# Patient Record
Sex: MALE | Race: BLACK OR AFRICAN AMERICAN | Employment: OTHER | URBAN - NONMETROPOLITAN AREA
[De-identification: names, ages, dates, MRNs, and addresses within clinical notes are randomized per-mention and may not be internally consistent; named-entity substitution may affect disease eponyms.]

---

## 2017-04-04 RX ORDER — TAMSULOSIN HYDROCHLORIDE 0.4 MG/1
CAPSULE ORAL
Qty: 90 CAPSULE | Refills: 0 | OUTPATIENT
Start: 2017-04-04

## 2017-06-22 RX ORDER — METFORMIN HYDROCHLORIDE 500 MG/1
TABLET, EXTENDED RELEASE ORAL
Qty: 180 TABLET | Refills: 0 | OUTPATIENT
Start: 2017-06-22

## 2017-11-28 ENCOUNTER — OFFICE VISIT (OUTPATIENT)
Dept: FAMILY MEDICINE CLINIC | Age: 72
End: 2017-11-28
Payer: MEDICARE

## 2017-11-28 VITALS
HEART RATE: 84 BPM | DIASTOLIC BLOOD PRESSURE: 76 MMHG | BODY MASS INDEX: 42.01 KG/M2 | WEIGHT: 277.2 LBS | SYSTOLIC BLOOD PRESSURE: 138 MMHG | HEIGHT: 68 IN

## 2017-11-28 DIAGNOSIS — K21.9 GASTROESOPHAGEAL REFLUX DISEASE WITHOUT ESOPHAGITIS: ICD-10-CM

## 2017-11-28 DIAGNOSIS — I10 ESSENTIAL HYPERTENSION: Primary | ICD-10-CM

## 2017-11-28 DIAGNOSIS — E11.69 TYPE 2 DIABETES MELLITUS WITH OTHER SPECIFIED COMPLICATION, UNSPECIFIED LONG TERM INSULIN USE STATUS: ICD-10-CM

## 2017-11-28 DIAGNOSIS — K21.9 GASTROESOPHAGEAL REFLUX DISEASE, ESOPHAGITIS PRESENCE NOT SPECIFIED: ICD-10-CM

## 2017-11-28 DIAGNOSIS — N40.0 ENLARGED PROSTATE: ICD-10-CM

## 2017-11-28 DIAGNOSIS — Z23 FLU VACCINE NEED: ICD-10-CM

## 2017-11-28 DIAGNOSIS — E78.2 MIXED HYPERLIPIDEMIA: ICD-10-CM

## 2017-11-28 PROCEDURE — 3017F COLORECTAL CA SCREEN DOC REV: CPT | Performed by: NURSE PRACTITIONER

## 2017-11-28 PROCEDURE — 4040F PNEUMOC VAC/ADMIN/RCVD: CPT | Performed by: NURSE PRACTITIONER

## 2017-11-28 PROCEDURE — 99213 OFFICE O/P EST LOW 20 MIN: CPT | Performed by: NURSE PRACTITIONER

## 2017-11-28 PROCEDURE — 90662 IIV NO PRSV INCREASED AG IM: CPT | Performed by: NURSE PRACTITIONER

## 2017-11-28 PROCEDURE — 1123F ACP DISCUSS/DSCN MKR DOCD: CPT | Performed by: NURSE PRACTITIONER

## 2017-11-28 PROCEDURE — G8417 CALC BMI ABV UP PARAM F/U: HCPCS | Performed by: NURSE PRACTITIONER

## 2017-11-28 PROCEDURE — G8484 FLU IMMUNIZE NO ADMIN: HCPCS | Performed by: NURSE PRACTITIONER

## 2017-11-28 PROCEDURE — G8427 DOCREV CUR MEDS BY ELIG CLIN: HCPCS | Performed by: NURSE PRACTITIONER

## 2017-11-28 PROCEDURE — 1036F TOBACCO NON-USER: CPT | Performed by: NURSE PRACTITIONER

## 2017-11-28 PROCEDURE — 3046F HEMOGLOBIN A1C LEVEL >9.0%: CPT | Performed by: NURSE PRACTITIONER

## 2017-11-28 PROCEDURE — G0008 ADMIN INFLUENZA VIRUS VAC: HCPCS | Performed by: NURSE PRACTITIONER

## 2017-11-28 RX ORDER — ATORVASTATIN CALCIUM 20 MG/1
20 TABLET, FILM COATED ORAL DAILY
COMMUNITY
End: 2017-11-28 | Stop reason: SDUPTHER

## 2017-11-28 RX ORDER — METFORMIN HYDROCHLORIDE 500 MG/1
TABLET, EXTENDED RELEASE ORAL
Qty: 180 TABLET | Refills: 1 | Status: SHIPPED | OUTPATIENT
Start: 2017-11-28 | End: 2018-05-04 | Stop reason: SDUPTHER

## 2017-11-28 RX ORDER — TAMSULOSIN HYDROCHLORIDE 0.4 MG/1
CAPSULE ORAL
Qty: 90 CAPSULE | Refills: 1 | Status: SHIPPED | OUTPATIENT
Start: 2017-11-28 | End: 2018-10-23 | Stop reason: SDUPTHER

## 2017-11-28 RX ORDER — ATORVASTATIN CALCIUM 20 MG/1
20 TABLET, FILM COATED ORAL DAILY
Qty: 90 TABLET | Refills: 1 | Status: SHIPPED | OUTPATIENT
Start: 2017-11-28 | End: 2018-05-10 | Stop reason: SDUPTHER

## 2017-11-28 RX ORDER — RANITIDINE 150 MG/1
TABLET ORAL
Qty: 180 TABLET | Refills: 1 | Status: SHIPPED | OUTPATIENT
Start: 2017-11-28 | End: 2018-05-27 | Stop reason: SDUPTHER

## 2017-11-28 RX ORDER — OMEPRAZOLE 40 MG/1
40 CAPSULE, DELAYED RELEASE ORAL DAILY
COMMUNITY
End: 2018-04-30 | Stop reason: SDUPTHER

## 2017-11-28 RX ORDER — VALSARTAN AND HYDROCHLOROTHIAZIDE 160; 25 MG/1; MG/1
TABLET ORAL
Qty: 90 TABLET | Refills: 1 | Status: SHIPPED | OUTPATIENT
Start: 2017-11-28 | End: 2018-05-04 | Stop reason: SDUPTHER

## 2017-11-28 ASSESSMENT — PATIENT HEALTH QUESTIONNAIRE - PHQ9
1. LITTLE INTEREST OR PLEASURE IN DOING THINGS: 0
SUM OF ALL RESPONSES TO PHQ QUESTIONS 1-9: 1
SUM OF ALL RESPONSES TO PHQ9 QUESTIONS 1 & 2: 1
2. FEELING DOWN, DEPRESSED OR HOPELESS: 1

## 2017-11-28 NOTE — PROGRESS NOTES
abdominal pain, diarrhea, constipation and blood in stool. GERD well controlled    History of hepatic hemangioma which remained stable after several CT scans and therefore he does not require any further. Endocrine:        Type II diabetes stable-labs today     Genitourinary: Negative for hematuria. Benign prostatic hypertrophy and Flomax is working well   Musculoskeletal: Negative for myalgias, back pain, joint swelling and arthralgias. Skin: Negative for rash. Neurological: Negative for dizziness, seizures, syncope, facial asymmetry, weakness, light-headedness, numbness and headaches. History of diabetic peripheral neuropathy stable   Hematological:        Iron deficiency anemia    Psychiatric/Behavioral: Negative for behavioral problems, sleep disturbance and dysphoric mood. The patient is not nervous/anxious. All other systems reviewed and are negative. Objective:   Physical Exam   Constitutional: He is oriented to person, place, and time. He appears well-developed and well-nourished. No distress. Alert and oriented ×3 no acute distress very pleasant morbidly obese 76-year-old male who is age-appropriate   HENT:   Head: Normocephalic and atraumatic. Right Ear: Tympanic membrane, external ear and ear canal normal.   Left Ear: Tympanic membrane, external ear and ear canal normal.   Nose: Nose normal.   Mouth/Throat: Uvula is midline and oropharynx is clear and moist. No oropharyngeal exudate. Neck: Normal range of motion. Neck supple. No JVD present. Carotid bruit is not present. No thyromegaly present. Cardiovascular: Normal rate, regular rhythm and normal heart sounds. Exam reveals no gallop and no friction rub. No murmur heard. Pulmonary/Chest: Effort normal and breath sounds normal. No respiratory distress. He has no wheezes. He has no rales. Abdominal: Soft. Bowel sounds are normal. He exhibits no distension. There is no tenderness.    Obese   Musculoskeletal:

## 2017-12-13 DIAGNOSIS — E11.42 DM TYPE 2 WITH DIABETIC PERIPHERAL NEUROPATHY (HCC): ICD-10-CM

## 2018-01-02 ENCOUNTER — OFFICE VISIT (OUTPATIENT)
Dept: OPTOMETRY | Age: 73
End: 2018-01-02
Payer: MEDICARE

## 2018-01-02 DIAGNOSIS — E11.9 NON-INSULIN DEPENDENT TYPE 2 DIABETES MELLITUS (HCC): Primary | ICD-10-CM

## 2018-01-02 DIAGNOSIS — H52.03 HYPEROPIA OF BOTH EYES WITH ASTIGMATISM AND PRESBYOPIA: ICD-10-CM

## 2018-01-02 DIAGNOSIS — H52.4 HYPEROPIA OF BOTH EYES WITH ASTIGMATISM AND PRESBYOPIA: ICD-10-CM

## 2018-01-02 DIAGNOSIS — H52.203 HYPEROPIA OF BOTH EYES WITH ASTIGMATISM AND PRESBYOPIA: ICD-10-CM

## 2018-01-02 DIAGNOSIS — H25.13 NUCLEAR SCLEROSIS OF BOTH EYES: ICD-10-CM

## 2018-01-02 PROCEDURE — 99203 OFFICE O/P NEW LOW 30 MIN: CPT | Performed by: OPTOMETRIST

## 2018-01-02 RX ORDER — TROPICAMIDE 10 MG/ML
1 SOLUTION/ DROPS OPHTHALMIC ONCE
Status: COMPLETED | OUTPATIENT
Start: 2018-01-02 | End: 2018-01-02

## 2018-01-02 RX ORDER — PHENYLEPHRINE HCL 2.5 %
1 DROPS OPHTHALMIC (EYE) ONCE
Status: COMPLETED | OUTPATIENT
Start: 2018-01-02 | End: 2018-01-02

## 2018-01-02 RX ORDER — BENOXINATE HCL/FLUORESCEIN SOD 0.4%-0.25%
1 DROPS OPHTHALMIC (EYE) ONCE
Status: COMPLETED | OUTPATIENT
Start: 2018-01-02 | End: 2018-01-02

## 2018-01-02 RX ADMIN — Medication 1 DROP: at 14:21

## 2018-01-02 RX ADMIN — TROPICAMIDE 1 DROP: 10 SOLUTION/ DROPS OPHTHALMIC at 14:21

## 2018-01-02 ASSESSMENT — REFRACTION_MANIFEST
OD_AXIS: 095
OD_ADD: +2.25
OS_AXIS: 080
OD_CYLINDER: -0.75
OS_CYLINDER: -1.00
OS_ADD: +2.25
OS_SPHERE: +0.50
OD_SPHERE: +0.75

## 2018-01-02 ASSESSMENT — TONOMETRY
OD_IOP_MMHG: 17
IOP_METHOD: APPLANATION W FLURESS DROP
OS_IOP_MMHG: 17

## 2018-01-02 ASSESSMENT — VISUAL ACUITY
OS_SC+: -1
OS_SC: 20/40
OD_SC: 20/50 OU
METHOD: SNELLEN - LINEAR
OD_SC: 20/30

## 2018-01-02 ASSESSMENT — SLIT LAMP EXAM - LIDS
COMMENTS: NORMAL
COMMENTS: NORMAL

## 2018-01-02 NOTE — PROGRESS NOTES
-1.00 080 +2.25    Type:  fill only if deisred     Expiration Date:  1/3/2020            No diabetic retinopathy   1. Non-insulin dependent type 2 diabetes mellitus ([facility]    2. Hyperopia of both eyes with astigmatism and presbyopia    3.  Nuclear sclerosis of both eyes             Patient Instructions   Keep yearly appointments because of diabetes        Return in about 1 year (around 1/2/2019) for complete eye exam.

## 2018-01-04 ENCOUNTER — OFFICE VISIT (OUTPATIENT)
Dept: PODIATRY | Age: 73
End: 2018-01-04
Payer: MEDICARE

## 2018-01-04 VITALS
HEIGHT: 65 IN | DIASTOLIC BLOOD PRESSURE: 80 MMHG | SYSTOLIC BLOOD PRESSURE: 130 MMHG | BODY MASS INDEX: 42.25 KG/M2 | HEART RATE: 70 BPM | WEIGHT: 253.6 LBS

## 2018-01-04 DIAGNOSIS — E11.42 DM TYPE 2 WITH DIABETIC PERIPHERAL NEUROPATHY (HCC): ICD-10-CM

## 2018-01-04 DIAGNOSIS — M20.42 HAMMER TOES OF BOTH FEET: ICD-10-CM

## 2018-01-04 DIAGNOSIS — B35.1 DERMATOPHYTOSIS OF NAIL: Primary | ICD-10-CM

## 2018-01-04 DIAGNOSIS — M20.41 HAMMER TOES OF BOTH FEET: ICD-10-CM

## 2018-01-04 PROCEDURE — 1036F TOBACCO NON-USER: CPT | Performed by: PODIATRIST

## 2018-01-04 PROCEDURE — 3046F HEMOGLOBIN A1C LEVEL >9.0%: CPT | Performed by: PODIATRIST

## 2018-01-04 PROCEDURE — 1123F ACP DISCUSS/DSCN MKR DOCD: CPT | Performed by: PODIATRIST

## 2018-01-04 PROCEDURE — 4040F PNEUMOC VAC/ADMIN/RCVD: CPT | Performed by: PODIATRIST

## 2018-01-04 PROCEDURE — 11721 DEBRIDE NAIL 6 OR MORE: CPT | Performed by: PODIATRIST

## 2018-01-04 PROCEDURE — 99213 OFFICE O/P EST LOW 20 MIN: CPT | Performed by: PODIATRIST

## 2018-01-04 PROCEDURE — 3017F COLORECTAL CA SCREEN DOC REV: CPT | Performed by: PODIATRIST

## 2018-01-04 PROCEDURE — G8484 FLU IMMUNIZE NO ADMIN: HCPCS | Performed by: PODIATRIST

## 2018-01-04 PROCEDURE — G8417 CALC BMI ABV UP PARAM F/U: HCPCS | Performed by: PODIATRIST

## 2018-01-04 PROCEDURE — G8427 DOCREV CUR MEDS BY ELIG CLIN: HCPCS | Performed by: PODIATRIST

## 2018-01-04 NOTE — PROGRESS NOTES
Foot Care Worksheet  PCP: Tracy Mendoza NP  Last visit: 11/28/17    Nail description:  Thick , Yellow , Crumbly , Marked limitation of ambulation     Pain resulting from thickened and dystrophy of nail plate No    Nails involved  Right   1, 2, 3, 4, 5  (T5-T9)  Left     1, 2, 3, 4, 5  (TA-T4)    Q7 1 Class A Finding - Non traumatic amputation of foot No    Q8 2 Class B Findings - Absent DP pulse No, Absent PT pulse No, Advanced tropic changes (3 required) Yes    Decrease hair growth Yes, Nail changes/thickening Yes, Pigmented changes/discoloration Yes, Skin texture (thin, shiny) Yes, Skin color (rubor/redness) No    Q9 1 Class B and 2 Class C Findings  Claudication No, Temperature change No, Paresthesia No, Burning Yes, Edema Yes

## 2018-01-22 DIAGNOSIS — E11.42 TYPE 2 DIABETES MELLITUS WITH POLYNEUROPATHY (HCC): ICD-10-CM

## 2018-02-23 DIAGNOSIS — E11.69 TYPE 2 DIABETES MELLITUS WITH OTHER SPECIFIED COMPLICATION, UNSPECIFIED LONG TERM INSULIN USE STATUS: Primary | ICD-10-CM

## 2018-02-23 RX ORDER — LANCETS 30 GAUGE
EACH MISCELLANEOUS
Qty: 100 EACH | Refills: 3 | Status: SHIPPED | OUTPATIENT
Start: 2018-02-23 | End: 2018-05-04 | Stop reason: SDUPTHER

## 2018-03-13 ENCOUNTER — OFFICE VISIT (OUTPATIENT)
Dept: FAMILY MEDICINE CLINIC | Age: 73
End: 2018-03-13
Payer: MEDICARE

## 2018-03-13 VITALS
SYSTOLIC BLOOD PRESSURE: 130 MMHG | HEIGHT: 65 IN | WEIGHT: 292.6 LBS | DIASTOLIC BLOOD PRESSURE: 70 MMHG | BODY MASS INDEX: 48.75 KG/M2 | HEART RATE: 72 BPM

## 2018-03-13 DIAGNOSIS — E66.01 OBESITY, MORBID (HCC): Primary | ICD-10-CM

## 2018-03-13 DIAGNOSIS — E11.42 DM TYPE 2 WITH DIABETIC PERIPHERAL NEUROPATHY (HCC): ICD-10-CM

## 2018-03-13 PROCEDURE — G8427 DOCREV CUR MEDS BY ELIG CLIN: HCPCS | Performed by: NURSE PRACTITIONER

## 2018-03-13 PROCEDURE — 1123F ACP DISCUSS/DSCN MKR DOCD: CPT | Performed by: NURSE PRACTITIONER

## 2018-03-13 PROCEDURE — 3046F HEMOGLOBIN A1C LEVEL >9.0%: CPT | Performed by: NURSE PRACTITIONER

## 2018-03-13 PROCEDURE — 3017F COLORECTAL CA SCREEN DOC REV: CPT | Performed by: NURSE PRACTITIONER

## 2018-03-13 PROCEDURE — 4040F PNEUMOC VAC/ADMIN/RCVD: CPT | Performed by: NURSE PRACTITIONER

## 2018-03-13 PROCEDURE — G8482 FLU IMMUNIZE ORDER/ADMIN: HCPCS | Performed by: NURSE PRACTITIONER

## 2018-03-13 PROCEDURE — 99213 OFFICE O/P EST LOW 20 MIN: CPT | Performed by: NURSE PRACTITIONER

## 2018-03-13 PROCEDURE — 1036F TOBACCO NON-USER: CPT | Performed by: NURSE PRACTITIONER

## 2018-03-13 PROCEDURE — G8417 CALC BMI ABV UP PARAM F/U: HCPCS | Performed by: NURSE PRACTITIONER

## 2018-03-13 ASSESSMENT — ENCOUNTER SYMPTOMS
DIARRHEA: 0
SHORTNESS OF BREATH: 0
CONSTIPATION: 0
WHEEZING: 0

## 2018-03-13 NOTE — PROGRESS NOTES
Subjective:      Patient ID: Gonzalo Henao is a 75 y.o. male. Pt presents to the clinic to discuss weight gain. Pt states that he was 79 in January when he was in to see Dr. Katia Arnett and today he is 89. He states that he rides the bike daily at the [facility]. He met with the dietician several time while he lived in [place] and thinks that he would benefit from seeing another one. He has not been watching what he eats as closely as he was doing. He states that he notices when he gains the weight his back hurts more and he becomes a little more short of breath. He denies chest pain. He denies lower leg edema. He has no further concerns at this time. Past Medical History:   Diagnosis Date    Anemia     chronic, negative work up with EGD and Colonoscopy.  DJD (degenerative joint disease) of lumbar spine     Dry eye syndrome     GERD (gastroesophageal reflux disease)     Hepatic hemangioma     Hypertension     Keratitis     Secondary to dry eye syndrome.  Nuclear sclerotic cataract of both eyes     Obesity, morbid ([address]     Obstructive sleep apnea of adult     non compliant with cpap    Onychomycosis     1, 2, 3, 4, and 5, bilateral.    Type II or unspecified type diabetes mellitus without mention of complication, not stated as uncontrolled        Past Surgical History:   Procedure Laterality Date    COLONOSCOPY  09/15/2006    Diffuse diverticulosis slightly greater in the sigmoid colon and grade 1 internal hemorrhoids.  CYST REMOVAL      right side of  scalp    UMBILICAL HERNIA REPAIR  2013    UPPER GASTROINTESTINAL ENDOSCOPY  09/15/2006    Mild to moderate diffuse gastritis, mild to moderate diffuse duodenitis.        Social History     Social History    Marital status: Single     Spouse name: N/A    Number of children: N/A    Years of education: N/A     Social History Main Topics    Smoking status: Former Smoker     Years: 15.00     Types: Cigars     Quit date: 12/1/1970    Smokeless tobacco: Never Used      Comment: quit over 30 years ago    Alcohol use No      Comment: no alcohol for many years    Drug use: No    Sexual activity: Not Asked     Other Topics Concern    None     Social History Narrative    None       Family History   Problem Relation Age of Onset    Ovarian Cancer Mother     High Blood Pressure Sister     Diabetes Sister       at 76    High Blood Pressure Brother      all 4 brothers have it    Stroke Brother     Heart Attack Father      Acute myocardial infarction.  High Blood Pressure Brother     Stroke Brother     Dementia Sister       at 76    Cataracts Neg Hx     Glaucoma Neg Hx        No Known Allergies    Current Outpatient Prescriptions   Medication Sig Dispense Refill    valsartan-hydrochlorothiazide (DIOVAN-HCT) 160-25 MG per tablet TAKE 1 TABLET DAILY 90 tablet 1    metFORMIN (GLUCOPHAGE-XR) 500 MG extended release tablet TAKE 1 TABLET TWICE A  tablet 1    tamsulosin (FLOMAX) 0.4 MG capsule TAKE 1 CAPSULE DAILY 90 capsule 1    ranitidine (ZANTAC) 150 MG tablet TAKE 1 TABLET TWICE A  tablet 1    atorvastatin (LIPITOR) 20 MG tablet Take 1 tablet by mouth daily 90 tablet 1    Lancets MISC Check blood sugar daily 100 each 3    glucose blood VI test strips (GLUCOCARD VITAL TEST) strip 1 each by In Vitro route 2 times daily 100 each 1    Foot Care Products (DIABETIC INSOLES) MISC 1 Units by Does not apply route daily DM type 2 with diabetic peripheral neuropathy (HCC)  (primary encounter diagnosis)  Dermatophytosis of nail 3 each 0    Diabetic Shoe MISC by Does not apply route DM type 2 with diabetic peripheral neuropathy (HCC)  (primary encounter diagnosis)  Dermatophytosis of nail 1 each 0    Blood Glucose Monitoring Suppl KENA Check blood sugar daily.  1 Device 0    omeprazole (PRILOSEC) 40 MG delayed release capsule Take 40 mg by mouth daily      VIAGRA 100 MG tablet TAKE ONE-HALF TO ONE TABLET BY MOUTH AS

## 2018-03-23 DIAGNOSIS — E11.42 TYPE 2 DIABETES MELLITUS WITH POLYNEUROPATHY (HCC): ICD-10-CM

## 2018-04-09 ENCOUNTER — TELEPHONE (OUTPATIENT)
Dept: PODIATRY | Age: 73
End: 2018-04-09

## 2018-04-30 DIAGNOSIS — K21.9 GASTROESOPHAGEAL REFLUX DISEASE WITHOUT ESOPHAGITIS: Primary | ICD-10-CM

## 2018-04-30 RX ORDER — OMEPRAZOLE 40 MG/1
40 CAPSULE, DELAYED RELEASE ORAL DAILY
Qty: 90 CAPSULE | Refills: 1 | Status: SHIPPED | OUTPATIENT
Start: 2018-04-30 | End: 2018-10-27 | Stop reason: SDUPTHER

## 2018-05-04 DIAGNOSIS — E11.69 TYPE 2 DIABETES MELLITUS WITH OTHER SPECIFIED COMPLICATION, UNSPECIFIED LONG TERM INSULIN USE STATUS: ICD-10-CM

## 2018-05-04 DIAGNOSIS — I10 ESSENTIAL HYPERTENSION: ICD-10-CM

## 2018-05-04 RX ORDER — METFORMIN HYDROCHLORIDE 500 MG/1
TABLET, EXTENDED RELEASE ORAL
Qty: 180 TABLET | Refills: 1 | Status: SHIPPED | OUTPATIENT
Start: 2018-05-04 | End: 2018-10-23 | Stop reason: SDUPTHER

## 2018-05-04 RX ORDER — LANCETS 30 GAUGE
EACH MISCELLANEOUS
Qty: 100 EACH | Refills: 3 | Status: SHIPPED | OUTPATIENT
Start: 2018-05-04 | End: 2018-09-26 | Stop reason: SDUPTHER

## 2018-05-04 RX ORDER — VALSARTAN AND HYDROCHLOROTHIAZIDE 160; 25 MG/1; MG/1
TABLET ORAL
Qty: 90 TABLET | Refills: 1 | Status: SHIPPED | OUTPATIENT
Start: 2018-05-04 | End: 2018-10-23 | Stop reason: SDUPTHER

## 2018-05-08 DIAGNOSIS — E11.42 DM TYPE 2 WITH DIABETIC PERIPHERAL NEUROPATHY (HCC): ICD-10-CM

## 2018-05-10 DIAGNOSIS — E78.2 MIXED HYPERLIPIDEMIA: ICD-10-CM

## 2018-05-10 RX ORDER — ATORVASTATIN CALCIUM 20 MG/1
TABLET, FILM COATED ORAL
Qty: 90 TABLET | Refills: 1 | Status: SHIPPED | OUTPATIENT
Start: 2018-05-10 | End: 2018-10-23 | Stop reason: SDUPTHER

## 2018-05-22 ENCOUNTER — HOSPITAL ENCOUNTER (OUTPATIENT)
Dept: LAB | Age: 73
Setting detail: SPECIMEN
Discharge: HOME | End: 2018-05-22
Payer: MEDICARE

## 2018-05-22 ENCOUNTER — OFFICE VISIT (OUTPATIENT)
Dept: PODIATRY | Age: 73
End: 2018-05-22
Payer: MEDICARE

## 2018-05-22 VITALS
SYSTOLIC BLOOD PRESSURE: 132 MMHG | DIASTOLIC BLOOD PRESSURE: 74 MMHG | BODY MASS INDEX: 49.12 KG/M2 | HEIGHT: 65 IN | WEIGHT: 294.8 LBS | HEART RATE: 76 BPM

## 2018-05-22 DIAGNOSIS — I10 ESSENTIAL HYPERTENSION: ICD-10-CM

## 2018-05-22 DIAGNOSIS — E78.2 MIXED HYPERLIPIDEMIA: ICD-10-CM

## 2018-05-22 DIAGNOSIS — E11.69 TYPE 2 DIABETES MELLITUS WITH OTHER SPECIFIED COMPLICATION, UNSPECIFIED LONG TERM INSULIN USE STATUS: ICD-10-CM

## 2018-05-22 DIAGNOSIS — B35.1 DERMATOPHYTOSIS OF NAIL: ICD-10-CM

## 2018-05-22 DIAGNOSIS — E11.42 DM TYPE 2 WITH DIABETIC PERIPHERAL NEUROPATHY (HCC): Primary | ICD-10-CM

## 2018-05-22 LAB
ALBUMIN SERPL-MCNC: 3.9 G/DL (ref 3.5–5.2)
ALBUMIN/GLOBULIN RATIO: 0.9 (ref 1–2.5)
ALP BLD-CCNC: 112 U/L (ref 40–129)
ALT SERPL-CCNC: 14 U/L (ref 5–41)
ANION GAP SERPL CALCULATED.3IONS-SCNC: 11 MMOL/L (ref 9–17)
AST SERPL-CCNC: 14 U/L
BILIRUB SERPL-MCNC: 0.72 MG/DL (ref 0.3–1.2)
BUN BLDV-MCNC: 11 MG/DL (ref 8–23)
BUN/CREAT BLD: 11 (ref 9–20)
CALCIUM SERPL-MCNC: 9.4 MG/DL (ref 8.6–10.4)
CHLORIDE BLD-SCNC: 100 MMOL/L (ref 98–107)
CHOLESTEROL/HDL RATIO: 2.4
CHOLESTEROL: 102 MG/DL
CO2: 30 MMOL/L (ref 20–31)
CREAT SERPL-MCNC: 1.04 MG/DL (ref 0.7–1.2)
CREATININE URINE: 154.9 MG/DL (ref 39–259)
ESTIMATED AVERAGE GLUCOSE: 137 MG/DL
GFR AFRICAN AMERICAN: >60 ML/MIN
GFR NON-AFRICAN AMERICAN: >60 ML/MIN
GFR SERPL CREATININE-BSD FRML MDRD: ABNORMAL ML/MIN/{1.73_M2}
GFR SERPL CREATININE-BSD FRML MDRD: ABNORMAL ML/MIN/{1.73_M2}
GLUCOSE BLD-MCNC: 129 MG/DL (ref 70–99)
HBA1C MFR BLD: 6.4 % (ref 4.8–5.9)
HDLC SERPL-MCNC: 42 MG/DL
LDL CHOLESTEROL: 15 MG/DL (ref 0–130)
MICROALBUMIN/CREAT 24H UR: 79 MG/L
MICROALBUMIN/CREAT UR-RTO: 51 MCG/MG CREAT
POTASSIUM SERPL-SCNC: 3.9 MMOL/L (ref 3.7–5.3)
SODIUM BLD-SCNC: 141 MMOL/L (ref 135–144)
TOTAL PROTEIN: 8.1 G/DL (ref 6.4–8.3)
TRIGL SERPL-MCNC: 227 MG/DL
VLDLC SERPL CALC-MCNC: ABNORMAL MG/DL (ref 1–30)

## 2018-05-22 PROCEDURE — 36415 COLL VENOUS BLD VENIPUNCTURE: CPT

## 2018-05-22 PROCEDURE — 82570 ASSAY OF URINE CREATININE: CPT

## 2018-05-22 PROCEDURE — 82043 UR ALBUMIN QUANTITATIVE: CPT

## 2018-05-22 PROCEDURE — 83036 HEMOGLOBIN GLYCOSYLATED A1C: CPT

## 2018-05-22 PROCEDURE — 80061 LIPID PANEL: CPT

## 2018-05-22 PROCEDURE — 11721 DEBRIDE NAIL 6 OR MORE: CPT | Performed by: PODIATRIST

## 2018-05-22 PROCEDURE — 99999 PR OFFICE/OUTPT VISIT,PROCEDURE ONLY: CPT | Performed by: PODIATRIST

## 2018-05-22 PROCEDURE — 80053 COMPREHEN METABOLIC PANEL: CPT

## 2018-05-27 DIAGNOSIS — K21.9 GASTROESOPHAGEAL REFLUX DISEASE, ESOPHAGITIS PRESENCE NOT SPECIFIED: ICD-10-CM

## 2018-05-27 DIAGNOSIS — K21.9 GASTROESOPHAGEAL REFLUX DISEASE WITHOUT ESOPHAGITIS: ICD-10-CM

## 2018-05-29 ENCOUNTER — OFFICE VISIT (OUTPATIENT)
Dept: FAMILY MEDICINE CLINIC | Age: 73
End: 2018-05-29
Payer: MEDICARE

## 2018-05-29 VITALS
HEART RATE: 72 BPM | BODY MASS INDEX: 48.32 KG/M2 | DIASTOLIC BLOOD PRESSURE: 80 MMHG | HEIGHT: 65 IN | SYSTOLIC BLOOD PRESSURE: 130 MMHG | WEIGHT: 290 LBS

## 2018-05-29 DIAGNOSIS — Z12.11 ENCOUNTER FOR SCREENING COLONOSCOPY: ICD-10-CM

## 2018-05-29 DIAGNOSIS — G47.33 OBSTRUCTIVE SLEEP APNEA OF ADULT: ICD-10-CM

## 2018-05-29 DIAGNOSIS — I10 ESSENTIAL HYPERTENSION: ICD-10-CM

## 2018-05-29 DIAGNOSIS — Z00.00 ROUTINE GENERAL MEDICAL EXAMINATION AT A HEALTH CARE FACILITY: Primary | ICD-10-CM

## 2018-05-29 DIAGNOSIS — E11.42 DM TYPE 2 WITH DIABETIC PERIPHERAL NEUROPATHY (HCC): ICD-10-CM

## 2018-05-29 DIAGNOSIS — E66.01 OBESITY, MORBID (HCC): ICD-10-CM

## 2018-05-29 DIAGNOSIS — G31.84 MILD COGNITIVE IMPAIRMENT: ICD-10-CM

## 2018-05-29 PROCEDURE — G8417 CALC BMI ABV UP PARAM F/U: HCPCS | Performed by: NURSE PRACTITIONER

## 2018-05-29 PROCEDURE — G0438 PPPS, INITIAL VISIT: HCPCS | Performed by: NURSE PRACTITIONER

## 2018-05-29 PROCEDURE — 2022F DILAT RTA XM EVC RTNOPTHY: CPT | Performed by: NURSE PRACTITIONER

## 2018-05-29 PROCEDURE — 1036F TOBACCO NON-USER: CPT | Performed by: NURSE PRACTITIONER

## 2018-05-29 PROCEDURE — 3044F HG A1C LEVEL LT 7.0%: CPT | Performed by: NURSE PRACTITIONER

## 2018-05-29 PROCEDURE — 99214 OFFICE O/P EST MOD 30 MIN: CPT | Performed by: NURSE PRACTITIONER

## 2018-05-29 PROCEDURE — 1123F ACP DISCUSS/DSCN MKR DOCD: CPT | Performed by: NURSE PRACTITIONER

## 2018-05-29 PROCEDURE — 3017F COLORECTAL CA SCREEN DOC REV: CPT | Performed by: NURSE PRACTITIONER

## 2018-05-29 PROCEDURE — 4040F PNEUMOC VAC/ADMIN/RCVD: CPT | Performed by: NURSE PRACTITIONER

## 2018-05-29 PROCEDURE — G8427 DOCREV CUR MEDS BY ELIG CLIN: HCPCS | Performed by: NURSE PRACTITIONER

## 2018-05-29 RX ORDER — RANITIDINE 150 MG/1
TABLET ORAL
Qty: 180 TABLET | Refills: 1 | Status: SHIPPED | OUTPATIENT
Start: 2018-05-29 | End: 2018-11-25 | Stop reason: SDUPTHER

## 2018-05-29 ASSESSMENT — PATIENT HEALTH QUESTIONNAIRE - PHQ9: SUM OF ALL RESPONSES TO PHQ QUESTIONS 1-9: 1

## 2018-05-29 ASSESSMENT — LIFESTYLE VARIABLES: HOW OFTEN DO YOU HAVE A DRINK CONTAINING ALCOHOL: 0

## 2018-05-29 ASSESSMENT — ANXIETY QUESTIONNAIRES: GAD7 TOTAL SCORE: 1

## 2018-05-30 ASSESSMENT — ENCOUNTER SYMPTOMS
CONSTIPATION: 0
VOMITING: 0
NAUSEA: 0
BLURRED VISION: 0
WHEEZING: 0
VISUAL CHANGE: 0
COUGH: 0
SHORTNESS OF BREATH: 0

## 2018-07-20 DIAGNOSIS — E11.42 TYPE 2 DIABETES MELLITUS WITH POLYNEUROPATHY (HCC): ICD-10-CM

## 2018-09-06 ENCOUNTER — OFFICE VISIT (OUTPATIENT)
Dept: PODIATRY | Age: 73
End: 2018-09-06
Payer: MEDICARE

## 2018-09-06 VITALS
BODY MASS INDEX: 49.48 KG/M2 | WEIGHT: 297 LBS | DIASTOLIC BLOOD PRESSURE: 80 MMHG | SYSTOLIC BLOOD PRESSURE: 138 MMHG | HEART RATE: 88 BPM | HEIGHT: 65 IN

## 2018-09-06 DIAGNOSIS — B35.1 DERMATOPHYTOSIS OF NAIL: ICD-10-CM

## 2018-09-06 DIAGNOSIS — E11.42 DM TYPE 2 WITH DIABETIC PERIPHERAL NEUROPATHY (HCC): Primary | ICD-10-CM

## 2018-09-06 PROCEDURE — 99999 PR OFFICE/OUTPT VISIT,PROCEDURE ONLY: CPT | Performed by: PODIATRIST

## 2018-09-06 PROCEDURE — 11721 DEBRIDE NAIL 6 OR MORE: CPT | Performed by: PODIATRIST

## 2018-09-06 NOTE — PROGRESS NOTES
Subjective:  Patient presents to [facility] today for routine diabetic foot care. Patient denies any new problems with their feet. Patient's diabetic control has been not changed. No Known Allergies    Past Medical History:   Diagnosis Date    Anemia     chronic, negative work up with EGD and Colonoscopy.  DJD (degenerative joint disease) of lumbar spine     Dry eye syndrome     GERD (gastroesophageal reflux disease)     Hepatic hemangioma     Hypertension     Keratitis     Secondary to dry eye syndrome.  Nuclear sclerotic cataract of both eyes     Obesity, morbid ([address]     Obstructive sleep apnea of adult     non compliant with cpap    Onychomycosis     1, 2, 3, 4, and 5, bilateral.    Type II or unspecified type diabetes mellitus without mention of complication, not stated as uncontrolled        Prior to Admission medications    Medication Sig Start Date End Date Taking? Authorizing Provider   blood glucose test strips (GLUCOCARD VITAL TEST) strip 1 each by In Vitro route 2 times daily 7/20/18  Yes ROCÍO Medina CNP   ranitidine (ZANTAC) 150 MG tablet TAKE 1 TABLET TWICE A DAY 5/29/18  Yes ROCÍO Medina CNP   Foot Care Products (DIABETIC INSOLES) MISC 1 Units by Does not apply route daily DM type 2 with diabetic peripheral neuropathy ([address]  (primary encounter diagnosis)  Dermatophytosis of nail 5/22/18  Yes Johanny Miranda DPM   Diabetic Shoe MISC by Does not apply route DM type 2 with diabetic peripheral neuropathy ([address]  (primary encounter diagnosis)  Dermatophytosis of nail 5/22/18  Yes Shauna Chopra DPM   atorvastatin (LIPITOR) 20 MG tablet TAKE 1 TABLET DAILY 5/10/18  Yes ROCÍO Medina CNP   Blood Glucose Monitoring Suppl KENA Check blood sugar daily.  5/8/18  Yes ROCÍO Medina CNP   metFORMIN (GLUCOPHAGE-XR) 500 MG extended release tablet TAKE 1 TABLET TWICE A DAY 5/4/18  Yes Daryle Hoyle Muscle strength 5/5, bilateral.  Pain absent upon palpation bilateral. Normal medial longitudinal arch, bilateral.  Ankle ROM within normal limits,bilateral.  1st MPJ ROM within normal limits, bilateral.  Dorsally contracted digits present. No other foot deformities. Integument:  Open lesion absent, Bilateral.  Interdigital maceration absent to web spaces,absent Bilateral.  Nails left 1, 2, 3, 4, 5 and right 1, 2, 3, 4, 5 thickened, dystrophic and crumbly, discolored with subungual debris. Fissures absent, Bilateral. Hyperkeratotic tissue is absent. Assessment:    Diagnosis Orders   1. DM type 2 with diabetic peripheral neuropathy ([address]     2. Dermatophytosis of nail         Plan:  Diabetic foot education and exam.  Nails as mentioned above debrided in length and thickness. Patient advised about OTC treatments for nails and callous. Patient will follow up in 10 weeks for routine foot care or PRN if any further problems arise.

## 2018-09-26 DIAGNOSIS — E11.42 DM TYPE 2 WITH DIABETIC PERIPHERAL NEUROPATHY (HCC): Primary | ICD-10-CM

## 2018-09-26 DIAGNOSIS — E11.69 TYPE 2 DIABETES MELLITUS WITH OTHER SPECIFIED COMPLICATION, UNSPECIFIED WHETHER LONG TERM INSULIN USE (HCC): ICD-10-CM

## 2018-09-27 RX ORDER — LANCETS 30 GAUGE
EACH MISCELLANEOUS
Qty: 100 EACH | Refills: 3 | Status: SHIPPED | OUTPATIENT
Start: 2018-09-27 | End: 2018-12-10 | Stop reason: SDUPTHER

## 2018-10-05 ENCOUNTER — OFFICE VISIT (OUTPATIENT)
Dept: FAMILY MEDICINE CLINIC | Age: 73
End: 2018-10-05
Payer: MEDICARE

## 2018-10-05 VITALS
HEIGHT: 68 IN | DIASTOLIC BLOOD PRESSURE: 70 MMHG | BODY MASS INDEX: 44.71 KG/M2 | SYSTOLIC BLOOD PRESSURE: 132 MMHG | HEART RATE: 78 BPM | WEIGHT: 295 LBS | OXYGEN SATURATION: 94 %

## 2018-10-05 DIAGNOSIS — E66.01 OBESITY, MORBID (HCC): Primary | ICD-10-CM

## 2018-10-05 PROCEDURE — 1036F TOBACCO NON-USER: CPT | Performed by: NURSE PRACTITIONER

## 2018-10-05 PROCEDURE — 99213 OFFICE O/P EST LOW 20 MIN: CPT | Performed by: NURSE PRACTITIONER

## 2018-10-05 PROCEDURE — G8427 DOCREV CUR MEDS BY ELIG CLIN: HCPCS | Performed by: NURSE PRACTITIONER

## 2018-10-05 PROCEDURE — 1123F ACP DISCUSS/DSCN MKR DOCD: CPT | Performed by: NURSE PRACTITIONER

## 2018-10-05 PROCEDURE — G8417 CALC BMI ABV UP PARAM F/U: HCPCS | Performed by: NURSE PRACTITIONER

## 2018-10-05 PROCEDURE — 1101F PT FALLS ASSESS-DOCD LE1/YR: CPT | Performed by: NURSE PRACTITIONER

## 2018-10-05 PROCEDURE — 3017F COLORECTAL CA SCREEN DOC REV: CPT | Performed by: NURSE PRACTITIONER

## 2018-10-05 PROCEDURE — 4040F PNEUMOC VAC/ADMIN/RCVD: CPT | Performed by: NURSE PRACTITIONER

## 2018-10-05 PROCEDURE — G8484 FLU IMMUNIZE NO ADMIN: HCPCS | Performed by: NURSE PRACTITIONER

## 2018-10-07 ASSESSMENT — ENCOUNTER SYMPTOMS
WHEEZING: 0
SHORTNESS OF BREATH: 0
CONSTIPATION: 0
DIARRHEA: 0

## 2018-10-07 NOTE — PROGRESS NOTES
current facility-administered medications for this visit. Review of Systems   Constitutional: Negative for activity change and appetite change. Respiratory: Negative for shortness of breath and wheezing. Cardiovascular: Negative for palpitations. Gastrointestinal: Negative for constipation and diarrhea. Objective:   Physical Exam   Constitutional: He is oriented to person, place, and time. He appears well-developed and well-nourished. No distress. Morbidly obese   HENT:   Head: Normocephalic and atraumatic. Neck: Neck supple. Cardiovascular: Normal rate, regular rhythm and normal heart sounds. Pulmonary/Chest: Effort normal and breath sounds normal. No respiratory distress. He has no wheezes. He has no rales. Neurological: He is alert and oriented to person, place, and time. Assessment:            Plan:      Discussed with patient that he is to work on his diet.    He is to reduce the amount of sweets he is doing  I will see him back in November for chronic medical follow with labs prior  Will do additional testing if patient continues to gain weight  Pt to return ROCÍO Adams - CNP

## 2018-10-23 DIAGNOSIS — E78.2 MIXED HYPERLIPIDEMIA: ICD-10-CM

## 2018-10-23 DIAGNOSIS — E11.42 DM TYPE 2 WITH DIABETIC PERIPHERAL NEUROPATHY (HCC): Primary | ICD-10-CM

## 2018-10-23 DIAGNOSIS — N40.0 ENLARGED PROSTATE: ICD-10-CM

## 2018-10-23 DIAGNOSIS — I10 ESSENTIAL HYPERTENSION: ICD-10-CM

## 2018-10-23 RX ORDER — TAMSULOSIN HYDROCHLORIDE 0.4 MG/1
CAPSULE ORAL
Qty: 90 CAPSULE | Refills: 1 | Status: SHIPPED | OUTPATIENT
Start: 2018-10-23 | End: 2019-03-29 | Stop reason: SDUPTHER

## 2018-10-23 RX ORDER — METFORMIN HYDROCHLORIDE 500 MG/1
TABLET, EXTENDED RELEASE ORAL
Qty: 180 TABLET | Refills: 1 | Status: SHIPPED | OUTPATIENT
Start: 2018-10-23 | End: 2019-04-21 | Stop reason: SDUPTHER

## 2018-10-23 RX ORDER — VALSARTAN AND HYDROCHLOROTHIAZIDE 160; 25 MG/1; MG/1
TABLET ORAL
Qty: 90 TABLET | Refills: 1 | Status: SHIPPED | OUTPATIENT
Start: 2018-10-23 | End: 2019-04-21 | Stop reason: SDUPTHER

## 2018-10-23 RX ORDER — ATORVASTATIN CALCIUM 20 MG/1
TABLET, FILM COATED ORAL
Qty: 90 TABLET | Refills: 1 | Status: SHIPPED | OUTPATIENT
Start: 2018-10-23 | End: 2019-04-21 | Stop reason: SDUPTHER

## 2018-10-27 DIAGNOSIS — K21.9 GASTROESOPHAGEAL REFLUX DISEASE WITHOUT ESOPHAGITIS: ICD-10-CM

## 2018-10-29 RX ORDER — OMEPRAZOLE 40 MG/1
CAPSULE, DELAYED RELEASE ORAL
Qty: 90 CAPSULE | Refills: 1 | Status: SHIPPED | OUTPATIENT
Start: 2018-10-29 | End: 2019-04-27 | Stop reason: SDUPTHER

## 2018-11-25 DIAGNOSIS — K21.9 GASTROESOPHAGEAL REFLUX DISEASE, ESOPHAGITIS PRESENCE NOT SPECIFIED: ICD-10-CM

## 2018-11-25 DIAGNOSIS — K21.9 GASTROESOPHAGEAL REFLUX DISEASE WITHOUT ESOPHAGITIS: ICD-10-CM

## 2018-11-26 DIAGNOSIS — E11.42 TYPE 2 DIABETES MELLITUS WITH POLYNEUROPATHY (HCC): ICD-10-CM

## 2018-11-26 RX ORDER — RANITIDINE 150 MG/1
TABLET ORAL
Qty: 180 TABLET | Refills: 1 | Status: SHIPPED | OUTPATIENT
Start: 2018-11-26 | End: 2019-05-25 | Stop reason: SDUPTHER

## 2018-11-29 ENCOUNTER — HOSPITAL ENCOUNTER (OUTPATIENT)
Dept: LAB | Age: 73
Discharge: HOME | End: 2018-11-29
Payer: MEDICARE

## 2018-11-29 ENCOUNTER — OFFICE VISIT (OUTPATIENT)
Dept: PODIATRY | Age: 73
End: 2018-11-29
Payer: MEDICARE

## 2018-11-29 ENCOUNTER — TELEPHONE (OUTPATIENT)
Dept: FAMILY MEDICINE CLINIC | Age: 73
End: 2018-11-29

## 2018-11-29 VITALS
DIASTOLIC BLOOD PRESSURE: 76 MMHG | WEIGHT: 300.4 LBS | RESPIRATION RATE: 20 BRPM | HEART RATE: 72 BPM | HEIGHT: 66 IN | SYSTOLIC BLOOD PRESSURE: 136 MMHG | BODY MASS INDEX: 48.28 KG/M2

## 2018-11-29 DIAGNOSIS — E11.42 DM TYPE 2 WITH DIABETIC PERIPHERAL NEUROPATHY (HCC): Primary | ICD-10-CM

## 2018-11-29 DIAGNOSIS — E11.42 DM TYPE 2 WITH DIABETIC PERIPHERAL NEUROPATHY (HCC): ICD-10-CM

## 2018-11-29 DIAGNOSIS — I10 ESSENTIAL HYPERTENSION: ICD-10-CM

## 2018-11-29 DIAGNOSIS — B35.1 DERMATOPHYTOSIS OF NAIL: ICD-10-CM

## 2018-11-29 LAB
ANION GAP SERPL CALCULATED.3IONS-SCNC: 10 MMOL/L (ref 9–17)
BUN BLDV-MCNC: 14 MG/DL (ref 8–23)
BUN/CREAT BLD: 13 (ref 9–20)
CALCIUM SERPL-MCNC: 9.6 MG/DL (ref 8.6–10.4)
CHLORIDE BLD-SCNC: 99 MMOL/L (ref 98–107)
CHOLESTEROL/HDL RATIO: 2.5
CHOLESTEROL: 104 MG/DL
CO2: 30 MMOL/L (ref 20–31)
CREAT SERPL-MCNC: 1.08 MG/DL (ref 0.7–1.2)
ESTIMATED AVERAGE GLUCOSE: 154 MG/DL
GFR AFRICAN AMERICAN: >60 ML/MIN
GFR NON-AFRICAN AMERICAN: >60 ML/MIN
GFR SERPL CREATININE-BSD FRML MDRD: ABNORMAL ML/MIN/{1.73_M2}
GFR SERPL CREATININE-BSD FRML MDRD: ABNORMAL ML/MIN/{1.73_M2}
GLUCOSE BLD-MCNC: 149 MG/DL (ref 70–99)
HBA1C MFR BLD: 7 % (ref 4.8–5.9)
HDLC SERPL-MCNC: 42 MG/DL
LDL CHOLESTEROL: 42 MG/DL (ref 0–130)
POTASSIUM SERPL-SCNC: 3.9 MMOL/L (ref 3.7–5.3)
SODIUM BLD-SCNC: 139 MMOL/L (ref 135–144)
TRIGL SERPL-MCNC: 101 MG/DL
VLDLC SERPL CALC-MCNC: NORMAL MG/DL (ref 1–30)

## 2018-11-29 PROCEDURE — 36415 COLL VENOUS BLD VENIPUNCTURE: CPT

## 2018-11-29 PROCEDURE — 80061 LIPID PANEL: CPT

## 2018-11-29 PROCEDURE — 83036 HEMOGLOBIN GLYCOSYLATED A1C: CPT

## 2018-11-29 PROCEDURE — 80048 BASIC METABOLIC PNL TOTAL CA: CPT

## 2018-11-29 PROCEDURE — 99999 PR OFFICE/OUTPT VISIT,PROCEDURE ONLY: CPT | Performed by: PODIATRIST

## 2018-11-29 PROCEDURE — 11721 DEBRIDE NAIL 6 OR MORE: CPT | Performed by: PODIATRIST

## 2018-11-29 NOTE — PROGRESS NOTES
INSOLES) MISC 1 Units by Does not apply route daily DM type 2 with diabetic peripheral neuropathy (HCC)  (primary encounter diagnosis)  Dermatophytosis of nail 5/22/18  Yes Faby Pickard DPM   Diabetic Shoe MISC by Does not apply route DM type 2 with diabetic peripheral neuropathy ([address]  (primary encounter diagnosis)  Dermatophytosis of nail 5/22/18  Yes Faby Pickard DPM   Blood Glucose Monitoring Suppl KENA Check blood sugar daily. 5/8/18  Yes ROCÍO Betts CNP   VIAGRA 100 MG tablet TAKE ONE-HALF TO ONE TABLET BY MOUTH AS NEEDED 6/13/16  Yes ROCÍO Betts CNP   fluticasone (FLONASE) 50 MCG/ACT nasal spray USE TWO SPRAYS IN EACH NOSTRIL EVERY DAY 4/4/16  Yes Kalyn Meléndez DO   aspirin 81 MG tablet Take 81 mg by mouth daily. Yes Historical Provider, MD       Social History   Substance Use Topics    Smoking status: Former Smoker     Years: 15.00     Types: Cigars     Quit date: 12/1/1970    Smokeless tobacco: Never Used      Comment: quit over 30 years ago    Alcohol use No      Comment: no alcohol for many years     Review of Systems: All 12 systems reviewed and pertinent positives noted above. Objective:  Vascular: DP and PT pulses palpable 2/4, bilateral.  CFT <3 seconds, bilateral.  Hair growth present to the level of the digits, bilateral.  Edema absent, bilateral.  Varicosities absent, bilateral. Erythema absent, bilateral. Distal Rubor absent bilateral.  Temperature within normal limits bilateral. Hyperpigmentation absent bilateral. No atrophic skin. Neurological: Sensation intact to light touch to level of digits, bilateral.  Protective sensation intact 10/10 sites via 5.07/10g [place]arly Monofilament, bilateral.  negative Tinel's, bilateral.  negative Valleix sign, bilateral.  Vibratory intact bilateral.  Reflexes Decreased bilateral.  Paresthesias negative. Dysthesias negative.   Sharp/dull intact bilateral.    Musculoskeletal: Muscle

## 2018-11-29 NOTE — TELEPHONE ENCOUNTER
----- Message from ROCÍO Sousa CNP sent at 11/29/2018  3:01 PM EST -----  Hemoglobin a1C is higher this time at 7.0 encourage diet and exercise.

## 2018-12-01 ENCOUNTER — HOSPITAL ENCOUNTER (EMERGENCY)
Age: 73
Discharge: HOME | End: 2018-12-01
Attending: EMERGENCY MEDICINE
Payer: MEDICARE

## 2018-12-01 ENCOUNTER — APPOINTMENT (OUTPATIENT)
Dept: CT IMAGING | Age: 73
End: 2018-12-01
Payer: MEDICARE

## 2018-12-01 VITALS
HEIGHT: 65 IN | DIASTOLIC BLOOD PRESSURE: 66 MMHG | RESPIRATION RATE: 16 BRPM | OXYGEN SATURATION: 96 % | TEMPERATURE: 98.4 F | HEART RATE: 79 BPM | SYSTOLIC BLOOD PRESSURE: 113 MMHG | BODY MASS INDEX: 49.98 KG/M2 | WEIGHT: 300 LBS

## 2018-12-01 DIAGNOSIS — R11.2 NON-INTRACTABLE VOMITING WITH NAUSEA, UNSPECIFIED VOMITING TYPE: Primary | ICD-10-CM

## 2018-12-01 DIAGNOSIS — R10.9 FLANK PAIN: ICD-10-CM

## 2018-12-01 LAB
-: ABNORMAL
ABSOLUTE EOS #: 0 K/UL (ref 0–0.4)
ABSOLUTE IMMATURE GRANULOCYTE: ABNORMAL K/UL (ref 0–0.3)
ABSOLUTE LYMPH #: 0.9 K/UL (ref 1–4.8)
ABSOLUTE MONO #: 0.8 K/UL (ref 0.1–1.2)
ALBUMIN SERPL-MCNC: 3.8 G/DL (ref 3.5–5.2)
ALBUMIN/GLOBULIN RATIO: 0.9 (ref 1–2.5)
ALP BLD-CCNC: 106 U/L (ref 40–129)
ALT SERPL-CCNC: 13 U/L (ref 5–41)
AMORPHOUS: ABNORMAL
ANION GAP SERPL CALCULATED.3IONS-SCNC: 13 MMOL/L (ref 9–17)
AST SERPL-CCNC: 13 U/L
BACTERIA: ABNORMAL
BASOPHILS # BLD: 1 % (ref 0–2)
BASOPHILS ABSOLUTE: 0 K/UL (ref 0–0.2)
BILIRUB SERPL-MCNC: 0.4 MG/DL (ref 0.3–1.2)
BILIRUBIN URINE: NEGATIVE
BUN BLDV-MCNC: 17 MG/DL (ref 8–23)
BUN/CREAT BLD: 14 (ref 9–20)
CALCIUM SERPL-MCNC: 9.1 MG/DL (ref 8.6–10.4)
CASTS UA: ABNORMAL /LPF (ref 0–2)
CHLORIDE BLD-SCNC: 98 MMOL/L (ref 98–107)
CO2: 23 MMOL/L (ref 20–31)
COLOR: ABNORMAL
COMMENT UA: ABNORMAL
CREAT SERPL-MCNC: 1.18 MG/DL (ref 0.7–1.2)
CRYSTALS, UA: ABNORMAL /HPF
DIFFERENTIAL TYPE: ABNORMAL
EKG ATRIAL RATE: 72 BPM
EKG P AXIS: 58 DEGREES
EKG P-R INTERVAL: 200 MS
EKG Q-T INTERVAL: 394 MS
EKG QRS DURATION: 82 MS
EKG QTC CALCULATION (BAZETT): 431 MS
EKG R AXIS: 15 DEGREES
EKG T AXIS: 48 DEGREES
EKG VENTRICULAR RATE: 72 BPM
EOSINOPHILS RELATIVE PERCENT: 1 % (ref 1–8)
EPITHELIAL CELLS UA: ABNORMAL /HPF (ref 0–5)
GFR AFRICAN AMERICAN: >60 ML/MIN
GFR NON-AFRICAN AMERICAN: >60 ML/MIN
GFR SERPL CREATININE-BSD FRML MDRD: ABNORMAL ML/MIN/{1.73_M2}
GFR SERPL CREATININE-BSD FRML MDRD: ABNORMAL ML/MIN/{1.73_M2}
GLUCOSE BLD-MCNC: 204 MG/DL (ref 70–99)
GLUCOSE URINE: ABNORMAL
HCT VFR BLD CALC: 37.8 % (ref 41–53)
HEMOGLOBIN: 11.9 G/DL (ref 13.5–17.5)
IMMATURE GRANULOCYTES: ABNORMAL %
INR BLD: 1.1
KETONES, URINE: NEGATIVE
LEUKOCYTE ESTERASE, URINE: NEGATIVE
LIPASE: 20 U/L (ref 13–60)
LYMPHOCYTES # BLD: 12 % (ref 15–43)
MCH RBC QN AUTO: 27.8 PG (ref 26–34)
MCHC RBC AUTO-ENTMCNC: 31.6 G/DL (ref 31–37)
MCV RBC AUTO: 88 FL (ref 80–100)
MONOCYTES # BLD: 10 % (ref 6–14)
MUCUS: ABNORMAL
MYOGLOBIN: 65 NG/ML (ref 28–72)
NITRITE, URINE: NEGATIVE
NRBC AUTOMATED: ABNORMAL PER 100 WBC
OTHER OBSERVATIONS UA: ABNORMAL
PDW BLD-RTO: 14.2 % (ref 11–14.5)
PH UA: 5.5 (ref 5–6)
PLATELET # BLD: 242 K/UL (ref 140–450)
PLATELET ESTIMATE: ABNORMAL
PMV BLD AUTO: 9 FL (ref 6–12)
POTASSIUM SERPL-SCNC: 3.9 MMOL/L (ref 3.7–5.3)
PROTEIN UA: NEGATIVE
PROTHROMBIN TIME: 11.4 SEC (ref 9.4–11.3)
RBC # BLD: 4.29 M/UL (ref 4.5–5.9)
RBC # BLD: ABNORMAL 10*6/UL
RBC UA: ABNORMAL /HPF (ref 0–4)
RENAL EPITHELIAL, UA: ABNORMAL /HPF
SEG NEUTROPHILS: 76 % (ref 44–74)
SEGMENTED NEUTROPHILS ABSOLUTE COUNT: 5.6 K/UL (ref 1.8–7.7)
SODIUM BLD-SCNC: 134 MMOL/L (ref 135–144)
SPECIFIC GRAVITY UA: 1.03 (ref 1.01–1.02)
TOTAL PROTEIN: 7.9 G/DL (ref 6.4–8.3)
TRICHOMONAS: ABNORMAL
TROPONIN INTERP: NORMAL
TROPONIN T: <0.03 NG/ML
TURBIDITY: ABNORMAL
URINE HGB: ABNORMAL
UROBILINOGEN, URINE: NORMAL
WBC # BLD: 7.3 K/UL (ref 3.5–11)
WBC # BLD: ABNORMAL 10*3/UL
WBC UA: ABNORMAL /HPF (ref 0–4)
YEAST: ABNORMAL

## 2018-12-01 PROCEDURE — 93005 ELECTROCARDIOGRAM TRACING: CPT

## 2018-12-01 PROCEDURE — 85610 PROTHROMBIN TIME: CPT

## 2018-12-01 PROCEDURE — 2709999900 CT ABDOMEN PELVIS W IV CONTRAST

## 2018-12-01 PROCEDURE — 36415 COLL VENOUS BLD VENIPUNCTURE: CPT

## 2018-12-01 PROCEDURE — 87086 URINE CULTURE/COLONY COUNT: CPT

## 2018-12-01 PROCEDURE — 83874 ASSAY OF MYOGLOBIN: CPT

## 2018-12-01 PROCEDURE — 80053 COMPREHEN METABOLIC PANEL: CPT

## 2018-12-01 PROCEDURE — 99285 EMERGENCY DEPT VISIT HI MDM: CPT

## 2018-12-01 PROCEDURE — 84484 ASSAY OF TROPONIN QUANT: CPT

## 2018-12-01 PROCEDURE — 6360000004 HC RX CONTRAST MEDICATION: Performed by: EMERGENCY MEDICINE

## 2018-12-01 PROCEDURE — 6360000002 HC RX W HCPCS: Performed by: EMERGENCY MEDICINE

## 2018-12-01 PROCEDURE — 83690 ASSAY OF LIPASE: CPT

## 2018-12-01 PROCEDURE — 96374 THER/PROPH/DIAG INJ IV PUSH: CPT

## 2018-12-01 PROCEDURE — 85025 COMPLETE CBC W/AUTO DIFF WBC: CPT

## 2018-12-01 PROCEDURE — 81001 URINALYSIS AUTO W/SCOPE: CPT

## 2018-12-01 RX ORDER — ONDANSETRON 4 MG/1
4 TABLET, ORALLY DISINTEGRATING ORAL EVERY 8 HOURS PRN
Qty: 20 TABLET | Refills: 0 | Status: SHIPPED | OUTPATIENT
Start: 2018-12-01 | End: 2018-12-20 | Stop reason: ALTCHOICE

## 2018-12-01 RX ORDER — ONDANSETRON 2 MG/ML
4 INJECTION INTRAMUSCULAR; INTRAVENOUS ONCE
Status: COMPLETED | OUTPATIENT
Start: 2018-12-01 | End: 2018-12-01

## 2018-12-01 RX ADMIN — ONDANSETRON 4 MG: 2 INJECTION INTRAMUSCULAR; INTRAVENOUS at 13:22

## 2018-12-01 RX ADMIN — IOPAMIDOL 100 ML: 755 INJECTION, SOLUTION INTRAVENOUS at 14:14

## 2018-12-01 ASSESSMENT — ENCOUNTER SYMPTOMS
CONSTIPATION: 0
BLOOD IN STOOL: 0
WHEEZING: 0
BACK PAIN: 0
VOMITING: 0
ABDOMINAL PAIN: 1
DIARRHEA: 0
NAUSEA: 0
SORE THROAT: 0
TROUBLE SWALLOWING: 0
SHORTNESS OF BREATH: 0

## 2018-12-01 ASSESSMENT — PAIN DESCRIPTION - LOCATION: LOCATION: FLANK

## 2018-12-01 ASSESSMENT — PAIN DESCRIPTION - PAIN TYPE: TYPE: ACUTE PAIN

## 2018-12-01 ASSESSMENT — PAIN DESCRIPTION - ORIENTATION: ORIENTATION: LEFT

## 2018-12-01 ASSESSMENT — PAIN SCALES - WONG BAKER: WONGBAKER_NUMERICALRESPONSE: 2

## 2018-12-01 ASSESSMENT — PAIN DESCRIPTION - DESCRIPTORS: DESCRIPTORS: ACHING

## 2018-12-01 NOTE — ED PROVIDER NOTES
includes Dementia in his sister; Diabetes in his sister; Heart Attack in his father; High Blood Pressure in his brother, brother, and sister; Ovarian Cancer in his mother; Stroke in his brother and brother. SOCIAL HISTORY      reports that he quit smoking about 48 years ago. His smoking use included Cigars. He quit after 15.00 years of use. He has never used smokeless tobacco. He reports that he does not drink alcohol or use drugs. PHYSICAL EXAM     INITIAL VITALS:  height is 5' 5\" (1.651 m) and weight is 300 lb (136.1 kg). His tympanic temperature is 98.4 °F (36.9 °C). His blood pressure is 113/66 and his pulse is 79. His respiration is 16 and oxygen saturation is 96%. Physical Exam   Constitutional: He is oriented to person, place, and time. He appears well-developed and well-nourished. HENT:   Head: Normocephalic and atraumatic. Mouth/Throat: Oropharynx is clear and moist.   Eyes: Pupils are equal, round, and reactive to light. EOM are normal.   Neck: Normal range of motion. Neck supple. Cardiovascular: Normal rate and regular rhythm. Pulmonary/Chest: Effort normal and breath sounds normal.   Abdominal: Soft. Bowel sounds are normal.   Musculoskeletal: Normal range of motion. Neurological: He is alert and oriented to person, place, and time. Skin: Skin is warm and dry. Psychiatric: He has a normal mood and affect.  His behavior is normal.       DIFFERENTIAL DIAGNOSIS/ MDM:     Nausea with some flank pain image him with a history of diabetes and hypertension we'll do a workup including an EKG and 1 set of enzymes    DIAGNOSTIC RESULTS     EKG: All EKG's are interpreted by the Emergency Department Physician who either signs or Co-signs this chart in the absence of a cardiologist.  Sinus rhythm with marked sinus arrhythmia rate 72 bpm ME was 200 ms QR stations A2 milliseconds QT corrected 432 ms axis is 15 for the QRS is no acute ST or T-wave changes      RADIOLOGY:   I directly visualized [place]on MD, F.A.A.E.M.   Attending Emergency Physician                            Miriam Quiñonez MD  12/01/18 [id number]

## 2018-12-03 LAB
CULTURE: NORMAL
Lab: NORMAL
SPECIMEN DESCRIPTION: NORMAL
STATUS: NORMAL

## 2018-12-07 ENCOUNTER — OFFICE VISIT (OUTPATIENT)
Dept: FAMILY MEDICINE CLINIC | Age: 73
End: 2018-12-07
Payer: MEDICARE

## 2018-12-07 VITALS
WEIGHT: 301.2 LBS | BODY MASS INDEX: 45.65 KG/M2 | DIASTOLIC BLOOD PRESSURE: 80 MMHG | HEART RATE: 80 BPM | SYSTOLIC BLOOD PRESSURE: 130 MMHG | HEIGHT: 68 IN

## 2018-12-07 DIAGNOSIS — E66.01 OBESITY, MORBID (HCC): ICD-10-CM

## 2018-12-07 DIAGNOSIS — E11.69 TYPE 2 DIABETES MELLITUS WITH OTHER SPECIFIED COMPLICATION, UNSPECIFIED WHETHER LONG TERM INSULIN USE (HCC): Primary | ICD-10-CM

## 2018-12-07 PROCEDURE — 2022F DILAT RTA XM EVC RTNOPTHY: CPT | Performed by: NURSE PRACTITIONER

## 2018-12-07 PROCEDURE — 3017F COLORECTAL CA SCREEN DOC REV: CPT | Performed by: NURSE PRACTITIONER

## 2018-12-07 PROCEDURE — 3045F PR MOST RECENT HEMOGLOBIN A1C LEVEL 7.0-9.0%: CPT | Performed by: NURSE PRACTITIONER

## 2018-12-07 PROCEDURE — 1123F ACP DISCUSS/DSCN MKR DOCD: CPT | Performed by: NURSE PRACTITIONER

## 2018-12-07 PROCEDURE — G8427 DOCREV CUR MEDS BY ELIG CLIN: HCPCS | Performed by: NURSE PRACTITIONER

## 2018-12-07 PROCEDURE — 1036F TOBACCO NON-USER: CPT | Performed by: NURSE PRACTITIONER

## 2018-12-07 PROCEDURE — G8484 FLU IMMUNIZE NO ADMIN: HCPCS | Performed by: NURSE PRACTITIONER

## 2018-12-07 PROCEDURE — G8417 CALC BMI ABV UP PARAM F/U: HCPCS | Performed by: NURSE PRACTITIONER

## 2018-12-07 PROCEDURE — 99213 OFFICE O/P EST LOW 20 MIN: CPT | Performed by: NURSE PRACTITIONER

## 2018-12-07 PROCEDURE — 4040F PNEUMOC VAC/ADMIN/RCVD: CPT | Performed by: NURSE PRACTITIONER

## 2018-12-07 PROCEDURE — 1101F PT FALLS ASSESS-DOCD LE1/YR: CPT | Performed by: NURSE PRACTITIONER

## 2018-12-07 NOTE — PROGRESS NOTES
ONE-HALF TO ONE TABLET BY MOUTH AS NEEDED 10 tablet 0    fluticasone (FLONASE) 50 MCG/ACT nasal spray USE TWO SPRAYS IN EACH NOSTRIL EVERY DAY 1 Bottle 11    aspirin 81 MG tablet Take 81 mg by mouth daily. No current facility-administered medications for this visit. Review of Systems   Constitutional: Negative for activity change, appetite change and fever. Respiratory: Negative for cough, shortness of breath and wheezing. Cardiovascular: Negative for chest pain and palpitations. Gastrointestinal:        N/V went to ER. It has resolved   Neurological: Negative for dizziness, light-headedness and headaches. Objective:   Physical Exam   Constitutional: He is oriented to person, place, and time. He appears well-developed and well-nourished. No distress. Obese     HENT:   Head: Normocephalic. Eyes: Pupils are equal, round, and reactive to light. Neck: Neck supple. Cardiovascular: Normal rate, regular rhythm and normal heart sounds. Pulmonary/Chest: Effort normal and breath sounds normal.   Neurological: He is alert and oriented to person, place, and time. Nursing note and vitals reviewed. Assessment:       Diagnosis Orders   1. Type 2 diabetes mellitus with other specified complication, unspecified whether long term insulin use ([address]  Esthela Marin NP, [facility] [place]   2. Obesity, morbid ([address]  Esthela Marin NP, [facility] [place]   3. Essential hypertension             Plan: Will refer to diabetic education for evaluation   He is to continue to monitor his blood sugars. Pt to return as scheduled sooner if needed.         Charmaine Barba, ROCÍO - CNP

## 2018-12-10 DIAGNOSIS — E11.42 DM TYPE 2 WITH DIABETIC PERIPHERAL NEUROPATHY (HCC): ICD-10-CM

## 2018-12-10 DIAGNOSIS — E11.69 TYPE 2 DIABETES MELLITUS WITH OTHER SPECIFIED COMPLICATION, UNSPECIFIED WHETHER LONG TERM INSULIN USE (HCC): ICD-10-CM

## 2018-12-10 RX ORDER — LANCETS 30 GAUGE
EACH MISCELLANEOUS
Qty: 100 EACH | Refills: 3 | Status: SHIPPED | OUTPATIENT
Start: 2018-12-10 | End: 2020-01-20

## 2018-12-10 ASSESSMENT — ENCOUNTER SYMPTOMS
SHORTNESS OF BREATH: 0
WHEEZING: 0
COUGH: 0

## 2018-12-20 ENCOUNTER — OFFICE VISIT (OUTPATIENT)
Dept: INTERNAL MEDICINE | Age: 73
End: 2018-12-20
Payer: MEDICARE

## 2018-12-20 VITALS
BODY MASS INDEX: 45.89 KG/M2 | RESPIRATION RATE: 12 BRPM | WEIGHT: 297.4 LBS | SYSTOLIC BLOOD PRESSURE: 118 MMHG | OXYGEN SATURATION: 94 % | DIASTOLIC BLOOD PRESSURE: 60 MMHG | HEART RATE: 73 BPM

## 2018-12-20 DIAGNOSIS — E11.42 DM TYPE 2 WITH DIABETIC PERIPHERAL NEUROPATHY (HCC): Primary | ICD-10-CM

## 2018-12-20 DIAGNOSIS — I10 ESSENTIAL HYPERTENSION: ICD-10-CM

## 2018-12-20 DIAGNOSIS — E66.01 OBESITY, MORBID (HCC): ICD-10-CM

## 2018-12-20 DIAGNOSIS — E78.00 HIGH CHOLESTEROL: ICD-10-CM

## 2018-12-20 PROCEDURE — G8484 FLU IMMUNIZE NO ADMIN: HCPCS | Performed by: NURSE PRACTITIONER

## 2018-12-20 PROCEDURE — G8417 CALC BMI ABV UP PARAM F/U: HCPCS | Performed by: NURSE PRACTITIONER

## 2018-12-20 PROCEDURE — 3017F COLORECTAL CA SCREEN DOC REV: CPT | Performed by: NURSE PRACTITIONER

## 2018-12-20 PROCEDURE — 1101F PT FALLS ASSESS-DOCD LE1/YR: CPT | Performed by: NURSE PRACTITIONER

## 2018-12-20 PROCEDURE — 1036F TOBACCO NON-USER: CPT | Performed by: NURSE PRACTITIONER

## 2018-12-20 PROCEDURE — 2022F DILAT RTA XM EVC RTNOPTHY: CPT | Performed by: NURSE PRACTITIONER

## 2018-12-20 PROCEDURE — 3045F PR MOST RECENT HEMOGLOBIN A1C LEVEL 7.0-9.0%: CPT | Performed by: NURSE PRACTITIONER

## 2018-12-20 PROCEDURE — 4040F PNEUMOC VAC/ADMIN/RCVD: CPT | Performed by: NURSE PRACTITIONER

## 2018-12-20 PROCEDURE — G8427 DOCREV CUR MEDS BY ELIG CLIN: HCPCS | Performed by: NURSE PRACTITIONER

## 2018-12-20 PROCEDURE — 1123F ACP DISCUSS/DSCN MKR DOCD: CPT | Performed by: NURSE PRACTITIONER

## 2018-12-20 PROCEDURE — 99214 OFFICE O/P EST MOD 30 MIN: CPT | Performed by: NURSE PRACTITIONER

## 2018-12-20 ASSESSMENT — ENCOUNTER SYMPTOMS
SHORTNESS OF BREATH: 0
DIARRHEA: 0
VISUAL CHANGE: 0
ABDOMINAL PAIN: 0
BLURRED VISION: 0
RESPIRATORY NEGATIVE: 1

## 2019-03-28 NOTE — PROGRESS NOTES
Overdue reminder letter mailed to patient.
Vitro route 2 times daily 200 each 6    omeprazole (PRILOSEC) 40 MG delayed release capsule TAKE 1 CAPSULE DAILY 90 capsule 1    atorvastatin (LIPITOR) 20 MG tablet TAKE 1 TABLET DAILY 90 tablet 1    valsartan-hydrochlorothiazide (DIOVAN-HCT) 160-25 MG per tablet TAKE 1 TABLET DAILY 90 tablet 1    metFORMIN (GLUCOPHAGE-XR) 500 MG extended release tablet TAKE 1 TABLET TWICE A  tablet 1    tamsulosin (FLOMAX) 0.4 MG capsule TAKE 1 CAPSULE DAILY 90 capsule 1    Foot Care Products (DIABETIC INSOLES) MISC 1 Units by Does not apply route daily DM type 2 with diabetic peripheral neuropathy (HCC)  (primary encounter diagnosis)  Dermatophytosis of nail 3 each 0    Diabetic Shoe MISC by Does not apply route DM type 2 with diabetic peripheral neuropathy (HCC)  (primary encounter diagnosis)  Dermatophytosis of nail 1 each 0    Blood Glucose Monitoring Suppl KENA Check blood sugar daily. 1 Device 0    aspirin 81 MG tablet Take 81 mg by mouth daily. No current facility-administered medications for this visit. Review ofSymptoms:  Review of Systems   Constitutional: Positive for fatigue. Negative for unexpected weight change. Eyes: Negative for blurred vision and visual disturbance. Respiratory: Negative. Negative for shortness of breath. Cardiovascular: Negative for chest pain and leg swelling. Gastrointestinal: Negative for abdominal pain and diarrhea. Endocrine: Positive for polydipsia and polyuria. Negative for polyphagia. Genitourinary: Negative. Musculoskeletal: Negative. Skin: Negative for rash and wound. Neurological: Negative for dizziness, tremors, seizures, weakness and headaches. Psychiatric/Behavioral: Negative. Negative for confusion and decreased concentration. Theremainder of a complete 14-point review of systems is negative.        Vital Signs: /60 (Site: Left Upper Arm, Position: Sitting, Cuff Size: Large Adult)   Pulse 73   Resp 12   Wt 297 lb 6.4

## 2019-03-29 DIAGNOSIS — N40.0 ENLARGED PROSTATE: ICD-10-CM

## 2019-03-29 RX ORDER — TAMSULOSIN HYDROCHLORIDE 0.4 MG/1
CAPSULE ORAL
Qty: 90 CAPSULE | Refills: 1 | Status: SHIPPED | OUTPATIENT
Start: 2019-03-29 | End: 2019-09-25 | Stop reason: SDUPTHER

## 2019-04-02 ENCOUNTER — OFFICE VISIT (OUTPATIENT)
Dept: PODIATRY | Age: 74
End: 2019-04-02
Payer: MEDICARE

## 2019-04-02 VITALS
SYSTOLIC BLOOD PRESSURE: 136 MMHG | DIASTOLIC BLOOD PRESSURE: 80 MMHG | BODY MASS INDEX: 46.93 KG/M2 | HEIGHT: 67 IN | HEART RATE: 88 BPM | WEIGHT: 299 LBS

## 2019-04-02 DIAGNOSIS — B35.1 DERMATOPHYTOSIS OF NAIL: ICD-10-CM

## 2019-04-02 DIAGNOSIS — E11.42 DM TYPE 2 WITH DIABETIC PERIPHERAL NEUROPATHY (HCC): Primary | ICD-10-CM

## 2019-04-02 PROCEDURE — 99999 PR OFFICE/OUTPT VISIT,PROCEDURE ONLY: CPT | Performed by: PODIATRIST

## 2019-04-02 PROCEDURE — 11721 DEBRIDE NAIL 6 OR MORE: CPT | Performed by: PODIATRIST

## 2019-04-02 NOTE — PROGRESS NOTES
Subjective:  Patient presents to [facility] today for routine diabetic foot care. Patient denies any new problems with their feet. Patient's diabetic control has been not changed. No Known Allergies    Past Medical History:   Diagnosis Date    Anemia     chronic, negative work up with EGD and Colonoscopy.  DJD (degenerative joint disease) of lumbar spine     Dry eye syndrome     GERD (gastroesophageal reflux disease)     Hepatic hemangioma     Hypertension     Keratitis     Secondary to dry eye syndrome.  Nuclear sclerotic cataract of both eyes     Obesity, morbid ([address]     Obstructive sleep apnea of adult     non compliant with cpap    Onychomycosis     1, 2, 3, 4, and 5, bilateral.    Type II or unspecified type diabetes mellitus without mention of complication, not stated as uncontrolled        Prior to Admission medications    Medication Sig Start Date End Date Taking?  Authorizing Provider   tamsulosin (FLOMAX) 0.4 MG capsule TAKE 1 CAPSULE DAILY 3/29/19  Yes ynnet Life, APRN - CNP   Lancets MISC Check blood sugar daily 12/10/18  Yes Gwynneth Life, APRN - CNP   ranitidine (ZANTAC) 150 MG tablet TAKE 1 TABLET TWICE A DAY 11/26/18  Yes Gwynneth Life, APRN - CNP   blood glucose test strips (GLUCOCARD VITAL TEST) strip 1 each by In Vitro route 2 times daily 11/26/18  Yes Gwynneth Life, APRN - CNP   omeprazole (PRILOSEC) 40 MG delayed release capsule TAKE 1 CAPSULE DAILY 10/29/18  Yes ynnet Life, APRN - CNP   atorvastatin (LIPITOR) 20 MG tablet TAKE 1 TABLET DAILY 10/23/18  Yes Gwynneth Life, APRN - CNP   valsartan-hydrochlorothiazide (DIOVAN-HCT) 160-25 MG per tablet TAKE 1 TABLET DAILY 10/23/18  Yes Gwynnet Life, APRN - CNP   metFORMIN (GLUCOPHAGE-XR) 500 MG extended release tablet TAKE 1 TABLET TWICE A DAY 10/23/18  Yes Gwynneth Life, APRN - [address] (DIABETIC INSOLES) MISC 1 Units by Does not apply route daily DM type 2 with diabetic peripheral neuropathy (HCC)  (primary encounter diagnosis)  Dermatophytosis of nail 18  Yes Stefan Mayfield DPM   Diabetic Shoe MISC by Does not apply route DM type 2 with diabetic peripheral neuropathy ([facility]  (primary encounter diagnosis)  Dermatophytosis of nail 18  Yes Stefan Mayfield DPM   Blood Glucose Monitoring Suppl KENA Check blood sugar daily. 18  Yes ROCÍO Suazo CNP   aspirin 81 MG tablet Take 81 mg by mouth daily. Yes Historical Provider, MD       Social History     Tobacco Use    Smoking status: Former Smoker     Years: 15.00     Types: Cigars     Last attempt to quit: 1970     Years since quittin.3    Smokeless tobacco: Never Used    Tobacco comment: quit over 30 years ago   Substance Use Topics    Alcohol use: No     Alcohol/week: 0.0 oz     Comment: no alcohol for many years     Review of Systems: All 12 systems reviewed and pertinent positives noted above. Objective:  Vascular: DP and PT pulses palpable 2/4, bilateral.  CFT <3 seconds, bilateral.  Hair growth present to the level of the digits, bilateral.  Edema absent, bilateral.  Varicosities absent, bilateral. Erythema absent, bilateral. Distal Rubor absent bilateral.  Temperature within normal limits bilateral. Hyperpigmentation absent bilateral. No atrophic skin. Neurological: Sensation intact to light touch to level of digits, bilateral.  Protective sensation intact 10/10 sites via 5.07/10g [place] Monofilament, bilateral.  negative Tinel's, bilateral.  negative Valleix sign, bilateral.  Vibratory intact bilateral.  Reflexes Decreased bilateral.  Paresthesias negative. Dysthesias negative.   Sharp/dull intact bilateral.    Musculoskeletal: Muscle strength 5/5, bilateral.  Pain absent upon palpation bilateral. Normal medial longitudinal arch, bilateral.  Ankle ROM within normal limits,bilateral.  1st MPJ ROM within normal limits, bilateral.  Dorsally contracted digits present. No other foot deformities. Integument:  Open lesion absent, Bilateral.  Interdigital maceration absent to web spaces,absent Bilateral.  Nails left 1, 2, 3, 4, 5 and right 1, 2, 3, 4, 5 thickened, dystrophic and crumbly, discolored with subungual debris. Fissures absent, Bilateral. Hyperkeratotic tissue is absent. Assessment:    Diagnosis Orders   1. DM type 2 with diabetic peripheral neuropathy (HCC)  HM DIABETES FOOT EXAM    IL DEBRIDEMENT OF NAILS, 6 OR MORE   2. Dermatophytosis of nail  HM DIABETES FOOT EXAM    IL DEBRIDEMENT OF NAILS, 6 OR MORE       Plan:  Diabetic foot education and exam.  Nails as mentioned above debrided in length and thickness. Patient advised about OTC treatments for nails and callous. Patient will follow up in 10 weeks for routine foot care or PRN if any further problems arise.

## 2019-04-21 DIAGNOSIS — I10 ESSENTIAL HYPERTENSION: ICD-10-CM

## 2019-04-21 DIAGNOSIS — E78.2 MIXED HYPERLIPIDEMIA: ICD-10-CM

## 2019-04-21 DIAGNOSIS — E11.42 DM TYPE 2 WITH DIABETIC PERIPHERAL NEUROPATHY (HCC): ICD-10-CM

## 2019-04-22 RX ORDER — ATORVASTATIN CALCIUM 20 MG/1
TABLET, FILM COATED ORAL
Qty: 90 TABLET | Refills: 1 | Status: SHIPPED | OUTPATIENT
Start: 2019-04-22 | End: 2019-10-19 | Stop reason: SDUPTHER

## 2019-04-22 RX ORDER — VALSARTAN AND HYDROCHLOROTHIAZIDE 160; 25 MG/1; MG/1
TABLET ORAL
Qty: 90 TABLET | Refills: 1 | Status: SHIPPED | OUTPATIENT
Start: 2019-04-22 | End: 2019-10-19 | Stop reason: SDUPTHER

## 2019-04-22 RX ORDER — METFORMIN HYDROCHLORIDE 500 MG/1
TABLET, EXTENDED RELEASE ORAL
Qty: 180 TABLET | Refills: 1 | Status: SHIPPED | OUTPATIENT
Start: 2019-04-22 | End: 2019-10-19 | Stop reason: SDUPTHER

## 2019-04-27 DIAGNOSIS — K21.9 GASTROESOPHAGEAL REFLUX DISEASE WITHOUT ESOPHAGITIS: ICD-10-CM

## 2019-04-29 RX ORDER — OMEPRAZOLE 40 MG/1
CAPSULE, DELAYED RELEASE ORAL
Qty: 90 CAPSULE | Refills: 1 | Status: SHIPPED | OUTPATIENT
Start: 2019-04-29 | End: 2019-10-26 | Stop reason: SDUPTHER

## 2019-05-01 RX ORDER — FLUTICASONE PROPIONATE 50 MCG
SPRAY, SUSPENSION (ML) NASAL
Qty: 1 BOTTLE | Refills: 11 | Status: SHIPPED | OUTPATIENT
Start: 2019-05-01 | End: 2020-01-09 | Stop reason: ALTCHOICE

## 2019-05-25 DIAGNOSIS — K21.9 GASTROESOPHAGEAL REFLUX DISEASE, ESOPHAGITIS PRESENCE NOT SPECIFIED: ICD-10-CM

## 2019-05-25 DIAGNOSIS — K21.9 GASTROESOPHAGEAL REFLUX DISEASE WITHOUT ESOPHAGITIS: ICD-10-CM

## 2019-05-28 RX ORDER — RANITIDINE 150 MG/1
TABLET ORAL
Qty: 180 TABLET | Refills: 1 | Status: SHIPPED | OUTPATIENT
Start: 2019-05-28 | End: 2019-11-24 | Stop reason: SDUPTHER

## 2019-06-11 ENCOUNTER — OFFICE VISIT (OUTPATIENT)
Dept: PODIATRY | Age: 74
End: 2019-06-11
Payer: MEDICARE

## 2019-06-11 VITALS
HEART RATE: 76 BPM | SYSTOLIC BLOOD PRESSURE: 128 MMHG | BODY MASS INDEX: 46.3 KG/M2 | HEIGHT: 67 IN | WEIGHT: 295 LBS | DIASTOLIC BLOOD PRESSURE: 72 MMHG

## 2019-06-11 DIAGNOSIS — E11.42 DM TYPE 2 WITH DIABETIC PERIPHERAL NEUROPATHY (HCC): Primary | ICD-10-CM

## 2019-06-11 DIAGNOSIS — B35.1 DERMATOPHYTOSIS OF NAIL: ICD-10-CM

## 2019-06-11 PROCEDURE — 11721 DEBRIDE NAIL 6 OR MORE: CPT | Performed by: PODIATRIST

## 2019-06-11 PROCEDURE — 99999 PR OFFICE/OUTPT VISIT,PROCEDURE ONLY: CPT | Performed by: PODIATRIST

## 2019-06-11 NOTE — PROGRESS NOTES
Subjective:  Patient presents to [facility] today for routine diabetic foot care. Patient denies any new problems with their feet. Patient's diabetic control has been not changed. No Known Allergies    Past Medical History:   Diagnosis Date    Anemia     chronic, negative work up with EGD and Colonoscopy.  DJD (degenerative joint disease) of lumbar spine     Dry eye syndrome     GERD (gastroesophageal reflux disease)     Hepatic hemangioma     Hypertension     Keratitis     Secondary to dry eye syndrome.  Nuclear sclerotic cataract of both eyes     Obesity, morbid ([address]     Obstructive sleep apnea of adult     non compliant with cpap    Onychomycosis     1, 2, 3, 4, and 5, bilateral.    Type II or unspecified type diabetes mellitus without mention of complication, not stated as uncontrolled        Prior to Admission medications    Medication Sig Start Date End Date Taking?  Authorizing Provider   ranitidine (ZANTAC) 150 MG tablet TAKE 1 TABLET TWICE A DAY 5/28/19  Yes ROCÍO Nelson CNP   fluticasone (FLONASE) 50 MCG/ACT nasal spray USE TWO SPRAYS IN EACH NOSTRIL EVERY DAY 5/1/19  Yes ROCÍO Nelson CNP   omeprazole (PRILOSEC) 40 MG delayed release capsule TAKE 1 CAPSULE DAILY 4/29/19  Yes ROCÍO Nelson CNP   atorvastatin (LIPITOR) 20 MG tablet TAKE 1 TABLET DAILY 4/22/19  Yes ROCÍO Nelson CNP   valsartan-hydrochlorothiazide (DIOVAN-HCT) 160-25 MG per tablet TAKE 1 TABLET DAILY 4/22/19  Yes ROCÍO Nelson CNP   metFORMIN (GLUCOPHAGE-XR) 500 MG extended release tablet TAKE 1 TABLET TWICE A DAY 4/22/19  Yes ROCÍO Nelson CNP   tamsulosin (FLOMAX) 0.4 MG capsule TAKE 1 CAPSULE DAILY 3/29/19  Yes ROCÍO Nelson CNP   Lancets MISC Check blood sugar daily 12/10/18  Yes ROCÍO Nelson CNP   blood glucose test strips (GLUCOCARD VITAL TEST) strip 1 each by In Vitro route 2 times daily 18  Yes ROCÍO Ariza CNP   Foot Care Products (DIABETIC INSOLES) MISC 1 Units by Does not apply route daily DM type 2 with diabetic peripheral neuropathy ([facility] [address]  (primary encounter diagnosis)  Dermatophytosis of nail 18  Yes Thana Morgan, DPM   Diabetic Shoe MISC by Does not apply route DM type 2 with diabetic peripheral neuropathy ([facility] [address]  (primary encounter diagnosis)  Dermatophytosis of nail 18  Yes Thana Budd, DPM   Blood Glucose Monitoring Suppl KENA Check blood sugar daily. 18  Yes ROCÍO Ariza CNP   aspirin 81 MG tablet Take 81 mg by mouth daily. Yes Historical Provider, MD       Social History     Tobacco Use    Smoking status: Former Smoker     Years: 15.00     Types: Cigars     Last attempt to quit: 1970     Years since quittin.5    Smokeless tobacco: Never Used    Tobacco comment: quit over 30 years ago   Substance Use Topics    Alcohol use: No     Alcohol/week: 0.0 oz     Comment: no alcohol for many years     Review of Systems: All 12 systems reviewed and pertinent positives noted above. Objective:  Vascular: DP and PT pulses palpable 2/4, bilateral.  CFT <3 seconds, bilateral.  Hair growth present to the level of the digits, bilateral.  Edema absent, bilateral.  Varicosities absent, bilateral. Erythema absent, bilateral. Distal Rubor absent bilateral.  Temperature within normal limits bilateral. Hyperpigmentation absent bilateral. No atrophic skin. Neurological: Sensation intact to light touch to level of digits, bilateral.  Protective sensation intact 10/10 sites via 5.07/10g [place] Monofilament, bilateral.  negative Tinel's, bilateral.  negative Valleix sign, bilateral.  Vibratory intact bilateral.  Reflexes Decreased bilateral.  Paresthesias negative. Dysthesias negative.   Sharp/dull intact bilateral.    Musculoskeletal: Muscle strength 5/5, bilateral. Pain absent upon palpation bilateral. Normal medial longitudinal arch, bilateral.  Ankle ROM within normal limits,bilateral.  1st MPJ ROM within normal limits, bilateral.  Dorsally contracted digits present. No other foot deformities. Integument:  Open lesion absent, Bilateral.  Interdigital maceration absent to web spaces,absent Bilateral.  Nails left 1, 2, 3, 4, 5 and right 1, 2, 3, 4, 5 thickened, dystrophic and crumbly, discolored with subungual debris. Fissures absent, Bilateral. Hyperkeratotic tissue is absent. Assessment:    Diagnosis Orders   1. DM type 2 with diabetic peripheral neuropathy ([facility] [address]     2. Dermatophytosis of nail         Plan:  Diabetic foot education and exam.  Nails as mentioned above debrided in length and thickness. Patient advised about OTC treatments for nails and callous. Patient will follow up in 10 weeks for routine foot care or PRN if any further problems arise.

## 2019-07-09 ENCOUNTER — OFFICE VISIT (OUTPATIENT)
Dept: FAMILY MEDICINE CLINIC | Age: 74
End: 2019-07-09
Payer: MEDICARE

## 2019-07-09 ENCOUNTER — HOSPITAL ENCOUNTER (OUTPATIENT)
Dept: LAB | Age: 74
Discharge: HOME | End: 2019-07-09
Payer: MEDICARE

## 2019-07-09 VITALS
OXYGEN SATURATION: 96 % | HEART RATE: 72 BPM | BODY MASS INDEX: 47.96 KG/M2 | WEIGHT: 298.4 LBS | HEIGHT: 66 IN | SYSTOLIC BLOOD PRESSURE: 110 MMHG | DIASTOLIC BLOOD PRESSURE: 68 MMHG | TEMPERATURE: 97.7 F

## 2019-07-09 DIAGNOSIS — E11.42 DM TYPE 2 WITH DIABETIC PERIPHERAL NEUROPATHY (HCC): ICD-10-CM

## 2019-07-09 DIAGNOSIS — K21.9 GASTROESOPHAGEAL REFLUX DISEASE WITHOUT ESOPHAGITIS: ICD-10-CM

## 2019-07-09 DIAGNOSIS — G31.84 MCI (MILD COGNITIVE IMPAIRMENT): ICD-10-CM

## 2019-07-09 DIAGNOSIS — E78.5 HYPERLIPIDEMIA, UNSPECIFIED HYPERLIPIDEMIA TYPE: ICD-10-CM

## 2019-07-09 DIAGNOSIS — Z12.5 SCREENING PSA (PROSTATE SPECIFIC ANTIGEN): ICD-10-CM

## 2019-07-09 DIAGNOSIS — E66.01 MORBID OBESITY WITH BMI OF 45.0-49.9, ADULT (HCC): ICD-10-CM

## 2019-07-09 DIAGNOSIS — Z00.00 ROUTINE GENERAL MEDICAL EXAMINATION AT A HEALTH CARE FACILITY: Primary | ICD-10-CM

## 2019-07-09 DIAGNOSIS — I10 ESSENTIAL HYPERTENSION: ICD-10-CM

## 2019-07-09 DIAGNOSIS — E66.01 OBESITY, MORBID (HCC): ICD-10-CM

## 2019-07-09 LAB
CREATININE URINE: 158.3 MG/DL (ref 39–259)
ESTIMATED AVERAGE GLUCOSE: 163 MG/DL
HBA1C MFR BLD: 7.3 % (ref 4.8–5.9)
MICROALBUMIN/CREAT 24H UR: 184 MG/L
MICROALBUMIN/CREAT UR-RTO: 116 MCG/MG CREAT

## 2019-07-09 PROCEDURE — G8427 DOCREV CUR MEDS BY ELIG CLIN: HCPCS | Performed by: NURSE PRACTITIONER

## 2019-07-09 PROCEDURE — 83036 HEMOGLOBIN GLYCOSYLATED A1C: CPT

## 2019-07-09 PROCEDURE — 36415 COLL VENOUS BLD VENIPUNCTURE: CPT

## 2019-07-09 PROCEDURE — 1123F ACP DISCUSS/DSCN MKR DOCD: CPT | Performed by: NURSE PRACTITIONER

## 2019-07-09 PROCEDURE — G8417 CALC BMI ABV UP PARAM F/U: HCPCS | Performed by: NURSE PRACTITIONER

## 2019-07-09 PROCEDURE — 1036F TOBACCO NON-USER: CPT | Performed by: NURSE PRACTITIONER

## 2019-07-09 PROCEDURE — G0439 PPPS, SUBSEQ VISIT: HCPCS | Performed by: NURSE PRACTITIONER

## 2019-07-09 PROCEDURE — 82043 UR ALBUMIN QUANTITATIVE: CPT

## 2019-07-09 PROCEDURE — 99214 OFFICE O/P EST MOD 30 MIN: CPT | Performed by: NURSE PRACTITIONER

## 2019-07-09 PROCEDURE — 3017F COLORECTAL CA SCREEN DOC REV: CPT | Performed by: NURSE PRACTITIONER

## 2019-07-09 PROCEDURE — 82570 ASSAY OF URINE CREATININE: CPT

## 2019-07-09 PROCEDURE — 4040F PNEUMOC VAC/ADMIN/RCVD: CPT | Performed by: NURSE PRACTITIONER

## 2019-07-09 PROCEDURE — 2022F DILAT RTA XM EVC RTNOPTHY: CPT | Performed by: NURSE PRACTITIONER

## 2019-07-09 PROCEDURE — 3045F PR MOST RECENT HEMOGLOBIN A1C LEVEL 7.0-9.0%: CPT | Performed by: NURSE PRACTITIONER

## 2019-07-09 ASSESSMENT — PATIENT HEALTH QUESTIONNAIRE - PHQ9
SUM OF ALL RESPONSES TO PHQ QUESTIONS 1-9: 0
SUM OF ALL RESPONSES TO PHQ QUESTIONS 1-9: 0

## 2019-07-09 ASSESSMENT — ANXIETY QUESTIONNAIRES: GAD7 TOTAL SCORE: 0

## 2019-07-09 ASSESSMENT — LIFESTYLE VARIABLES: HOW OFTEN DO YOU HAVE A DRINK CONTAINING ALCOHOL: 0

## 2019-07-09 NOTE — PROGRESS NOTES
encounter diagnosis)  Dermatophytosis of nail  Choco Hall DPM   Blood Glucose Monitoring Suppl KENA Check blood sugar daily. ROCÍO Ramirez CNP   aspirin 81 MG tablet Take 81 mg by mouth daily. Historical Provider, MD     Past Medical History:   Diagnosis Date    Anemia     chronic, negative work up with EGD and Colonoscopy.  DJD (degenerative joint disease) of lumbar spine     Dry eye syndrome     GERD (gastroesophageal reflux disease)     Hepatic hemangioma     Hypertension     Keratitis     Secondary to dry eye syndrome.  Nuclear sclerotic cataract of both eyes     Obesity, morbid ([address]     Obstructive sleep apnea of adult     non compliant with cpap    Onychomycosis     1, 2, 3, 4, and 5, bilateral.    Type II or unspecified type diabetes mellitus without mention of complication, not stated as uncontrolled      Past Surgical History:   Procedure Laterality Date    COLONOSCOPY  09/15/2006    Diffuse diverticulosis slightly greater in the sigmoid colon and grade 1 internal hemorrhoids.  CYST REMOVAL      right side of  scalp    UMBILICAL HERNIA REPAIR      UPPER GASTROINTESTINAL ENDOSCOPY  09/15/2006    Mild to moderate diffuse gastritis, mild to moderate diffuse duodenitis. Family History   Problem Relation Age of Onset    Ovarian Cancer Mother     High Blood Pressure Sister     Diabetes Sister          at 76    High Blood Pressure Brother         all 4 brothers have it    Stroke Brother     Heart Attack Father         Acute myocardial infarction.     High Blood Pressure Brother     Stroke Brother     Dementia Sister          at 76    Cataracts Neg Hx     Glaucoma Neg Hx        CareTeam (Including outside providers/suppliers regularly involved in providing care):   Patient Care Team:  ROCÍO Ramirez CNP as PCP - General (Family Medicine)  ROCÍO Ramirez CNP as PCP - [facility] Empaneled Provider    Wt

## 2019-07-09 NOTE — PATIENT INSTRUCTIONS
Personalized Preventive Plan for Roel Aas - 7/9/2019  Medicare offers a range of preventive health benefits. Some of the tests and screenings are paid in full while other may be subject to a deductible, co-insurance, and/or copay. Some of these benefits include a comprehensive review of your medical history including lifestyle, illnesses that may run in your family, and various assessments and screenings as appropriate. After reviewing your medical record and screening and assessments performed today your provider may have ordered immunizations, labs, imaging, and/or referrals for you. A list of these orders (if applicable) as well as your Preventive Care list are included within your After Visit Summary for your review. Other Preventive Recommendations:    · A preventive eye exam performed by an eye specialist is recommended every 1-2 years to screen for glaucoma; cataracts, macular degeneration, and other eye disorders. · A preventive dental visit is recommended every 6 months. · Try to get at least 150 minutes of exercise per week or 10,000 steps per day on a pedometer . · Order or download the FREE \"Exercise & Physical Activity: Your Everyday Guide\" from [employer]. Call 9-381.483.8532 or search [employer] online. · You need 3662-5297 mg of calcium and 6546-5373 IU of vitamin D per day. It is possible to meet your calcium requirement with diet alone, but a vitamin D supplement is usually necessary to meet this goal.  · When exposed to the sun, use a sunscreen that protects against both UVA and UVB radiation with an SPF of 30 or greater. Reapply every 2 to 3 hours or after sweating, drying off with a towel, or swimming. · Always wear a seat belt when traveling in a car. Always wear a helmet when riding a bicycle or motorcycle.

## 2019-07-11 ASSESSMENT — ENCOUNTER SYMPTOMS
BLURRED VISION: 0
SORE THROAT: 0
NAUSEA: 0
HEARTBURN: 0
COUGH: 0
CHOKING: 0
SHORTNESS OF BREATH: 0

## 2019-07-31 ENCOUNTER — OFFICE VISIT (OUTPATIENT)
Dept: OPTOMETRY | Age: 74
End: 2019-07-31
Payer: COMMERCIAL

## 2019-07-31 DIAGNOSIS — H52.4 PRESBYOPIA OF BOTH EYES: Primary | ICD-10-CM

## 2019-07-31 DIAGNOSIS — H25.13 NUCLEAR SCLEROSIS OF BOTH EYES: ICD-10-CM

## 2019-07-31 DIAGNOSIS — E11.9 NON-INSULIN DEPENDENT TYPE 2 DIABETES MELLITUS (HCC): ICD-10-CM

## 2019-07-31 PROCEDURE — 92014 COMPRE OPH EXAM EST PT 1/>: CPT | Performed by: OPTOMETRIST

## 2019-07-31 RX ORDER — TROPICAMIDE 10 MG/ML
1 SOLUTION/ DROPS OPHTHALMIC ONCE
Status: COMPLETED | OUTPATIENT
Start: 2019-07-31 | End: 2019-07-31

## 2019-07-31 RX ORDER — PHENYLEPHRINE HCL 2.5 %
1 DROPS OPHTHALMIC (EYE) ONCE
Status: COMPLETED | OUTPATIENT
Start: 2019-07-31 | End: 2019-07-31

## 2019-07-31 RX ADMIN — Medication 1 DROP: at 09:43

## 2019-07-31 RX ADMIN — TROPICAMIDE 1 DROP: 10 SOLUTION/ DROPS OPHTHALMIC at 09:43

## 2019-07-31 ASSESSMENT — VISUAL ACUITY
OD_SC+: -1
OS_SC+: -2
OD_SC: 20/30
METHOD: SNELLEN - LINEAR
OD_SC: 20/40 OU
OS_SC: 20/20

## 2019-07-31 ASSESSMENT — TONOMETRY
OD_IOP_MMHG: 21
IOP_METHOD: NON-CONTACT AIR PUFF
OS_IOP_MMHG: 20

## 2019-07-31 ASSESSMENT — REFRACTION_WEARINGRX
OS_CYLINDER: -1.00
OD_ADD: +2.25
OS_ADD: +2.25
OD_AXIS: 095
OD_SPHERE: +0.75
OS_SPHERE: +0.50
OD_CYLINDER: -0.75
OS_AXIS: 080

## 2019-07-31 ASSESSMENT — KERATOMETRY
OD_K1POWER_DIOPTERS: 39.75
OD_AXISANGLE_DEGREES: 025
OD_AXISANGLE2_DEGREES: 115
OD_K2POWER_DIOPTERS: 40.75

## 2019-07-31 ASSESSMENT — SLIT LAMP EXAM - LIDS
COMMENTS: NORMAL
COMMENTS: NORMAL

## 2019-07-31 NOTE — PROGRESS NOTES
Hepatic hemangioma     Hypertension     Keratitis     Secondary to dry eye syndrome.     Nuclear sclerotic cataract of both eyes     Obesity, morbid ([address]     Obstructive sleep apnea of adult     non compliant with cpap    Onychomycosis     1, 2, 3, 4, and 5, bilateral.    Type II or unspecified type diabetes mellitus without mention of complication, not stated as uncontrolled          Main Ophthalmology Exam     External Exam       Right Left    External Normal Normal          Slit Lamp Exam       Right Left    Lids/Lashes Normal Normal    Conjunctiva/Sclera White and quiet White and quiet    Cornea Clear Clear    Anterior Chamber Deep and quiet Deep and quiet    Iris Round and reactive Round and reactive    Lens 2+ Nuclear sclerosis 2+ Nuclear sclerosis    Vitreous Normal Normal          Fundus Exam       Right Left    Disc Normal Normal    C/D Ratio 0.35 0.35    Macula Normal Normal    Vessels Normal Normal    Periphery Normal Normal    Photo documented                   Tonometry     Tonometry (Non-contact air puff, 9:27 AM)       Right Left    Pressure 21 20   IOP.8             17.1  CH:  8.6          8.2  WS: 8.5          6.3                   Visual Acuity (Snellen - Linear)       Right Left    Dist sc 20/30 -1 20/20 -2    Near sc 20/40 OU         Keratometry     Keratometry       K1 Axis K2 Axis    Right 39.75 115 40.75 025    Left                  Pupils     Pupils       Pupils    Right PERRL    Left PERRL               Not recorded         Not recorded          Ophthalmology Exam     Wearing Rx       Sphere Cylinder Axis Add    Right +0.75 -0.75 095 +2.25    Left +0.50 -1.00 080 +2.25    Age:  1yr    Type:  fill only if deisred                 Manifest Refraction     Manifest Refraction #2 (Auto)       Sphere Cylinder Axis    Right +2.00 -1.50 095    Left +1.50 -1.50 087               Final Rx       Sphere Cylinder Axis Add    Right +1.00 -0.75 095 +2.25    Left +0.75 -1.00 080 +2.25    Type:

## 2019-08-20 ENCOUNTER — OFFICE VISIT (OUTPATIENT)
Dept: PODIATRY | Age: 74
End: 2019-08-20
Payer: MEDICARE

## 2019-08-20 VITALS
HEART RATE: 78 BPM | DIASTOLIC BLOOD PRESSURE: 80 MMHG | SYSTOLIC BLOOD PRESSURE: 138 MMHG | WEIGHT: 296 LBS | BODY MASS INDEX: 47.57 KG/M2 | HEIGHT: 66 IN

## 2019-08-20 DIAGNOSIS — M79.671 FOOT PAIN, BILATERAL: ICD-10-CM

## 2019-08-20 DIAGNOSIS — B35.1 DERMATOPHYTOSIS OF NAIL: ICD-10-CM

## 2019-08-20 DIAGNOSIS — M79.672 FOOT PAIN, BILATERAL: ICD-10-CM

## 2019-08-20 DIAGNOSIS — E11.42 DM TYPE 2 WITH DIABETIC PERIPHERAL NEUROPATHY (HCC): ICD-10-CM

## 2019-08-20 DIAGNOSIS — M24.573 EQUINUS CONTRACTURE OF ANKLE: Primary | ICD-10-CM

## 2019-08-20 PROCEDURE — G8427 DOCREV CUR MEDS BY ELIG CLIN: HCPCS | Performed by: PODIATRIST

## 2019-08-20 PROCEDURE — 11721 DEBRIDE NAIL 6 OR MORE: CPT | Performed by: PODIATRIST

## 2019-08-20 PROCEDURE — 99213 OFFICE O/P EST LOW 20 MIN: CPT | Performed by: PODIATRIST

## 2019-08-20 PROCEDURE — 4040F PNEUMOC VAC/ADMIN/RCVD: CPT | Performed by: PODIATRIST

## 2019-08-20 PROCEDURE — 2022F DILAT RTA XM EVC RTNOPTHY: CPT | Performed by: PODIATRIST

## 2019-08-20 PROCEDURE — G8417 CALC BMI ABV UP PARAM F/U: HCPCS | Performed by: PODIATRIST

## 2019-08-20 PROCEDURE — 3045F PR MOST RECENT HEMOGLOBIN A1C LEVEL 7.0-9.0%: CPT | Performed by: PODIATRIST

## 2019-08-20 PROCEDURE — 3017F COLORECTAL CA SCREEN DOC REV: CPT | Performed by: PODIATRIST

## 2019-08-20 PROCEDURE — 1036F TOBACCO NON-USER: CPT | Performed by: PODIATRIST

## 2019-08-20 PROCEDURE — 1123F ACP DISCUSS/DSCN MKR DOCD: CPT | Performed by: PODIATRIST

## 2019-08-20 NOTE — PROGRESS NOTES
Subjective:  Patient presents to [facility] today for new issue of soreness in his feet. pain comes and goes. Worse with activity. No tx tried. Pt thinks his shoes have worn out. pt also presents for routine diabetic foot care. No n/v/f/c/d. Patient's diabetic control has been not changed. No Known Allergies    Past Medical History:   Diagnosis Date    Anemia     chronic, negative work up with EGD and Colonoscopy.  DJD (degenerative joint disease) of lumbar spine     Dry eye syndrome     GERD (gastroesophageal reflux disease)     Hepatic hemangioma     Hypertension     Keratitis     Secondary to dry eye syndrome.  Nuclear sclerotic cataract of both eyes     Obesity, morbid ([address]     Obstructive sleep apnea of adult     non compliant with cpap    Onychomycosis     1, 2, 3, 4, and 5, bilateral.    Type II or unspecified type diabetes mellitus without mention of complication, not stated as uncontrolled        Prior to Admission medications    Medication Sig Start Date End Date Taking?  Authorizing Provider   Diabetic Shoe MISC by Does not apply route Equinus contracture of ankle  (primary encounter diagnosis)    DM type 2 with diabetic peripheral neuropathy ([facility] [address]    Dermatophytosis of nail    Foot pain, bilateral 8/20/19  Yes Rosa Solis DPM   Foot Care Products (DIABETIC INSOLES) MISC 1 Units by Does not apply route daily Equinus contracture of ankle  (primary encounter diagnosis) DM type 2 with diabetic peripheral neuropathy (HCC) Dermatophytosis of nail Foot pain, bilateral 8/20/19  Yes Rebecca Chopra DPM   ranitidine (ZANTAC) 150 MG tablet TAKE 1 TABLET TWICE A DAY 5/28/19  Yes Susan Point, APRN - CNP   fluticasone (FLONASE) 50 MCG/ACT nasal spray USE TWO SPRAYS IN EACH NOSTRIL EVERY DAY 5/1/19  Yes Susan Point, APRN - CNP   omeprazole (PRILOSEC) 40 MG delayed release capsule TAKE 1 CAPSULE DAILY 4/29/19  Yes Susan Point, APRN - bilateral.  negative Valleix sign, bilateral.  Vibratory intact bilateral.  Reflexes Decreased bilateral.  Paresthesias negative. Dysthesias negative. Sharp/dull intact bilateral.    Musculoskeletal: Muscle strength 5/5, bilateral.  Pain present upon palpation bilateral generalized forefoot. Normal medial longitudinal arch, bilateral.  Ankle ROM decreased,bilateral.  1st MPJ ROM within normal limits, bilateral.  Dorsally contracted digits present. No other foot deformities. Integument:  Open lesion absent, Bilateral.  Interdigital maceration absent to web spaces,absent Bilateral.  Nails left 1, 2, 3, 4, 5 and right 1, 2, 3, 4, 5 thickened, dystrophic and crumbly, discolored with subungual debris. Fissures absent, Bilateral. Hyperkeratotic tissue is absent. Assessment:    Diagnosis Orders   1. Equinus contracture of ankle   DIABETES FOOT EXAM   2. DM type 2 with diabetic peripheral neuropathy (HCC)   DIABETES FOOT EXAM    KY DEBRIDEMENT OF NAILS, 6 OR MORE   3. Dermatophytosis of nail  HM DIABETES FOOT EXAM    KY DEBRIDEMENT OF NAILS, 6 OR MORE   4. Foot pain, bilateral  HM DIABETES FOOT EXAM       Plan:  Diabetic foot education and exam.  Orders Placed This Encounter   Medications    Diabetic Shoe MISC     Sig: by Does not apply route Equinus contracture of ankle  (primary encounter diagnosis)    DM type 2 with diabetic peripheral neuropathy (HCC)    Dermatophytosis of nail    Foot pain, bilateral     Dispense:  1 each     Refill:  0    Foot Care Products (DIABETIC INSOLES) MISC     Si Units by Does not apply route daily Equinus contracture of ankle  (primary encounter diagnosis) DM type 2 with diabetic peripheral neuropathy (HCC) Dermatophytosis of nail Foot pain, bilateral     Dispense:  3 each     Refill:  0     appropriate stretching for tightness discussed. Nails as mentioned above debrided in length and thickness. Patient advised about OTC treatments for nails and callous.   Patient will follow up in 10 weeks for routine foot care or PRN if any further problems arise.

## 2019-08-20 NOTE — PROGRESS NOTES
Foot Care Worksheet  PCP: ROCÍO Junior - CNP  Last visit: 7/9/19  Nail description:  Thick , Yellow , Crumbly , Marked limitation of ambulation     Pain resulting from thickened and dystrophy of nail plate No    Nails involved  Right   1, 2, 3, 4, 5  (T5-T9)  Left     1, 2, 3, 4, 5  (TA-T4)    Q7 1 Class A Finding - Non traumatic amputation of foot No    Q8 2 Class B Findings - Absent DP pulse No, Absent PT pulse No, Advanced tropic changes (3 required) Yes    Decrease hair growth Yes, Nail changes/thickening Yes, Pigmented changes/discoloration Yes, Skin texture (thin, shiny) Yes, Skin color (rubor/redness) No    Q9 1 Class B and 2 Class C Findings  Claudication No, Temperature change No, Paresthesia No, Burning Yes, Edema Yes

## 2019-09-25 DIAGNOSIS — N40.0 ENLARGED PROSTATE: ICD-10-CM

## 2019-09-25 RX ORDER — TAMSULOSIN HYDROCHLORIDE 0.4 MG/1
CAPSULE ORAL
Qty: 90 CAPSULE | Refills: 1 | Status: SHIPPED | OUTPATIENT
Start: 2019-09-25 | End: 2020-03-23

## 2019-09-27 DIAGNOSIS — E11.42 TYPE 2 DIABETES MELLITUS WITH POLYNEUROPATHY (HCC): ICD-10-CM

## 2019-10-19 DIAGNOSIS — E78.2 MIXED HYPERLIPIDEMIA: ICD-10-CM

## 2019-10-19 DIAGNOSIS — I10 ESSENTIAL HYPERTENSION: ICD-10-CM

## 2019-10-19 DIAGNOSIS — E11.42 DM TYPE 2 WITH DIABETIC PERIPHERAL NEUROPATHY (HCC): ICD-10-CM

## 2019-10-21 RX ORDER — METFORMIN HYDROCHLORIDE 500 MG/1
TABLET, EXTENDED RELEASE ORAL
Qty: 180 TABLET | Refills: 1 | Status: SHIPPED | OUTPATIENT
Start: 2019-10-21 | End: 2020-05-14 | Stop reason: SDUPTHER

## 2019-10-21 RX ORDER — VALSARTAN AND HYDROCHLOROTHIAZIDE 160; 25 MG/1; MG/1
TABLET ORAL
Qty: 90 TABLET | Refills: 1 | Status: SHIPPED | OUTPATIENT
Start: 2019-10-21 | End: 2020-04-20

## 2019-10-21 RX ORDER — ATORVASTATIN CALCIUM 20 MG/1
TABLET, FILM COATED ORAL
Qty: 90 TABLET | Refills: 1 | Status: SHIPPED | OUTPATIENT
Start: 2019-10-21 | End: 2020-04-20

## 2019-10-26 DIAGNOSIS — K21.9 GASTROESOPHAGEAL REFLUX DISEASE WITHOUT ESOPHAGITIS: ICD-10-CM

## 2019-10-28 RX ORDER — OMEPRAZOLE 40 MG/1
CAPSULE, DELAYED RELEASE ORAL
Qty: 90 CAPSULE | Refills: 4 | Status: SHIPPED | OUTPATIENT
Start: 2019-10-28

## 2019-10-29 ENCOUNTER — OFFICE VISIT (OUTPATIENT)
Dept: PODIATRY | Age: 74
End: 2019-10-29
Payer: MEDICARE

## 2019-10-29 ENCOUNTER — IMMUNIZATION (OUTPATIENT)
Dept: LAB | Age: 74
End: 2019-10-29
Payer: MEDICARE

## 2019-10-29 VITALS
SYSTOLIC BLOOD PRESSURE: 128 MMHG | DIASTOLIC BLOOD PRESSURE: 80 MMHG | WEIGHT: 294 LBS | HEART RATE: 96 BPM | RESPIRATION RATE: 20 BRPM | BODY MASS INDEX: 47.25 KG/M2 | HEIGHT: 66 IN

## 2019-10-29 DIAGNOSIS — B35.1 DERMATOPHYTOSIS OF NAIL: ICD-10-CM

## 2019-10-29 DIAGNOSIS — E11.42 DM TYPE 2 WITH DIABETIC PERIPHERAL NEUROPATHY (HCC): Primary | ICD-10-CM

## 2019-10-29 PROCEDURE — G0008 ADMIN INFLUENZA VIRUS VAC: HCPCS | Performed by: NURSE PRACTITIONER

## 2019-10-29 PROCEDURE — 11721 DEBRIDE NAIL 6 OR MORE: CPT | Performed by: PODIATRIST

## 2019-10-29 PROCEDURE — 99999 PR OFFICE/OUTPT VISIT,PROCEDURE ONLY: CPT | Performed by: PODIATRIST

## 2019-10-29 PROCEDURE — 90653 IIV ADJUVANT VACCINE IM: CPT | Performed by: NURSE PRACTITIONER

## 2019-11-24 DIAGNOSIS — K21.9 GASTROESOPHAGEAL REFLUX DISEASE WITHOUT ESOPHAGITIS: ICD-10-CM

## 2019-11-24 DIAGNOSIS — K21.9 GASTROESOPHAGEAL REFLUX DISEASE, ESOPHAGITIS PRESENCE NOT SPECIFIED: ICD-10-CM

## 2019-11-25 RX ORDER — RANITIDINE 150 MG/1
TABLET ORAL
Qty: 180 TABLET | Refills: 4 | Status: SHIPPED | OUTPATIENT
Start: 2019-11-25 | End: 2020-01-09 | Stop reason: ALTCHOICE

## 2020-01-09 ENCOUNTER — OFFICE VISIT (OUTPATIENT)
Dept: PODIATRY | Age: 75
End: 2020-01-09
Payer: MEDICARE

## 2020-01-09 ENCOUNTER — OFFICE VISIT (OUTPATIENT)
Dept: FAMILY MEDICINE CLINIC | Age: 75
End: 2020-01-09
Payer: MEDICARE

## 2020-01-09 ENCOUNTER — HOSPITAL ENCOUNTER (OUTPATIENT)
Dept: LAB | Age: 75
Discharge: HOME | End: 2020-01-09
Payer: MEDICARE

## 2020-01-09 VITALS
DIASTOLIC BLOOD PRESSURE: 72 MMHG | HEART RATE: 68 BPM | SYSTOLIC BLOOD PRESSURE: 130 MMHG | RESPIRATION RATE: 20 BRPM | HEIGHT: 65 IN | BODY MASS INDEX: 49.28 KG/M2 | WEIGHT: 295.8 LBS

## 2020-01-09 VITALS
BODY MASS INDEX: 47.57 KG/M2 | WEIGHT: 296 LBS | DIASTOLIC BLOOD PRESSURE: 78 MMHG | OXYGEN SATURATION: 96 % | HEIGHT: 66 IN | HEART RATE: 88 BPM | SYSTOLIC BLOOD PRESSURE: 130 MMHG

## 2020-01-09 LAB
ALBUMIN SERPL-MCNC: 3.9 G/DL (ref 3.5–5.2)
ALBUMIN/GLOBULIN RATIO: 0.9 (ref 1–2.5)
ALP BLD-CCNC: 149 U/L (ref 40–129)
ALT SERPL-CCNC: 12 U/L (ref 5–41)
ANION GAP SERPL CALCULATED.3IONS-SCNC: 14 MMOL/L (ref 9–17)
AST SERPL-CCNC: 13 U/L
BILIRUB SERPL-MCNC: 0.45 MG/DL (ref 0.3–1.2)
BUN BLDV-MCNC: 12 MG/DL (ref 8–23)
BUN/CREAT BLD: 13 (ref 9–20)
CALCIUM SERPL-MCNC: 9.6 MG/DL (ref 8.6–10.4)
CHLORIDE BLD-SCNC: 96 MMOL/L (ref 98–107)
CHOLESTEROL/HDL RATIO: 2.4
CHOLESTEROL: 111 MG/DL
CO2: 25 MMOL/L (ref 20–31)
CREAT SERPL-MCNC: 0.94 MG/DL (ref 0.7–1.2)
ESTIMATED AVERAGE GLUCOSE: 180 MG/DL
FOLATE: 12.9 NG/ML
GFR AFRICAN AMERICAN: >60 ML/MIN
GFR NON-AFRICAN AMERICAN: >60 ML/MIN
GFR SERPL CREATININE-BSD FRML MDRD: ABNORMAL ML/MIN/{1.73_M2}
GFR SERPL CREATININE-BSD FRML MDRD: ABNORMAL ML/MIN/{1.73_M2}
GLUCOSE BLD-MCNC: 214 MG/DL (ref 70–99)
HBA1C MFR BLD: 7.9 % (ref 4.8–5.9)
HDLC SERPL-MCNC: 46 MG/DL
LDL CHOLESTEROL: 44 MG/DL (ref 0–130)
POTASSIUM SERPL-SCNC: 3.9 MMOL/L (ref 3.7–5.3)
PROSTATE SPECIFIC ANTIGEN: 4.2 UG/L
SODIUM BLD-SCNC: 135 MMOL/L (ref 135–144)
TOTAL PROTEIN: 8.3 G/DL (ref 6.4–8.3)
TRIGL SERPL-MCNC: 104 MG/DL
VITAMIN B-12: 290 PG/ML (ref 232–1245)
VLDLC SERPL CALC-MCNC: NORMAL MG/DL (ref 1–30)

## 2020-01-09 PROCEDURE — 1036F TOBACCO NON-USER: CPT | Performed by: NURSE PRACTITIONER

## 2020-01-09 PROCEDURE — G8417 CALC BMI ABV UP PARAM F/U: HCPCS | Performed by: NURSE PRACTITIONER

## 2020-01-09 PROCEDURE — 36415 COLL VENOUS BLD VENIPUNCTURE: CPT

## 2020-01-09 PROCEDURE — 82746 ASSAY OF FOLIC ACID SERUM: CPT

## 2020-01-09 PROCEDURE — G8427 DOCREV CUR MEDS BY ELIG CLIN: HCPCS | Performed by: NURSE PRACTITIONER

## 2020-01-09 PROCEDURE — 1123F ACP DISCUSS/DSCN MKR DOCD: CPT | Performed by: NURSE PRACTITIONER

## 2020-01-09 PROCEDURE — 99213 OFFICE O/P EST LOW 20 MIN: CPT | Performed by: NURSE PRACTITIONER

## 2020-01-09 PROCEDURE — 80053 COMPREHEN METABOLIC PANEL: CPT

## 2020-01-09 PROCEDURE — 90732 PPSV23 VACC 2 YRS+ SUBQ/IM: CPT | Performed by: NURSE PRACTITIONER

## 2020-01-09 PROCEDURE — 99212 OFFICE O/P EST SF 10 MIN: CPT | Performed by: NURSE PRACTITIONER

## 2020-01-09 PROCEDURE — 80061 LIPID PANEL: CPT

## 2020-01-09 PROCEDURE — 83036 HEMOGLOBIN GLYCOSYLATED A1C: CPT

## 2020-01-09 PROCEDURE — 4040F PNEUMOC VAC/ADMIN/RCVD: CPT | Performed by: NURSE PRACTITIONER

## 2020-01-09 PROCEDURE — G0103 PSA SCREENING: HCPCS

## 2020-01-09 PROCEDURE — 3017F COLORECTAL CA SCREEN DOC REV: CPT | Performed by: NURSE PRACTITIONER

## 2020-01-09 PROCEDURE — 82607 VITAMIN B-12: CPT

## 2020-01-09 PROCEDURE — 99999 PR OFFICE/OUTPT VISIT,PROCEDURE ONLY: CPT | Performed by: PODIATRIST

## 2020-01-09 PROCEDURE — 2022F DILAT RTA XM EVC RTNOPTHY: CPT | Performed by: NURSE PRACTITIONER

## 2020-01-09 PROCEDURE — 11721 DEBRIDE NAIL 6 OR MORE: CPT | Performed by: PODIATRIST

## 2020-01-09 PROCEDURE — G8482 FLU IMMUNIZE ORDER/ADMIN: HCPCS | Performed by: NURSE PRACTITIONER

## 2020-01-09 ASSESSMENT — PATIENT HEALTH QUESTIONNAIRE - PHQ9
SUM OF ALL RESPONSES TO PHQ QUESTIONS 1-9: 0
SUM OF ALL RESPONSES TO PHQ QUESTIONS 1-9: 0
2. FEELING DOWN, DEPRESSED OR HOPELESS: 0
1. LITTLE INTEREST OR PLEASURE IN DOING THINGS: 0
SUM OF ALL RESPONSES TO PHQ9 QUESTIONS 1 & 2: 0

## 2020-01-09 NOTE — PROGRESS NOTES
Subjective:  Patient presents to [facility] today for routine diabetic foot care. Patient denies any new problems with their feet. Patient's diabetic control has been not changed. No Known Allergies    Past Medical History:   Diagnosis Date    Anemia     chronic, negative work up with EGD and Colonoscopy.  DJD (degenerative joint disease) of lumbar spine     Dry eye syndrome     GERD (gastroesophageal reflux disease)     Hepatic hemangioma     Hypertension     Keratitis     Secondary to dry eye syndrome.  Nuclear sclerotic cataract of both eyes     Obesity, morbid ([address]     Obstructive sleep apnea of adult     non compliant with cpap    Onychomycosis     1, 2, 3, 4, and 5, bilateral.    Type II or unspecified type diabetes mellitus without mention of complication, not stated as uncontrolled        Prior to Admission medications    Medication Sig Start Date End Date Taking? Authorizing Provider   blood glucose monitor kit and supplies Check blood sugar daily.   Diagnosis E11.42 1/9/20  Yes Ace Royer, APRN - CNP   blood glucose test strips (GLUCOCARD VITAL TEST) strip 1 each by In Vitro route daily Diagnosis E11.42 1/9/20  Yes Ace Royer, APRN - CNP   Foot Care Products (DIABETIC INSOLES) MISC 1 Units by Does not apply route daily Equinus contracture of ankle  (primary encounter diagnosis) DM type 2 with diabetic peripheral neuropathy ([address] Dermatophytosis of nail Foot pain, bilateral 10/29/19  Yes Crista Jaramillo DPM   Diabetic Shoe MISC by Does not apply route Equinus contracture of ankle  (primary encounter diagnosis)    DM type 2 with diabetic peripheral neuropathy ([address]    Dermatophytosis of nail    Foot pain, bilateral 10/29/19  Yes Ting Chopra DPM   omeprazole (PRILOSEC) 40 MG delayed release capsule TAKE 1 CAPSULE DAILY 10/28/19  Yes Ace Royer, APRN - CNP   atorvastatin (LIPITOR) 20 MG tablet TAKE 1 TABLET DAILY 10/21/19  Yes Abdias Forrest, APRN - CNP   valsartan-hydrochlorothiazide (DIOVAN-HCT) 160-25 MG per tablet TAKE 1 TABLET DAILY 10/21/19  Yes ROCÍO Pineda CNP   metFORMIN (GLUCOPHAGE-XR) 500 MG extended release tablet TAKE 1 TABLET TWICE A DAY 10/21/19  Yes ROCÍO Pineda CNP   tamsulosin (FLOMAX) 0.4 MG capsule TAKE 1 CAPSULE DAILY 19  Yes ROCÍO Pineda CNP   Lancets MISC Check blood sugar daily 12/10/18  Yes ROCÍO Pineda CNP   aspirin 81 MG tablet Take 81 mg by mouth daily. Yes Historical Provider, MD       Social History     Tobacco Use    Smoking status: Former Smoker     Years: 15.00     Types: Cigars     Last attempt to quit: 1970     Years since quittin.1    Smokeless tobacco: Never Used    Tobacco comment: quit over 30 years ago   Substance Use Topics    Alcohol use: No     Alcohol/week: 0.0 standard drinks     Comment: no alcohol for many years     Review of Systems: All 12 systems reviewed and pertinent positives noted above. Objective:  Vascular: DP and PT pulses palpable 2/4, bilateral.  CFT <3 seconds, bilateral.  Hair growth present to the level of the digits, bilateral.  Edema absent, bilateral.  Varicosities absent, bilateral. Erythema absent, bilateral. Distal Rubor absent bilateral.  Temperature within normal limits bilateral. Hyperpigmentation absent bilateral. No atrophic skin. Neurological: Sensation intact to light touch to level of digits, bilateral.  Protective sensation intact 10/10 sites via 5.07/10g [place] Monofilament, bilateral.  negative Tinel's, bilateral.  negative Valleix sign, bilateral.  Vibratory intact bilateral.  Reflexes Decreased bilateral.  Paresthesias negative. Dysthesias negative.   Sharp/dull intact bilateral.    Musculoskeletal: Muscle strength 5/5, bilateral.  Pain absent upon palpation bilateral. Normal medial longitudinal arch, bilateral.  Ankle ROM within

## 2020-01-10 ENCOUNTER — TELEPHONE (OUTPATIENT)
Dept: FAMILY MEDICINE CLINIC | Age: 75
End: 2020-01-10

## 2020-01-12 ASSESSMENT — ENCOUNTER SYMPTOMS
SHORTNESS OF BREATH: 0
WHEEZING: 0
COUGH: 0

## 2020-01-13 NOTE — PROGRESS NOTES
2020     Christ Byrne (:  1945) is a 75 y.o. male, here for evaluation of the following medical concerns:    Diabetes   He presents for his follow-up diabetic visit. He has type 2 diabetes mellitus. Disease course: labs were not available at time of visit. There are no hypoglycemic associated symptoms. Pertinent negatives for hypoglycemia include no dizziness or headaches. Pertinent negatives for diabetes include no chest pain, no fatigue, no foot paresthesias, no polydipsia and no polyuria. There are no hypoglycemic complications. Diabetic symptom progression: due for labs. Diabetic complications include peripheral neuropathy. Risk factors for coronary artery disease include male sex, diabetes mellitus and obesity. Current diabetic treatment includes oral agent (monotherapy). He is compliant with treatment most of the time. His weight is stable. He is following a generally unhealthy diet. Meal planning includes avoidance of concentrated sweets. He has not had a previous visit with a dietitian. He participates in exercise daily (goes to the [employer] center and rides the bike). Home blood sugar record trend: does not check his blood sugar does not think his meter works. An ACE inhibitor/angiotensin II receptor blocker is being taken. He sees a podiatrist.Eye exam is current. Past Medical History:   Diagnosis Date    Anemia     chronic, negative work up with EGD and Colonoscopy.  DJD (degenerative joint disease) of lumbar spine     Dry eye syndrome     GERD (gastroesophageal reflux disease)     Hepatic hemangioma     Hypertension     Keratitis     Secondary to dry eye syndrome.     Nuclear sclerotic cataract of both eyes     Obesity, morbid ([address]     Obstructive sleep apnea of adult     non compliant with cpap    Onychomycosis     1, 2, 3, 4, and 5, bilateral.    Type II or unspecified type diabetes mellitus without mention of complication, not stated as uncontrolled        Past Surgical History:   Procedure Laterality Date    COLONOSCOPY  09/15/2006    Diffuse diverticulosis slightly greater in the sigmoid colon and grade 1 internal hemorrhoids.  CYST REMOVAL      right side of  scalp    UMBILICAL HERNIA REPAIR  2013    UPPER GASTROINTESTINAL ENDOSCOPY  09/15/2006    Mild to moderate diffuse gastritis, mild to moderate diffuse duodenitis.        Social History     Socioeconomic History    Marital status: Single     Spouse name: None    Number of children: None    Years of education: None    Highest education level: None   Occupational History    None   Social Needs    Financial resource strain: None    Food insecurity:     Worry: None     Inability: None    Transportation needs:     Medical: None     Non-medical: None   Tobacco Use    Smoking status: Former Smoker     Years: 15.00     Types: Cigars     Last attempt to quit: 1970     Years since quittin.1    Smokeless tobacco: Never Used    Tobacco comment: quit over 30 years ago   Substance and Sexual Activity    Alcohol use: No     Alcohol/week: 0.0 standard drinks     Comment: no alcohol for many years    Drug use: No    Sexual activity: None   Lifestyle    Physical activity:     Days per week: None     Minutes per session: None    Stress: None   Relationships    Social connections:     Talks on phone: None     Gets together: None     Attends Alevism service: None     Active member of club or organization: None     Attends meetings of clubs or organizations: None     Relationship status: None    Intimate partner violence:     Fear of current or ex partner: None     Emotionally abused: None     Physically abused: None     Forced sexual activity: None   Other Topics Concern    None   Social History Narrative    None       Family History   Problem Relation Age of Onset    Ovarian Cancer Mother     High Blood Pressure Sister     Diabetes Sister          at 76    High Blood Pressure Brother Negative for dizziness, light-headedness and headaches. Prior to Visit Medications    Medication Sig Taking? Authorizing Provider   blood glucose monitor kit and supplies Check blood sugar daily. Diagnosis E11.42 Yes ROCÍO TONEY CNP   blood glucose test strips (GLUCOCARD VITAL TEST) strip 1 each by In Vitro route daily Diagnosis E11.42 Yes ROCÍO TONEY CNP   Foot Care Products (DIABETIC INSOLES) MISC 1 Units by Does not apply route daily Equinus contracture of ankle  (primary encounter diagnosis) DM type 2 with diabetic peripheral neuropathy (HCC) Dermatophytosis of nail Foot pain, bilateral  Red Cha, DPM   Diabetic Shoe MISC by Does not apply route Equinus contracture of ankle  (primary encounter diagnosis)    DM type 2 with diabetic peripheral neuropathy (HCC)    Dermatophytosis of nail    Foot pain, bilateral  Red Cha, DPM   omeprazole (PRILOSEC) 40 MG delayed release capsule TAKE 1 CAPSULE DAILY  ROCÍO TONEY CNP   atorvastatin (LIPITOR) 20 MG tablet TAKE 1 TABLET DAILY  ROCÍO TONEY CNP   valsartan-hydrochlorothiazide (DIOVAN-HCT) 160-25 MG per tablet TAKE 1 TABLET DAILY  ROCÍO TONEY CNP   metFORMIN (GLUCOPHAGE-XR) 500 MG extended release tablet TAKE 1 TABLET TWICE A DAY  ROCÍO Duarte CNP   tamsulosin (FLOMAX) 0.4 MG capsule TAKE 1 CAPSULE DAILY  ROCÍO TONEY CNP   Lancets MISC Check blood sugar daily  ROCÍO TONEY CNP   aspirin 81 MG tablet Take 81 mg by mouth daily.   Historical Provider, MD        Social History     Tobacco Use    Smoking status: Former Smoker     Years: 15.00     Types: Cigars     Last attempt to quit: 1970     Years since quittin.1    Smokeless tobacco: Never Used    Tobacco comment: quit over 30 years ago   Substance Use Topics    Alcohol use: No     Alcohol/week: 0.0 standard drinks

## 2020-01-20 RX ORDER — LANCETS 30 GAUGE
EACH MISCELLANEOUS
Qty: 100 EACH | Refills: 3 | Status: SHIPPED | OUTPATIENT
Start: 2020-01-20 | End: 2021-04-07

## 2020-02-03 ENCOUNTER — HOSPITAL ENCOUNTER (OUTPATIENT)
Dept: LAB | Age: 75
Discharge: HOME | End: 2020-02-03
Payer: MEDICARE

## 2020-02-03 ENCOUNTER — OFFICE VISIT (OUTPATIENT)
Dept: UROLOGY | Age: 75
End: 2020-02-03
Payer: MEDICARE

## 2020-02-03 VITALS
DIASTOLIC BLOOD PRESSURE: 92 MMHG | SYSTOLIC BLOOD PRESSURE: 130 MMHG | OXYGEN SATURATION: 94 % | HEART RATE: 97 BPM | WEIGHT: 295.86 LBS | BODY MASS INDEX: 49.29 KG/M2 | HEIGHT: 65 IN

## 2020-02-03 LAB — PROSTATE SPECIFIC ANTIGEN: 4.46 UG/L

## 2020-02-03 PROCEDURE — 36415 COLL VENOUS BLD VENIPUNCTURE: CPT

## 2020-02-03 PROCEDURE — 3017F COLORECTAL CA SCREEN DOC REV: CPT | Performed by: UROLOGY

## 2020-02-03 PROCEDURE — G8427 DOCREV CUR MEDS BY ELIG CLIN: HCPCS | Performed by: UROLOGY

## 2020-02-03 PROCEDURE — 1036F TOBACCO NON-USER: CPT | Performed by: UROLOGY

## 2020-02-03 PROCEDURE — G8417 CALC BMI ABV UP PARAM F/U: HCPCS | Performed by: UROLOGY

## 2020-02-03 PROCEDURE — G8482 FLU IMMUNIZE ORDER/ADMIN: HCPCS | Performed by: UROLOGY

## 2020-02-03 PROCEDURE — 99204 OFFICE O/P NEW MOD 45 MIN: CPT | Performed by: UROLOGY

## 2020-02-03 PROCEDURE — 1123F ACP DISCUSS/DSCN MKR DOCD: CPT | Performed by: UROLOGY

## 2020-02-03 PROCEDURE — 84153 ASSAY OF PSA TOTAL: CPT

## 2020-02-03 PROCEDURE — 4040F PNEUMOC VAC/ADMIN/RCVD: CPT | Performed by: UROLOGY

## 2020-02-03 PROCEDURE — 99214 OFFICE O/P EST MOD 30 MIN: CPT

## 2020-02-03 RX ORDER — OXYBUTYNIN CHLORIDE 10 MG/1
10 TABLET, EXTENDED RELEASE ORAL DAILY
Qty: 30 TABLET | Refills: 3 | Status: SHIPPED | OUTPATIENT
Start: 2020-02-03 | End: 2021-03-08 | Stop reason: SDUPTHER

## 2020-02-03 NOTE — PROGRESS NOTES
DORINA Evangelista MD        [address]  [address] [place] [address]  Dept: [phone]  Dept Fax: [phone]  Loc: [address] Urology Office Note - New Patient    Patient:  Gena Snow  YOB: 1945  Date: 2/3/2020    The patient is a 75 y.o. male who presents today for evaluation of the following problems:   Chief Complaint   Patient presents with    Elevated PSA     elevated PSA    referred/consultation requested by ROCÍO Otero CNP. HISTORY OF PRESENT ILLNESS:     Elevated PSA  Onset was  Weeks ago  Overall, the problem(s) are worse. Severity is described as moderate. Associated Symptoms: No dysuria, no gross hematuria. Current Pain Severity: 0    No family hx of prostate cancer  Urinary frequency- hx of diabetes      BPH- worsening voiding. Incontinence. On flomax. Has been on this. Requested/reviewed records from ROCÍO Otero CNP office and/or outside physician/EMR    (Patient's old records have been requested, reviewed and pertinent findings summarized in today's note.)    Procedures Today: N/A      Last several PSA's:  Lab Results   Component Value Date    PSA 4.20 (H) 01/09/2020    PSA 3.11 03/19/2015    PSA 2.83 09/19/2014       Last total testosterone:  No results found for: TESTOSTERONE    Urinalysis today:  No results found for this visit on 02/03/20. Last BUN and creatinine:  Lab Results   Component Value Date    BUN 12 01/09/2020     Lab Results   Component Value Date    CREATININE 0.94 01/09/2020       Additional Lab/Culture results: none    Imaging Reviewed during this Office Visit:   Zaid Juarez MD independently reviewed the images and verified the radiology reports from:    No results found.     PAST MEDICAL, FAMILY AND SOCIAL HISTORY:  Past Medical History:   Diagnosis Date    Anemia     chronic, negative History     Substance and Sexual Activity   Alcohol Use No    Alcohol/week: 0.0 standard drinks    Comment: no alcohol for many years       REVIEW OF SYSTEMS:  Constitutional: negative  Eyes: negative  Respiratory: negative  Cardiovascular: negative  Gastrointestinal: negative  Genitourinary: see HPI  Musculoskeletal: negative  Skin: negative   Neurological: negative  Hematological/Lymphatic: negative  Psychological: negative      Physical Exam:    This a 75 y.o. male  Vitals:    02/03/20 1057   BP: (!) 130/92   Pulse: 97   SpO2: 94%     Body mass index is 49.23 kg/m². Constitutional: Patient in no acute distress; Neuro: alert and oriented to person place and time. Psych: Mood and affect normal.  Skin: warm, dry  Lungs: Respiratory effort normal, Symmetric chest rise  Cardiovascular:  Normal peripheral pulses; Regular rate, radial pulses palpable  Abdomen: Soft, non-tender, non-distended with no CVA, flank pain, hepatosplenomegaly or hernia. Kidneys normal. Protuberant abdomen  Bladder non-tender and not distended. Lymphatics: no palpable lymphadenopathy  Minimal/no edema in bilateral lower extremities        Assessment and Plan        1. Elevated PSA    2. Benign localized prostatic hyperplasia with lower urinary tract symptoms (LUTS)               Plan:      Continue flomax  Will try ditropan for OAB symptoms/worsening incontinence  Recheck PSA   Follow up in 2-3 weeks with results.         Prescriptions Ordered:  Orders Placed This Encounter   Medications    oxybutynin (DITROPAN XL) 10 MG extended release tablet     Sig: Take 1 tablet by mouth daily     Dispense:  30 tablet     Refill:  3      Orders Placed:  Orders Placed This Encounter   Procedures    PSA, Diagnostic     Standing Status:   Future     Standing Expiration Date:   2/3/2021            Charleen Weber MD

## 2020-03-09 ENCOUNTER — TELEPHONE (OUTPATIENT)
Dept: UROLOGY | Age: 75
End: 2020-03-09

## 2020-03-09 NOTE — TELEPHONE ENCOUNTER
Patient called stating he had lab work done and has not heard results of that, Last seen 2/3 by Dr Merlyn Okeefe and had PSA done that day also.   Call him back at [phone]

## 2020-03-16 ENCOUNTER — OFFICE VISIT (OUTPATIENT)
Dept: UROLOGY | Age: 75
End: 2020-03-16
Payer: MEDICARE

## 2020-03-16 ENCOUNTER — TELEPHONE (OUTPATIENT)
Dept: UROLOGY | Age: 75
End: 2020-03-16

## 2020-03-16 VITALS
WEIGHT: 296 LBS | DIASTOLIC BLOOD PRESSURE: 80 MMHG | BODY MASS INDEX: 49.31 KG/M2 | HEART RATE: 92 BPM | SYSTOLIC BLOOD PRESSURE: 130 MMHG | HEIGHT: 65 IN

## 2020-03-16 PROCEDURE — G8417 CALC BMI ABV UP PARAM F/U: HCPCS | Performed by: UROLOGY

## 2020-03-16 PROCEDURE — G8427 DOCREV CUR MEDS BY ELIG CLIN: HCPCS | Performed by: UROLOGY

## 2020-03-16 PROCEDURE — 1123F ACP DISCUSS/DSCN MKR DOCD: CPT | Performed by: UROLOGY

## 2020-03-16 PROCEDURE — 99214 OFFICE O/P EST MOD 30 MIN: CPT | Performed by: UROLOGY

## 2020-03-16 PROCEDURE — 4040F PNEUMOC VAC/ADMIN/RCVD: CPT | Performed by: UROLOGY

## 2020-03-16 PROCEDURE — 1036F TOBACCO NON-USER: CPT | Performed by: UROLOGY

## 2020-03-16 PROCEDURE — G8482 FLU IMMUNIZE ORDER/ADMIN: HCPCS | Performed by: UROLOGY

## 2020-03-16 NOTE — PROGRESS NOTES
DORINA ALI BETHESDA [facility], MD        [address] [address]  Dept: [phone]  Dept Fax: [phone]  Loc: [phone]      [facility] Urology Office Note - Follow up Patient    Patient:  Thad Franklin  YOB: 1945  Date: 3/31/2020    The patient is a 67 y.o. male who presents today for evaluation of the following problems:   Chief Complaint   Patient presents with    Elevated PSA     6wk follow up    referred/consultation requested by ROCÍO Sheffield CNP. HISTORY OF PRESENT ILLNESS:     Elevated PSA  Onset was  Weeks ago  Overall, the problem(s) are worse. Severity is described as moderate. Associated Symptoms: No dysuria, no gross hematuria. Current Pain Severity: 0    No family hx of prostate cancer  Here with rising PSA      Urinary frequency- hx of diabetes. Was recently started on flomax and finasteride- helpful        Requested/reviewed records from ROCÍO Sheffield [facility] office and/or outside physician/EMR    (Patient's old records have been requested, reviewed and pertinent findings summarized in today's note.)    Procedures Today: N/A      Last several PSA's:  Lab Results   Component Value Date    PSA 4.46 (H) 02/03/2020    PSA 4.20 (H) 01/09/2020    PSA 3.11 03/19/2015       Last total testosterone:  No results found for: TESTOSTERONE    Urinalysis today:  No results found for this visit on 03/16/20. Last BUN and creatinine:  Lab Results   Component Value Date    BUN 12 01/09/2020     Lab Results   Component Value Date    CREATININE 0.94 01/09/2020       Additional Lab/Culture results: none    Imaging Reviewed during this Office Visit:   Phil Duran MD independently reviewed the images and verified the radiology reports from:    No results found.     PAST MEDICAL, FAMILY AND SOCIAL HISTORY:  Past Medical History:   Diagnosis Date    Anemia chronic, negative work up with EGD and Colonoscopy.  DJD (degenerative joint disease) of lumbar spine     Dry eye syndrome     GERD (gastroesophageal reflux disease)     Hepatic hemangioma     Hypertension     Keratitis     Secondary to dry eye syndrome.  Nuclear sclerotic cataract of both eyes     Obesity, morbid ([address]     Obstructive sleep apnea of adult     non compliant with cpap    Onychomycosis     1, 2, 3, 4, and 5, bilateral.    Type II or unspecified type diabetes mellitus without mention of complication, not stated as uncontrolled      Past Surgical History:   Procedure Laterality Date    COLONOSCOPY  09/15/2006    Diffuse diverticulosis slightly greater in the sigmoid colon and grade 1 internal hemorrhoids.  CYST REMOVAL      right side of  scalp    UMBILICAL HERNIA REPAIR      UPPER GASTROINTESTINAL ENDOSCOPY  09/15/2006    Mild to moderate diffuse gastritis, mild to moderate diffuse duodenitis. Family History   Problem Relation Age of Onset    Ovarian Cancer Mother     High Blood Pressure Sister     Diabetes Sister          at 76    High Blood Pressure Brother         all 4 brothers have it    Stroke Brother     Heart Attack Father         Acute myocardial infarction.  High Blood Pressure Brother     Stroke Brother     Dementia Sister          at 76    Cataracts Neg Hx     Glaucoma Neg Hx      Outpatient Medications Marked as Taking for the 3/16/20 encounter (Office Visit) with Eladio Lin MD   Medication Sig Dispense Refill    oxybutynin (DITROPAN XL) 10 MG extended release tablet Take 1 tablet by mouth daily 30 tablet 3    Lancets MISC USE TO TEST BLOOD SUGAR EVERY  each 3    blood glucose monitor kit and supplies Check blood sugar daily.   Diagnosis E11.42 1 kit 0    [DISCONTINUED] blood glucose test strips (GLUCOCARD VITAL TEST) strip 1 each by In Vitro route daily Diagnosis E11.42 200 each 6    Foot Care Products (DIABETIC INSOLES) MISC Psych: Mood and affect normal.  Skin: warm, dry  Lungs: Respiratory effort normal, Symmetric chest rise  Cardiovascular:  Normal peripheral pulses; Regular rate, radial pulses palpable  Abdomen: Soft, non-tender, non-distended with no CVA, flank pain, hepatosplenomegaly or hernia. Kidneys normal. Protuberant abdomen  Bladder non-tender and not distended. Lymphatics: no palpable lymphadenopathy  Minimal/no edema in bilateral lower extremities        Assessment and Plan        1. Elevated PSA    2. Benign localized prostatic hyperplasia with lower urinary tract symptoms (LUTS)               Plan:      Ditropan and flomax helpful  Less incontinence  psa elevated on recheck  Prostate biopsy local   Keflex 500 tid cipro 500 bid 3 days to start day before biopsy        Prescriptions Ordered:  No orders of the defined types were placed in this encounter. Orders Placed:  No orders of the defined types were placed in this encounter.            Emy Mendoza MD

## 2020-03-23 RX ORDER — TAMSULOSIN HYDROCHLORIDE 0.4 MG/1
CAPSULE ORAL
Qty: 90 CAPSULE | Refills: 3 | Status: SHIPPED | OUTPATIENT
Start: 2020-03-23 | End: 2021-03-08 | Stop reason: SDUPTHER

## 2020-03-30 RX ORDER — FLUTICASONE PROPIONATE 50 MCG
SPRAY, SUSPENSION (ML) NASAL
Qty: 1 BOTTLE | Refills: 11 | Status: SHIPPED | OUTPATIENT
Start: 2020-03-30 | End: 2021-04-05

## 2020-03-30 RX ORDER — BLOOD-GLUCOSE METER
1 KIT MISCELLANEOUS DAILY
Qty: 200 EACH | Refills: 6 | Status: SHIPPED | OUTPATIENT
Start: 2020-03-30 | End: 2021-06-14

## 2020-03-30 NOTE — TELEPHONE ENCOUNTER
Pt call, med refill request.  Requested Prescriptions     Pending Prescriptions Disp Refills    blood glucose test strips (GLUCOCARD VITAL TEST) strip 200 each 6     Si each by In Vitro route daily Diagnosis E11.42    fluticasone (FLONASE) 50 MCG/ACT nasal spray 1 Bottle 11     Sig: USE TWO SPRAYS IN EACH NOSTRIL EVERY DAY

## 2020-04-20 RX ORDER — VALSARTAN AND HYDROCHLOROTHIAZIDE 160; 25 MG/1; MG/1
TABLET ORAL
Qty: 90 TABLET | Refills: 3 | Status: SHIPPED | OUTPATIENT
Start: 2020-04-20 | End: 2021-01-11 | Stop reason: SDUPTHER

## 2020-04-20 RX ORDER — ATORVASTATIN CALCIUM 20 MG/1
TABLET, FILM COATED ORAL
Qty: 90 TABLET | Refills: 3 | Status: SHIPPED | OUTPATIENT
Start: 2020-04-20 | End: 2021-01-11 | Stop reason: SDUPTHER

## 2020-05-14 RX ORDER — METFORMIN HYDROCHLORIDE 500 MG/1
TABLET, EXTENDED RELEASE ORAL
Qty: 180 TABLET | Refills: 1 | Status: SHIPPED | OUTPATIENT
Start: 2020-05-14 | End: 2020-11-10

## 2020-05-14 RX ORDER — METFORMIN HYDROCHLORIDE 500 MG/1
TABLET, EXTENDED RELEASE ORAL
Qty: 28 TABLET | Refills: 0 | Status: ON HOLD | OUTPATIENT
Start: 2020-05-14 | End: 2020-08-04

## 2020-05-21 ENCOUNTER — OFFICE VISIT (OUTPATIENT)
Dept: PODIATRY | Age: 75
End: 2020-05-21
Payer: MEDICARE

## 2020-05-21 VITALS
HEART RATE: 88 BPM | BODY MASS INDEX: 48.75 KG/M2 | DIASTOLIC BLOOD PRESSURE: 78 MMHG | WEIGHT: 292.6 LBS | SYSTOLIC BLOOD PRESSURE: 132 MMHG | HEIGHT: 65 IN

## 2020-05-21 PROCEDURE — 11721 DEBRIDE NAIL 6 OR MORE: CPT | Performed by: PODIATRIST

## 2020-05-21 NOTE — PROGRESS NOTES
2/3/20  Yes Danielle Maza MD   Lancets MISC USE TO TEST BLOOD SUGAR EVERY DAY 20  Yes ROCÍO Marcus CNP   blood glucose monitor kit and supplies Check blood sugar daily. Diagnosis E11.42 20  Yes ROCÍO Marcus CNP   Foot Care Products (DIABETIC INSOLES) MISC 1 Units by Does not apply route daily Equinus contracture of ankle  (primary encounter diagnosis) DM type 2 with diabetic peripheral neuropathy ([facility] [address] Dermatophytosis of nail Foot pain, bilateral 10/29/19  Yes Jesse Whittington DPM   Diabetic Shoe MISC by Does not apply route Equinus contracture of ankle  (primary encounter diagnosis)    DM type 2 with diabetic peripheral neuropathy ([facility] [address] [facility]    Dermatophytosis of nail    Foot pain, bilateral 10/29/19  Yes Angeline Chopra DPM   omeprazole (PRILOSEC) 40 MG delayed release capsule TAKE 1 CAPSULE DAILY 10/28/19  Yes ROCÍO Marcus CNP   aspirin 81 MG tablet Take 81 mg by mouth daily. Yes Historical Provider, MD       Social History     Tobacco Use    Smoking status: Former Smoker     Years: 15.00     Types: Cigars     Last attempt to quit: 1970     Years since quittin.5    Smokeless tobacco: Never Used    Tobacco comment: quit over 30 years ago   Substance Use Topics    Alcohol use: No     Alcohol/week: 0.0 standard drinks     Comment: no alcohol for many years     ROS: All 14 ROS systems reviewed and pertinent positives noted above, all others negative. Objective:  Vascular: DP and PT pulses palpable 2/4, bilateral.  CFT <3 seconds, bilateral.  Hair growth present to the level of the digits, bilateral.  Edema absent, bilateral.  Varicosities absent, bilateral. Erythema absent, bilateral. Distal Rubor absent bilateral.  Temperature within normal limits bilateral. Hyperpigmentation absent bilateral. No atrophic skin.     Neurological: Sensation intact to light touch to level of digits, bilateral.  Protective sensation intact 10/10 sites

## 2020-05-27 ENCOUNTER — TELEPHONE (OUTPATIENT)
Dept: UROLOGY | Age: 75
End: 2020-05-27

## 2020-06-05 RX ORDER — RANITIDINE 150 MG/1
TABLET ORAL
Qty: 180 TABLET | Refills: 4 | OUTPATIENT
Start: 2020-06-05

## 2020-06-05 RX ORDER — RANITIDINE 150 MG/1
TABLET ORAL
Status: CANCELLED | OUTPATIENT
Start: 2020-06-05 | End: 2020-06-19

## 2020-06-05 RX ORDER — FAMOTIDINE 20 MG/1
20 TABLET, FILM COATED ORAL 2 TIMES DAILY
Qty: 180 TABLET | Refills: 1 | Status: SHIPPED | OUTPATIENT
Start: 2020-06-05 | End: 2020-12-09

## 2020-06-05 RX ORDER — FAMOTIDINE 20 MG/1
20 TABLET, FILM COATED ORAL 2 TIMES DAILY
Qty: 60 TABLET | Refills: 11 | Status: SHIPPED | OUTPATIENT
Start: 2020-06-05 | End: 2020-06-05 | Stop reason: SDUPTHER

## 2020-06-09 RX ORDER — CIPROFLOXACIN 500 MG/1
500 TABLET, FILM COATED ORAL 2 TIMES DAILY
Qty: 6 TABLET | Refills: 0 | Status: SHIPPED | OUTPATIENT
Start: 2020-06-17 | End: 2020-06-17 | Stop reason: SDUPTHER

## 2020-06-09 RX ORDER — CEPHALEXIN 500 MG/1
500 CAPSULE ORAL 3 TIMES DAILY
Qty: 9 CAPSULE | Refills: 0 | Status: SHIPPED | OUTPATIENT
Start: 2020-06-17 | End: 2020-06-17 | Stop reason: SDUPTHER

## 2020-06-17 RX ORDER — CIPROFLOXACIN 500 MG/1
500 TABLET, FILM COATED ORAL 2 TIMES DAILY
Qty: 6 TABLET | Refills: 0 | Status: SHIPPED | OUTPATIENT
Start: 2020-06-17 | End: 2020-06-20

## 2020-06-17 RX ORDER — CEPHALEXIN 500 MG/1
500 CAPSULE ORAL 3 TIMES DAILY
Qty: 9 CAPSULE | Refills: 0 | Status: SHIPPED | OUTPATIENT
Start: 2020-06-17 | End: 2020-06-20

## 2020-07-06 ENCOUNTER — HOSPITAL ENCOUNTER (OUTPATIENT)
Dept: LAB | Age: 75
Discharge: HOME | End: 2020-07-06
Payer: MEDICARE

## 2020-07-06 LAB
ALBUMIN SERPL-MCNC: 4 G/DL (ref 3.5–5.2)
ALBUMIN/GLOBULIN RATIO: 1 (ref 1–2.5)
ALP BLD-CCNC: 139 U/L (ref 40–129)
ALT SERPL-CCNC: 13 U/L (ref 5–41)
ANION GAP SERPL CALCULATED.3IONS-SCNC: 12 MMOL/L (ref 9–17)
AST SERPL-CCNC: 11 U/L
BILIRUB SERPL-MCNC: 0.61 MG/DL (ref 0.3–1.2)
BUN BLDV-MCNC: 13 MG/DL (ref 8–23)
BUN/CREAT BLD: 11 (ref 9–20)
CALCIUM SERPL-MCNC: 8.9 MG/DL (ref 8.6–10.4)
CHLORIDE BLD-SCNC: 99 MMOL/L (ref 98–107)
CO2: 27 MMOL/L (ref 20–31)
CREAT SERPL-MCNC: 1.15 MG/DL (ref 0.7–1.2)
CREATININE URINE: 126.7 MG/DL (ref 39–259)
ESTIMATED AVERAGE GLUCOSE: 146 MG/DL
GFR AFRICAN AMERICAN: >60 ML/MIN
GFR NON-AFRICAN AMERICAN: >60 ML/MIN
GFR SERPL CREATININE-BSD FRML MDRD: ABNORMAL ML/MIN/{1.73_M2}
GFR SERPL CREATININE-BSD FRML MDRD: ABNORMAL ML/MIN/{1.73_M2}
GLUCOSE BLD-MCNC: 197 MG/DL (ref 70–99)
HBA1C MFR BLD: 6.7 % (ref 4.8–5.9)
MICROALBUMIN/CREAT 24H UR: 197 MG/L
MICROALBUMIN/CREAT UR-RTO: 155 MCG/MG CREAT
POTASSIUM SERPL-SCNC: 3.8 MMOL/L (ref 3.7–5.3)
SODIUM BLD-SCNC: 138 MMOL/L (ref 135–144)
TOTAL PROTEIN: 8.1 G/DL (ref 6.4–8.3)

## 2020-07-06 PROCEDURE — 83036 HEMOGLOBIN GLYCOSYLATED A1C: CPT

## 2020-07-06 PROCEDURE — 80053 COMPREHEN METABOLIC PANEL: CPT

## 2020-07-06 PROCEDURE — 82043 UR ALBUMIN QUANTITATIVE: CPT

## 2020-07-06 PROCEDURE — 82570 ASSAY OF URINE CREATININE: CPT

## 2020-07-06 PROCEDURE — 36415 COLL VENOUS BLD VENIPUNCTURE: CPT

## 2020-07-10 ENCOUNTER — OFFICE VISIT (OUTPATIENT)
Dept: FAMILY MEDICINE CLINIC | Age: 75
End: 2020-07-10
Payer: MEDICARE

## 2020-07-10 ENCOUNTER — TELEPHONE (OUTPATIENT)
Dept: UROLOGY | Age: 75
End: 2020-07-10

## 2020-07-10 VITALS
BODY MASS INDEX: 45.92 KG/M2 | DIASTOLIC BLOOD PRESSURE: 82 MMHG | OXYGEN SATURATION: 96 % | SYSTOLIC BLOOD PRESSURE: 132 MMHG | HEART RATE: 84 BPM | WEIGHT: 292.6 LBS | HEIGHT: 67 IN | TEMPERATURE: 97.4 F

## 2020-07-10 PROCEDURE — 1036F TOBACCO NON-USER: CPT | Performed by: NURSE PRACTITIONER

## 2020-07-10 PROCEDURE — 4040F PNEUMOC VAC/ADMIN/RCVD: CPT | Performed by: NURSE PRACTITIONER

## 2020-07-10 PROCEDURE — G8427 DOCREV CUR MEDS BY ELIG CLIN: HCPCS | Performed by: NURSE PRACTITIONER

## 2020-07-10 PROCEDURE — G8417 CALC BMI ABV UP PARAM F/U: HCPCS | Performed by: NURSE PRACTITIONER

## 2020-07-10 PROCEDURE — G0439 PPPS, SUBSEQ VISIT: HCPCS | Performed by: NURSE PRACTITIONER

## 2020-07-10 PROCEDURE — 99213 OFFICE O/P EST LOW 20 MIN: CPT | Performed by: NURSE PRACTITIONER

## 2020-07-10 PROCEDURE — 1123F ACP DISCUSS/DSCN MKR DOCD: CPT | Performed by: NURSE PRACTITIONER

## 2020-07-10 ASSESSMENT — PATIENT HEALTH QUESTIONNAIRE - PHQ9
1. LITTLE INTEREST OR PLEASURE IN DOING THINGS: 0
SUM OF ALL RESPONSES TO PHQ QUESTIONS 1-9: 0
2. FEELING DOWN, DEPRESSED OR HOPELESS: 0
SUM OF ALL RESPONSES TO PHQ9 QUESTIONS 1 & 2: 0
SUM OF ALL RESPONSES TO PHQ QUESTIONS 1-9: 0

## 2020-07-10 ASSESSMENT — LIFESTYLE VARIABLES: HOW OFTEN DO YOU HAVE A DRINK CONTAINING ALCOHOL: 0

## 2020-07-10 NOTE — PROGRESS NOTES
Medicare Annual Wellness Visit  Name: Cleopatra Walters Date: 7/10/2020   MRN: [id number] Sex: Male   Age: 67 y.o. Ethnicity: Non-/Non    : 1945 Race: Black      Agnieszka Dang is here for Annual Exam (medicare wellnesss) and Back Pain (ongoing)    Screenings for behavioral, psychosocial and functional/safety risks, and cognitive dysfunction are all negative except as indicated below. These results, as well as other patient data from the [address] form, are documented in Flowsheets linked to this Encounter. No Known Allergies      Prior to Visit Medications    Medication Sig Taking? Authorizing Provider   famotidine (PEPCID) 20 MG tablet Take 1 tablet by mouth 2 times daily  ROCÍO Bryant CNP   metFORMIN (GLUCOPHAGE-XR) 500 MG extended release tablet TAKE 1 TABLET TWICE A DAY  ROCÍO Duarte CNP   metFORMIN (GLUCOPHAGE-XR) 500 MG extended release tablet TAKE 1 TABLET TWICE A DAY  ROCÍO Duarte CNP   atorvastatin (LIPITOR) 20 MG tablet TAKE 1 TABLET DAILY  ROCÍO Duarte CNP   valsartan-hydroCHLOROthiazide (DIOVAN-HCT) 160-25 MG per tablet TAKE 1 TABLET DAILY  ROCÍO Duarte CNP   blood glucose test strips (GLUCOCARD VITAL TEST) strip 1 each by In Vitro route daily Diagnosis E11.42  ROCÍO Bryant CNP   fluticasone (FLONASE) 50 MCG/ACT nasal spray USE TWO SPRAYS IN EACH NOSTRIL EVERY DAY  ROCÍO Bryant CNP   tamsulosin (FLOMAX) 0.4 MG capsule TAKE 1 CAPSULE DAILY  ROCÍO Bryant CNP   oxybutynin (DITROPAN XL) 10 MG extended release tablet Take 1 tablet by mouth daily  Joy Sol MD   Lancets MISC USE TO TEST BLOOD SUGAR EVERY DAY  ROCÍO Bryant CNP   blood glucose monitor kit and supplies Check blood sugar daily.   Diagnosis E11.42  ROCÍO Bryant CNP   Foot Care Products (DIABETIC INSOLES) MISC 1 Units by Does not apply route daily Equinus contracture of ankle  (primary encounter diagnosis) DM type 2 with diabetic peripheral neuropathy (HCC) Dermatophytosis of nail Foot pain, bilateral  Cristin Ports, DPM   Diabetic Shoe MISC by Does not apply route Equinus contracture of ankle  (primary encounter diagnosis)    DM type 2 with diabetic peripheral neuropathy (HCC)    Dermatophytosis of nail    Foot pain, bilateral  Cristin Ports, DPM   omeprazole (PRILOSEC) 40 MG delayed release capsule TAKE 1 CAPSULE DAILY  Corina Blush, APRN - CNP   aspirin 81 MG tablet Take 81 mg by mouth daily. Historical Provider, MD         Past Medical History:   Diagnosis Date    Anemia     chronic, negative work up with EGD and Colonoscopy.  DJD (degenerative joint disease) of lumbar spine     Dry eye syndrome     GERD (gastroesophageal reflux disease)     Hepatic hemangioma     Hypertension     Keratitis     Secondary to dry eye syndrome.  Nuclear sclerotic cataract of both eyes     Obesity, morbid ([address]     Obstructive sleep apnea of adult     non compliant with cpap    Onychomycosis     1, 2, 3, 4, and 5, bilateral.    Type II or unspecified type diabetes mellitus without mention of complication, not stated as uncontrolled        Past Surgical History:   Procedure Laterality Date    COLONOSCOPY  09/15/2006    Diffuse diverticulosis slightly greater in the sigmoid colon and grade 1 internal hemorrhoids.  CYST REMOVAL      right side of  scalp    UMBILICAL HERNIA REPAIR      UPPER GASTROINTESTINAL ENDOSCOPY  09/15/2006    Mild to moderate diffuse gastritis, mild to moderate diffuse duodenitis.          Family History   Problem Relation Age of Onset    Ovarian Cancer Mother     High Blood Pressure Sister     Diabetes Sister          at 76    High Blood Pressure Brother         all 4 brothers have it    Stroke Brother     Heart Attack Father         Acute myocardial infarction.  High Blood Pressure Brother     Stroke Brother     Dementia Sister          at 76    Cataracts Neg Hx     Glaucoma Neg Hx        CareTeam (Including outside providers/suppliers regularly involved in providing care):   Patient Care Team:  ROCÍO Hunt CNP as PCP - General (Family Medicine)  ROCÍO Hunt CNP as PCP - [facility] Jayden Guillermo Provider    Wt Readings from Last 3 Encounters:   07/10/20 292 lb 9.6 oz (132.7 kg)   20 292 lb 9.6 oz (132.7 kg)   20 296 lb (134.3 kg)     Vitals:    07/10/20 0853   BP: 132/82   Site: Right Upper Arm   Position: Sitting   Cuff Size: Large Adult   Pulse: 84   Temp: 97.4 °F (36.3 °C)   SpO2: 96%   Weight: 292 lb 9.6 oz (132.7 kg)   Height: 5' 7\" (1.702 m)     Body mass index is 45.83 kg/m². Based upon direct observation of the patient, evaluation of cognition reveals recent memory intact, remote memory impaired. Patient's complete Health Risk Assessment and screening values have been reviewed and are found in Flowsheets. The following problems were reviewed today and where indicated follow up appointments were made and/or referrals ordered. Positive Risk Factor Screenings with Interventions:     Fall Risk:  Timed Up and Go Test > 12 seconds? (Complete if either Fall Risk answers are Yes): (!) yes  2 or more falls in past year?: no  Fall with injury in past year?: no  Fall Risk Interventions:    · Home safety tips provided    Cognitive: Words recalled: 2 Words Recalled  Clock Drawing Test (CDT) Score: (!) Abnormal  Total Score Interpretation: Positive Mini-Cog  Cognitive Impairment Interventions:  · Neurology referral ordered    General Health:  General  In general, how would you say your health is?: Fair  In the past 7 days, have you experienced any of the following?  New or Increased Pain, New or Increased Fatigue, Loneliness, Social Isolation, Stress or Anger?: (!) New or Increased Pain  Do you get the social and emotional support that you need?: Yes  Do you have a Living Will?: (!) No  General Health Risk Interventions:  · No Living Will: referred to spiritual counselor    Health Habits/Nutrition:  Health Habits/Nutrition  Do you exercise for at least 20 minutes 2-3 times per week?: (!) No  Have you lost any weight without trying in the past 3 months?: No  Do you eat fewer than 2 meals per day?: No  Have you seen a dentist within the past year?: (!) No  Body mass index is 45.83 kg/m².   Health Habits/Nutrition Interventions:  · Inadequate physical activity:  patient is not ready to increase his/her physical activity level at this time, planning to go back to the [facility] when they open  · Dental exam overdue:  patient encouraged to make appointment with his/her dentist    Hearing/Vision:  No exam data present  Hearing/Vision  Do you or your family notice any trouble with your hearing?: No  Do you have difficulty driving, watching TV, or doing any of your daily activities because of your eyesight?: No  Have you had an eye exam within the past year?: (!) No  Hearing/Vision Interventions:  · Vision concerns:  patient encouraged to make appointment with his/her eye specialist    Personalized Preventive Plan   Current Health Maintenance Status  Immunization History   Administered Date(s) Administered    Influenza Virus Vaccine 12/13/2010, 12/15/2011, 12/05/2012, 09/19/2014    Influenza, High Dose (Fluzone 65 yrs and older) 01/23/2014, 09/25/2015, 10/12/2016, 11/28/2017    Influenza, Triv, inactivated, subunit, adjuvanted, IM (Fluad 65 yrs and older) 10/10/2018, 10/29/2019    Pneumococcal Conjugate 13-valent ([id number]) 03/19/2015    Pneumococcal Conjugate 7-valent (Prevnar7) 12/15/2011    Pneumococcal Polysaccharide ([id number]) 12/15/2011, 01/09/2020        Health Maintenance   Topic Date Due    DTaP/Tdap/Td vaccine (1 - Tdap) 02/15/1963    Shingles Vaccine (1 of 2) 02/15/1994    Flu vaccine (1)

## 2020-07-10 NOTE — PATIENT INSTRUCTIONS
Personalized Preventive Plan for Shruthi Cooper - 7/10/2020  Medicare offers a range of preventive health benefits. Some of the tests and screenings are paid in full while other may be subject to a deductible, co-insurance, and/or copay. Some of these benefits include a comprehensive review of your medical history including lifestyle, illnesses that may run in your family, and various assessments and screenings as appropriate. After reviewing your medical record and screening and assessments performed today your provider may have ordered immunizations, labs, imaging, and/or referrals for you. A list of these orders (if applicable) as well as your Preventive Care list are included within your After Visit Summary for your review. Other Preventive Recommendations:    · A preventive eye exam performed by an eye specialist is recommended every 1-2 years to screen for glaucoma; cataracts, macular degeneration, and other eye disorders. · A preventive dental visit is recommended every 6 months. · Try to get at least 150 minutes of exercise per week or 10,000 steps per day on a pedometer . · Order or download the FREE \"Exercise & Physical Activity: Your Everyday Guide\" from [employer]. Call 8-131.473.2877 or search [employer] online. · You need 5222-8191 mg of calcium and 1911-1849 IU of vitamin D per day. It is possible to meet your calcium requirement with diet alone, but a vitamin D supplement is usually necessary to meet this goal.  · When exposed to the sun, use a sunscreen that protects against both UVA and UVB radiation with an SPF of 30 or greater. Reapply every 2 to 3 hours or after sweating, drying off with a towel, or swimming. · Always wear a seat belt when traveling in a car. Always wear a helmet when riding a bicycle or motorcycle.   Patient Education        Preventing Falls: Care Instructions  Your Care Instructions     Getting around your home safely can be a challenge if you have injuries or health problems that make it easy for you to fall. Loose rugs and furniture in walkways are among the dangers for many older people who have problems walking or who have poor eyesight. People who have conditions such as arthritis, osteoporosis, or dementia also have to be careful not to fall. You can make your home safer with a few simple measures. Follow-up care is a key part of your treatment and safety. Be sure to make and go to all appointments, and call your doctor if you are having problems. It's also a good idea to know your test results and keep a list of the medicines you take. How can you care for yourself at home? Taking care of yourself  You may get dizzy if you do not drink enough water. To prevent dehydration, drink plenty of fluids, enough so that your urine is light yellow or clear like water. Choose water and other caffeine-free clear liquids. If you have kidney, heart, or liver disease and have to limit fluids, talk with your doctor before you increase the amount of fluids you drink. Exercise regularly to improve your strength, muscle tone, and balance. Walk if you can. Swimming may be a good choice if you cannot walk easily. Have your vision and hearing checked each year or any time you notice a change. If you have trouble seeing and hearing, you might not be able to avoid objects and could lose your balance. Know the side effects of the medicines you take. Ask your doctor or pharmacist whether the medicines you take can affect your balance. Sleeping pills or sedatives can affect your balance. Limit the amount of alcohol you drink. Alcohol can impair your balance and other senses. Ask your doctor whether calluses or corns on your feet need to be removed. If you wear loose-fitting shoes because of calluses or corns, you can lose your balance and fall. Talk to your doctor if you have numbness in your feet.   Preventing falls at home  Remove raised first.  Repair loose toilet seats and consider installing a raised toilet seat to make getting on and off the toilet easier. Keep your bathroom door unlocked while you are in the shower. Where can you learn more? Go to https://chpediana.myQaa. org and sign in to your [employer] account. Enter [phone] in the [facility] box to learn more about \"Preventing Falls: Care Instructions. \"     If you do not have an account, please click on the \"Sign Up Now\" link. Current as of: August 7, 2019               Content Version: 12.5  © 9188-7347 Healthwise, Incorporated. Care instructions adapted under license by [facility] ([facility]). If you have questions about a medical condition or this instruction, always ask your healthcare professional. [address] any warranty or liability for your use of this information.

## 2020-07-10 NOTE — TELEPHONE ENCOUNTER
Patient was scheduled for a prostate biopsy 6/22/2020 but this was canceled. Please call patient back to reschedule this.

## 2020-07-12 ENCOUNTER — APPOINTMENT (OUTPATIENT)
Dept: GENERAL RADIOLOGY | Age: 75
End: 2020-07-12
Payer: MEDICARE

## 2020-07-12 ENCOUNTER — HOSPITAL ENCOUNTER (EMERGENCY)
Age: 75
Discharge: HOME | End: 2020-07-12
Attending: EMERGENCY MEDICINE
Payer: MEDICARE

## 2020-07-12 VITALS
SYSTOLIC BLOOD PRESSURE: 162 MMHG | OXYGEN SATURATION: 96 % | BODY MASS INDEX: 44.25 KG/M2 | TEMPERATURE: 98.3 F | RESPIRATION RATE: 15 BRPM | HEIGHT: 68 IN | DIASTOLIC BLOOD PRESSURE: 81 MMHG | WEIGHT: 292 LBS | HEART RATE: 68 BPM

## 2020-07-12 LAB
-: NORMAL
AMORPHOUS: NORMAL
BACTERIA: NORMAL
BILIRUBIN URINE: NEGATIVE
CASTS UA: NORMAL /LPF (ref 0–2)
COLOR: ABNORMAL
COMMENT UA: ABNORMAL
CRYSTALS, UA: NORMAL /HPF
EPITHELIAL CELLS UA: NORMAL /HPF (ref 0–5)
GLUCOSE URINE: NEGATIVE
KETONES, URINE: NEGATIVE
LEUKOCYTE ESTERASE, URINE: NEGATIVE
MUCUS: NORMAL
NITRITE, URINE: NEGATIVE
OTHER OBSERVATIONS UA: NORMAL
PH UA: 6 (ref 5–6)
PROTEIN UA: ABNORMAL
RBC UA: NORMAL /HPF (ref 0–4)
RENAL EPITHELIAL, UA: NORMAL /HPF
SPECIFIC GRAVITY UA: 1.02 (ref 1.01–1.02)
TRICHOMONAS: NORMAL
TURBIDITY: ABNORMAL
URINE HGB: ABNORMAL
UROBILINOGEN, URINE: NORMAL
WBC UA: NORMAL /HPF (ref 0–4)
YEAST: NORMAL

## 2020-07-12 PROCEDURE — 81001 URINALYSIS AUTO W/SCOPE: CPT

## 2020-07-12 PROCEDURE — 96372 THER/PROPH/DIAG INJ SC/IM: CPT

## 2020-07-12 PROCEDURE — 6360000002 HC RX W HCPCS: Performed by: EMERGENCY MEDICINE

## 2020-07-12 PROCEDURE — 99284 EMERGENCY DEPT VISIT MOD MDM: CPT

## 2020-07-12 PROCEDURE — 72100 X-RAY EXAM L-S SPINE 2/3 VWS: CPT

## 2020-07-12 RX ORDER — KETOROLAC TROMETHAMINE 30 MG/ML
15 INJECTION, SOLUTION INTRAMUSCULAR; INTRAVENOUS ONCE
Status: COMPLETED | OUTPATIENT
Start: 2020-07-12 | End: 2020-07-12

## 2020-07-12 RX ORDER — IBUPROFEN 800 MG/1
800 TABLET ORAL EVERY 8 HOURS PRN
Qty: 30 TABLET | Refills: 0 | Status: ON HOLD | OUTPATIENT
Start: 2020-07-12 | End: 2020-08-05 | Stop reason: HOSPADM

## 2020-07-12 RX ADMIN — KETOROLAC TROMETHAMINE 15 MG: 30 INJECTION, SOLUTION INTRAMUSCULAR at 03:28

## 2020-07-12 ASSESSMENT — ENCOUNTER SYMPTOMS
WHEEZING: 0
BLURRED VISION: 0
BACK PAIN: 1
SHORTNESS OF BREATH: 0
BOWEL INCONTINENCE: 0
COUGH: 0
ABDOMINAL PAIN: 0

## 2020-07-12 ASSESSMENT — PAIN SCALES - GENERAL
PAINLEVEL_OUTOF10: 2

## 2020-07-12 ASSESSMENT — PAIN DESCRIPTION - LOCATION: LOCATION: FLANK

## 2020-07-12 ASSESSMENT — PAIN DESCRIPTION - PAIN TYPE
TYPE: ACUTE PAIN
TYPE: ACUTE PAIN

## 2020-07-12 ASSESSMENT — PAIN DESCRIPTION - ORIENTATION: ORIENTATION: LEFT

## 2020-07-12 ASSESSMENT — PAIN DESCRIPTION - DESCRIPTORS: DESCRIPTORS: ACHING

## 2020-07-12 NOTE — ED PROVIDER NOTES
eMERGENCY dEPARTMENT eNCOUnter      Pt Name: Susie De La Torre  MRN: [id number]  [place] 1945  Date of evaluation: 7/12/2020      CHIEF COMPLAINT       Chief Complaint   Patient presents with    Flank Pain     left sided, worse when getting up and sitting down. x1 wk. HISTORY OF PRESENT ILLNESS    Susie De La Torre is a 67 y.o. male who presents with back pain. Patient states for last week. He is been having pain to left side of his back. It is worse to sit down, worse to stand up, worse to actually move. Says lying down at rest.  He has no pain. No radiation pain. No bowel or bladder dysfunction, frequency, urgency, dysuria. No chest pain, shortness of breath, fever, chills. Has not taken anything for his pain         REVIEW OF SYSTEMS       Review systems are reviewed and negative except stated above in HPI     [address]    has a past medical history of Anemia, DJD (degenerative joint disease) of lumbar spine, Dry eye syndrome, GERD (gastroesophageal reflux disease), Hepatic hemangioma, Hypertension, Keratitis, Nuclear sclerotic cataract of both eyes, Obesity, morbid ([address] Obstructive sleep apnea of adult, Onychomycosis, and Type II or unspecified type diabetes mellitus without mention of complication, not stated as uncontrolled. SURGICAL HISTORY      has a past surgical history that includes Colonoscopy (09/15/2006); Upper gastrointestinal endoscopy (09/15/2006); cyst removal; and Umbilical hernia repair (2013).     CURRENT MEDICATIONS       Discharge Medication List as of 7/12/2020  5:02 AM      CONTINUE these medications which have NOT CHANGED    Details   famotidine (PEPCID) 20 MG tablet Take 1 tablet by mouth 2 times daily, Disp-180 tablet, R-1Normal      !! metFORMIN (GLUCOPHAGE-XR) 500 MG extended release tablet TAKE 1 TABLET TWICE A DAY, Disp-180 tablet, R-1Normal      atorvastatin (LIPITOR) 20 MG tablet TAKE 1 TABLET DAILY, Disp-90 tablet, R-3Normal valsartan-hydroCHLOROthiazide (DIOVAN-HCT) 160-25 MG per tablet TAKE 1 TABLET DAILY, Disp-90 tablet, R-3Normal      tamsulosin (FLOMAX) 0.4 MG capsule TAKE 1 CAPSULE DAILY, Disp-90 capsule, R-3Normal      omeprazole (PRILOSEC) 40 MG delayed release capsule TAKE 1 CAPSULE DAILY, Disp-90 capsule, R-4Normal      !! metFORMIN (GLUCOPHAGE-XR) 500 MG extended release tablet TAKE 1 TABLET TWICE A DAY, Disp-28 tablet, R-0Normal      blood glucose test strips (GLUCOCARD VITAL TEST) strip 1 each by In Vitro route daily Diagnosis E11.42, Disp-200 each, R-6Patient uses True Metrix MeterNormal      fluticasone (FLONASE) 50 MCG/ACT nasal spray USE TWO SPRAYS IN EACH NOSTRIL EVERY DAY, Disp-1 Bottle, R-11Normal      oxybutynin (DITROPAN XL) 10 MG extended release tablet Take 1 tablet by mouth daily, Disp-30 tablet, R-3Normal      Lancets MISC Disp-100 each, R-3, NormalUSE TO TEST BLOOD SUGAR EVERY DAY      blood glucose monitor kit and supplies Check blood sugar daily. Diagnosis E11.42, Disp-1 kit, R-0, Print      Foot Care Products (DIABETIC INSOLES) MISC DAILY Starting Tue 10/29/2019, Disp-3 each, R-0, PrintEquinus contracture of ankle  (primary encounter diagnosis) DM type 2 with diabetic peripheral neuropathy ([facility] [address] Dermatophytosis of nail Foot pain, bilateral      Diabetic Shoe MISC Starting Tue 10/29/2019, Disp-1 each, R-0, PrintEquinus contracture of ankle  (primary encounter diagnosis)  DM type 2 with diabetic peripheral neuropathy (HCC)  Dermatophytosis of nail  Foot pain, bilateral      aspirin 81 MG tablet Take 81 mg by mouth daily. !! - Potential duplicate medications found. Please discuss with provider. ALLERGIES     has No Known Allergies. FAMILY HISTORY     He indicated that his mother is . He indicated that his father is . He indicated that one of his two sisters is . He indicated that two of his four brothers are alive.  He indicated that the status of his neg hx is unknown.     family history includes Dementia in his sister; Diabetes in his sister; Heart Attack in his father; High Blood Pressure in his brother, brother, and sister; Ovarian Cancer in his mother; Stroke in his brother and brother. SOCIAL HISTORY      reports that he quit smoking about 49 years ago. His smoking use included cigars. He quit after 15.00 years of use. He has never used smokeless tobacco. He reports that he does not drink alcohol or use drugs. PHYSICAL EXAM     INITIAL VITALS:  height is 5' 8\" (1.727 m) and weight is 292 lb (132.5 kg). His tympanic temperature is 98.3 °F (36.8 °C). His blood pressure is 162/81 (abnormal) and his pulse is 68. His respiration is 15 and oxygen saturation is 96%. Gen.: Patient is alert, in no acute distress. HEENT: Head is atraumatic. Respiratory: Sounds are clear bilateral.  Cardiac: Heart is regular rate and rhythm. GI: Abdomen is morbidly obese, soft and nontender. Back: Patient has some mild reproducible pain in the paraspinal region in the left lumbar no step-offs or deformities. Neuro: Patient is no gross focal neurological deficits at bedside exam    DIFFERENTIAL DIAGNOSIS/ MDM:     , Herniated disks, muscular skeletal strain    DIAGNOSTIC RESULTS       RADIOLOGY:   I directly visualized the following  images and reviewed the radiologist interpretations:  XR LUMBAR SPINE (2-3 VIEWS)   Final Result   1. L4 on L5 anterolisthesis measuring 4 mm. 2. Multilevel mild lumbar spine spondylosis. 3. Multilevel mild-to-moderate facet degenerative arthropathy.                LABS:  Labs Reviewed   URINALYSIS - Abnormal; Notable for the following components:       Result Value    Urine Hgb TRACE (*)     Protein, UA TRACE (*)     All other components within normal limits   MICROSCOPIC URINALYSIS         EMERGENCY DEPARTMENT COURSE:   Vitals:    Vitals:    07/12/20 0310 07/12/20 0511   BP: (!) 154/67 (!) 162/81   Pulse: 72 68   Resp: 16 15   Temp: 98.3 °F (36.8 °C)    TempSrc: Tympanic    SpO2: 98% 96%   Weight: 292 lb (132.5 kg)    Height: 5' 8\" (1.727 m)      -------------------------  BP: (!) 162/81, Temp: 98.3 °F (36.8 °C), Pulse: 68, Resp: 15    Orders Placed This Encounter   Medications    ketorolac (TORADOL) injection 15 mg    ibuprofen (ADVIL;MOTRIN) 800 MG tablet     Sig: Take 1 tablet by mouth every 8 hours as needed for Pain     Dispense:  30 tablet     Refill:  0           Re-evaluation Notes    X-rays revealed degenerative changes, otherwise nothing acute. Eyes are most consistent with muscular skeletal origin. We will begin a prescription for Motrin and follow-up as directed. Return if worse        FINAL IMPRESSION      1. Left flank pain          DISPOSITION/PLAN   DISPOSITION Decision To Discharge 07/12/2020 05:02:15 AM      Condition on Disposition    Stable    PATIENT REFERRED TO:  ROCÍO Rodriguez - CNP  [facility] [id number]  [phone]    In 2 days        DISCHARGE MEDICATIONS:  Discharge Medication List as of 7/12/2020  5:02 AM      START taking these medications    Details   ibuprofen (ADVIL;MOTRIN) 800 MG tablet Take 1 tablet by mouth every 8 hours as needed for Pain, Disp-30 tablet,R-0Print             (Please note that portions of this note were completed with a voice recognition program.  Efforts were made to edit the dictations but occasionally words are mis-transcribed.)    Moraes MD, F.A.C.E.P.   Attending Emergency Physician        Kashif Katz MD  07/12/20 [id number]

## 2020-07-12 NOTE — ED TRIAGE NOTES
Pt presents to ED d/t left flank pain x1 wk. Pt states it hurts worse when he sits down or gets up. Denies any n/v/d. Pt reports its aching pain. Pt reports when he goes to urinate he sometimes urinates on himself. Pt  Denies ever having kidney stones but reports he takes a pill for his prostate.

## 2020-07-12 NOTE — PROGRESS NOTES
participates in exercise three times a week (when the senior center is open). There is no change in his home blood glucose trend. An ACE inhibitor/angiotensin II receptor blocker is being taken. He does not see a podiatrist.Eye exam is not current. Hyperlipidemia   This is a chronic problem. The current episode started more than 1 year ago. The problem is controlled. Recent lipid tests were reviewed and are normal. Exacerbating diseases include diabetes and obesity. Factors aggravating his hyperlipidemia include fatty foods and thiazides. Pertinent negatives include no chest pain, leg pain, myalgias or shortness of breath. Current antihyperlipidemic treatment includes statins. The current treatment provides significant improvement of lipids. Compliance problems include adherence to diet and adherence to exercise. Risk factors for coronary artery disease include diabetes mellitus, dyslipidemia, hypertension, male sex and obesity. Hypertension   This is a chronic problem. The current episode started more than 1 year ago. The problem is unchanged. The problem is controlled. Pertinent negatives include no anxiety, blurred vision, chest pain, headaches, palpitations, peripheral edema or shortness of breath. There are no associated agents to hypertension. Risk factors for coronary artery disease include diabetes mellitus, dyslipidemia, obesity, male gender and sedentary lifestyle. Past treatments include angiotensin blockers and diuretics. The current treatment provides moderate improvement. Compliance problems include diet and exercise. Past Medical History:   Diagnosis Date    Anemia     chronic, negative work up with EGD and Colonoscopy.  DJD (degenerative joint disease) of lumbar spine     Dry eye syndrome     GERD (gastroesophageal reflux disease)     Hepatic hemangioma     Hypertension     Keratitis     Secondary to dry eye syndrome.     Nuclear sclerotic cataract of both eyes     Obesity, morbid ([facility]     Obstructive sleep apnea of adult     non compliant with cpap    Onychomycosis     1, 2, 3, 4, and 5, bilateral.    Type II or unspecified type diabetes mellitus without mention of complication, not stated as uncontrolled        Past Surgical History:   Procedure Laterality Date    COLONOSCOPY  09/15/2006    Diffuse diverticulosis slightly greater in the sigmoid colon and grade 1 internal hemorrhoids.  CYST REMOVAL      right side of  scalp    UMBILICAL HERNIA REPAIR  2013    UPPER GASTROINTESTINAL ENDOSCOPY  09/15/2006    Mild to moderate diffuse gastritis, mild to moderate diffuse duodenitis.        Social History     Socioeconomic History    Marital status: Single     Spouse name: None    Number of children: None    Years of education: None    Highest education level: None   Occupational History    None   Social Needs    Financial resource strain: None    Food insecurity     Worry: None     Inability: None    Transportation needs     Medical: None     Non-medical: None   Tobacco Use    Smoking status: Former Smoker     Years: 15.00     Types: Cigars     Last attempt to quit: 1970     Years since quittin.6    Smokeless tobacco: Never Used    Tobacco comment: quit over 30 years ago   Substance and Sexual Activity    Alcohol use: No     Alcohol/week: 0.0 standard drinks     Comment: no alcohol for many years    Drug use: No    Sexual activity: None   Lifestyle    Physical activity     Days per week: None     Minutes per session: None    Stress: None   Relationships    Social connections     Talks on phone: None     Gets together: None     Attends Mandaeism service: None     Active member of club or organization: None     Attends meetings of clubs or organizations: None     Relationship status: None    Intimate partner violence     Fear of current or ex partner: None     Emotionally abused: None     Physically abused: None     Forced sexual activity: None   Other Topics Concern    None   Social History Narrative    None       Family History   Problem Relation Age of Onset    Ovarian Cancer Mother     High Blood Pressure Sister     Diabetes Sister          at 76    High Blood Pressure Brother         all 4 brothers have it    Stroke Brother     Heart Attack Father         Acute myocardial infarction.  High Blood Pressure Brother     Stroke Brother     Dementia Sister          at 76    Cataracts Neg Hx     Glaucoma Neg Hx        No Known Allergies    Current Outpatient Medications   Medication Sig Dispense Refill    ibuprofen (ADVIL;MOTRIN) 800 MG tablet Take 1 tablet by mouth every 8 hours as needed for Pain 30 tablet 0    famotidine (PEPCID) 20 MG tablet Take 1 tablet by mouth 2 times daily 180 tablet 1    metFORMIN (GLUCOPHAGE-XR) 500 MG extended release tablet TAKE 1 TABLET TWICE A  tablet 1    metFORMIN (GLUCOPHAGE-XR) 500 MG extended release tablet TAKE 1 TABLET TWICE A DAY 28 tablet 0    atorvastatin (LIPITOR) 20 MG tablet TAKE 1 TABLET DAILY 90 tablet 3    valsartan-hydroCHLOROthiazide (DIOVAN-HCT) 160-25 MG per tablet TAKE 1 TABLET DAILY 90 tablet 3    blood glucose test strips (GLUCOCARD VITAL TEST) strip 1 each by In Vitro route daily Diagnosis E11.42 200 each 6    fluticasone (FLONASE) 50 MCG/ACT nasal spray USE TWO SPRAYS IN EACH NOSTRIL EVERY DAY 1 Bottle 11    tamsulosin (FLOMAX) 0.4 MG capsule TAKE 1 CAPSULE DAILY 90 capsule 3    oxybutynin (DITROPAN XL) 10 MG extended release tablet Take 1 tablet by mouth daily 30 tablet 3    Lancets MISC USE TO TEST BLOOD SUGAR EVERY  each 3    blood glucose monitor kit and supplies Check blood sugar daily.   Diagnosis E11.42 1 kit 0    Foot Care Products (DIABETIC INSOLES) MISC 1 Units by Does not apply route daily Equinus contracture of ankle  (primary encounter diagnosis) DM type 2 with diabetic peripheral neuropathy (HCC) Dermatophytosis of nail Foot pain, bilateral 3 each 0    Diabetic Shoe MISC by Does not apply route Equinus contracture of ankle  (primary encounter diagnosis)    DM type 2 with diabetic peripheral neuropathy (HCC)    Dermatophytosis of nail    Foot pain, bilateral 1 each 0    omeprazole (PRILOSEC) 40 MG delayed release capsule TAKE 1 CAPSULE DAILY 90 capsule 4    aspirin 81 MG tablet Take 81 mg by mouth daily. No current facility-administered medications for this visit. Review of Systems   Constitutional: Negative for activity change, appetite change, fatigue and fever. Eyes: Negative for blurred vision. Respiratory: Negative for cough, shortness of breath and wheezing. Cardiovascular: Negative for chest pain, palpitations and leg swelling. Gastrointestinal: Negative for abdominal pain and bowel incontinence. Genitourinary: Positive for difficulty urinating. Negative for bladder incontinence, discharge, dysuria and penile pain. Musculoskeletal: Positive for back pain. Negative for myalgias. Neurological: Negative for dizziness, light-headedness and headaches. Psychiatric/Behavioral:        Difficulty with memory       Prior to Visit Medications    Medication Sig Taking?  Authorizing Provider   ibuprofen (ADVIL;MOTRIN) 800 MG tablet Take 1 tablet by mouth every 8 hours as needed for Pain  Jen Stewart MD   famotidine (PEPCID) 20 MG tablet Take 1 tablet by mouth 2 times daily  ROCÍO Castillo CNP   metFORMIN (GLUCOPHAGE-XR) 500 MG extended release tablet TAKE 1 TABLET TWICE A DAY  ROCÍO Duarte CNP   metFORMIN (GLUCOPHAGE-XR) 500 MG extended release tablet TAKE 1 TABLET TWICE A DAY  ROCÍO Duarte CNP   atorvastatin (LIPITOR) 20 MG tablet TAKE 1 TABLET DAILY  ROCÍO Duarte CNP   valsartan-hydroCHLOROthiazide (DIOVAN-HCT) 160-25 MG per tablet TAKE 1 TABLET DAILY  ROCÍO Castillo CNP   blood glucose test strips (GLUCOCARD VITAL TEST) strip 1 each by In Vitro route daily Diagnosis E11.42  ROCÍO Green CNP   fluticasone (FLONASE) 50 MCG/ACT nasal spray USE TWO SPRAYS IN EACH NOSTRIL EVERY DAY  ROCÍO Green CNP   tamsulosin (FLOMAX) 0.4 MG capsule TAKE 1 CAPSULE DAILY  ROCÍO Green CNP   oxybutynin (DITROPAN XL) 10 MG extended release tablet Take 1 tablet by mouth daily  Valentin Reza MD   Lancets MISC USE TO TEST BLOOD SUGAR EVERY DAY  ROCÍO Green CNP   blood glucose monitor kit and supplies Check blood sugar daily. Diagnosis E11.42  ROCOÍ Green CNP   Foot Care Products (DIABETIC INSOLES) MISC 1 Units by Does not apply route daily Equinus contracture of ankle  (primary encounter diagnosis) DM type 2 with diabetic peripheral neuropathy (HCC) Dermatophytosis of nail Foot pain, bilateral  [place] Broccoli, DPM   Diabetic Shoe MISC by Does not apply route Equinus contracture of ankle  (primary encounter diagnosis)    DM type 2 with diabetic peripheral neuropathy (HCC)    Dermatophytosis of nail    Foot pain, bilateral  [place] Broccoli, DPM   omeprazole (PRILOSEC) 40 MG delayed release capsule TAKE 1 CAPSULE DAILY  ROCÍO Green CNP   aspirin 81 MG tablet Take 81 mg by mouth daily.   Historical Provider, MD        Social History     Tobacco Use    Smoking status: Former Smoker     Years: 15.00     Types: Cigars     Last attempt to quit: 1970     Years since quittin.6    Smokeless tobacco: Never Used    Tobacco comment: quit over 30 years ago   Substance Use Topics    Alcohol use: No     Alcohol/week: 0.0 standard drinks     Comment: no alcohol for many years        Vitals:    07/10/20 0853   BP: 132/82   Site: Right Upper Arm   Position: Sitting   Cuff Size: Large Adult   Pulse: 84   Temp: 97.4 °F (36.3 °C)   SpO2: 96%   Weight: 292 lb 9.6 oz (132.7 kg)   Height: 5' 7\" (1.702 m)     Estimated body mass index is 45.83 Albumin/Globulin Ratio 07/06/2020 1.0  1.0 - 2.5 Final    GFR Non- 07/06/2020 >60  >60 mL/min Final    GFR  07/06/2020 >60  >60 mL/min Final    GFR Comment 07/06/2020        Final    Comment: Average GFR for 79or more years old:   76 mL/min/1.73sq m  Chronic Kidney Disease:   <60 mL/min/1.73sq m  Kidney failure:   <15 mL/min/1.73sq m              eGFR calculated using average adult body mass. Additional eGFR calculator available at:        [email or web address]            GFR Staging 07/06/2020 NOT REPORTED   Final    Hemoglobin A1C 07/06/2020 6.7* 4.8 - 5.9 % Final    Estimated Avg Glucose 07/06/2020 146  mg/dL Final    Microalb, Ur 07/06/2020 197* <21 mg/L Final    Creatinine, Ur 07/06/2020 126.7  39.0 - 259.0 mg/dL Final    Microalb/Crt. Ratio 07/06/2020 155* <17 mcg/mg creat Final         ASSESSMENT/PLAN:  1. Routine general medical examination at a health care facility  Referral to spiritual counselor for living will    2. DM type 2 with diabetic peripheral neuropathy ([facility] [address]  Improved continue current medication   Encouraged patient to resume light exercise  - Comprehensive Metabolic Panel; Future  - Hemoglobin A1C; Future    3. Enlarged prostate  Encouraged patient to reschedule with urology     4. Mixed hyperlipidemia  Stable continue current medication   - Comprehensive Metabolic Panel; Future  - Lipid Panel; Future    5. Abnormal mini-mental status exam    - [place] Cheadle, MD, Neurology, [place]    6. Counseling for living will    - [facility]    7. Essential hypertension  Stable continue current medication     8. Back pain try NSAIDs or tylenol, heat to affected area      Return in about 6 months (around 1/10/2021), or if symptoms worsen or fail to improve, for Medicare Annual Wellness Visit in 1 year. An electronic signature was used to authenticate this note.     --ROCÍO Iniguez - CNP on 7/12/2020 at 5:35 PM

## 2020-07-13 NOTE — TELEPHONE ENCOUNTER
Spoke with patient. He is set up for biopsy next Monday the 20th at 10:30. Reviewed medication instructions- patient has cipro and keflex ordered that he already picked up and was instructed to hold blood thinners after today. Patent voiced understanding, no further questions.

## 2020-07-16 ENCOUNTER — TELEPHONE (OUTPATIENT)
Dept: UROLOGY | Age: 75
End: 2020-07-16

## 2020-07-16 NOTE — TELEPHONE ENCOUNTER
Contacted patient answered his questions about his antibiotic that he needs to take prior the procedure

## 2020-07-20 ENCOUNTER — HOSPITAL ENCOUNTER (OUTPATIENT)
Age: 75
Setting detail: SPECIMEN
Discharge: HOME | End: 2020-07-20
Payer: MEDICARE

## 2020-07-20 ENCOUNTER — HOSPITAL ENCOUNTER (OUTPATIENT)
Dept: ULTRASOUND IMAGING | Age: 75
Discharge: HOME | End: 2020-07-22
Payer: MEDICARE

## 2020-07-20 ENCOUNTER — PROCEDURE VISIT (OUTPATIENT)
Dept: UROLOGY | Age: 75
End: 2020-07-20
Payer: MEDICARE

## 2020-07-20 PROCEDURE — 88344 IMHCHEM/IMCYTCHM EA MLT ANTB: CPT

## 2020-07-20 PROCEDURE — 55700 HC BIOPSY PROSTATE NEEDLE OR PUNC: CPT | Performed by: UROLOGY

## 2020-07-20 PROCEDURE — 76872 US TRANSRECTAL: CPT | Performed by: UROLOGY

## 2020-07-20 PROCEDURE — 76942 ECHO GUIDE FOR BIOPSY: CPT

## 2020-07-20 PROCEDURE — 2500000003 HC RX 250 WO HCPCS

## 2020-07-20 PROCEDURE — 55700 PR BIOPSY OF PROSTATE,NEEDLE/PUNCH: CPT | Performed by: UROLOGY

## 2020-07-20 PROCEDURE — 88305 TISSUE EXAM BY PATHOLOGIST: CPT

## 2020-07-20 NOTE — PROGRESS NOTES
Mr. Hilaria London was seen in follow up for his prostate biopsy. The biopsy was indicated and is being performed for elevated PSA. The biopsy was done without difficulty or complication. TRUS prostate biopsy note:  After obtaining informed consent, the rectal ultrasound probe was passed per rectum and the prostate visualized. A local block was performed by instilling 2% lidocaine at the base Measurements were taken and the prostate measuredfor a total volume of 160 cc. At this point, prostate biopsy was performed. Two cores were taken at the base, the mid-portion, and the apex of the gland on either side for a total of 12 cores. The rectal probe was removed and there was minimal bleeding. Mr. Hilaria London tolerated the procedure well and there were no complications. Prostate size: 160 cc  Prostatic calcifications:yes   Hypoechoic areas: yes  Cores taken: 12  Complications: none    Large median lobe      Assessment:      Prostate biopsy performed without difficulty. Plan:        I will see Patito Whittington back to discuss the pathology in 1-2 weeks. Further recommendations will be based on these results.

## 2020-07-21 ENCOUNTER — TELEPHONE (OUTPATIENT)
Dept: SPIRITUAL SERVICES | Age: 75
End: 2020-07-21

## 2020-07-21 NOTE — TELEPHONE ENCOUNTER
ACP specialist contacted patient per outpatient referral for assistance with advance directives. Left message. Specialist will follow up at a later time.     Electronically signed by Emogene Peabody on 7/21/2020 at 10:17 AM

## 2020-07-23 LAB — SURGICAL PATHOLOGY REPORT: NORMAL

## 2020-07-27 ENCOUNTER — APPOINTMENT (OUTPATIENT)
Dept: GENERAL RADIOLOGY | Age: 75
End: 2020-07-27
Payer: MEDICARE

## 2020-07-27 ENCOUNTER — OFFICE VISIT (OUTPATIENT)
Dept: UROLOGY | Age: 75
End: 2020-07-27
Payer: MEDICARE

## 2020-07-27 ENCOUNTER — HOSPITAL ENCOUNTER (EMERGENCY)
Age: 75
Discharge: HOME | End: 2020-07-27
Attending: EMERGENCY MEDICINE
Payer: MEDICARE

## 2020-07-27 VITALS
TEMPERATURE: 98.1 F | OXYGEN SATURATION: 96 % | RESPIRATION RATE: 16 BRPM | HEIGHT: 68 IN | SYSTOLIC BLOOD PRESSURE: 126 MMHG | WEIGHT: 295 LBS | HEART RATE: 83 BPM | BODY MASS INDEX: 44.71 KG/M2 | DIASTOLIC BLOOD PRESSURE: 67 MMHG

## 2020-07-27 VITALS
DIASTOLIC BLOOD PRESSURE: 82 MMHG | HEIGHT: 68 IN | SYSTOLIC BLOOD PRESSURE: 126 MMHG | OXYGEN SATURATION: 97 % | WEIGHT: 292 LBS | BODY MASS INDEX: 44.25 KG/M2 | HEART RATE: 159 BPM

## 2020-07-27 LAB
ABSOLUTE EOS #: 0.06 K/UL (ref 0–0.44)
ABSOLUTE IMMATURE GRANULOCYTE: 0.05 K/UL (ref 0–0.3)
ABSOLUTE LYMPH #: 1.55 K/UL (ref 1.1–3.7)
ABSOLUTE MONO #: 0.81 K/UL (ref 0.1–1.2)
ANION GAP SERPL CALCULATED.3IONS-SCNC: 15 MMOL/L (ref 9–17)
BASOPHILS # BLD: 0 % (ref 0–2)
BASOPHILS ABSOLUTE: 0.03 K/UL (ref 0–0.2)
BUN BLDV-MCNC: 22 MG/DL (ref 8–23)
BUN/CREAT BLD: 16 (ref 9–20)
CALCIUM SERPL-MCNC: 8.4 MG/DL (ref 8.6–10.4)
CHLORIDE BLD-SCNC: 98 MMOL/L (ref 98–107)
CO2: 23 MMOL/L (ref 20–31)
CREAT SERPL-MCNC: 1.41 MG/DL (ref 0.7–1.2)
D-DIMER QUANTITATIVE: 0.39 MG/L FEU (ref 0–0.59)
DIFFERENTIAL TYPE: ABNORMAL
EOSINOPHILS RELATIVE PERCENT: 1 % (ref 1–4)
GFR AFRICAN AMERICAN: 59 ML/MIN
GFR NON-AFRICAN AMERICAN: 49 ML/MIN
GFR SERPL CREATININE-BSD FRML MDRD: ABNORMAL ML/MIN/{1.73_M2}
GFR SERPL CREATININE-BSD FRML MDRD: ABNORMAL ML/MIN/{1.73_M2}
GLUCOSE BLD-MCNC: 225 MG/DL (ref 70–99)
HCT VFR BLD CALC: 35 % (ref 40.7–50.3)
HEMOGLOBIN: 11.1 G/DL (ref 13–17)
IMMATURE GRANULOCYTES: 1 %
LYMPHOCYTES # BLD: 21 % (ref 24–43)
MCH RBC QN AUTO: 27.3 PG (ref 25.2–33.5)
MCHC RBC AUTO-ENTMCNC: 31.7 G/DL (ref 25.2–33.5)
MCV RBC AUTO: 86 FL (ref 82.6–102.9)
MONOCYTES # BLD: 11 % (ref 3–12)
NRBC AUTOMATED: 0 PER 100 WBC
PDW BLD-RTO: 13.2 % (ref 11.8–14.4)
PLATELET # BLD: 319 K/UL (ref 138–453)
PLATELET ESTIMATE: ABNORMAL
PMV BLD AUTO: 10.2 FL (ref 8.1–13.5)
POTASSIUM SERPL-SCNC: 3.3 MMOL/L (ref 3.7–5.3)
RBC # BLD: 4.07 M/UL (ref 4.21–5.77)
RBC # BLD: ABNORMAL 10*6/UL
SEG NEUTROPHILS: 66 % (ref 36–65)
SEGMENTED NEUTROPHILS ABSOLUTE COUNT: 4.9 K/UL (ref 1.5–8.1)
SODIUM BLD-SCNC: 136 MMOL/L (ref 135–144)
TROPONIN INTERP: NORMAL
TROPONIN T: NORMAL NG/ML
TROPONIN, HIGH SENSITIVITY: 14 NG/L (ref 0–22)
WBC # BLD: 7.4 K/UL (ref 3.5–11.3)
WBC # BLD: ABNORMAL 10*3/UL

## 2020-07-27 PROCEDURE — 71045 X-RAY EXAM CHEST 1 VIEW: CPT

## 2020-07-27 PROCEDURE — 84484 ASSAY OF TROPONIN QUANT: CPT

## 2020-07-27 PROCEDURE — 99285 EMERGENCY DEPT VISIT HI MDM: CPT

## 2020-07-27 PROCEDURE — 80048 BASIC METABOLIC PNL TOTAL CA: CPT

## 2020-07-27 PROCEDURE — 99214 OFFICE O/P EST MOD 30 MIN: CPT | Performed by: UROLOGY

## 2020-07-27 PROCEDURE — 85025 COMPLETE CBC W/AUTO DIFF WBC: CPT

## 2020-07-27 PROCEDURE — 99214 OFFICE O/P EST MOD 30 MIN: CPT

## 2020-07-27 PROCEDURE — 96374 THER/PROPH/DIAG INJ IV PUSH: CPT

## 2020-07-27 PROCEDURE — 1123F ACP DISCUSS/DSCN MKR DOCD: CPT | Performed by: UROLOGY

## 2020-07-27 PROCEDURE — 2500000003 HC RX 250 WO HCPCS: Performed by: EMERGENCY MEDICINE

## 2020-07-27 PROCEDURE — 93005 ELECTROCARDIOGRAM TRACING: CPT | Performed by: EMERGENCY MEDICINE

## 2020-07-27 PROCEDURE — 85379 FIBRIN DEGRADATION QUANT: CPT

## 2020-07-27 PROCEDURE — G8417 CALC BMI ABV UP PARAM F/U: HCPCS | Performed by: UROLOGY

## 2020-07-27 PROCEDURE — 4040F PNEUMOC VAC/ADMIN/RCVD: CPT | Performed by: UROLOGY

## 2020-07-27 PROCEDURE — G8427 DOCREV CUR MEDS BY ELIG CLIN: HCPCS | Performed by: UROLOGY

## 2020-07-27 PROCEDURE — 1036F TOBACCO NON-USER: CPT | Performed by: UROLOGY

## 2020-07-27 RX ORDER — METOPROLOL SUCCINATE 25 MG/1
25 TABLET, EXTENDED RELEASE ORAL DAILY
Qty: 30 TABLET | Refills: 0 | Status: ON HOLD | OUTPATIENT
Start: 2020-07-27 | End: 2020-08-05 | Stop reason: HOSPADM

## 2020-07-27 RX ORDER — METOPROLOL TARTRATE 5 MG/5ML
5 INJECTION INTRAVENOUS ONCE
Status: COMPLETED | OUTPATIENT
Start: 2020-07-27 | End: 2020-07-27

## 2020-07-27 RX ADMIN — METOPROLOL TARTRATE 5 MG: 1 INJECTION, SOLUTION INTRAVENOUS at 14:11

## 2020-07-27 NOTE — PROGRESS NOTES
DORINA Navarro MD        [address] [address]  Dept: [phone]  Dept Fax: [phone]  Loc: [phone]      [facility] Urology Office Note - Follow up Patient    Patient:  Shruthi Cooper  YOB: 1945  Date: 7/27/2020    The patient is a 67 y.o. male who presents today for evaluation of the following problems:   Chief Complaint   Patient presents with    Elevated PSA     path post prostate bx    referred/consultation requested by ROCÍO Mcarthur CNP. HISTORY OF PRESENT ILLNESS:     Elevated PSA  Onset was  Weeks ago  Overall, the problem(s) are unchanged  Severity is described as moderate. Associated Symptoms: No dysuria, no gross hematuria. Current Pain Severity: 0    No family hx of prostate cancer  Here with biopsy results--- negative  HR in 160's in office today      Secondary Diagnosis:    Urinary frequency- hx of diabetes. Was recently started on flomax and finasteride- helpful        Requested/reviewed records from ROCÍO Mcarthur office and/or outside physician/EMR    (Patient's old records have been requested, reviewed and pertinent findings summarized in today's note.)    Procedures Today: N/A      Last several PSA's:  Lab Results   Component Value Date    PSA 4.46 (H) 02/03/2020    PSA 4.20 (H) 01/09/2020    PSA 3.11 03/19/2015       Last total testosterone:  No results found for: TESTOSTERONE    Urinalysis today:  No results found for this visit on 07/27/20. Last BUN and creatinine:  Lab Results   Component Value Date    BUN 13 07/06/2020     Lab Results   Component Value Date    CREATININE 1.15 07/06/2020       Additional Lab/Culture results: none    Imaging Reviewed during this Office Visit:   Jennifer Nelson MD independently reviewed the images and verified the radiology reports from:    No results found.     PAST MEDICAL, FAMILY AND SOCIAL HISTORY:  Past Medical History:   Diagnosis Date    Anemia     chronic, negative work up with EGD and Colonoscopy.  DJD (degenerative joint disease) of lumbar spine     Dry eye syndrome     GERD (gastroesophageal reflux disease)     Hepatic hemangioma     Hypertension     Keratitis     Secondary to dry eye syndrome.  Nuclear sclerotic cataract of both eyes     Obesity, morbid ([address]     Obstructive sleep apnea of adult     non compliant with cpap    Onychomycosis     1, 2, 3, 4, and 5, bilateral.    Type II or unspecified type diabetes mellitus without mention of complication, not stated as uncontrolled      Past Surgical History:   Procedure Laterality Date    COLONOSCOPY  09/15/2006    Diffuse diverticulosis slightly greater in the sigmoid colon and grade 1 internal hemorrhoids.  CYST REMOVAL      right side of  scalp    UMBILICAL HERNIA REPAIR      UPPER GASTROINTESTINAL ENDOSCOPY  09/15/2006    Mild to moderate diffuse gastritis, mild to moderate diffuse duodenitis. Family History   Problem Relation Age of Onset    Ovarian Cancer Mother     High Blood Pressure Sister     Diabetes Sister          at 76    High Blood Pressure Brother         all 4 brothers have it    Stroke Brother     Heart Attack Father         Acute myocardial infarction.     High Blood Pressure Brother     Stroke Brother     Dementia Sister          at 76    Cataracts Neg Hx     Glaucoma Neg Hx      Outpatient Medications Marked as Taking for the 20 encounter (Office Visit) with Porsche Monreal MD   Medication Sig Dispense Refill    ibuprofen (ADVIL;MOTRIN) 800 MG tablet Take 1 tablet by mouth every 8 hours as needed for Pain 30 tablet 0    famotidine (PEPCID) 20 MG tablet Take 1 tablet by mouth 2 times daily 180 tablet 1    metFORMIN (GLUCOPHAGE-XR) 500 MG extended release tablet TAKE 1 TABLET TWICE A  tablet 1    metFORMIN (GLUCOPHAGE-XR) 500 MG extended release tablet TAKE 1 TABLET TWICE A DAY 28 tablet 0    atorvastatin (LIPITOR) 20 MG tablet TAKE 1 TABLET DAILY 90 tablet 3    valsartan-hydroCHLOROthiazide (DIOVAN-HCT) 160-25 MG per tablet TAKE 1 TABLET DAILY 90 tablet 3    blood glucose test strips (GLUCOCARD VITAL TEST) strip 1 each by In Vitro route daily Diagnosis E11.42 200 each 6    fluticasone (FLONASE) 50 MCG/ACT nasal spray USE TWO SPRAYS IN EACH NOSTRIL EVERY DAY 1 Bottle 11    tamsulosin (FLOMAX) 0.4 MG capsule TAKE 1 CAPSULE DAILY 90 capsule 3    oxybutynin (DITROPAN XL) 10 MG extended release tablet Take 1 tablet by mouth daily 30 tablet 3    Lancets MISC USE TO TEST BLOOD SUGAR EVERY  each 3    blood glucose monitor kit and supplies Check blood sugar daily. Diagnosis E11.42 1 kit 0    Foot Care Products (DIABETIC INSOLES) MISC 1 Units by Does not apply route daily Equinus contracture of ankle  (primary encounter diagnosis) DM type 2 with diabetic peripheral neuropathy (HCC) Dermatophytosis of nail Foot pain, bilateral 3 each 0    Diabetic Shoe MISC by Does not apply route Equinus contracture of ankle  (primary encounter diagnosis)    DM type 2 with diabetic peripheral neuropathy (HCC)    Dermatophytosis of nail    Foot pain, bilateral 1 each 0    omeprazole (PRILOSEC) 40 MG delayed release capsule TAKE 1 CAPSULE DAILY 90 capsule 4    aspirin 81 MG tablet Take 81 mg by mouth daily. Patient has no known allergies.   Social History     Tobacco Use   Smoking Status Former Smoker    Years: 15.00    Types: Cigars    Last attempt to quit: 1970    Years since quittin.6   Smokeless Tobacco Never Used   Tobacco Comment    quit over 30 years ago      (If patient a smoker, smoking cessation counseling offered)   Social History     Substance and Sexual Activity   Alcohol Use No    Alcohol/week: 0.0 standard drinks    Comment: no alcohol for many years       REVIEW OF SYSTEMS:  Constitutional: negative  Eyes: negative  Respiratory: negative  Cardiovascular: chest pain  Gastrointestinal: negative  Genitourinary: see HPI  Musculoskeletal: negative  Skin: negative   Neurological: negative  Hematological/Lymphatic: negative  Psychological: negative      Physical Exam:    This a 67 y.o. male  Vitals:    07/27/20 1319   BP:    Pulse: 159   SpO2:      Body mass index is 44.4 kg/m². Constitutional: Patient in no acute distress;           Assessment and Plan        1. Elevated PSA    2. Benign localized prostatic hyperplasia with lower urinary tract symptoms (LUTS)               Plan:      Ditropan and flomax helpful. Continue. Less incontinence  Follow up in six months with PSA prior  Recommend ED urgently for tachycardia and intermittent chest pain over last few days. Prescriptions Ordered:  No orders of the defined types were placed in this encounter. Orders Placed:  No orders of the defined types were placed in this encounter.            Jeovany Coon MD

## 2020-07-27 NOTE — ED NOTES
Writer called [facility] [employer], spoke with Naman Clark. Called in prescription for metoprolol succinate 25mg extended release tablet with instructions to take one tablet by mouth daily, dispense 30 tablets with no refills.      Dillon Minor RN  07/27/20 0946

## 2020-07-27 NOTE — ED PROVIDER NOTES
sister; Diabetes in his sister; Heart Attack in his father; High Blood Pressure in his brother, brother, and sister; Ovarian Cancer in his mother; Stroke in his brother and brother. SOCIAL HISTORY      reports that he quit smoking about 49 years ago. His smoking use included cigars. He quit after 15.00 years of use. He has never used smokeless tobacco. He reports that he does not drink alcohol or use drugs. PHYSICAL EXAM    (up to 7 for level 4, 8 or more for level 5)   INITIAL VITALS:  height is 5' 8\" (1.727 m) and weight is 295 lb (133.8 kg). His tympanic temperature is 98.1 °F (36.7 °C). His blood pressure is 126/67 and his pulse is 83. His respiration is 16 and oxygen saturation is 96%. Physical Exam  Vitals signs and nursing note reviewed. Constitutional:       Appearance: Normal appearance. HENT:      Head: Normocephalic and atraumatic. Eyes:      Conjunctiva/sclera: Conjunctivae normal.   Neck:      Musculoskeletal: Normal range of motion and neck supple. No neck rigidity or muscular tenderness. Cardiovascular:      Rate and Rhythm: Normal rate and regular rhythm. Pulses: Normal pulses. Heart sounds: Normal heart sounds. Pulmonary:      Effort: Pulmonary effort is normal.      Breath sounds: Normal breath sounds. Abdominal:      Palpations: Abdomen is soft. Tenderness: There is no abdominal tenderness. There is no guarding. Musculoskeletal: Normal range of motion. General: No swelling or tenderness. Lymphadenopathy:      Cervical: No cervical adenopathy. Skin:     General: Skin is warm and dry. Findings: No rash. Neurological:      General: No focal deficit present. Mental Status: He is alert.          DIFFERENTIAL DIAGNOSIS/ MDM:     We will get an EKG basic labs and a chest x-ray    DIAGNOSTIC RESULTS     EKG: All EKG's are interpreted by the Emergency Department Physician who either signs or Co-signs this chart in the [address] a Basic Metabolic Panel   Result Value Ref Range    Glucose 225 (H) 70 - 99 mg/dL    BUN 22 8 - 23 mg/dL    CREATININE 1.41 (H) 0.70 - 1.20 mg/dL    Bun/Cre Ratio 16 9 - 20    Calcium 8.4 (L) 8.6 - 10.4 mg/dL    Sodium 136 135 - 144 mmol/L    Potassium 3.3 (L) 3.7 - 5.3 mmol/L    Chloride 98 98 - 107 mmol/L    CO2 23 20 - 31 mmol/L    Anion Gap 15 9 - 17 mmol/L    GFR Non-African American 49 (L) >60 mL/min    GFR  59 (L) >60 mL/min    GFR Comment          GFR Staging NOT REPORTED    Troponin   Result Value Ref Range    Troponin, High Sensitivity 14 0 - 22 ng/L    Troponin T NOT REPORTED <0.03 ng/mL    Troponin Interp NOT REPORTED    D-Dimer, Quantitative   Result Value Ref Range    D-Dimer, Quant 0.39 0.00 - 0.59 mg/L FEU   EKG 12 Lead   Result Value Ref Range    Ventricular Rate 99 BPM    Atrial Rate 99 BPM    P-R Interval 192 ms    QRS Duration 80 ms    Q-T Interval 354 ms    QTc Calculation (Bazett) 454 ms    P Axis 44 degrees    R Axis -4 degrees    T Axis 30 degrees           EMERGENCY DEPARTMENT COURSE:   Vitals:    Vitals:    07/27/20 1334 07/27/20 1420 07/27/20 1435   BP: (!) 140/64 (!) 143/71 126/67   Pulse: 92 89 83   Resp: 18 16 16   Temp: 98.1 °F (36.7 °C)     TempSrc: Tympanic     SpO2: 100% 93% 96%   Weight: 295 lb (133.8 kg)     Height: 5' 8\" (1.727 m)       -------------------------  BP: 126/67, Temp: 98.1 °F (36.7 °C), Pulse: 83, Resp: 16      RE-EVALUATION:  The patient was noted to have a run of PSVT which resolved after being given 5 mg of Lopressor we will go ahead and place the patient on metoprolol recommending that he return to the ER for any further palpitations any chest pain shortness of breath lightheadedness or other concerns otherwise to follow-up with his family doctor calling tomorrow for an appointment  At this time the patient is without objective evidence of an acute process requiring hospitalization or inpatient management.  They have remained hemodynamically stable throughout their entire ED visit and are stable for discharge with outpatient follow-up. The patient understands that at this time there is no evidence for a more malignant underlying process, but the patient also understands that early in the process of an illness or injury, an emergency department workup can be falsely reassuring. Routine discharge counseling was given, and the patient understands that worsening, changing or persistent symptoms should prompt an immediate call or follow up with their primary physician or return to the emergency department. The importance of appropriate follow up was also discussed. I have reviewed the disposition diagnosis with the patient and or their family/guardian. I have answered their questions and given discharge instructions. They voiced understanding of these instructions and did not have any further questions or complaints. CONSULTS:  I did discuss the patient with my hospitalist who is recommending outpatient follow-up  I did discuss the patient with family practice and they are agreeable to seeing the patient as an outpatient given that his paroxysmal supraventricular tachycardia resolved with 5 mg of Lopressor they are requesting that we go ahead and place the patient on a low-dose beta-blocker and they will see the patient in their office    PROCEDURES:  None    FINAL IMPRESSION      1.  Paroxysmal supraventricular tachycardia ([facility] [address]          DISPOSITION/PLAN   DISPOSITION        CONDITION ON DISPOSITION:   Stable    PATIENT REFERRED TO:  Mariluz Godinez, ROCÍO - CNP  [address]  [phone]    Call in 1 day        DISCHARGE MEDICATIONS:  New Prescriptions    METOPROLOL SUCCINATE (TOPROL XL) 25 MG EXTENDED RELEASE TABLET    Take 1 tablet by mouth daily       (Please note that portions of this note were completed with a voicerecognition program.  Efforts were made to edit the dictations but occasionally words are

## 2020-07-28 LAB
EKG ATRIAL RATE: 99 BPM
EKG P AXIS: 44 DEGREES
EKG P-R INTERVAL: 192 MS
EKG Q-T INTERVAL: 354 MS
EKG QRS DURATION: 80 MS
EKG QTC CALCULATION (BAZETT): 454 MS
EKG R AXIS: -4 DEGREES
EKG T AXIS: 30 DEGREES
EKG VENTRICULAR RATE: 99 BPM

## 2020-07-31 ENCOUNTER — TELEPHONE (OUTPATIENT)
Dept: SPIRITUAL SERVICES | Age: 75
End: 2020-07-31

## 2020-08-04 ENCOUNTER — OFFICE VISIT (OUTPATIENT)
Dept: PRIMARY CARE CLINIC | Age: 75
End: 2020-08-04
Payer: COMMERCIAL

## 2020-08-04 ENCOUNTER — APPOINTMENT (OUTPATIENT)
Dept: GENERAL RADIOLOGY | Age: 75
End: 2020-08-04
Payer: MEDICARE

## 2020-08-04 ENCOUNTER — APPOINTMENT (OUTPATIENT)
Dept: CT IMAGING | Age: 75
End: 2020-08-04
Payer: MEDICARE

## 2020-08-04 ENCOUNTER — HOSPITAL ENCOUNTER (OUTPATIENT)
Age: 75
Setting detail: OBSERVATION
Discharge: HOME | End: 2020-08-05
Attending: EMERGENCY MEDICINE | Admitting: INTERNAL MEDICINE
Payer: MEDICARE

## 2020-08-04 VITALS
RESPIRATION RATE: 16 BRPM | WEIGHT: 295 LBS | HEIGHT: 68 IN | OXYGEN SATURATION: 98 % | HEART RATE: 168 BPM | TEMPERATURE: 97.3 F | BODY MASS INDEX: 44.71 KG/M2

## 2020-08-04 PROBLEM — N39.0 UTI (URINARY TRACT INFECTION): Status: ACTIVE | Noted: 2020-08-04

## 2020-08-04 LAB
-: ABNORMAL
ABSOLUTE EOS #: 0.09 K/UL (ref 0–0.44)
ABSOLUTE IMMATURE GRANULOCYTE: 0.04 K/UL (ref 0–0.3)
ABSOLUTE LYMPH #: 1.44 K/UL (ref 1.1–3.7)
ABSOLUTE MONO #: 0.74 K/UL (ref 0.1–1.2)
ALBUMIN SERPL-MCNC: 3.8 G/DL (ref 3.5–5.2)
ALBUMIN/GLOBULIN RATIO: 0.8 (ref 1–2.5)
ALP BLD-CCNC: 133 U/L (ref 40–129)
ALT SERPL-CCNC: 13 U/L (ref 5–41)
AMORPHOUS: ABNORMAL
ANION GAP SERPL CALCULATED.3IONS-SCNC: 11 MMOL/L (ref 9–17)
AST SERPL-CCNC: 16 U/L
BACTERIA: ABNORMAL
BASOPHILS # BLD: 0 % (ref 0–2)
BASOPHILS ABSOLUTE: 0.03 K/UL (ref 0–0.2)
BILIRUB SERPL-MCNC: 0.76 MG/DL (ref 0.3–1.2)
BILIRUBIN URINE: NEGATIVE
BUN BLDV-MCNC: 24 MG/DL (ref 8–23)
BUN/CREAT BLD: 20 (ref 9–20)
CALCIUM SERPL-MCNC: 9.3 MG/DL (ref 8.6–10.4)
CASTS UA: ABNORMAL /LPF (ref 0–2)
CHLORIDE BLD-SCNC: 98 MMOL/L (ref 98–107)
CO2: 24 MMOL/L (ref 20–31)
COLOR: ABNORMAL
COMMENT UA: ABNORMAL
CREAT SERPL-MCNC: 1.23 MG/DL (ref 0.7–1.2)
CRYSTALS, UA: ABNORMAL /HPF
DIFFERENTIAL TYPE: ABNORMAL
EOSINOPHILS RELATIVE PERCENT: 1 % (ref 1–4)
EPITHELIAL CELLS UA: ABNORMAL /HPF (ref 0–5)
GFR AFRICAN AMERICAN: >60 ML/MIN
GFR NON-AFRICAN AMERICAN: 57 ML/MIN
GFR SERPL CREATININE-BSD FRML MDRD: ABNORMAL ML/MIN/{1.73_M2}
GFR SERPL CREATININE-BSD FRML MDRD: ABNORMAL ML/MIN/{1.73_M2}
GLUCOSE BLD-MCNC: 114 MG/DL (ref 75–110)
GLUCOSE BLD-MCNC: 172 MG/DL (ref 70–99)
GLUCOSE BLD-MCNC: 176 MG/DL (ref 75–110)
GLUCOSE URINE: NEGATIVE
HCT VFR BLD CALC: 35.4 % (ref 40.7–50.3)
HEMOGLOBIN: 11.4 G/DL (ref 13–17)
IMMATURE GRANULOCYTES: 1 %
INR BLD: 1
KETONES, URINE: NEGATIVE
LACTIC ACID: 1.2 MMOL/L (ref 0.5–2.2)
LEUKOCYTE ESTERASE, URINE: ABNORMAL
LYMPHOCYTES # BLD: 19 % (ref 24–43)
MCH RBC QN AUTO: 27.5 PG (ref 25.2–33.5)
MCHC RBC AUTO-ENTMCNC: 32.2 G/DL (ref 25.2–33.5)
MCV RBC AUTO: 85.5 FL (ref 82.6–102.9)
MONOCYTES # BLD: 10 % (ref 3–12)
MUCUS: ABNORMAL
NITRITE, URINE: NEGATIVE
NRBC AUTOMATED: 0 PER 100 WBC
OTHER OBSERVATIONS UA: ABNORMAL
PDW BLD-RTO: 13.3 % (ref 11.8–14.4)
PH UA: 6 (ref 5–6)
PLATELET # BLD: 367 K/UL (ref 138–453)
PLATELET ESTIMATE: ABNORMAL
PMV BLD AUTO: 10.3 FL (ref 8.1–13.5)
POTASSIUM SERPL-SCNC: 4.2 MMOL/L (ref 3.7–5.3)
PROTEIN UA: ABNORMAL
PROTHROMBIN TIME: 13.2 SEC (ref 11.5–14.2)
RBC # BLD: 4.14 M/UL (ref 4.21–5.77)
RBC # BLD: ABNORMAL 10*6/UL
RBC UA: >100 /HPF (ref 0–4)
RENAL EPITHELIAL, UA: ABNORMAL /HPF
SARS-COV-2, PCR: NORMAL
SARS-COV-2, RAPID: NOT DETECTED
SARS-COV-2: NORMAL
SEG NEUTROPHILS: 69 % (ref 36–65)
SEGMENTED NEUTROPHILS ABSOLUTE COUNT: 5.08 K/UL (ref 1.5–8.1)
SODIUM BLD-SCNC: 133 MMOL/L (ref 135–144)
SOURCE: NORMAL
SPECIFIC GRAVITY UA: 1 (ref 1.01–1.02)
TOTAL PROTEIN: 8.3 G/DL (ref 6.4–8.3)
TRICHOMONAS: ABNORMAL
TROPONIN INTERP: NORMAL
TROPONIN T: NORMAL NG/ML
TROPONIN, HIGH SENSITIVITY: 12 NG/L (ref 0–22)
TROPONIN, HIGH SENSITIVITY: 16 NG/L (ref 0–22)
TROPONIN, HIGH SENSITIVITY: 18 NG/L (ref 0–22)
TURBIDITY: ABNORMAL
URINE HGB: ABNORMAL
UROBILINOGEN, URINE: NORMAL
WBC # BLD: 7.4 K/UL (ref 3.5–11.3)
WBC # BLD: ABNORMAL 10*3/UL
WBC UA: ABNORMAL /HPF (ref 0–4)
YEAST: ABNORMAL

## 2020-08-04 PROCEDURE — 6360000002 HC RX W HCPCS: Performed by: EMERGENCY MEDICINE

## 2020-08-04 PROCEDURE — 2709999900 CT ABDOMEN PELVIS W IV CONTRAST

## 2020-08-04 PROCEDURE — 2580000003 HC RX 258: Performed by: EMERGENCY MEDICINE

## 2020-08-04 PROCEDURE — G0378 HOSPITAL OBSERVATION PER HR: HCPCS

## 2020-08-04 PROCEDURE — 80053 COMPREHEN METABOLIC PANEL: CPT

## 2020-08-04 PROCEDURE — 36415 COLL VENOUS BLD VENIPUNCTURE: CPT

## 2020-08-04 PROCEDURE — 99285 EMERGENCY DEPT VISIT HI MDM: CPT

## 2020-08-04 PROCEDURE — 84484 ASSAY OF TROPONIN QUANT: CPT

## 2020-08-04 PROCEDURE — 93005 ELECTROCARDIOGRAM TRACING: CPT | Performed by: EMERGENCY MEDICINE

## 2020-08-04 PROCEDURE — 71045 X-RAY EXAM CHEST 1 VIEW: CPT

## 2020-08-04 PROCEDURE — U0002 COVID-19 LAB TEST NON-CDC: HCPCS

## 2020-08-04 PROCEDURE — 96365 THER/PROPH/DIAG IV INF INIT: CPT

## 2020-08-04 PROCEDURE — 96374 THER/PROPH/DIAG INJ IV PUSH: CPT

## 2020-08-04 PROCEDURE — 87088 URINE BACTERIA CULTURE: CPT

## 2020-08-04 PROCEDURE — 99212 OFFICE O/P EST SF 10 MIN: CPT | Performed by: PHYSICIAN ASSISTANT

## 2020-08-04 PROCEDURE — 6370000000 HC RX 637 (ALT 250 FOR IP): Performed by: INTERNAL MEDICINE

## 2020-08-04 PROCEDURE — 96372 THER/PROPH/DIAG INJ SC/IM: CPT

## 2020-08-04 PROCEDURE — 85610 PROTHROMBIN TIME: CPT

## 2020-08-04 PROCEDURE — 96376 TX/PRO/DX INJ SAME DRUG ADON: CPT

## 2020-08-04 PROCEDURE — 81001 URINALYSIS AUTO W/SCOPE: CPT

## 2020-08-04 PROCEDURE — 6360000002 HC RX W HCPCS

## 2020-08-04 PROCEDURE — 96375 TX/PRO/DX INJ NEW DRUG ADDON: CPT

## 2020-08-04 PROCEDURE — 99219 PR INITIAL OBSERVATION CARE/DAY 50 MINUTES: CPT | Performed by: INTERNAL MEDICINE

## 2020-08-04 PROCEDURE — 83605 ASSAY OF LACTIC ACID: CPT

## 2020-08-04 PROCEDURE — 2500000003 HC RX 250 WO HCPCS: Performed by: EMERGENCY MEDICINE

## 2020-08-04 PROCEDURE — 87186 SC STD MICRODIL/AGAR DIL: CPT

## 2020-08-04 PROCEDURE — 82947 ASSAY GLUCOSE BLOOD QUANT: CPT

## 2020-08-04 PROCEDURE — 87086 URINE CULTURE/COLONY COUNT: CPT

## 2020-08-04 PROCEDURE — 6370000000 HC RX 637 (ALT 250 FOR IP)

## 2020-08-04 PROCEDURE — 2580000003 HC RX 258: Performed by: INTERNAL MEDICINE

## 2020-08-04 PROCEDURE — 85025 COMPLETE CBC W/AUTO DIFF WBC: CPT

## 2020-08-04 PROCEDURE — 94761 N-INVAS EAR/PLS OXIMETRY MLT: CPT

## 2020-08-04 PROCEDURE — 87040 BLOOD CULTURE FOR BACTERIA: CPT

## 2020-08-04 PROCEDURE — 6360000004 HC RX CONTRAST MEDICATION: Performed by: EMERGENCY MEDICINE

## 2020-08-04 RX ORDER — VALSARTAN 80 MG/1
160 TABLET ORAL DAILY
Status: DISCONTINUED | OUTPATIENT
Start: 2020-08-05 | End: 2020-08-05 | Stop reason: HOSPADM

## 2020-08-04 RX ORDER — FAMOTIDINE 20 MG/1
20 TABLET, FILM COATED ORAL 2 TIMES DAILY
Status: DISCONTINUED | OUTPATIENT
Start: 2020-08-04 | End: 2020-08-04

## 2020-08-04 RX ORDER — INSULIN GLARGINE 100 [IU]/ML
INJECTION, SOLUTION SUBCUTANEOUS
Status: COMPLETED
Start: 2020-08-04 | End: 2020-08-04

## 2020-08-04 RX ORDER — HYDROCHLOROTHIAZIDE 25 MG/1
25 TABLET ORAL DAILY
Status: DISCONTINUED | OUTPATIENT
Start: 2020-08-05 | End: 2020-08-05 | Stop reason: HOSPADM

## 2020-08-04 RX ORDER — TAMSULOSIN HYDROCHLORIDE 0.4 MG/1
0.4 CAPSULE ORAL DAILY
Status: DISCONTINUED | OUTPATIENT
Start: 2020-08-05 | End: 2020-08-05 | Stop reason: HOSPADM

## 2020-08-04 RX ORDER — FAMOTIDINE 20 MG/1
20 TABLET, FILM COATED ORAL 2 TIMES DAILY
Status: DISCONTINUED | OUTPATIENT
Start: 2020-08-04 | End: 2020-08-05 | Stop reason: HOSPADM

## 2020-08-04 RX ORDER — ONDANSETRON 2 MG/ML
4 INJECTION INTRAMUSCULAR; INTRAVENOUS EVERY 6 HOURS PRN
Status: DISCONTINUED | OUTPATIENT
Start: 2020-08-04 | End: 2020-08-05 | Stop reason: HOSPADM

## 2020-08-04 RX ORDER — SODIUM CHLORIDE 0.9 % (FLUSH) 0.9 %
10 SYRINGE (ML) INJECTION PRN
Status: DISCONTINUED | OUTPATIENT
Start: 2020-08-04 | End: 2020-08-05 | Stop reason: HOSPADM

## 2020-08-04 RX ORDER — ATORVASTATIN CALCIUM 10 MG/1
20 TABLET, FILM COATED ORAL DAILY
Status: DISCONTINUED | OUTPATIENT
Start: 2020-08-05 | End: 2020-08-05 | Stop reason: HOSPADM

## 2020-08-04 RX ORDER — METOPROLOL SUCCINATE 25 MG/1
25 TABLET, EXTENDED RELEASE ORAL DAILY
Status: DISCONTINUED | OUTPATIENT
Start: 2020-08-05 | End: 2020-08-05 | Stop reason: HOSPADM

## 2020-08-04 RX ORDER — DEXTROSE MONOHYDRATE 25 G/50ML
12.5 INJECTION, SOLUTION INTRAVENOUS PRN
Status: DISCONTINUED | OUTPATIENT
Start: 2020-08-04 | End: 2020-08-05 | Stop reason: HOSPADM

## 2020-08-04 RX ORDER — OXYBUTYNIN CHLORIDE 5 MG/1
10 TABLET, EXTENDED RELEASE ORAL DAILY
Status: DISCONTINUED | OUTPATIENT
Start: 2020-08-05 | End: 2020-08-05 | Stop reason: HOSPADM

## 2020-08-04 RX ORDER — SODIUM CHLORIDE 0.9 % (FLUSH) 0.9 %
10 SYRINGE (ML) INJECTION EVERY 12 HOURS SCHEDULED
Status: DISCONTINUED | OUTPATIENT
Start: 2020-08-04 | End: 2020-08-05 | Stop reason: HOSPADM

## 2020-08-04 RX ORDER — 0.9 % SODIUM CHLORIDE 0.9 %
500 INTRAVENOUS SOLUTION INTRAVENOUS ONCE
Status: COMPLETED | OUTPATIENT
Start: 2020-08-04 | End: 2020-08-04

## 2020-08-04 RX ORDER — DEXTROSE MONOHYDRATE 50 MG/ML
100 INJECTION, SOLUTION INTRAVENOUS PRN
Status: DISCONTINUED | OUTPATIENT
Start: 2020-08-04 | End: 2020-08-05 | Stop reason: HOSPADM

## 2020-08-04 RX ORDER — INSULIN GLARGINE 100 [IU]/ML
10 INJECTION, SOLUTION SUBCUTANEOUS
Status: DISCONTINUED | OUTPATIENT
Start: 2020-08-04 | End: 2020-08-05 | Stop reason: HOSPADM

## 2020-08-04 RX ORDER — METOPROLOL TARTRATE 5 MG/5ML
5 INJECTION INTRAVENOUS ONCE
Status: COMPLETED | OUTPATIENT
Start: 2020-08-04 | End: 2020-08-04

## 2020-08-04 RX ORDER — VALSARTAN AND HYDROCHLOROTHIAZIDE 160; 25 MG/1; MG/1
1 TABLET ORAL DAILY
Status: DISCONTINUED | OUTPATIENT
Start: 2020-08-04 | End: 2020-08-04 | Stop reason: CLARIF

## 2020-08-04 RX ORDER — ACETAMINOPHEN 325 MG/1
650 TABLET ORAL EVERY 4 HOURS PRN
Status: DISCONTINUED | OUTPATIENT
Start: 2020-08-04 | End: 2020-08-05 | Stop reason: HOSPADM

## 2020-08-04 RX ORDER — FLUTICASONE PROPIONATE 50 MCG
2 SPRAY, SUSPENSION (ML) NASAL DAILY
Status: DISCONTINUED | OUTPATIENT
Start: 2020-08-05 | End: 2020-08-05 | Stop reason: HOSPADM

## 2020-08-04 RX ORDER — ASPIRIN 81 MG/1
81 TABLET, CHEWABLE ORAL DAILY
Status: DISCONTINUED | OUTPATIENT
Start: 2020-08-04 | End: 2020-08-05 | Stop reason: HOSPADM

## 2020-08-04 RX ORDER — NICOTINE POLACRILEX 4 MG
15 LOZENGE BUCCAL PRN
Status: DISCONTINUED | OUTPATIENT
Start: 2020-08-04 | End: 2020-08-05 | Stop reason: HOSPADM

## 2020-08-04 RX ORDER — FAMOTIDINE 20 MG/1
TABLET, FILM COATED ORAL
Status: COMPLETED
Start: 2020-08-04 | End: 2020-08-04

## 2020-08-04 RX ADMIN — ENOXAPARIN SODIUM 40 MG: 40 INJECTION SUBCUTANEOUS at 18:11

## 2020-08-04 RX ADMIN — SODIUM CHLORIDE 500 ML: 9 INJECTION, SOLUTION INTRAVENOUS at 14:38

## 2020-08-04 RX ADMIN — SODIUM CHLORIDE 500 ML: 9 INJECTION, SOLUTION INTRAVENOUS at 12:32

## 2020-08-04 RX ADMIN — SODIUM CHLORIDE, PRESERVATIVE FREE 10 ML: 5 INJECTION INTRAVENOUS at 21:17

## 2020-08-04 RX ADMIN — METOPROLOL TARTRATE 5 MG: 5 INJECTION INTRAVENOUS at 13:23

## 2020-08-04 RX ADMIN — INSULIN GLARGINE 10 UNITS: 100 INJECTION, SOLUTION SUBCUTANEOUS at 18:11

## 2020-08-04 RX ADMIN — INSULIN LISPRO 1 UNITS: 100 INJECTION, SOLUTION INTRAVENOUS; SUBCUTANEOUS at 21:16

## 2020-08-04 RX ADMIN — INSULIN LISPRO 3 UNITS: 100 INJECTION, SOLUTION INTRAVENOUS; SUBCUTANEOUS at 18:12

## 2020-08-04 RX ADMIN — FAMOTIDINE 20 MG: 20 TABLET, FILM COATED ORAL at 18:10

## 2020-08-04 RX ADMIN — CEFTRIAXONE 1 G: 1 INJECTION, POWDER, FOR SOLUTION INTRAMUSCULAR; INTRAVENOUS at 15:37

## 2020-08-04 RX ADMIN — METOPROLOL TARTRATE 5 MG: 5 INJECTION INTRAVENOUS at 12:31

## 2020-08-04 RX ADMIN — IOPAMIDOL 100 ML: 755 INJECTION, SOLUTION INTRAVENOUS at 14:12

## 2020-08-04 ASSESSMENT — ENCOUNTER SYMPTOMS
SHORTNESS OF BREATH: 0
NAUSEA: 0
SORE THROAT: 0
CONSTIPATION: 1
BLOOD IN STOOL: 0
ABDOMINAL PAIN: 1
VOMITING: 0
TROUBLE SWALLOWING: 0
WHEEZING: 0
DIARRHEA: 0
BACK PAIN: 0

## 2020-08-04 ASSESSMENT — PAIN SCALES - GENERAL
PAINLEVEL_OUTOF10: 0

## 2020-08-04 NOTE — ED NOTES
Patient remains in the ER Dr Licha Zuniga hospitalist at bedside     [facility]  08/04/20 2369

## 2020-08-04 NOTE — H&P
HOSPITALIST ADMISSION H&P      REASON FOR ADMISSION:    UTI---POA------supraventricular tachycardia SVT]  ESTIMATED LENGTH OF STAY:    > 2 midnights---2-3 days    ATTENDING/ADMITTING PHYSICIAN: Daria Mcdonald MD  PCP: RCOÍO Vergara CNP    HISTORY OF PRESENT ILLNESS:      The patient is a 67 y.o. male patient of ROCÍO Vergara CNP who presents with rapid heart rate---PSVT---rate 150-160---was able to be converted to SR with Lopressor IV  X 2---Cardiology--to see and 2D ECHO----goal to avoid further episodes given recent event of SVT on 7.31.2020. Initial troponin = 12. 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 Patient cyvq-[id number]-[id number]-available records reviewed, including, but not limited to,   ER reports--labs--imaging---EKGs---office records--personal notes       Past Medical History:   Diagnosis Date    Anemia     chronic, negative work up with EGD and Colonoscopy.  DJD (degenerative joint disease) of lumbar spine     Dry eye syndrome     GERD (gastroesophageal reflux disease)     Hepatic hemangioma     Hypertension     Keratitis     Secondary to dry eye syndrome.  Nuclear sclerotic cataract of both eyes     Obesity, morbid ([address]     Obstructive sleep apnea of adult     non compliant with cpap    Onychomycosis     1, 2, 3, 4, and 5, bilateral.    Type II or unspecified type diabetes mellitus without mention of complication, not stated as uncontrolled            Past Surgical History:   Procedure Laterality Date    COLONOSCOPY  09/15/2006    Diffuse diverticulosis slightly greater in the sigmoid colon and grade 1 internal hemorrhoids.     CYST REMOVAL      right side of  scalp    UMBILICAL HERNIA REPAIR  2013    UPPER GASTROINTESTINAL ENDOSCOPY  09/15/2006    Mild to moderate diffuse gastritis, mild to moderate 08/04/2020    CALCIUM 9.3 08/04/2020    GFRAA >60 08/04/2020    LABGLOM 57 08/04/2020    GLUCOSE 172 08/04/2020       ASSESSMENT:      Patient Active Problem List   Diagnosis    Hypertension    GERD (gastroesophageal reflux disease)    Obesity, morbid ([facility] [address]    Osteoarthritis of lumbar spine    Hepatic hemangioma    Obstructive sleep apnea of adult    Diabetes ([facility] [address]    DM type 2 with diabetic peripheral neuropathy (HCC)    Dermatophytosis of nail    Chest pain    UTI (urinary tract infection)     EZEKIEL MARTINEZ  75  BM   [florencia Iyer NP---FP;   [facility]--[facility]]  FULL CODE    LOVENOX   ASPIRIN    Anti-infectives:  Rocephin IV    Supraventricular tachycardia---8.4.2020----converted to SR with Lopressor IV x 2          2D ECHO--8.5.2020----pending           MI ruled out---8.4.2020          CXR----8.4.2020---[-]          EKG----8.4.2020---#2----NSR--81---sinus arrhythmia          EKG----8.4.2020---SVT---151                                                  PSVT---7.31.2020     Constipation---8.4.2020            CT abdomen-pelvis---8.4.2020--marked urinary bladder wall thickening                              with infiltration of pericystic fat---consistent with a  cystitis-----prostatomegaly                             with heterogeneity consistent with prostatitis 5.6 x 6.1 cm ----no obstructive uropathy---                             mild left-sided pyelocaliectasis--ureterectasis---colonic diverticulosis---right                              adrenal adenoma----ectasia iliac circulation bilaterally 2.2 cm right common iliac artery   Hyponatremia---8.4.2020  UTI---POA---8.4.2020       Hypertension  Hyperlipidemia   Diabetes Mellitus Type 2           Diabetic peripheral neuropathy  GERD  RABIA---obstructive sleep apnea---non-compliant CPAP  Morbid obesity   CKD---Stage 3  Tobacco abuse---quit---12.1.1970  PMH:    anemia---chronic---negative workup, chest pain, dermatophytosis--onychomycosis-- nails, hepatic hemangioma, OA--DJD--LS spine, keratitis---dry eye syndrome,               nuclear sclerotic cataract--OU, elevated PSA  PSH:     umbilical hernia QLNRVD---1494, colonoscopy---2006---diffuse diverticulosis---greater                in sigmoid colon--Grade I internal hemorrhoids, EGD--2006--mild-to-moderate                diffuse gastritis---mild-to-moderate diffuse duodenitis, cystectomy---right scalp, prostate                biopsy--2020    Allergies:   NKDA    Code Status: FULL CODE  OARRS---8.4.2020---[-]    PLAN:    1. UTI---POA---Rocephin IV pending culture  2. SVT---ACS regimen----2D ECHO----Cardiology due to recurrent events requiring IV Lopressor intervention  3. Hyponatremia---sodium replacement  4. Diabetes Mellitus Type 2--diabetic admission---orders  5. Home medications reviewed  6.   See orders     Note that over 50 minutes was spent in evaluation of the patient, review of the chart and pertinent records, discussion with family/staff, etc    Rick Degroot MD  5:18 PM  8/4/2020

## 2020-08-04 NOTE — ED PROVIDER NOTES
uncontrolled. SURGICAL HISTORY      has a past surgical history that includes Colonoscopy (09/15/2006); Upper gastrointestinal endoscopy (09/15/2006); cyst removal; and Umbilical hernia repair (2013). CURRENT MEDICATIONS       Previous Medications    ASPIRIN 81 MG TABLET    Take 81 mg by mouth daily. ATORVASTATIN (LIPITOR) 20 MG TABLET    TAKE 1 TABLET DAILY    BLOOD GLUCOSE MONITOR KIT AND SUPPLIES    Check blood sugar daily.   Diagnosis E11.42    BLOOD GLUCOSE TEST STRIPS (GLUCOCARD VITAL TEST) STRIP    1 each by In Vitro route daily Diagnosis E11.42    DIABETIC SHOE MISC    by Does not apply route Equinus contracture of ankle  (primary encounter diagnosis)    DM type 2 with diabetic peripheral neuropathy (HCC)    Dermatophytosis of nail    Foot pain, bilateral    FAMOTIDINE (PEPCID) 20 MG TABLET    Take 1 tablet by mouth 2 times daily    FLUTICASONE (FLONASE) 50 MCG/ACT NASAL SPRAY    USE TWO SPRAYS IN EACH NOSTRIL EVERY DAY    FOOT CARE PRODUCTS (DIABETIC INSOLES) MISC    1 Units by Does not apply route daily Equinus contracture of ankle  (primary encounter diagnosis) DM type 2 with diabetic peripheral neuropathy (HCC) Dermatophytosis of nail Foot pain, bilateral    IBUPROFEN (ADVIL;MOTRIN) 800 MG TABLET    Take 1 tablet by mouth every 8 hours as needed for Pain    LANCETS MISC    USE TO TEST BLOOD SUGAR EVERY DAY    METFORMIN (GLUCOPHAGE-XR) 500 MG EXTENDED RELEASE TABLET    TAKE 1 TABLET TWICE A DAY    METFORMIN (GLUCOPHAGE-XR) 500 MG EXTENDED RELEASE TABLET    TAKE 1 TABLET TWICE A DAY    METOPROLOL SUCCINATE (TOPROL XL) 25 MG EXTENDED RELEASE TABLET    Take 1 tablet by mouth daily    OMEPRAZOLE (PRILOSEC) 40 MG DELAYED RELEASE CAPSULE    TAKE 1 CAPSULE DAILY    OXYBUTYNIN (DITROPAN XL) 10 MG EXTENDED RELEASE TABLET    Take 1 tablet by mouth daily    TAMSULOSIN (FLOMAX) 0.4 MG CAPSULE    TAKE 1 CAPSULE DAILY    VALSARTAN-HYDROCHLOROTHIAZIDE (DIOVAN-HCT) 160-25 MG PER TABLET    TAKE 1 TABLET DAILY ALLERGIES     has No Known Allergies. FAMILY HISTORY     He indicated that his mother is . He indicated that his father is . He indicated that one of his two sisters is . He indicated that two of his four brothers are alive. He indicated that the status of his neg hx is unknown.     family history includes Dementia in his sister; Diabetes in his sister; Heart Attack in his father; High Blood Pressure in his brother, brother, and sister; Ovarian Cancer in his mother; Stroke in his brother and brother. SOCIAL HISTORY      reports that he quit smoking about 49 years ago. His smoking use included cigars. He quit after 15.00 years of use. He has never used smokeless tobacco. He reports that he does not drink alcohol or use drugs. PHYSICAL EXAM     INITIAL VITALS:  height is 5' 8\" (1.727 m) and weight is 292 lb (132.5 kg). His tympanic temperature is 97.7 °F (36.5 °C). His blood pressure is 119/63 and his pulse is 81. His respiration is 18 and oxygen saturation is 100%. Physical Exam  Constitutional:       Appearance: He is well-developed. HENT:      Head: Normocephalic and atraumatic. Eyes:      Pupils: Pupils are equal, round, and reactive to light. Neck:      Musculoskeletal: Normal range of motion and neck supple. Cardiovascular:      Rate and Rhythm: Regular rhythm. Tachycardia present. Pulmonary:      Effort: Pulmonary effort is normal.      Breath sounds: Normal breath sounds. Abdominal:      General: Bowel sounds are normal.      Palpations: Abdomen is soft. Tenderness: There is abdominal tenderness. There is no right CVA tenderness, left CVA tenderness or guarding. Musculoskeletal: Normal range of motion. Skin:     General: Skin is warm and dry. Neurological:      Mental Status: He is alert and oriented to person, place, and time.    Psychiatric:         Behavior: Behavior normal.           DIFFERENTIAL DIAGNOSIS/ MDM:     Patient with abdominal pain but also tachycardia history of SVT will do a work-up last time he was here Lopressor converted him he slowed down from the 150s to the 120s will try Lopressor    DIAGNOSTIC RESULTS     EKG: All EKG's are interpreted by the Emergency Department Physician who either signs or Co-signs this chart in the absence of a cardiologist.  EKG shows supraventricular tachycardia narrow complex with a rate of 151 QRS duration 96 ms QT corrected 462 ms axis is 24 there is no acute ST or T wave changes seen      RADIOLOGY:   I directly visualized the following  images and reviewed the radiologist interpretations:       EXAMINATION:    ONE XRAY VIEW OF THE CHEST         8/4/2020 12:45 pm         COMPARISON:    07/27/2020         HISTORY:    ORDERING SYSTEM PROVIDED HISTORY: shortness of breath    TECHNOLOGIST PROVIDED HISTORY:    shortness of breath    Reason for Exam: SOB    Acuity: Acute    Type of Exam: Initial         FINDINGS:    Normal heart size and pulmonary vasculature.  No focal consolidations,    pleural effusions, or pneumothorax.              Impression    No evidence of acute process.              1. Marked bladder wall thickening with infiltration of the pericystic fat    consistent with a cystitis.  Prostatomegaly with heterogeneity raising the    possibility of an associated prostatitis. 2. No evidence of obstructive uropathy.  Mild left-sided pyelocaliectasis,    likely secondary to the cystitis.     3. Colonic diverticulosis with no acute features.                 ED BEDSIDE ULTRASOUND:       LABS:  Labs Reviewed   CBC WITH AUTO DIFFERENTIAL - Abnormal; Notable for the following components:       Result Value    RBC 4.14 (*)     Hemoglobin 11.4 (*)     Hematocrit 35.4 (*)     Seg Neutrophils 69 (*)     Lymphocytes 19 (*)     Immature Granulocytes 1 (*)     All other components within normal limits   COMPREHENSIVE METABOLIC PANEL - Abnormal; Notable for the following components:    Glucose 172 (*)     BUN 24 (*)     CREATININE 1.23 (*)     Sodium 133 (*)     Alkaline Phosphatase 133 (*)     Albumin/Globulin Ratio 0.8 (*)     GFR Non- 57 (*)     All other components within normal limits   URINE RT REFLEX TO CULTURE - Abnormal; Notable for the following components:    Specific Gravity, UA 1.005 (*)     Urine Hgb 2+ (*)     Protein, UA TRACE (*)     Leukocyte Esterase, Urine 3+ (*)     All other components within normal limits   MICROSCOPIC URINALYSIS - Abnormal; Notable for the following components:    Bacteria, UA 2+ (*)     Other Observations UA Specimen Cultured (*)     All other components within normal limits   CULTURE, URINE   CULTURE, BLOOD 1   CULTURE, BLOOD 1   PROTIME-INR   TROPONIN   LACTIC ACID   COVID-19       Evidence of a urinary tract infection    EMERGENCY DEPARTMENT COURSE:   Vitals:    Vitals:    08/04/20 1305 08/04/20 1327 08/04/20 1421 08/04/20 1540   BP: 98/73 (!) 143/86 113/65 119/63   Pulse: 130 130 126 81   Resp:  16 14 18   Temp:       TempSrc:       SpO2:  98% 97% 100%   Weight:       Height:         -------------------------  BP: 119/63, Temp: 97.7 °F (36.5 °C), Pulse: 81, Resp: 18        Re-evaluation Notes    On reevaluation patient has now converted to a sinus rhythm rate of 80 after 2 doses of Lopressor and IV fluids  Repeat EKG shows sinus rhythm with sinus arrhythmia rate of 81 bpm LA interval 206 ms QRS duration 66 ms QT corrected 413 ms axis is -8  CRITICAL CARE:   IP CONSULT TO HOSPITALIST        CONSULTS:      PROCEDURES:  None    FINAL IMPRESSION      1. Acute cystitis with hematuria    2.  Paroxysmal supraventricular tachycardia ([address]          DISPOSITION/PLAN   DISPOSITION admission    Condition on Disposition    Stable    PATIENT REFERRED TO:  Cristian Thomas, APRN - CNP  [address]  [phone]            DISCHARGE MEDICATIONS:  New Prescriptions    No medications on file       (Please note that portions of this note were

## 2020-08-04 NOTE — ED NOTES
Patient unsure of medication names or what he currently takes states \"I take them like I am supposed to\"     Edgar Dean, ELIZABETH  08/04/20 9345

## 2020-08-05 ENCOUNTER — APPOINTMENT (OUTPATIENT)
Dept: GENERAL RADIOLOGY | Age: 75
End: 2020-08-05
Payer: MEDICARE

## 2020-08-05 ENCOUNTER — TELEPHONE (OUTPATIENT)
Dept: FAMILY MEDICINE CLINIC | Age: 75
End: 2020-08-05

## 2020-08-05 VITALS
BODY MASS INDEX: 43.1 KG/M2 | HEART RATE: 64 BPM | DIASTOLIC BLOOD PRESSURE: 64 MMHG | OXYGEN SATURATION: 98 % | HEIGHT: 68 IN | RESPIRATION RATE: 20 BRPM | TEMPERATURE: 97.7 F | WEIGHT: 284.4 LBS | SYSTOLIC BLOOD PRESSURE: 140 MMHG

## 2020-08-05 LAB
ANION GAP SERPL CALCULATED.3IONS-SCNC: 11 MMOL/L (ref 9–17)
BUN BLDV-MCNC: 20 MG/DL (ref 8–23)
BUN/CREAT BLD: 17 (ref 9–20)
CALCIUM SERPL-MCNC: 8.8 MG/DL (ref 8.6–10.4)
CHLORIDE BLD-SCNC: 101 MMOL/L (ref 98–107)
CO2: 24 MMOL/L (ref 20–31)
CREAT SERPL-MCNC: 1.15 MG/DL (ref 0.7–1.2)
EKG ATRIAL RATE: 156 BPM
EKG ATRIAL RATE: 67 BPM
EKG ATRIAL RATE: 81 BPM
EKG P AXIS: -64 DEGREES
EKG P AXIS: 42 DEGREES
EKG P-R INTERVAL: 180 MS
EKG P-R INTERVAL: 206 MS
EKG Q-T INTERVAL: 292 MS
EKG Q-T INTERVAL: 356 MS
EKG Q-T INTERVAL: 404 MS
EKG QRS DURATION: 66 MS
EKG QRS DURATION: 82 MS
EKG QRS DURATION: 96 MS
EKG QTC CALCULATION (BAZETT): 413 MS
EKG QTC CALCULATION (BAZETT): 426 MS
EKG QTC CALCULATION (BAZETT): 462 MS
EKG R AXIS: -8 DEGREES
EKG R AXIS: 24 DEGREES
EKG R AXIS: 9 DEGREES
EKG T AXIS: 45 DEGREES
EKG T AXIS: 5 DEGREES
EKG T AXIS: 8 DEGREES
EKG VENTRICULAR RATE: 151 BPM
EKG VENTRICULAR RATE: 67 BPM
EKG VENTRICULAR RATE: 81 BPM
GFR AFRICAN AMERICAN: >60 ML/MIN
GFR NON-AFRICAN AMERICAN: >60 ML/MIN
GFR SERPL CREATININE-BSD FRML MDRD: ABNORMAL ML/MIN/{1.73_M2}
GFR SERPL CREATININE-BSD FRML MDRD: ABNORMAL ML/MIN/{1.73_M2}
GLUCOSE BLD-MCNC: 113 MG/DL (ref 70–99)
GLUCOSE BLD-MCNC: 159 MG/DL (ref 75–110)
HCT VFR BLD CALC: 31.9 % (ref 40.7–50.3)
HEMOGLOBIN: 10.2 G/DL (ref 13–17)
LV EF: 60 %
LVEF MODALITY: NORMAL
MCH RBC QN AUTO: 27.2 PG (ref 25.2–33.5)
MCHC RBC AUTO-ENTMCNC: 32 G/DL (ref 25.2–33.5)
MCV RBC AUTO: 85.1 FL (ref 82.6–102.9)
NRBC AUTOMATED: 0 PER 100 WBC
PDW BLD-RTO: 13.5 % (ref 11.8–14.4)
PLATELET # BLD: 333 K/UL (ref 138–453)
PMV BLD AUTO: 9.9 FL (ref 8.1–13.5)
POTASSIUM SERPL-SCNC: 3.7 MMOL/L (ref 3.7–5.3)
RBC # BLD: 3.75 M/UL (ref 4.21–5.77)
SODIUM BLD-SCNC: 136 MMOL/L (ref 135–144)
TROPONIN INTERP: NORMAL
TROPONIN T: NORMAL NG/ML
TROPONIN, HIGH SENSITIVITY: 15 NG/L (ref 0–22)
WBC # BLD: 9.6 K/UL (ref 3.5–11.3)

## 2020-08-05 PROCEDURE — 99217 PR OBSERVATION CARE DISCHARGE MANAGEMENT: CPT | Performed by: INTERNAL MEDICINE

## 2020-08-05 PROCEDURE — 36415 COLL VENOUS BLD VENIPUNCTURE: CPT

## 2020-08-05 PROCEDURE — 2580000003 HC RX 258: Performed by: INTERNAL MEDICINE

## 2020-08-05 PROCEDURE — 82947 ASSAY GLUCOSE BLOOD QUANT: CPT

## 2020-08-05 PROCEDURE — G0378 HOSPITAL OBSERVATION PER HR: HCPCS

## 2020-08-05 PROCEDURE — 6370000000 HC RX 637 (ALT 250 FOR IP): Performed by: INTERNAL MEDICINE

## 2020-08-05 PROCEDURE — 80048 BASIC METABOLIC PNL TOTAL CA: CPT

## 2020-08-05 PROCEDURE — 6360000002 HC RX W HCPCS: Performed by: INTERNAL MEDICINE

## 2020-08-05 PROCEDURE — G9744 PT NOT ELI D/T ACT DIG HTN: HCPCS | Performed by: INTERNAL MEDICINE

## 2020-08-05 PROCEDURE — 93005 ELECTROCARDIOGRAM TRACING: CPT | Performed by: INTERNAL MEDICINE

## 2020-08-05 PROCEDURE — 84484 ASSAY OF TROPONIN QUANT: CPT

## 2020-08-05 PROCEDURE — 85027 COMPLETE CBC AUTOMATED: CPT

## 2020-08-05 PROCEDURE — 96372 THER/PROPH/DIAG INJ SC/IM: CPT

## 2020-08-05 PROCEDURE — 71046 X-RAY EXAM CHEST 2 VIEWS: CPT

## 2020-08-05 PROCEDURE — 94760 N-INVAS EAR/PLS OXIMETRY 1: CPT

## 2020-08-05 PROCEDURE — 93306 TTE W/DOPPLER COMPLETE: CPT

## 2020-08-05 PROCEDURE — 99204 OFFICE O/P NEW MOD 45 MIN: CPT | Performed by: INTERNAL MEDICINE

## 2020-08-05 RX ORDER — METOPROLOL SUCCINATE 25 MG/1
50 TABLET, EXTENDED RELEASE ORAL DAILY
Qty: 30 TABLET | Refills: 0
Start: 2020-08-06 | End: 2020-08-05

## 2020-08-05 RX ORDER — GREEN TEA/HOODIA GORDONII 315-12.5MG
1 CAPSULE ORAL 3 TIMES DAILY
Qty: 30 TABLET | Refills: 0 | COMMUNITY
Start: 2020-08-05 | End: 2020-08-15

## 2020-08-05 RX ORDER — CEFDINIR 300 MG/1
300 CAPSULE ORAL 2 TIMES DAILY
Qty: 12 CAPSULE | Refills: 0 | Status: SHIPPED | OUTPATIENT
Start: 2020-08-05 | End: 2020-08-11

## 2020-08-05 RX ORDER — METOPROLOL SUCCINATE 25 MG/1
50 TABLET, EXTENDED RELEASE ORAL DAILY
Qty: 30 TABLET | Refills: 0
Start: 2020-08-06 | End: 2020-08-20

## 2020-08-05 RX ADMIN — ENOXAPARIN SODIUM 40 MG: 40 INJECTION SUBCUTANEOUS at 12:52

## 2020-08-05 RX ADMIN — OXYBUTYNIN CHLORIDE 10 MG: 5 TABLET, EXTENDED RELEASE ORAL at 08:06

## 2020-08-05 RX ADMIN — INSULIN LISPRO 3 UNITS: 100 INJECTION, SOLUTION INTRAVENOUS; SUBCUTANEOUS at 08:06

## 2020-08-05 RX ADMIN — ASPIRIN 81 MG: 81 TABLET, CHEWABLE ORAL at 08:06

## 2020-08-05 RX ADMIN — SODIUM CHLORIDE, PRESERVATIVE FREE 10 ML: 5 INJECTION INTRAVENOUS at 08:06

## 2020-08-05 RX ADMIN — INSULIN LISPRO 3 UNITS: 100 INJECTION, SOLUTION INTRAVENOUS; SUBCUTANEOUS at 12:52

## 2020-08-05 RX ADMIN — HYDROCHLOROTHIAZIDE 25 MG: 25 TABLET ORAL at 08:05

## 2020-08-05 RX ADMIN — TAMSULOSIN HYDROCHLORIDE 0.4 MG: 0.4 CAPSULE ORAL at 08:06

## 2020-08-05 RX ADMIN — FLUTICASONE PROPIONATE 2 SPRAY: 50 SPRAY, METERED NASAL at 08:05

## 2020-08-05 RX ADMIN — METOPROLOL SUCCINATE 25 MG: 25 TABLET, EXTENDED RELEASE ORAL at 08:06

## 2020-08-05 RX ADMIN — FAMOTIDINE 20 MG: 20 TABLET, FILM COATED ORAL at 08:06

## 2020-08-05 RX ADMIN — VALSARTAN 160 MG: 80 TABLET, FILM COATED ORAL at 08:06

## 2020-08-05 RX ADMIN — ATORVASTATIN CALCIUM 20 MG: 10 TABLET, FILM COATED ORAL at 08:06

## 2020-08-05 RX ADMIN — INSULIN LISPRO 1 UNITS: 100 INJECTION, SOLUTION INTRAVENOUS; SUBCUTANEOUS at 12:52

## 2020-08-05 ASSESSMENT — PAIN SCALES - GENERAL: PAINLEVEL_OUTOF10: 0

## 2020-08-05 NOTE — PROGRESS NOTES
08/04/2020    BASOSABS 0.03 08/04/2020    DIFFTYPE NOT REPORTED 08/04/2020     BMP:    Lab Results   Component Value Date     08/05/2020    K 3.7 08/05/2020     08/05/2020    CO2 24 08/05/2020    BUN 20 08/05/2020    LABALBU 3.8 08/04/2020    CREATININE 1.15 08/05/2020    CALCIUM 8.8 08/05/2020    GFRAA >60 08/05/2020    LABGLOM >60 08/05/2020    GLUCOSE 113 08/05/2020           Physical Exam:  Vitals: BP (!) 140/64   Pulse 64   Temp 97.7 °F (36.5 °C) (Tympanic)   Resp 20   Ht 5' 8\" (1.727 m)   Wt 284 lb 6.4 oz (129 kg)   SpO2 98%   BMI 43.24 kg/m²   24 hour intake/output:    Intake/Output Summary (Last 24 hours) at 8/5/2020 1412  Last data filed at 8/5/2020 1035  Gross per 24 hour   Intake 670 ml   Output --   Net 670 ml     Last 3 weights: Wt Readings from Last 3 Encounters:   08/05/20 284 lb 6.4 oz (129 kg)   08/04/20 295 lb (133.8 kg)   07/27/20 295 lb (133.8 kg)     HEENT: Normocephalic and Atraumatic  Neck: Supple, No Masses, Tenderness, Nodularity and No Lymphadenopathy  Chest/Lungs: Clear to Auscultation without Rales, Rhonchi, or Wheezes  Cardiac: Regular Rate and Rhythm without Rubs, Clicks, Gallops, or Murmurs  GI/Abdomen: Bowel Sounds Present and Soft, Non-tender, without Guarding or Rebound Tenderness  : Normal Male  EXT/Skin: No Cyanosis, No Clubbing and Edema Present --mild  Neuro: Alert and Oriented and No Localizing Signs/Symptoms      Assessment:    Active Problems:    UTI (urinary tract infection)    Paroxysmal supraventricular tachycardia (HCC)  Resolved Problems:    * No resolved hospital problems.  *  EZEKIEL MARTINEZ  67  BM   [florencia Iyer NP---FP;   [facility]]  FULL CODE    LOVENOX   ASPIRIN    Anti-infectives:  Rocephin IV    Supraventricular tachycardia---8.4.2020----converted to SR with Lopressor IV x 2          2D ECHO--8.5.2020---mild LVH--NLVSF---RA dilatation--RV dilatation----                         NRVSF--MAC--mild BARBARA---aortic root mildly dilated at 3.8 cm---                         IVC not well visualized----LVEF ~ 60%          CXR---8.5.2020--minimal bilateral atelectasis---diaphragmatic flattening                         suggesting underlying COPD          EKG--8.5.2020---biphasic P---67          MI ruled out---8.4.2020          CXR----8.4.2020---[-]          EKG----8.4.2020---#2----NSR--81---sinus arrhythmia          EKG----8.4.2020---SVT---151                                                  PSVT---7.31.2020     Constipation---8.4.2020            CT abdomen-pelvis---8.4.2020--marked urinary bladder wall thickening                              with infiltration of pericystic fat---consistent with a  cystitis-----prostatomegaly                             with heterogeneity consistent with prostatitis 5.6 x 6.1 cm ----no obstructive uropathy---                             mild left-sided pyelocaliectasis--ureterectasis---colonic diverticulosis---right                              adrenal adenoma----ectasia iliac circulation bilaterally 2.2 cm right common iliac artery   Hyponatremia---8.4.2020  UTI---POA---8.4.2020       Hypertension  Hyperlipidemia   Diabetes Mellitus Type 2           Diabetic peripheral neuropathy  GERD  ARBIA---obstructive sleep apnea---non-compliant CPAP  Morbid obesity   CKD---Stage 3  Tobacco abuse---quit---12.1.1970  PMH:    anemia---chronic---negative workup, chest pain, dermatophytosis--onychomycosis--               nails, hepatic hemangioma, OA--DJD--LS spine, keratitis---dry eye syndrome,               nuclear sclerotic cataract--OU, elevated PSA  PSH:     umbilical hernia AFZEXI---8301, colonoscopy---2006---diffuse diverticulosis---greater                in sigmoid colon--Grade I internal hemorrhoids, EGD--2006--mild-to-moderate               diffuse gastritis---mild-to-moderate diffuse duodenitis, cystectomy---right scalp                             Allergies:   NKDA         Plan:  1. Home---8.5.2020  2.    Home medications reviewed  3. Increased Toprol XL 25 ---> 50 PO daily  4. UTI---POA---Rocephin ----> Omnicef pending culture  5. DM2--home regimen resumed  6. Follow up---Cleopatra Iyer NP---FP  7. Follow up [facility]----[facility]  8.    See orders     Electronically signed by Erna Gutierrez on 8/5/2020 at 2:12 PM    Hospitalist

## 2020-08-05 NOTE — PROGRESS NOTES
Physical Therapy    Patient declined PT services, stating they are at their baseline level of function. Patient notes they are independent with functional mobility, ambulation and transfers. Patient discharged per their request. Patient was advised to inform staff if there is a decline in function or if they have any questions, and therapy can return at that time. Patient verbalized understanding.     Ricky Hannah, PT, DPT

## 2020-08-05 NOTE — TELEPHONE ENCOUNTER
Patient dc from PCU on 08/05/20 with dx of UTI and tachycardia.    Has appt with Dr. Solmon Mcburney on 08/10/20 at [phone]

## 2020-08-05 NOTE — PROGRESS NOTES
SW completed a Psychosocial Assessment for evaluation of patient's mental health, social status, and functional capacity within the community. Patient is a 67year old male admitted due to UTI. Patient was alert, cooperative, and engaging during assessment. Patient states he lives in [place]. Patient states he has been  from his wife however, considers her as support. Patient states he has support from family members and friends. Patient reports he does not use DME. Patient states she uses meals on wheels and senior services for transportation. SW provided supportive listening while patient discussed past medical history. Patient has PCP DIANN Mcnulty and reports no issues affording his medication. SW assessed ADLs and means of transportation. Patient states he is independent in his ADLs. Patient reports he uses senior services for transportation. SW assessed if patient had food insecurity or needed financial assistance. Patient states he retired form [employer]June 2006 after working there 14 years. \" Patient denied food insecurity or any financial concerns. Patient is a full code status at this time. Patient states he needs no additional services at this time. SW provided business card. SW will continue to monitor needs and assist as appropriate.        LEONCIO Sanchez  8/5/2020

## 2020-08-05 NOTE — PLAN OF CARE
Problem: Infection:  Goal: Will remain free from infection  Description: Will remain free from infection  8/4/2020 2005 by Chava Carrillo RN  Outcome: Ongoing  8/4/2020 1919 by Cameron Encinas RN  Outcome: Ongoing     Problem: Safety:  Goal: Free from accidental physical injury  Description: Free from accidental physical injury  8/4/2020 2005 by Chava Carrillo RN  Outcome: Ongoing  8/4/2020 1919 by Cameron Encinas RN  Outcome: Ongoing  Goal: Free from intentional harm  Description: Free from intentional harm  8/4/2020 2005 by Chava Carrillo RN  Outcome: Ongoing  8/4/2020 1919 by Cameron Encinas RN  Outcome: Ongoing     Problem: Daily Care:  Goal: Daily care needs are met  Description: Daily care needs are met  8/4/2020 2005 by Chava Carrillo RN  Outcome: Ongoing  8/4/2020 1919 by Cameron Encinas RN  Outcome: Ongoing     Problem: Pain:  Goal: Patient's pain/discomfort is manageable  Description: Patient's pain/discomfort is manageable  8/4/2020 2005 by Chava Carrillo RN  Outcome: Ongoing  8/4/2020 1919 by Cameron Encinas RN  Outcome: Ongoing     Problem: Skin Integrity:  Goal: Skin integrity will stabilize  Description: Skin integrity will stabilize  8/4/2020 2005 by Chava Carrillo RN  Outcome: Ongoing  8/4/2020 1919 by Cameron Encinas RN  Outcome: Ongoing     Problem: Discharge Planning:  Goal: Patients continuum of care needs are met  Description: Patients continuum of care needs are met  8/4/2020 2005 by Chava Carrillo RN  Outcome: Ongoing  8/4/2020 1919 by Cameron Encians RN  Outcome: Ongoing

## 2020-08-05 NOTE — PLAN OF CARE
Problem: Infection:  Goal: Will remain free from infection  Description: Will remain free from infection  Outcome: Ongoing     Problem: Safety:  Goal: Free from accidental physical injury  Description: Free from accidental physical injury  Outcome: Ongoing  Goal: Free from intentional harm  Description: Free from intentional harm  Outcome: Ongoing     Problem: Daily Care:  Goal: Daily care needs are met  Description: Daily care needs are met  Outcome: Ongoing     Problem: Pain:  Goal: Patient's pain/discomfort is manageable  Description: Patient's pain/discomfort is manageable  Outcome: Ongoing     Problem: Skin Integrity:  Goal: Skin integrity will stabilize  Description: Skin integrity will stabilize  Outcome: Ongoing     Problem: Discharge Planning:  Goal: Patients continuum of care needs are met  Description: Patients continuum of care needs are met  Outcome: Ongoing     Problem: Sensory:  Goal: General experience of comfort will improve  Description: General experience of comfort will improve  Outcome: Ongoing     Problem: Urinary Elimination:  Goal: Signs and symptoms of infection will decrease  Description: Signs and symptoms of infection will decrease  Outcome: Ongoing  Goal: Ability to reestablish a normal urinary elimination pattern will improve - after catheter removal  Description: Ability to reestablish a normal urinary elimination pattern will improve  Outcome: Ongoing  Goal: Complications related to the disease process, condition or treatment will be avoided or minimized  Description: Complications related to the disease process, condition or treatment will be avoided or minimized  Outcome: Ongoing

## 2020-08-05 NOTE — CONSULTS
Cardiology   Consult Note          History Obtained From:  patient    HISTORY OF PRESENT ILLNESS:      The patient is a 67 y.o. male seen for tachycardia. The patient  presents with rapid heart rate---ECG showed SVT---rate 150-160--- converted to SR after Lopressor IV  X 2. He denies prior cardiac disease or SVT. He is asymptomatic this am in NSR. Denies syncope, angina, CHF sx. He is diagnosed with UTI---POA on antibiotics    Past Medical History:    Past Medical History:   Diagnosis Date    Anemia     chronic, negative work up with EGD and Colonoscopy.  DJD (degenerative joint disease) of lumbar spine     Dry eye syndrome     GERD (gastroesophageal reflux disease)     Hepatic hemangioma     Hypertension     Keratitis     Secondary to dry eye syndrome.  Nuclear sclerotic cataract of both eyes     Obesity, morbid ([address]     Obstructive sleep apnea of adult     non compliant with cpap    Onychomycosis     1, 2, 3, 4, and 5, bilateral.    Type II or unspecified type diabetes mellitus without mention of complication, not stated as uncontrolled      Past Surgical History:    Past Surgical History:   Procedure Laterality Date    COLONOSCOPY  09/15/2006    Diffuse diverticulosis slightly greater in the sigmoid colon and grade 1 internal hemorrhoids.  CYST REMOVAL      right side of  scalp    UMBILICAL HERNIA REPAIR  2013    UPPER GASTROINTESTINAL ENDOSCOPY  09/15/2006    Mild to moderate diffuse gastritis, mild to moderate diffuse duodenitis. Home Medications:  Prior to Admission medications    Medication Sig Start Date End Date Taking?  Authorizing Provider   metoprolol succinate (TOPROL XL) 25 MG extended release tablet Take 1 tablet by mouth daily 7/27/20  Yes Lynn Horan MD   famotidine (PEPCID) 20 MG tablet Take 1 tablet by mouth 2 times daily 6/5/20  Yes ROCÍO Burrell - CNP   metFORMIN (GLUCOPHAGE-XR) 500 MG extended release tablet TAKE 1 TABLET TWICE A DAY 5/14/20  Yes ROCÍO Armas CNP   atorvastatin (LIPITOR) 20 MG tablet TAKE 1 TABLET DAILY 4/20/20  Yes ROCÍO Armas CNP   valsartan-hydroCHLOROthiazide (DIOVAN-HCT) 160-25 MG per tablet TAKE 1 TABLET DAILY 4/20/20  Yes ROCÍO Armas CNP   fluticasone (FLONASE) 50 MCG/ACT nasal spray USE TWO SPRAYS IN EACH NOSTRIL EVERY DAY 3/30/20  Yes ROCÍO Armas CNP   tamsulosin (FLOMAX) 0.4 MG capsule TAKE 1 CAPSULE DAILY 3/23/20  Yes ROCÍO Armas CNP   oxybutynin (DITROPAN XL) 10 MG extended release tablet Take 1 tablet by mouth daily 2/3/20  Yes Flakita Gonzales MD   omeprazole (PRILOSEC) 40 MG delayed release capsule TAKE 1 CAPSULE DAILY 10/28/19  Yes ROCÍO Armas CNP   aspirin 81 MG tablet Take 81 mg by mouth daily. Yes Historical Provider, MD   ibuprofen (ADVIL;MOTRIN) 800 MG tablet Take 1 tablet by mouth every 8 hours as needed for Pain 7/12/20   Yeison Vu MD   blood glucose test strips (GLUCOCARD VITAL TEST) strip 1 each by In Vitro route daily Diagnosis E11.42 3/30/20   ROCÍO Armas CNP   Lancets MISC USE TO TEST BLOOD SUGAR EVERY DAY 1/20/20   ROCÍO Armas CNP   blood glucose monitor kit and supplies Check blood sugar daily.   Diagnosis E11.42 1/9/20   ROCÍO Armas CNP   Foot Care Products (DIABETIC INSOLES) MISC 1 Units by Does not apply route daily Equinus contracture of ankle  (primary encounter diagnosis) DM type 2 with diabetic peripheral neuropathy ([facility] Dermatophytosis of nail Foot pain, bilateral 10/29/19   Everlina Neve, DPM   Diabetic Shoe MISC by Does not apply route Equinus contracture of ankle  (primary encounter diagnosis)    DM type 2 with diabetic peripheral neuropathy ([facility]    Dermatophytosis of nail    Foot pain, bilateral 10/29/19   Everlina Neve, DPM     Current Inpatient Medications:   sodium chloride flush 10 mL Intravenous 2 times per day    enoxaparin  40 mg Subcutaneous Daily    cefTRIAXone (ROCEPHIN) IV  1 g Intravenous Q24H    aspirin  81 mg Oral Daily    atorvastatin  20 mg Oral Daily    fluticasone  2 spray Each Nostril Daily    metoprolol succinate  25 mg Oral Daily    oxybutynin  10 mg Oral Daily    tamsulosin  0.4 mg Oral Daily    valsartan  160 mg Oral Daily    And    hydrochlorothiazide  25 mg Oral Daily    insulin glargine  10 Units Subcutaneous Dinner    insulin lispro  3 Units Subcutaneous TID WC    insulin lispro  0-6 Units Subcutaneous TID WC    insulin lispro  0-3 Units Subcutaneous Nightly    famotidine  20 mg Oral BID     Allergies:  Patient has no known allergies.   Social History:    Social History     Socioeconomic History    Marital status: Single     Spouse name: Not on file    Number of children: Not on file    Years of education: Not on file    Highest education level: Not on file   Occupational History    Not on file   Social Needs    Financial resource strain: Not on file    Food insecurity     Worry: Not on file     Inability: Not on file    Transportation needs     Medical: Not on file     Non-medical: Not on file   Tobacco Use    Smoking status: Former Smoker     Years: 15.00     Types: Cigars     Last attempt to quit: 1970     Years since quittin.7    Smokeless tobacco: Never Used    Tobacco comment: quit over 30 years ago   Substance and Sexual Activity    Alcohol use: No     Alcohol/week: 0.0 standard drinks     Comment: no alcohol for many years    Drug use: No    Sexual activity: Not on file   Lifestyle    Physical activity     Days per week: Not on file     Minutes per session: Not on file    Stress: Not on file   Relationships    Social connections     Talks on phone: Not on file     Gets together: Not on file     Attends Holiness service: Not on file     Active member of club or organization: Not on file     Attends meetings of clubs or ECG:SVT (AVNRT)      ECHO 8/4/2020:  Summary  Normal left ventricular diameter. Mild left ventricular hypertrophy. Left ventricular systolic function is normal.  Left ventricular ejection fraction 60 %. Right ventricular dilatation with normal systolic function. Aortic valve is mildly sclerotic. Mitral annular calcification. No pericardial effusion.     Lab:   Lab Results   Component Value Date     08/05/2020    K 3.7 08/05/2020     08/05/2020    CO2 24 08/05/2020    BUN 20 08/05/2020    CREATININE 1.15 08/05/2020    GFRAA >60 08/05/2020    LABGLOM >60 08/05/2020    GLUCOSE 113 08/05/2020    PROT 8.3 08/04/2020    LABALBU 3.8 08/04/2020    CALCIUM 8.8 08/05/2020    BILITOT 0.76 08/04/2020    ALKPHOS 133 08/04/2020    AST 16 08/04/2020    ALT 13 08/04/2020     Magnesium:  No results found for: MG  Warfarin PT/INR:    Lab Results   Component Value Date    PROTIME 13.2 08/04/2020    INR 1.0 08/04/2020     TSH:    Lab Results   Component Value Date    TSH 0.36 04/27/2016     BMP:    Lab Results   Component Value Date     08/05/2020    K 3.7 08/05/2020     08/05/2020    CO2 24 08/05/2020    BUN 20 08/05/2020    LABALBU 3.8 08/04/2020    CREATININE 1.15 08/05/2020    CALCIUM 8.8 08/05/2020    GFRAA >60 08/05/2020    LABGLOM >60 08/05/2020    GLUCOSE 113 08/05/2020     FLP:    Lab Results   Component Value Date    CHOL 111 01/09/2020    TRIG 104 01/09/2020    HDL 46 01/09/2020    LDLCHOLESTEROL 44 01/09/2020       IMPRESSION:  PSVT  HTN  OBESITY  HPL  UTI  Patient Active Problem List   Diagnosis    Hypertension    GERD (gastroesophageal reflux disease)    Obesity, morbid ([address]    Osteoarthritis of lumbar spine    Hepatic hemangioma    Obstructive sleep apnea of adult    Diabetes ([facility]    DM type 2 with diabetic peripheral neuropathy (HCC)    Dermatophytosis of nail    Chest pain    UTI (urinary tract infection)         RECOMMENDATIONS:     Agree with BB  Will fu as op if SVT recurs will consider EP/Ablation evaluation    Starla Avila M.D.

## 2020-08-05 NOTE — PLAN OF CARE
Problem: Discharge Planning:  Goal: Patients continuum of care needs are met  Description: Patients continuum of care needs are met  8/5/2020 1320 by Jethro Salinas, RN  Outcome: Met This Shift  8/5/2020 1037 by Makayla Monteiro RN  Outcome: Ongoing   Pt denies any discharge needs at present time.

## 2020-08-06 LAB
CULTURE: ABNORMAL
Lab: ABNORMAL
SPECIMEN DESCRIPTION: ABNORMAL

## 2020-08-06 NOTE — DISCHARGE SUMMARY
[place] [address]                 [address] [place] [address], 00 [facility]                               DISCHARGE SUMMARY    PATIENT NAME: Roel Robles                      :        1945  MED REC NO:   [id number]                             ROOM:       0214  ACCOUNT NO:   [de-identified]                           ADMIT DATE: 2020  PROVIDER:     Shira Rooney MD                  DISCHARGE DATE:  2020    ATTENDING PHYSICIAN OF HOSPITALIZATION:  Ulises Scott MD    PERSONAL CARE PROVIDER:  Federico Rutledge, nurse practitioner,  Family Practice. The patient seen by [facility], [facility]  [facility]ultants this hospitalization. DIAGNOSES:  1. Supraventricular tachycardia, 2020, converted to sinus rhythm,  Lopressor IV twice. A 2D echo, 2020, mild LVH, normal left  ventricular systolic function, RA dilatation, RV dilatation, normal  right ventricular systolic function, MAC, mild BARBARA, aortic root mildly  dilated at 3.8 cm, IVC not well visualized, LVEF 60%. 2.  Chest x-ray, 2020, minimal bilateral atelectasis,  diaphragmatic flattening suggesting underlying COPD. 3.  EKG of 2020, biphasic T wave, rate 67, otherwise no other  acute findings. MI ruled out, 2020.  4.  Chest x-ray, 2020, no acute changes. 5.  EKG, 2020, #2, normal sinus rhythm, rate 81, sinus arrhythmia. EKG, 2020, SVT (supraventricular tachycardia), rate 151. Prior  history of PSVT, 2020, requiring at least one dose of IV Lopressor  to get back to a sinus rhythm. 6.  Constipation, 2020.  7.  CT scan of the abdomen and pelvis, 2020, marked urinary  bladder wall thickening with infiltration of the pericystic fat  consistent with cystitis plus prostatomegaly, with heterogeneous  consistency of prostate consistent with prostatitis, prostate being  enlarged to 5.6 x 6.1 cm.   No obstructive uropathy, mild controlled, 116/61. Diabetes  mellitus type 2, blood glucose level today was 113.      obstructive sleep apnea. Unfortunately, noncompliant with his CPAP. CPAP would help certainly with his dysrhythmias. Prior tobacco smoker, quit in 1970, much to his credit. LABORATORY DATA:  Around the time of discharge, white cell count 9.6;  hemoglobin 10.2; hematocrit 31.9; and platelets 784,476. Sodium 136,  potassium 3.7, chloride 101, CO2 of 24, BUN 20, creatinine 1.15, glucose  113, calcium 8.8, and GFR greater than 60. The patient ruled out for  myocardial infarction. Troponin 15, 18, 16 and 12. DISCHARGE INSTRUCTIONS:  FOLLOWUP:  Discharged to home on 08/06/2020. DIET:  Cardiac, diabetic. ACTIVITY:  As tolerated. CONDITION AT DISCHARGE:  Improved, fair. MEDICATIONS:  NEW:  Omnicef (cefdinir) 300 mg p.o. b.i.d., 6 days; probiotic  acidophilus one p.o. three times a day. FOLLOWING MEDICATIONS FOR WHICH CHANGES MAY HAVE OCCURRED:  Metformin  (Glucophage XR) 500 mg p.o. twice daily, note that this was not given  during the hospitalization, has been renewed at the time of discharge;  metoprolol succinate (Toprol XL) 50 mg daily, increased dose. FOLLOWING MEDICATIONS CONTINUED, NO CHANGE:  Aspirin 81 mg p.o. daily;  atorvastatin (Lipitor) 20 mg p.o. daily; famotidine (Pepcid) 20 mg p.o.  b.i.d.; Flonase nasal spray 2 sprays each nostril daily; Prilosec  (omeprazole) 40 mg p.o. daily; oxybutynin (Ditropan XL) 10 mg p.o.  daily; tamsulosin (Flomax) 0.4 mg p.o. daily; Diovan HCT  (valsartan/HCTZ) 160/25 one p.o. daily. Specifically discontinued was ibuprofen given the patient being a  diabetic and also initial presentation was stage III kidney disease. Follow up with Edy France, nurse practitioner, Family  Practice. Follow up with [facility], [facility].     Also recommend given the size of the patient's prostate together with a  urinary tract infection that the patient would be seen by Urology for  further evaluation and treatment for what appears to be a very large  prostate, which may be also a contributing factor to the patient's  urinary tract infection. Also recommended to the patient if he has CPAP  available to use it, particularly in light of the patient's  supraventricular tachycardias. Any aspect of the patient's care not discussed in the chart and/or  dictation will be addressed and treated as an outpatient. The patient's medications have been reviewed including, but not limited  to, pre-hospital, hospital and discharge medications. The patient  and/or the patient's personal representatives were specifically advised  the only medications to be taken are those set forth in the discharge  orders and no other medications should be taken. Any prior medications  not on the discharge orders are specifically discontinued.         Jose Elias Alba MD    D: 08/05/2020 18:02:32       T: 08/05/2020 18:08:56     [email or web address]  Job#: [id number]     Doc#: [id number]    CC:

## 2020-08-07 ENCOUNTER — TELEPHONE (OUTPATIENT)
Dept: SPIRITUAL SERVICES | Age: 75
End: 2020-08-07

## 2020-08-07 NOTE — TELEPHONE ENCOUNTER
[facility] Transitions Initial Follow Up Call    Outreach made within 2 business days of discharge: Yes    Patient: Carloz Mei Patient : 1945   MRN: [id number]  Reason for Admission: UTI and Tachycardia  Discharge Date: 20       Spoke with: Cecil Woods    Discharge department/facility: [place] [address] Interactive Patient Contact:  Was patient able to fill all prescriptions: Yes  Was patient instructed to bring all medications to the follow-up visit: Yes  Is patient taking all medications as directed in the discharge summary?  Yes  Does patient understand their discharge instructions: Yes  Does patient have questions or concerns that need addressed prior to 7-14 day follow up office visit: no    Scheduled appointment with PCP within 7-14 days    Follow Up  Future Appointments   Date Time Provider Nelia Vizcarra   8/10/2020 10:20 AM Juan Pearl MD [facility]   2020 11:30 AM [facility]   2021  8:50 AM SCHEDULE, John Avila [address] [facility] [address] [facility] [place]   2021 10:20 AM ROCÍO Hunt - CNP [facility]   2021 10:00 AM SCHEDULE, John Avila [address] [facility] [address] [facility] [place]   2021 10:00 AM Saint Satchel, MD [facility] [facility]       Michael Helm LPN 1

## 2020-08-07 NOTE — TELEPHONE ENCOUNTER
ACP specialist made follow up call to discuss ACP with patient. Patient said he did receive information in the mail. He was not interested in making an appointment at this time. Because patient is difficult to understand over the phone, specialist feels this discussion needs to be completed face to face.   Patient said he would .    Electronically signed by Pavel Larkin on 8/7/2020 at 12:50 PM

## 2020-08-10 ENCOUNTER — OFFICE VISIT (OUTPATIENT)
Dept: FAMILY MEDICINE CLINIC | Age: 75
End: 2020-08-10
Payer: MEDICARE

## 2020-08-10 VITALS
TEMPERATURE: 96.4 F | SYSTOLIC BLOOD PRESSURE: 138 MMHG | DIASTOLIC BLOOD PRESSURE: 78 MMHG | OXYGEN SATURATION: 96 % | HEIGHT: 68 IN | WEIGHT: 279 LBS | HEART RATE: 75 BPM | BODY MASS INDEX: 42.28 KG/M2

## 2020-08-10 LAB
CULTURE: NORMAL
CULTURE: NORMAL
Lab: NORMAL
Lab: NORMAL
SPECIMEN DESCRIPTION: NORMAL
SPECIMEN DESCRIPTION: NORMAL

## 2020-08-10 PROCEDURE — 99495 TRANSJ CARE MGMT MOD F2F 14D: CPT | Performed by: FAMILY MEDICINE

## 2020-08-10 NOTE — PROGRESS NOTES
HPI:  Patient comes in today for   Chief Complaint   Patient presents with    Follow-Up from Hospital     UTI and Tachycardia; [facility] discharged 2020   Here for transitional care post hospitalization for SVT on 2020-2020 converted to NSR with IV lopressor ,this was second episode in a week. ECHO with normal LVEF of 60% ,his Toprol XL dose was increased by Valentino Contras had a   UTI  With [facility] was on rocephin switched to [facility] on discharge. Feeling fine now,no chest pain no palpitations ,no fever no urinary complaints. h/o HTN,Dmtype 2 are fairly stable. HISTORY:  Past Medical History:   Diagnosis Date    Anemia     chronic, negative work up with EGD and Colonoscopy.  DJD (degenerative joint disease) of lumbar spine     Dry eye syndrome     GERD (gastroesophageal reflux disease)     Hepatic hemangioma     Hypertension     Keratitis     Secondary to dry eye syndrome.  Nuclear sclerotic cataract of both eyes     Obesity, morbid ([address]     Obstructive sleep apnea of adult     non compliant with cpap    Onychomycosis     1, 2, 3, 4, and 5, bilateral.    Type II or unspecified type diabetes mellitus without mention of complication, not stated as uncontrolled        Past Surgical History:   Procedure Laterality Date    COLONOSCOPY  09/15/2006    Diffuse diverticulosis slightly greater in the sigmoid colon and grade 1 internal hemorrhoids.  CYST REMOVAL      right side of  scalp    UMBILICAL HERNIA REPAIR      UPPER GASTROINTESTINAL ENDOSCOPY  09/15/2006    Mild to moderate diffuse gastritis, mild to moderate diffuse duodenitis. Family History   Problem Relation Age of Onset    Ovarian Cancer Mother     High Blood Pressure Sister     Diabetes Sister          at 76    High Blood Pressure Brother         all 4 brothers have it    Stroke Brother     Heart Attack Father         Acute myocardial infarction.     High Blood Pressure Brother     Stroke Brother     Dementia Sister          at 76    Cataracts Neg Hx     Glaucoma Neg Hx        Social History     Socioeconomic History    Marital status: Single     Spouse name: Not on file    Number of children: Not on file    Years of education: Not on file    Highest education level: Not on file   Occupational History    Not on file   Social Needs    Financial resource strain: Not on file    Food insecurity     Worry: Not on file     Inability: Not on file    Transportation needs     Medical: Not on file     Non-medical: Not on file   Tobacco Use    Smoking status: Former Smoker     Years: 15.00     Types: Cigars     Last attempt to quit: 1970     Years since quittin.7    Smokeless tobacco: Never Used    Tobacco comment: quit over 30 years ago   Substance and Sexual Activity    Alcohol use: No     Alcohol/week: 0.0 standard drinks     Comment: no alcohol for many years    Drug use: No    Sexual activity: Not on file   Lifestyle    Physical activity     Days per week: Not on file     Minutes per session: Not on file    Stress: Not on file   Relationships    Social connections     Talks on phone: Not on file     Gets together: Not on file     Attends [employer] service: Not on file     Active member of club or organization: Not on file     Attends meetings of clubs or organizations: Not on file     Relationship status: Not on file    Intimate partner violence     Fear of current or ex partner: Not on file     Emotionally abused: Not on file     Physically abused: Not on file     Forced sexual activity: Not on file   Other Topics Concern    Not on file   Social History Narrative    Not on file       Current Outpatient Medications   Medication Sig Dispense Refill    Probiotic Acidophilus (FLORANEX) TABS Take 1 tablet by mouth 3 times daily for 10 days 30 tablet 0    cefdinir (OMNICEF) 300 MG capsule Take 1 capsule by mouth 2 times daily for 6 days 12 capsule 0    metoprolol SOB  GI: No nausea, vomiting, dysphagia, odynophagia, diarrhea, constipation. : No dysuria, hematuria, urgency, incontinence. nocturia x1-2  MusculoskeletaOccasional low back pain. No other  muscle or joint aches, no joint swelling  Neuro: No dizziness/lightheadedness, no seizures  Endocrine: No polyuria, polydipsia, polyphagia, no temperature intolerance  Skin: No lesions or itching  No problems with ADLs  Sleep: Good has h/o RABIA is not on CPAP due to non compliances. Psychiatric: No depression    PHYSICAL EXAM:  VS:  /78   Pulse 75   Temp 96.4 °F (35.8 °C)   Ht 5' 8\" (1.727 m)   Wt 279 lb (126.6 kg)   SpO2 96%   BMI 42.42 kg/m²   General:  Alert and oriented, NAD  HEENT:  TMs, JALEESA, EOMI, Conjunctivae clear       Throat currently clear. NECK:  Supple without adenopathy or thyromegaly, no carotid bruits  LUNGS:  CTA all fields  HEART:  RRR without M, R, or G  ABDOMEN:  Soft and nontender without palpable abnormalities  EXTREMITIES:  Without clubbing, cyanosis, or edema, no calf tenderness  NEURO:  No focal deficits. SKIN:  warm to touch,normal texture. No active lesions. ASSESSMENT/PLAN:     Diagnosis Orders   1. History of palpitations     2. DM type 2 with diabetic peripheral neuropathy ([facility] [address]     3. Hospital discharge follow-up         No orders of the defined types were placed in this encounter. Requested Prescriptions      No prescriptions requested or ordered in this encounter   Continue current meds. F/u with PCP for ongoing care. F/U  With cardiology. Return if symptoms worsen or fail to improve.     Electronically signed by Cecile Patel MD

## 2020-08-20 ENCOUNTER — HOSPITAL ENCOUNTER (EMERGENCY)
Age: 75
Discharge: HOME | End: 2020-08-20
Attending: EMERGENCY MEDICINE
Payer: MEDICARE

## 2020-08-20 ENCOUNTER — APPOINTMENT (OUTPATIENT)
Dept: GENERAL RADIOLOGY | Age: 75
End: 2020-08-20
Payer: MEDICARE

## 2020-08-20 ENCOUNTER — OFFICE VISIT (OUTPATIENT)
Dept: CARDIOLOGY | Age: 75
End: 2020-08-20
Payer: MEDICARE

## 2020-08-20 VITALS
BODY MASS INDEX: 42.28 KG/M2 | HEIGHT: 68 IN | HEART RATE: 80 BPM | SYSTOLIC BLOOD PRESSURE: 142 MMHG | DIASTOLIC BLOOD PRESSURE: 70 MMHG | WEIGHT: 279 LBS

## 2020-08-20 VITALS
TEMPERATURE: 98.5 F | WEIGHT: 285 LBS | BODY MASS INDEX: 43.19 KG/M2 | HEART RATE: 71 BPM | RESPIRATION RATE: 17 BRPM | DIASTOLIC BLOOD PRESSURE: 73 MMHG | OXYGEN SATURATION: 94 % | SYSTOLIC BLOOD PRESSURE: 135 MMHG | HEIGHT: 68 IN

## 2020-08-20 LAB
ABSOLUTE EOS #: 0.08 K/UL (ref 0–0.44)
ABSOLUTE IMMATURE GRANULOCYTE: <0.03 K/UL (ref 0–0.3)
ABSOLUTE LYMPH #: 1.33 K/UL (ref 1.1–3.7)
ABSOLUTE MONO #: 0.68 K/UL (ref 0.1–1.2)
ANION GAP SERPL CALCULATED.3IONS-SCNC: 11 MMOL/L (ref 9–17)
BASOPHILS # BLD: 1 % (ref 0–2)
BASOPHILS ABSOLUTE: 0.04 K/UL (ref 0–0.2)
BUN BLDV-MCNC: 17 MG/DL (ref 8–23)
BUN/CREAT BLD: 15 (ref 9–20)
CALCIUM SERPL-MCNC: 9.2 MG/DL (ref 8.6–10.4)
CHLORIDE BLD-SCNC: 100 MMOL/L (ref 98–107)
CO2: 25 MMOL/L (ref 20–31)
CREAT SERPL-MCNC: 1.1 MG/DL (ref 0.7–1.2)
DIFFERENTIAL TYPE: ABNORMAL
EKG ATRIAL RATE: 84 BPM
EKG P AXIS: 32 DEGREES
EKG P-R INTERVAL: 190 MS
EKG Q-T INTERVAL: 380 MS
EKG QRS DURATION: 82 MS
EKG QTC CALCULATION (BAZETT): 449 MS
EKG R AXIS: -7 DEGREES
EKG T AXIS: 21 DEGREES
EKG VENTRICULAR RATE: 84 BPM
EOSINOPHILS RELATIVE PERCENT: 1 % (ref 1–4)
GFR AFRICAN AMERICAN: >60 ML/MIN
GFR NON-AFRICAN AMERICAN: >60 ML/MIN
GFR SERPL CREATININE-BSD FRML MDRD: ABNORMAL ML/MIN/{1.73_M2}
GFR SERPL CREATININE-BSD FRML MDRD: ABNORMAL ML/MIN/{1.73_M2}
GLUCOSE BLD-MCNC: 231 MG/DL (ref 70–99)
HCT VFR BLD CALC: 35.8 % (ref 40.7–50.3)
HEMOGLOBIN: 11.2 G/DL (ref 13–17)
IMMATURE GRANULOCYTES: 0 %
LYMPHOCYTES # BLD: 18 % (ref 24–43)
MCH RBC QN AUTO: 27.2 PG (ref 25.2–33.5)
MCHC RBC AUTO-ENTMCNC: 31.3 G/DL (ref 25.2–33.5)
MCV RBC AUTO: 86.9 FL (ref 82.6–102.9)
MONOCYTES # BLD: 9 % (ref 3–12)
NRBC AUTOMATED: 0 PER 100 WBC
PDW BLD-RTO: 14.2 % (ref 11.8–14.4)
PLATELET # BLD: 248 K/UL (ref 138–453)
PLATELET ESTIMATE: ABNORMAL
PMV BLD AUTO: 10.3 FL (ref 8.1–13.5)
POTASSIUM SERPL-SCNC: 3.6 MMOL/L (ref 3.7–5.3)
RBC # BLD: 4.12 M/UL (ref 4.21–5.77)
RBC # BLD: ABNORMAL 10*6/UL
SEG NEUTROPHILS: 71 % (ref 36–65)
SEGMENTED NEUTROPHILS ABSOLUTE COUNT: 5.26 K/UL (ref 1.5–8.1)
SODIUM BLD-SCNC: 136 MMOL/L (ref 135–144)
WBC # BLD: 7.4 K/UL (ref 3.5–11.3)
WBC # BLD: ABNORMAL 10*3/UL

## 2020-08-20 PROCEDURE — 93010 ELECTROCARDIOGRAM REPORT: CPT | Performed by: INTERNAL MEDICINE

## 2020-08-20 PROCEDURE — 99214 OFFICE O/P EST MOD 30 MIN: CPT

## 2020-08-20 PROCEDURE — G8417 CALC BMI ABV UP PARAM F/U: HCPCS | Performed by: INTERNAL MEDICINE

## 2020-08-20 PROCEDURE — 71045 X-RAY EXAM CHEST 1 VIEW: CPT

## 2020-08-20 PROCEDURE — 96374 THER/PROPH/DIAG INJ IV PUSH: CPT

## 2020-08-20 PROCEDURE — 99285 EMERGENCY DEPT VISIT HI MDM: CPT

## 2020-08-20 PROCEDURE — 36415 COLL VENOUS BLD VENIPUNCTURE: CPT

## 2020-08-20 PROCEDURE — 85025 COMPLETE CBC W/AUTO DIFF WBC: CPT

## 2020-08-20 PROCEDURE — 80048 BASIC METABOLIC PNL TOTAL CA: CPT

## 2020-08-20 PROCEDURE — 2500000003 HC RX 250 WO HCPCS: Performed by: EMERGENCY MEDICINE

## 2020-08-20 PROCEDURE — G8427 DOCREV CUR MEDS BY ELIG CLIN: HCPCS | Performed by: INTERNAL MEDICINE

## 2020-08-20 PROCEDURE — 93005 ELECTROCARDIOGRAM TRACING: CPT | Performed by: INTERNAL MEDICINE

## 2020-08-20 PROCEDURE — 99214 OFFICE O/P EST MOD 30 MIN: CPT | Performed by: INTERNAL MEDICINE

## 2020-08-20 RX ORDER — METOPROLOL TARTRATE 100 MG/1
100 TABLET ORAL 2 TIMES DAILY
Qty: 60 TABLET | Refills: 0 | Status: SHIPPED | OUTPATIENT
Start: 2020-08-20 | End: 2020-09-24 | Stop reason: SDUPTHER

## 2020-08-20 RX ORDER — METOPROLOL TARTRATE 5 MG/5ML
5 INJECTION INTRAVENOUS ONCE
Status: COMPLETED | OUTPATIENT
Start: 2020-08-20 | End: 2020-08-20

## 2020-08-20 RX ADMIN — METOPROLOL TARTRATE 5 MG: 1 INJECTION, SOLUTION INTRAVENOUS at 12:54

## 2020-08-20 NOTE — PROGRESS NOTES
Cardiology Consultation/Follow Up. [facility]    CC: Patient is here for follow up    HPI  Margoth Sequeira is here for posthospitalization follow-up. He was recently admitted to hospital with a UTI. He was found to have supraventricular tachycardia/AVNRT spontaneously converted to normal sinus rhythm with IV Lopressor. He was sent home on Toprol-XL 50 mg daily. Patient reports that he is compliant with his medication. He is a poor historian however denies any recurrent palpitation. Today on my encounter, his heart rate was noted to be high. ECG showed supraventricular tachycardia with heart rate of 140 bpm.    Patient otherwise denies any chest pain or shortness of breath. No lower extremity edema. Past Medical:  Past Medical History:   Diagnosis Date    Anemia     chronic, negative work up with EGD and Colonoscopy.  DJD (degenerative joint disease) of lumbar spine     Dry eye syndrome     GERD (gastroesophageal reflux disease)     Hepatic hemangioma     Hypertension     Keratitis     Secondary to dry eye syndrome.  Nuclear sclerotic cataract of both eyes     Obesity, morbid ([address]     Obstructive sleep apnea of adult     non compliant with cpap    Onychomycosis     1, 2, 3, 4, and 5, bilateral.    Type II or unspecified type diabetes mellitus without mention of complication, not stated as uncontrolled        Past Surgical:  Past Surgical History:   Procedure Laterality Date    COLONOSCOPY  09/15/2006    Diffuse diverticulosis slightly greater in the sigmoid colon and grade 1 internal hemorrhoids.  CYST REMOVAL      right side of  scalp    UMBILICAL HERNIA REPAIR      UPPER GASTROINTESTINAL ENDOSCOPY  09/15/2006    Mild to moderate diffuse gastritis, mild to moderate diffuse duodenitis.        Family History:  Family History   Problem Relation Age of Onset    Ovarian Cancer Mother     High Blood Pressure Sister     Diabetes Sister          at 76    High Blood Pressure Brother         all 4 brothers have it    Stroke Brother     Heart Attack Father         Acute myocardial infarction.  High Blood Pressure Brother     Stroke Brother     Dementia Sister          at 76    Cataracts Neg Hx     Glaucoma Neg Hx        Social History:  Social History     Tobacco Use    Smoking status: Former Smoker     Years: 15.00     Types: Cigars     Last attempt to quit: 1970     Years since quittin.7    Smokeless tobacco: Never Used    Tobacco comment: quit over 30 years ago   Substance Use Topics    Alcohol use: No     Alcohol/week: 0.0 standard drinks     Comment: no alcohol for many years    Drug use: No        REVIEW OF SYSTEMS:    · Constitutional: there has been no unanticipated weight loss. There's been No change in energy level, No change in activity level. · Eyes: No visual changes or diplopia. No scleral icterus. · ENT: No Headaches, hearing loss or vertigo. No mouth sores or sore throat. · Cardiovascular: As described in HPI. · Respiratory: AS HPI  · Gastrointestinal: No abdominal pain, appetite loss, blood in stools. No change in bowel or bladder habits. · Genitourinary: No dysuria, trouble voiding, or hematuria. · Musculoskeletal:  No gait disturbance, No weakness or joint complaints. · Integumentary: No rash or pruritis. · Neurological: No headache, diplopia, change in muscle strength, numbness or tingling  · Psychiatric: No new anxiety or depression. · Endocrine: No temperature intolerance. No excessive thirst, fluid intake, or urination. No tremor. · Hematologic/Lymphatic: No abnormal bruising or bleeding, blood clots or swollen lymph nodes. · Allergic/Immunologic: No nasal congestion or hives.     Medications:  Current Outpatient Medications   Medication Sig Dispense Refill    metoprolol succinate (TOPROL XL) 25 MG extended release tablet Take 2 tablets by mouth daily 30 tablet 0    famotidine (PEPCID) 20 MG tablet Take 1 tablet by mouth 2 times daily 180 tablet 1    metFORMIN (GLUCOPHAGE-XR) 500 MG extended release tablet TAKE 1 TABLET TWICE A  tablet 1    atorvastatin (LIPITOR) 20 MG tablet TAKE 1 TABLET DAILY 90 tablet 3    valsartan-hydroCHLOROthiazide (DIOVAN-HCT) 160-25 MG per tablet TAKE 1 TABLET DAILY 90 tablet 3    blood glucose test strips (GLUCOCARD VITAL TEST) strip 1 each by In Vitro route daily Diagnosis E11.42 200 each 6    fluticasone (FLONASE) 50 MCG/ACT nasal spray USE TWO SPRAYS IN EACH NOSTRIL EVERY DAY 1 Bottle 11    tamsulosin (FLOMAX) 0.4 MG capsule TAKE 1 CAPSULE DAILY 90 capsule 3    oxybutynin (DITROPAN XL) 10 MG extended release tablet Take 1 tablet by mouth daily 30 tablet 3    Lancets MISC USE TO TEST BLOOD SUGAR EVERY  each 3    blood glucose monitor kit and supplies Check blood sugar daily. Diagnosis E11.42 1 kit 0    Foot Care Products (DIABETIC INSOLES) MISC 1 Units by Does not apply route daily Equinus contracture of ankle  (primary encounter diagnosis) DM type 2 with diabetic peripheral neuropathy (HCC) Dermatophytosis of nail Foot pain, bilateral 3 each 0    Diabetic Shoe MISC by Does not apply route Equinus contracture of ankle  (primary encounter diagnosis)    DM type 2 with diabetic peripheral neuropathy (HCC)    Dermatophytosis of nail    Foot pain, bilateral 1 each 0    omeprazole (PRILOSEC) 40 MG delayed release capsule TAKE 1 CAPSULE DAILY 90 capsule 4    aspirin 81 MG tablet Take 81 mg by mouth daily. No current facility-administered medications for this visit. Physical Exam:   Vitals: BP (!) 142/70 (Cuff Size: Large Adult)   Pulse 80   Ht 5' 8\" (1.727 m)   Wt 279 lb (126.6 kg)   BMI 42.42 kg/m²   General appearance: alert, oriented and cooperative with exam  HEENT: Head: Normocephalic, no lesions, without obvious abnormality.   Neck: no carotid bruit, no JVD  Lungs: clear to auscultation bilaterally without any wheezing or rales   Heart: Tachycardia, regular S1-S2, no murmur  Abdomen: soft, non-tender; bowel sounds normal; no masses,  no organomegaly  Extremities: extremities normal, atraumatic, no cyanosis or edema  Neurologic: Mental status: Alert, oriented, thought content appropriate    Labs:  CBC: No results for input(s): WBC, HGB, HCT, PLT in the last 72 hours. BMP: No results for input(s): NA, K, CO2, BUN, CREATININE, LABGLOM, GLUCOSE in the last 72 hours. PT/INR: No results for input(s): PROTIME, INR in the last 72 hours.   FASTING LIPID PANEL:  Lab Results   Component Value Date    HDL 46 01/09/2020    TRIG 104 01/09/2020       EKG: Normal Sinus Rhythm with no ischemic changes, PACs    Repeat ECG showed supraventricular tachycardia at the rate of 140 bpm.      Results for orders placed or performed during the hospital encounter of 08/04/20   EKG 12 lead   Result Value Ref Range    Ventricular Rate 67 BPM    Atrial Rate 67 BPM    P-R Interval 180 ms    QRS Duration 82 ms    Q-T Interval 404 ms    QTc Calculation (Bazett) 426 ms    P Axis -64 degrees    R Axis 9 degrees    T Axis 8 degrees    Narrative    Unusual P axis, possible ectopic atrial rhythm  Abnormal ECG       LAST ECHO:  Results for orders placed or performed during the hospital encounter of 08/04/20   ECHO Complete 2D W Doppler W Color   Result Value Ref Range    Left Ventricular Ejection Fraction 60     LVEF MODALITY ECHO     Narrative    Transthoracic Echocardiography Report (TTE)     Patient Name [facility]      Date of Study           08/05/2020                ROSCOE      Date of      1945  Gender                  Male   Birth      Age          67 year(s)  Race                    Black      Room Number  0214        Height:                 68 inch, 172.72 cm      Corporate ID [id number]    Weight:                 292 pounds, 132.5 kg   #      Patient Acct [de-identified]   BSA:        2.4 m^2     BMI:        44.4 kg/m^2   #      MR #         [id number]     Sonographer Phillip Escobar RDCS      Accession #  [id number]  Interpreting Physician  Starla Avila      Fellow                   Referring Nurse                            Practitioner      Interpreting             Referring Physician     KENNETH Simmons Cap*   Fellow     Additional Comments  Technically difficult study due to body habitus. Type of Study      TTE procedure:2D Echocardiogram, M-Mode, Doppler, Color Doppler. Procedure Date  Date: 08/05/2020 Start: 09:34 AM    Study Location: [facility]  Technical Quality: Adequate visualization    Indications: Follow-up SVT. History / Tech. Comments:  Procedure explained to patient. Patient Status: Inpatient    Height: 68 inches Weight: 292.01 pounds BSA: 2.4 m^2 BMI: 44.4 kg/m^2    CONCLUSIONS    Summary  Normal left ventricular diameter. Mild left ventricular hypertrophy. Left ventricular systolic function is normal.  Left ventricular ejection fraction 60 %. Right ventricular dilatation with normal systolic function. Aortic valve is mildly sclerotic. Mitral annular calcification. No pericardial effusion. Signature  ----------------------------------------------------------------------------   Electronically signed by Phillip Escobar RDCS(Sonographer) on 08/05/2020   11:40 AM  ----------------------------------------------------------------------------    ----------------------------------------------------------------------------   Electronically signed by Starla Avila(Interpreting physician) on   08/05/2020 12:56 PM  ----------------------------------------------------------------------------  FINDINGS  Left Atrium  Left atrium is normal in size. Left Ventricle  Normal left ventricular diameter. Mild left ventricular hypertrophy. Left ventricular systolic function is normal.  Left ventricular ejection fraction 60 %. No doppler evidence of diastolic dysfunction. Right Atrium  Right atrial dilatation.   Right Ventricle  Right ventricular dilatation with normal systolic function. Mitral Valve  Mitral annular calcification. Aortic Valve  Aortic valve is mildly sclerotic. Tricuspid Valve  Normal tricuspid valve structure and function. Pulmonic Valve  Pulmonic valve not well visualized but Doppler velocities are normal.  Pericardial Effusion  No significant pericardial effusion is seen. Miscellaneous  Aortic root is mildly dilated at 3.8 cm. IVC not well visualized. E/E' average = 8. M-mode / 2D Measurements & Calculations:      LVIDd:4.26 cm(3.7 - 5.6 cm)      Diastolic MPLKZC:413.8 ml   LVIDs:3.02 cm(2.2 - 4.0 cm)      Systolic PBUXGH:79.8 ml   IVSd:1.31 cm(0.6 - 1.1 cm)       Aortic Root:3.8 cm(2.0 - 3.7 cm)   LVPWd:1.23 cm(0.6 - 1.1 cm)      LA Dimension: 3.7 cm(1.9 - 4.0 cm)   Fractional Shortenin.11 %    LA volume/Index: 45.2 ml /19m^2   Calculated LVEF (%): 60.7 %      Mitral:                                Aortic      Peak E-Wave: 0.69 m/s                  Peak Velocity: 1.26 m/s   Peak A-Wave: 0.89 m/s                  Peak Gradient: 6.35 mmHg   E/A Ratio: 0.78   Peak Gradient: 1.93 mmHg   Deceleration Time: 275 msec                                             Pulmonic:                                             Peak Velocity: 0.91 m/s                                          Peak Gradient: 3.31 mmHg     Septal Wall E' velocity:0.09 m/s  Lateral Wall E' velocity:0.08 m/s           The CVD Risk score (D'Agostino, et al., 2008) failed to calculate for the following reasons: The 2008 CVD risk score is only valid for ages 27 to 76    Past Medical and Surgical History, Problem List, Allergies, Medications, Labs, Imaging, all reviewed extensively in EMR and with the patient. Assessment and Plan:  Supraventricular tachycardia, questionable atrial flutter, patient advised to go to ER for IV Cardizem. Called and discussed with the ER physician. Will need higher doses of Toprol-XL on discharge.   If these episodes are recurrent will refer him to electrophysiology for consideration of radiofrequency ablation. Follow-up in office in 3 weeks after hospitalization. Renata Benavides MD, [address] [facility]  [facility]. [facility]  [phone]

## 2020-08-20 NOTE — ED PROVIDER NOTES
[facility] ED  [address]  [place] [address]  Phone: [phone]  Ben      Pt Name: Mona Damico  MRN: [id number]  Steffaniegfconstance 1945  Date of evaluation: 8/20/2020    CHIEF COMPLAINT       Chief Complaint   Patient presents with    Tachycardia     per cards office pt was in SVT in office follow up apt today, pt brought over from office. Pt denies Chest pain, pt HR at this time 89-90       HISTORY OF PRESENT ILLNESS    Mona Damico is a 67 y.o. male who presents to the emergency department sent by cardiology for SVT in the office. He was admitted for UTI recently and had a run of SVT was placed on Toprol-XL. In the office today for follow-up was found to be in SVT. Denies chest pain or shortness of breath. On arrival to the emergency department was back in sinus rhythm in the 80s. REVIEW OF SYSTEMS       Constitutional: No fevers or chills   HEENT: No sore throat, rhinorrhea, or earache   Eyes: No blurry vision or double vision no drainage   Cardiovascular: No chest pain positive tachycardia   Respiratory: No wheezing or shortness of breath no cough   Gastrointestinal: No nausea, vomiting, diarrhea, constipation, or abdominal pain   : No hematuria or dysuria   Musculoskeletal: No swelling or pain   Skin: No rash   Neurological: No focal neurologic complaints, paresthesias, weakness, or headache     PAST MEDICAL HISTORY    has a past medical history of Anemia, DJD (degenerative joint disease) of lumbar spine, Dry eye syndrome, GERD (gastroesophageal reflux disease), Hepatic hemangioma, Hypertension, Keratitis, Nuclear sclerotic cataract of both eyes, Obesity, morbid ([address] Obstructive sleep apnea of adult, Onychomycosis, and Type II or unspecified type diabetes mellitus without mention of complication, not stated as uncontrolled. SURGICAL HISTORY      has a past surgical history that includes Colonoscopy (09/15/2006);  Upper gastrointestinal endoscopy (09/15/2006); cyst removal; and Umbilical hernia repair (2013). CURRENT MEDICATIONS       Previous Medications    ASPIRIN 81 MG TABLET    Take 81 mg by mouth daily. ATORVASTATIN (LIPITOR) 20 MG TABLET    TAKE 1 TABLET DAILY    BLOOD GLUCOSE MONITOR KIT AND SUPPLIES    Check blood sugar daily. Diagnosis E11.42    BLOOD GLUCOSE TEST STRIPS (GLUCOCARD VITAL TEST) STRIP    1 each by In Vitro route daily Diagnosis E11.42    DIABETIC SHOE MISC    by Does not apply route Equinus contracture of ankle  (primary encounter diagnosis)    DM type 2 with diabetic peripheral neuropathy (HCC)    Dermatophytosis of nail    Foot pain, bilateral    FAMOTIDINE (PEPCID) 20 MG TABLET    Take 1 tablet by mouth 2 times daily    FLUTICASONE (FLONASE) 50 MCG/ACT NASAL SPRAY    USE TWO SPRAYS IN EACH NOSTRIL EVERY DAY    FOOT CARE PRODUCTS (DIABETIC INSOLES) MISC    1 Units by Does not apply route daily Equinus contracture of ankle  (primary encounter diagnosis) DM type 2 with diabetic peripheral neuropathy (HCC) Dermatophytosis of nail Foot pain, bilateral    LANCETS MISC    USE TO TEST BLOOD SUGAR EVERY DAY    METFORMIN (GLUCOPHAGE-XR) 500 MG EXTENDED RELEASE TABLET    TAKE 1 TABLET TWICE A DAY    OMEPRAZOLE (PRILOSEC) 40 MG DELAYED RELEASE CAPSULE    TAKE 1 CAPSULE DAILY    OXYBUTYNIN (DITROPAN XL) 10 MG EXTENDED RELEASE TABLET    Take 1 tablet by mouth daily    TAMSULOSIN (FLOMAX) 0.4 MG CAPSULE    TAKE 1 CAPSULE DAILY    VALSARTAN-HYDROCHLOROTHIAZIDE (DIOVAN-HCT) 160-25 MG PER TABLET    TAKE 1 TABLET DAILY       ALLERGIES     has No Known Allergies. FAMILY HISTORY     He indicated that his mother is . He indicated that his father is . He indicated that one of his two sisters is . He indicated that two of his four brothers are alive. He indicated that the status of his neg hx is unknown.     family history includes Dementia in his sister; Diabetes in his sister;  Heart Attack in his father; High Blood 5 mg    metoprolol (LOPRESSOR) 100 MG tablet     Sig: Take 1 tablet by mouth 2 times daily     Dispense:  60 tablet     Refill:  0        Vitals:   -------------------------  BP (!) 152/80   Pulse 89   Temp 98.5 °F (36.9 °C) (Tympanic)   Resp 17   Ht 5' 8\" (1.727 m)   Wt 285 lb (129.3 kg)   SpO2 98%   BMI 43.33 kg/m²     12:40 PM discussed with Dr. Quynh Bhakta patient in sinus rhythm in the 80s denies chest pain or shortness of breath. He would like 1 dose of IV Lopressor does not require troponins. Will check CBC and BMP and he would like him discharged on 100 mg twice daily Lopressor instead of the Toprol-XL that he is on.    1:40 PM labs unremarkable. Heart rate remaining in the 70s and 80s. Elevated blood sugar 231 he is on metoprolol. We will start a prescription for Lopressor and advised to stop the Toprol-XL. Follow-up with cardiology return if worsening symptoms or other concerns. I have reviewed the disposition diagnosis with the patient and or their family/guardian. I have answered their questions and given discharge instructions. They voiced understanding of these instructions and did not have any furtherquestions or complaints. CRITICAL CARE:    None    CONSULTS:    None    PROCEDURES:    None      OARRS Report if indicated             FINAL IMPRESSION      1.  Paroxysmal supraventricular tachycardia [facility])          DISPOSITION/PLAN   DISPOSITION Decision To Discharge 08/20/2020 01:38:11 PM        CONDITION ON DISPOSITION: STABLE       PATIENT REFERRED TO:  Cardiologist    Schedule an appointment as soon as possible for a visit in 1 day        DISCHARGE MEDICATIONS:  New Prescriptions    METOPROLOL (LOPRESSOR) 100 MG TABLET    Take 1 tablet by mouth 2 times daily       (Please note that portions of thisnote were completed with a voice recognition program.  Efforts were made to edit the dictations but occasionally words are mis-transcribed.)    [place],, DO  Attending Emergency Physician       Sudheer Chandra DO  08/20/20 [id number]

## 2020-08-21 ENCOUNTER — OFFICE VISIT (OUTPATIENT)
Dept: PODIATRY | Age: 75
End: 2020-08-21
Payer: MEDICARE

## 2020-08-21 VITALS
BODY MASS INDEX: 43.3 KG/M2 | SYSTOLIC BLOOD PRESSURE: 128 MMHG | RESPIRATION RATE: 24 BRPM | DIASTOLIC BLOOD PRESSURE: 72 MMHG | HEART RATE: 80 BPM | WEIGHT: 284.8 LBS

## 2020-08-21 PROCEDURE — 11721 DEBRIDE NAIL 6 OR MORE: CPT | Performed by: PODIATRIST

## 2020-08-21 PROCEDURE — 99999 PR OFFICE/OUTPT VISIT,PROCEDURE ONLY: CPT | Performed by: PODIATRIST

## 2020-08-21 NOTE — PROGRESS NOTES
Foot Care Worksheet  PCP: Nupur Freire, APRN - CNP  Last visit: 07 / 20 / 2020    Nail description:  Thick , Yellow , Crumbly , Marked limitation of ambulation     Pain resulting from thickened and dystrophy of nail plate No    Nails involved  Right   1, 2, 3, 4, 5  (T5-T9)  Left     1, 2, 3, 4, 5  (TA-T4)    Q7 1 Class A Finding - Non traumatic amputation of foot No    Q8 2 Class B Findings - Absent DP pulse No, Absent PT pulse No, Advanced tropic changes (3 required) Yes    Decrease hair growth Yes, Nail changes/thickening Yes, Pigmented changes/discoloration Yes, Skin texture (thin, shiny) Yes, Skin color (rubor/redness) No    Q9 1 Class B and 2 Class C Findings  Claudication No, Temperature change No, Paresthesia No, Burning Yes, Edema Yes

## 2020-08-21 NOTE — PROGRESS NOTES
Subjective:  Patient presents to [facility] today for routine diabetic foot care. Patient denies any new problems with their feet. Patient's diabetic control has been not changed. No Known Allergies    Past Medical History:   Diagnosis Date    Anemia     chronic, negative work up with EGD and Colonoscopy.  DJD (degenerative joint disease) of lumbar spine     Dry eye syndrome     GERD (gastroesophageal reflux disease)     Hepatic hemangioma     Hypertension     Keratitis     Secondary to dry eye syndrome.  Nuclear sclerotic cataract of both eyes     Obesity, morbid ([address]     Obstructive sleep apnea of adult     non compliant with cpap    Onychomycosis     1, 2, 3, 4, and 5, bilateral.    Type II or unspecified type diabetes mellitus without mention of complication, not stated as uncontrolled        Prior to Admission medications    Medication Sig Start Date End Date Taking?  Authorizing Provider   metoprolol (LOPRESSOR) 100 MG tablet Take 1 tablet by mouth 2 times daily 8/20/20  Yes Gerson Bowling,    famotidine (PEPCID) 20 MG tablet Take 1 tablet by mouth 2 times daily 6/5/20  Yes Raydell Phlegm, APRN - CNP   metFORMIN (GLUCOPHAGE-XR) 500 MG extended release tablet TAKE 1 TABLET TWICE A DAY 5/14/20  Yes Raydell Phlegm, APRN - CNP   atorvastatin (LIPITOR) 20 MG tablet TAKE 1 TABLET DAILY 4/20/20  Yes Raydell Phlegm, APRN - CNP   valsartan-hydroCHLOROthiazide (DIOVAN-HCT) 160-25 MG per tablet TAKE 1 TABLET DAILY 4/20/20  Yes Raydell Phlegm, APRN - CNP   blood glucose test strips (GLUCOCARD VITAL TEST) strip 1 each by In Vitro route daily Diagnosis E11.42 3/30/20  Yes Raydell Phlegm, APRN - CNP   fluticasone (FLONASE) 50 MCG/ACT nasal spray USE TWO SPRAYS IN EACH NOSTRIL EVERY DAY 3/30/20  Yes Raydell Phlegm, APRN - CNP   tamsulosin (FLOMAX) 0.4 MG capsule TAKE 1 CAPSULE DAILY 3/23/20  Yes Raydell Phlegm, APRN - CNP   oxybutynin (DITROPAN XL) 10 MG extended release tablet Take 1 tablet by mouth daily 2/3/20  Yes Ambreen Pappas MD   Lancets MISC USE TO TEST BLOOD SUGAR EVERY DAY 20  Yes ROCÍO Fontenot CNP   blood glucose monitor kit and supplies Check blood sugar daily. Diagnosis E11.42 20  Yes ROCÍO Fontenot CNP   Foot Care Products (DIABETIC INSOLES) MISC 1 Units by Does not apply route daily Equinus contracture of ankle  (primary encounter diagnosis) DM type 2 with diabetic peripheral neuropathy ([facility] [address] Dermatophytosis of nail Foot pain, bilateral 10/29/19  Yes Gloria Escobar DPM   Diabetic Shoe MISC by Does not apply route Equinus contracture of ankle  (primary encounter diagnosis)    DM type 2 with diabetic peripheral neuropathy ([facility]    Dermatophytosis of nail    Foot pain, bilateral 10/29/19  Yes Shalonda Chopra DPM   omeprazole (PRILOSEC) 40 MG delayed release capsule TAKE 1 CAPSULE DAILY 10/28/19  Yes ROCÍO Fontenot CNP   aspirin 81 MG tablet Take 81 mg by mouth daily. Yes Historical Provider, MD       Social History     Tobacco Use    Smoking status: Former Smoker     Years: 15.00     Types: Cigars     Last attempt to quit: 1970     Years since quittin.7    Smokeless tobacco: Never Used    Tobacco comment: quit over 30 years ago   Substance Use Topics    Alcohol use: No     Alcohol/week: 0.0 standard drinks     Comment: no alcohol for many years     ROS: All 14 ROS systems reviewed and pertinent positives noted above, all others negative. Objective:  Vascular: DP and PT pulses palpable 2/4, bilateral.  CFT <3 seconds, bilateral.  Hair growth present to the level of the digits, bilateral.  Edema absent, bilateral.  Varicosities absent, bilateral. Erythema absent, bilateral. Distal Rubor absent bilateral.  Temperature within normal limits bilateral. Hyperpigmentation absent bilateral. No atrophic skin.     Neurological: Sensation intact to light touch to level of digits, bilateral.  Protective sensation intact 10/10 sites via 5.07/10g [place] Monofilament, bilateral.  negative Tinel's, bilateral.  negative Valleix sign, bilateral.  Vibratory intact bilateral.  Reflexes Decreased bilateral.  Paresthesias negative. Dysthesias negative. Sharp/dull intact bilateral.    Musculoskeletal: Muscle strength 5/5, bilateral.  Pain absent upon palpation bilateral. Normal medial longitudinal arch, bilateral.  Ankle ROM within normal limits,bilateral.  1st MPJ ROM within normal limits, bilateral.  Dorsally contracted digits present. No other foot deformities. Integument:  Open lesion absent, Bilateral.  Interdigital maceration absent to web spaces,absent Bilateral.  Nails left 1, 2, 3, 4, 5 and right 1, 2, 3, 4, 5 thickened, dystrophic and crumbly, discolored with subungual debris. Fissures absent, Bilateral. Hyperkeratotic tissue is absent. Assessment:    Diagnosis Orders   1. DM type 2 with diabetic peripheral neuropathy ([address]     2. Dermatophytosis of nail         Plan:  Diabetic foot education and exam.  Nails as mentioned above debrided in length and thickness. Patient advised about OTC treatments for nails and callous. Patient will follow up in 10 weeks for routine foot care or PRN if any further problems arise.

## 2020-09-03 PROBLEM — N39.0 UTI (URINARY TRACT INFECTION): Status: RESOLVED | Noted: 2020-08-04 | Resolved: 2020-09-03

## 2020-09-24 ENCOUNTER — OFFICE VISIT (OUTPATIENT)
Dept: CARDIOLOGY | Age: 75
End: 2020-09-24
Payer: MEDICARE

## 2020-09-24 VITALS
DIASTOLIC BLOOD PRESSURE: 68 MMHG | TEMPERATURE: 96.6 F | HEART RATE: 66 BPM | HEIGHT: 68 IN | BODY MASS INDEX: 43.65 KG/M2 | SYSTOLIC BLOOD PRESSURE: 124 MMHG | WEIGHT: 288 LBS

## 2020-09-24 PROCEDURE — G8427 DOCREV CUR MEDS BY ELIG CLIN: HCPCS | Performed by: INTERNAL MEDICINE

## 2020-09-24 PROCEDURE — 93010 ELECTROCARDIOGRAM REPORT: CPT | Performed by: INTERNAL MEDICINE

## 2020-09-24 PROCEDURE — 93005 ELECTROCARDIOGRAM TRACING: CPT | Performed by: INTERNAL MEDICINE

## 2020-09-24 PROCEDURE — G8417 CALC BMI ABV UP PARAM F/U: HCPCS | Performed by: INTERNAL MEDICINE

## 2020-09-24 PROCEDURE — 4040F PNEUMOC VAC/ADMIN/RCVD: CPT | Performed by: INTERNAL MEDICINE

## 2020-09-24 PROCEDURE — 1123F ACP DISCUSS/DSCN MKR DOCD: CPT | Performed by: INTERNAL MEDICINE

## 2020-09-24 PROCEDURE — 1036F TOBACCO NON-USER: CPT | Performed by: INTERNAL MEDICINE

## 2020-09-24 PROCEDURE — 99214 OFFICE O/P EST MOD 30 MIN: CPT | Performed by: INTERNAL MEDICINE

## 2020-09-24 RX ORDER — METOPROLOL TARTRATE 100 MG/1
100 TABLET ORAL 2 TIMES DAILY
Qty: 60 TABLET | Refills: 3 | Status: SHIPPED | OUTPATIENT
Start: 2020-09-24 | End: 2020-09-24 | Stop reason: SDUPTHER

## 2020-09-24 RX ORDER — METOPROLOL TARTRATE 100 MG/1
100 TABLET ORAL 2 TIMES DAILY
Qty: 180 TABLET | Refills: 3 | Status: SHIPPED | OUTPATIENT
Start: 2020-09-24 | End: 2021-09-09

## 2020-09-24 NOTE — PROGRESS NOTES
Cardiology Consultation/Follow Up. [facility]    CC: Patient is here for follow up    HPI  Mely Vazquez is here for posthospitalization follow-up. Feeling much better and denies any further episodes of palpitation. He is maintaining normal sinus rhythm. Denies any chest pain or pressure. No dyspnea on exertion. Past Medical:  Past Medical History:   Diagnosis Date    Anemia     chronic, negative work up with EGD and Colonoscopy.  DJD (degenerative joint disease) of lumbar spine     Dry eye syndrome     GERD (gastroesophageal reflux disease)     Hepatic hemangioma     Hypertension     Keratitis     Secondary to dry eye syndrome.  Nuclear sclerotic cataract of both eyes     Obesity, morbid ([address]     Obstructive sleep apnea of adult     non compliant with cpap    Onychomycosis     1, 2, 3, 4, and 5, bilateral.    Type II or unspecified type diabetes mellitus without mention of complication, not stated as uncontrolled        Past Surgical:  Past Surgical History:   Procedure Laterality Date    COLONOSCOPY  09/15/2006    Diffuse diverticulosis slightly greater in the sigmoid colon and grade 1 internal hemorrhoids.  CYST REMOVAL      right side of  scalp    UMBILICAL HERNIA REPAIR      UPPER GASTROINTESTINAL ENDOSCOPY  09/15/2006    Mild to moderate diffuse gastritis, mild to moderate diffuse duodenitis. Family History:  Family History   Problem Relation Age of Onset    Ovarian Cancer Mother     High Blood Pressure Sister     Diabetes Sister          at 76    High Blood Pressure Brother         all 4 brothers have it    Stroke Brother     Heart Attack Father         Acute myocardial infarction.     High Blood Pressure Brother     Stroke Brother     Dementia Sister          at 76    Cataracts Neg Hx     Glaucoma Neg Hx        Social History:  Social History     Tobacco Use    Smoking status: Former Smoker     Years: 15.00     Types: Cigars Last attempt to quit: 1970     Years since quittin.8    Smokeless tobacco: Never Used    Tobacco comment: quit over 30 years ago   Substance Use Topics    Alcohol use: No     Alcohol/week: 0.0 standard drinks     Comment: no alcohol for many years    Drug use: No        REVIEW OF SYSTEMS:    · Constitutional: there has been no unanticipated weight loss. There's been No change in energy level, No change in activity level. · Eyes: No visual changes or diplopia. No scleral icterus. · ENT: No Headaches, hearing loss or vertigo. No mouth sores or sore throat. · Cardiovascular: As described in HPI. · Respiratory: AS HPI  · Gastrointestinal: No abdominal pain, appetite loss, blood in stools. No change in bowel or bladder habits. · Genitourinary: No dysuria, trouble voiding, or hematuria. · Musculoskeletal:  No gait disturbance, No weakness or joint complaints. · Integumentary: No rash or pruritis. · Neurological: No headache, diplopia, change in muscle strength, numbness or tingling  · Psychiatric: No new anxiety or depression. · Endocrine: No temperature intolerance. No excessive thirst, fluid intake, or urination. No tremor. · Hematologic/Lymphatic: No abnormal bruising or bleeding, blood clots or swollen lymph nodes. · Allergic/Immunologic: No nasal congestion or hives.     Medications:  Current Outpatient Medications   Medication Sig Dispense Refill    metoprolol (LOPRESSOR) 100 MG tablet Take 1 tablet by mouth 2 times daily 180 tablet 3    famotidine (PEPCID) 20 MG tablet Take 1 tablet by mouth 2 times daily 180 tablet 1    metFORMIN (GLUCOPHAGE-XR) 500 MG extended release tablet TAKE 1 TABLET TWICE A  tablet 1    atorvastatin (LIPITOR) 20 MG tablet TAKE 1 TABLET DAILY 90 tablet 3    valsartan-hydroCHLOROthiazide (DIOVAN-HCT) 160-25 MG per tablet TAKE 1 TABLET DAILY 90 tablet 3    blood glucose test strips (GLUCOCARD VITAL TEST) strip 1 each by In Vitro route daily Diagnosis E11.42 200 each 6    fluticasone (FLONASE) 50 MCG/ACT nasal spray USE TWO SPRAYS IN EACH NOSTRIL EVERY DAY 1 Bottle 11    tamsulosin (FLOMAX) 0.4 MG capsule TAKE 1 CAPSULE DAILY 90 capsule 3    oxybutynin (DITROPAN XL) 10 MG extended release tablet Take 1 tablet by mouth daily 30 tablet 3    Lancets MISC USE TO TEST BLOOD SUGAR EVERY  each 3    blood glucose monitor kit and supplies Check blood sugar daily. Diagnosis E11.42 1 kit 0    Foot Care Products (DIABETIC INSOLES) MISC 1 Units by Does not apply route daily Equinus contracture of ankle  (primary encounter diagnosis) DM type 2 with diabetic peripheral neuropathy (HCC) Dermatophytosis of nail Foot pain, bilateral 3 each 0    Diabetic Shoe MISC by Does not apply route Equinus contracture of ankle  (primary encounter diagnosis)    DM type 2 with diabetic peripheral neuropathy (HCC)    Dermatophytosis of nail    Foot pain, bilateral 1 each 0    omeprazole (PRILOSEC) 40 MG delayed release capsule TAKE 1 CAPSULE DAILY 90 capsule 4    aspirin 81 MG tablet Take 81 mg by mouth daily. No current facility-administered medications for this visit. Physical Exam:   Vitals: /68   Pulse 66   Temp 96.6 °F (35.9 °C)   Ht 5' 8\" (1.727 m)   Wt 288 lb (130.6 kg)   BMI 43.79 kg/m²   General appearance: alert, oriented and cooperative with exam  HEENT: Head: Normocephalic, no lesions, without obvious abnormality. Neck: no carotid bruit, no JVD  Lungs: clear to auscultation bilaterally without any wheezing or rales   Heart:  regular S1-S2, no murmur  Abdomen: soft, non-tender; bowel sounds normal; no masses,  no organomegaly  Extremities: extremities normal, atraumatic, no cyanosis or edema  Neurologic: Mental status: Alert, oriented, thought content appropriate    Labs:  CBC: No results for input(s): WBC, HGB, HCT, PLT in the last 72 hours.   BMP: No results for input(s): NA, K, CO2, BUN, CREATININE, LABGLOM, GLUCOSE in the last 72 hours.  PT/INR: No results for input(s): PROTIME, INR in the last 72 hours. FASTING LIPID PANEL:  Lab Results   Component Value Date    HDL 46 01/09/2020    TRIG 104 01/09/2020       EKG 9/24/20220:   NSR, normal ECG      Results for orders placed or performed during the hospital encounter of 08/04/20   EKG 12 lead   Result Value Ref Range    Ventricular Rate 67 BPM    Atrial Rate 67 BPM    P-R Interval 180 ms    QRS Duration 82 ms    Q-T Interval 404 ms    QTc Calculation (Bazett) 426 ms    P Axis -64 degrees    R Axis 9 degrees    T Axis 8 degrees    Narrative    Unusual P axis, possible ectopic atrial rhythm  Abnormal ECG       LAST ECHO:  Results for orders placed or performed during the hospital encounter of 08/04/20   ECHO Complete 2D W Doppler W Color   Result Value Ref Range    Left Ventricular Ejection Fraction 60     LVEF MODALITY ECHO     Narrative    Transthoracic Echocardiography Report (TTE)     Patient Name [facility]      Date of Study           08/05/2020                ROSCOE      Date of      1945  Gender                  Male   Birth      Age          67 year(s)  Race                    Black      Room Number  0214        Height:                 68 inch, 172.72 cm      Corporate ID [id number]    Weight:                 292 pounds, 132.5 kg   #      Patient Acct [de-identified]   BSA:        2.4 m^2     BMI:        44.4 kg/m^2   #      MR #         [id number]     [facility]      Accession #  [id number]  Interpreting Physician  Starla Avila      Fellow                   Referring Nurse                            Practitioner      Interpreting             Referring Physician     KENNETH Celestin*   Fellow     Additional Comments  Technically difficult study due to body habitus. Type of Study      TTE procedure:2D Echocardiogram, M-Mode, Doppler, Color Doppler.      Procedure Date  Date: 08/05/2020 Start: 09:34 AM    Study Location: [facility]  Technical Quality: Adequate visualization    Indications: Follow-up SVT. History / Tech. Comments:  Procedure explained to patient. Patient Status: Inpatient    Height: 68 inches Weight: 292.01 pounds BSA: 2.4 m^2 BMI: 44.4 kg/m^2    CONCLUSIONS    Summary  Normal left ventricular diameter. Mild left ventricular hypertrophy. Left ventricular systolic function is normal.  Left ventricular ejection fraction 60 %. Right ventricular dilatation with normal systolic function. Aortic valve is mildly sclerotic. Mitral annular calcification. No pericardial effusion. Signature  ----------------------------------------------------------------------------   Electronically signed by Aung Wilkes RDCS(Sonographer) on 08/05/2020   11:40 AM  ----------------------------------------------------------------------------    ----------------------------------------------------------------------------   Electronically signed by Starla Avila(Interpreting physician) on   08/05/2020 12:56 PM  ----------------------------------------------------------------------------  FINDINGS  Left Atrium  Left atrium is normal in size. Left Ventricle  Normal left ventricular diameter. Mild left ventricular hypertrophy. Left ventricular systolic function is normal.  Left ventricular ejection fraction 60 %. No doppler evidence of diastolic dysfunction. Right Atrium  Right atrial dilatation. Right Ventricle  Right ventricular dilatation with normal systolic function. Mitral Valve  Mitral annular calcification. Aortic Valve  Aortic valve is mildly sclerotic. Tricuspid Valve  Normal tricuspid valve structure and function. Pulmonic Valve  Pulmonic valve not well visualized but Doppler velocities are normal.  Pericardial Effusion  No significant pericardial effusion is seen. Miscellaneous  Aortic root is mildly dilated at 3.8 cm. IVC not well visualized. E/E' average = 8.     M-mode / 2D Measurements & Calculations:      LVIDd:4.26 cm(3.7 - 5.6 cm)      Diastolic VYQPMP:538.8 ml   LVIDs:3.02 cm(2.2 - 4.0 cm)      Systolic WYNNUL:25.9 ml   IVSd:1.31 cm(0.6 - 1.1 cm)       Aortic Root:3.8 cm(2.0 - 3.7 cm)   LVPWd:1.23 cm(0.6 - 1.1 cm)      LA Dimension: 3.7 cm(1.9 - 4.0 cm)   Fractional Shortenin.11 %    LA volume/Index: 45.2 ml /19m^2   Calculated LVEF (%): 60.7 %      Mitral:                                Aortic      Peak E-Wave: 0.69 m/s                  Peak Velocity: 1.26 m/s   Peak A-Wave: 0.89 m/s                  Peak Gradient: 6.35 mmHg   E/A Ratio: 0.78   Peak Gradient: 1.93 mmHg   Deceleration Time: 275 msec                                             Pulmonic:                                             Peak Velocity: 0.91 m/s                                          Peak Gradient: 3.31 mmHg     Septal Wall E' velocity:0.09 m/s  Lateral Wall E' velocity:0.08 m/s           Assessment:  · Supraventricular tachycardia, paroxysmal:   · Hypertension   · Diabetes   · Obesity   · RABIA  · GERD         Plan:   Continue Lopressor 100 mg twice daily  Continue valsartan/hydrochlorothiazide  Aspirin 81 mg daily  Patient counseled regarding importance of medication compliance  Also counseled regarding heart healthy diet and regular exercise for weight loss      Follow-up in office in 6 months or earlier if needed. Teresa Lane MD, [address] [facility]  [facility]  [phone]

## 2020-11-10 RX ORDER — METFORMIN HYDROCHLORIDE 500 MG/1
TABLET, EXTENDED RELEASE ORAL
Qty: 180 TABLET | Refills: 3 | Status: SHIPPED | OUTPATIENT
Start: 2020-11-10 | End: 2021-01-11 | Stop reason: SDUPTHER

## 2020-11-10 NOTE — TELEPHONE ENCOUNTER
Requested Prescriptions     Pending Prescriptions Disp Refills    metFORMIN (GLUCOPHAGE-XR) 500 MG extended release tablet [Pharmacy Med Name: METFORMIN HCL ER TABS 500MG] 180 tablet 3     Sig: TAKE 1 TABLET TWICE A DAY       Last Appt:  7/10/2020  Next Appt:   1/11/2021  Med verified in [address] [place] [address] Rd

## 2020-12-01 ENCOUNTER — TELEPHONE (OUTPATIENT)
Dept: FAMILY MEDICINE CLINIC | Age: 75
End: 2020-12-01

## 2020-12-01 NOTE — TELEPHONE ENCOUNTER
This writer called patient and patient states that he had 2 eggs and 2 pieces of whole wheat toast but his blood sugar had already some down to the 170's. Patient states that he was feeling much better. Patient was advised that the carbs in the toast could have caused an increase in his blood sugar. Patient was advised to watch his diet and keep a log of his blood sugars and if his levels did not come down he would need to come in and see Tiffany Armando so she could adjust his medication. Patient agreed with plan and will let us know how his blood sugars keep running.

## 2020-12-01 NOTE — TELEPHONE ENCOUNTER
Pt calling stating his BS this AM before his meds and before eating was 167, then he ate and took meds and his readings were 273 and 300, please advise at above number.

## 2020-12-09 RX ORDER — FAMOTIDINE 20 MG/1
TABLET, FILM COATED ORAL
Qty: 180 TABLET | Refills: 3 | Status: SHIPPED | OUTPATIENT
Start: 2020-12-09 | End: 2021-08-16

## 2020-12-09 NOTE — TELEPHONE ENCOUNTER
Darwin Currie called requesting a refill of the below medication which has been pended for you:     Requested Prescriptions     Pending Prescriptions Disp Refills    famotidine (PEPCID) 20 MG tablet [Pharmacy Med Name: FAMOTIDINE TABS 20MG] 180 tablet 3     Sig: TAKE 1 TABLET TWICE A DAY       Last Appointment Date: 7/10/2020  Next Appointment Date: 1/11/2021    No Known Allergies

## 2020-12-15 ENCOUNTER — OFFICE VISIT (OUTPATIENT)
Dept: PODIATRY | Age: 75
End: 2020-12-15
Payer: MEDICARE

## 2020-12-15 ENCOUNTER — IMMUNIZATION (OUTPATIENT)
Dept: LAB | Age: 75
End: 2020-12-15
Payer: MEDICARE

## 2020-12-15 VITALS
RESPIRATION RATE: 20 BRPM | WEIGHT: 288.4 LBS | HEART RATE: 80 BPM | SYSTOLIC BLOOD PRESSURE: 134 MMHG | DIASTOLIC BLOOD PRESSURE: 80 MMHG | BODY MASS INDEX: 43.85 KG/M2

## 2020-12-15 PROCEDURE — 11721 DEBRIDE NAIL 6 OR MORE: CPT | Performed by: PODIATRIST

## 2020-12-15 PROCEDURE — 99999 PR OFFICE/OUTPT VISIT,PROCEDURE ONLY: CPT | Performed by: PODIATRIST

## 2020-12-15 PROCEDURE — 90694 VACC AIIV4 NO PRSRV 0.5ML IM: CPT

## 2020-12-15 NOTE — PROGRESS NOTES
Subjective:  Patient presents to [facility] today for routine diabetic foot care. Patient denies any new problems with their feet. Patient's diabetic control has been not changed. No Known Allergies    Past Medical History:   Diagnosis Date    Anemia     chronic, negative work up with EGD and Colonoscopy.  DJD (degenerative joint disease) of lumbar spine     Dry eye syndrome     GERD (gastroesophageal reflux disease)     Hepatic hemangioma     Hypertension     Keratitis     Secondary to dry eye syndrome.  Nuclear sclerotic cataract of both eyes     Obesity, morbid ([address]     Obstructive sleep apnea of adult     non compliant with cpap    Onychomycosis     1, 2, 3, 4, and 5, bilateral.    Type II or unspecified type diabetes mellitus without mention of complication, not stated as uncontrolled        Prior to Admission medications    Medication Sig Start Date End Date Taking?  Authorizing Provider   famotidine (PEPCID) 20 MG tablet TAKE 1 TABLET TWICE A DAY 12/9/20  Yes ROCÍO Cota CNP   metFORMIN (GLUCOPHAGE-XR) 500 MG extended release tablet TAKE 1 TABLET TWICE A DAY 11/10/20  Yes ROCÍO Cota CNP   metoprolol (LOPRESSOR) 100 MG tablet Take 1 tablet by mouth 2 times daily 9/24/20  Yes Berny Juarez MD   atorvastatin (LIPITOR) 20 MG tablet TAKE 1 TABLET DAILY 4/20/20  Yes ROCÍO Cota CNP   valsartan-hydroCHLOROthiazide (DIOVAN-HCT) 160-25 MG per tablet TAKE 1 TABLET DAILY 4/20/20  Yes ROCÍO Cota CNP   blood glucose test strips (GLUCOCARD VITAL TEST) strip 1 each by In Vitro route daily Diagnosis E11.42 3/30/20  Yes ROCÍO Cota CNP   fluticasone (FLONASE) 50 MCG/ACT nasal spray USE TWO SPRAYS IN EACH NOSTRIL EVERY DAY 3/30/20  Yes ROCÍO Cota CNP   tamsulosin (FLOMAX) 0.4 MG capsule TAKE 1 CAPSULE DAILY 3/23/20  Yes ROCÍO Cota CNP oxybutynin (DITROPAN XL) 10 MG extended release tablet Take 1 tablet by mouth daily 2/3/20  Yes Tisha Wise MD   Lancets MISC USE TO TEST BLOOD SUGAR EVERY DAY 20  Yes ROCÍO Otto CNP   blood glucose monitor kit and supplies Check blood sugar daily. Diagnosis E11.42 20  Yes ROCÍO Otto CNP   Foot Care Products (DIABETIC INSOLES) MISC 1 Units by Does not apply route daily Equinus contracture of ankle  (primary encounter diagnosis) DM type 2 with diabetic peripheral neuropathy ([facility] [address] Dermatophytosis of nail Foot pain, bilateral 10/29/19  Yes Celsa Vargas DPM   Diabetic Shoe MISC by Does not apply route Equinus contracture of ankle  (primary encounter diagnosis)    DM type 2 with diabetic peripheral neuropathy ([facility]    Dermatophytosis of nail    Foot pain, bilateral 10/29/19  Yes Jorge L Chopra DPM   omeprazole (PRILOSEC) 40 MG delayed release capsule TAKE 1 CAPSULE DAILY 10/28/19  Yes ROCÍO Otto CNP   aspirin 81 MG tablet Take 81 mg by mouth daily. Yes Historical Provider, MD       Social History     Tobacco Use    Smoking status: Former Smoker     Years: 15.00     Types: Cigars     Quit date: 1970     Years since quittin.0    Smokeless tobacco: Never Used    Tobacco comment: quit over 30 years ago   Substance Use Topics    Alcohol use: No     Alcohol/week: 0.0 standard drinks     Comment: no alcohol for many years     ROS: All 14 ROS systems reviewed and pertinent positives noted above, all others negative. Objective:  Vascular: DP and PT pulses palpable 2/4, bilateral.  CFT <3 seconds, bilateral.  Hair growth present to the level of the digits, bilateral.  Edema absent, bilateral.  Varicosities absent, bilateral. Erythema absent, bilateral. Distal Rubor absent bilateral.  Temperature within normal limits bilateral. Hyperpigmentation absent bilateral. No atrophic skin.     Neurological: Sensation intact to light touch to level of digits, bilateral.  Protective sensation intact 10/10 sites via 5.07/10g [place] Monofilament, bilateral.  negative Tinel's, bilateral.  negative Valleix sign, bilateral.  Vibratory intact bilateral.  Reflexes Decreased bilateral.  Paresthesias negative. Dysthesias negative. Sharp/dull intact bilateral.    Musculoskeletal: Muscle strength 5/5, bilateral.  Pain absent upon palpation bilateral. Normal medial longitudinal arch, bilateral.  Ankle ROM within normal limits,bilateral.  1st MPJ ROM within normal limits, bilateral.  Dorsally contracted digits present. No other foot deformities. Integument:  Open lesion absent, Bilateral.  Interdigital maceration absent to web spaces,absent Bilateral.  Nails left 1, 2, 3, 4, 5 and right 1, 2, 3, 4, 5 thickened, dystrophic and crumbly, discolored with subungual debris. Fissures absent, Bilateral. Hyperkeratotic tissue is absent. Assessment:    Diagnosis Orders   1. DM type 2 with diabetic peripheral neuropathy ([facility] [address]     2. Dermatophytosis of nail         Plan:  Diabetic foot education and exam.  Nails as mentioned above debrided in length and thickness. Patient advised about OTC treatments for nails and callous. Patient will follow up in 10 weeks for routine foot care or PRN if any further problems arise.

## 2020-12-15 NOTE — PROGRESS NOTES
Foot Care Worksheet  PCP: ROCÍO Ramon - CNP  Last visit: 08 / 10 / 2020    Nail description:  Thick , Yellow , Crumbly , Marked limitation of ambulation     Pain resulting from thickened and dystrophy of nail plate No    Nails involved  Right   1, 2, 3, 4, 5  (T5-T9)  Left     1, 2, 3, 4, 5  (TA-T4)    Q7 1 Class A Finding - Non traumatic amputation of foot No    Q8 2 Class B Findings - Absent DP pulse No, Absent PT pulse No, Advanced tropic changes (3 required) Yes    Decrease hair growth Yes, Nail changes/thickening Yes, Pigmented changes/discoloration Yes, Skin texture (thin, shiny) Yes, Skin color (rubor/redness) No    Q9 1 Class B and 2 Class C Findings  Claudication No, Temperature change No, Paresthesia No, Burning Yes, Edema Yes

## 2021-01-11 DIAGNOSIS — I10 ESSENTIAL HYPERTENSION: ICD-10-CM

## 2021-01-11 DIAGNOSIS — E78.2 MIXED HYPERLIPIDEMIA: ICD-10-CM

## 2021-01-11 DIAGNOSIS — E11.42 DM TYPE 2 WITH DIABETIC PERIPHERAL NEUROPATHY (HCC): ICD-10-CM

## 2021-01-11 RX ORDER — VALSARTAN AND HYDROCHLOROTHIAZIDE 160; 25 MG/1; MG/1
TABLET ORAL
Qty: 30 TABLET | Refills: 1 | Status: SHIPPED | OUTPATIENT
Start: 2021-01-11 | End: 2021-01-12

## 2021-01-11 RX ORDER — ATORVASTATIN CALCIUM 20 MG/1
TABLET, FILM COATED ORAL
Qty: 30 TABLET | Refills: 1 | Status: SHIPPED | OUTPATIENT
Start: 2021-01-11 | End: 2021-01-12

## 2021-01-11 RX ORDER — METFORMIN HYDROCHLORIDE 500 MG/1
TABLET, EXTENDED RELEASE ORAL
Qty: 60 TABLET | Refills: 1 | Status: SHIPPED | OUTPATIENT
Start: 2021-01-11 | End: 2021-01-18

## 2021-01-11 NOTE — TELEPHONE ENCOUNTER
[facility]. Pt has OV 1-25-21 with [facility].     Please call pt with any questions

## 2021-01-12 RX ORDER — VALSARTAN AND HYDROCHLOROTHIAZIDE 160; 25 MG/1; MG/1
TABLET ORAL
Qty: 90 TABLET | Refills: 0 | Status: SHIPPED | OUTPATIENT
Start: 2021-01-12 | End: 2021-04-09

## 2021-01-12 RX ORDER — ATORVASTATIN CALCIUM 20 MG/1
TABLET, FILM COATED ORAL
Qty: 90 TABLET | Refills: 0 | Status: SHIPPED | OUTPATIENT
Start: 2021-01-12 | End: 2021-04-09

## 2021-01-12 NOTE — TELEPHONE ENCOUNTER
Last Appt:  8/10/2020 (transitional with [facility])  Next Appt:   1/25/2021 (6mo)    Med verified in [facility]

## 2021-01-16 DIAGNOSIS — E11.42 DM TYPE 2 WITH DIABETIC PERIPHERAL NEUROPATHY (HCC): ICD-10-CM

## 2021-01-18 RX ORDER — METFORMIN HYDROCHLORIDE 500 MG/1
TABLET, EXTENDED RELEASE ORAL
Qty: 180 TABLET | Refills: 1 | Status: SHIPPED | OUTPATIENT
Start: 2021-01-18 | End: 2021-07-19

## 2021-01-25 ENCOUNTER — HOSPITAL ENCOUNTER (OUTPATIENT)
Dept: LAB | Age: 76
Discharge: HOME | End: 2021-01-25
Payer: MEDICARE

## 2021-01-25 ENCOUNTER — IMMUNIZATION (OUTPATIENT)
Dept: LAB | Age: 76
End: 2021-01-25
Payer: MEDICARE

## 2021-01-25 ENCOUNTER — OFFICE VISIT (OUTPATIENT)
Dept: FAMILY MEDICINE CLINIC | Age: 76
End: 2021-01-25
Payer: MEDICARE

## 2021-01-25 ENCOUNTER — HOSPITAL ENCOUNTER (OUTPATIENT)
Dept: GENERAL RADIOLOGY | Age: 76
Discharge: HOME | End: 2021-01-27
Payer: MEDICARE

## 2021-01-25 VITALS
DIASTOLIC BLOOD PRESSURE: 78 MMHG | OXYGEN SATURATION: 98 % | WEIGHT: 289 LBS | HEART RATE: 66 BPM | SYSTOLIC BLOOD PRESSURE: 118 MMHG | BODY MASS INDEX: 43.8 KG/M2 | HEIGHT: 68 IN

## 2021-01-25 DIAGNOSIS — E11.42 DM TYPE 2 WITH DIABETIC PERIPHERAL NEUROPATHY (HCC): Primary | ICD-10-CM

## 2021-01-25 DIAGNOSIS — R97.20 ELEVATED PSA: ICD-10-CM

## 2021-01-25 DIAGNOSIS — K21.9 GASTROESOPHAGEAL REFLUX DISEASE WITHOUT ESOPHAGITIS: ICD-10-CM

## 2021-01-25 DIAGNOSIS — E78.2 MIXED HYPERLIPIDEMIA: ICD-10-CM

## 2021-01-25 DIAGNOSIS — I10 ESSENTIAL HYPERTENSION: ICD-10-CM

## 2021-01-25 DIAGNOSIS — E66.01 OBESITY, MORBID (HCC): ICD-10-CM

## 2021-01-25 DIAGNOSIS — M25.561 RIGHT KNEE PAIN, UNSPECIFIED CHRONICITY: ICD-10-CM

## 2021-01-25 DIAGNOSIS — E11.42 DM TYPE 2 WITH DIABETIC PERIPHERAL NEUROPATHY (HCC): ICD-10-CM

## 2021-01-25 LAB
ALBUMIN SERPL-MCNC: 3.5 G/DL (ref 3.5–5.2)
ALBUMIN/GLOBULIN RATIO: 0.9 (ref 1–2.5)
ALP BLD-CCNC: 184 U/L (ref 40–129)
ALT SERPL-CCNC: 21 U/L (ref 5–41)
ANION GAP SERPL CALCULATED.3IONS-SCNC: 8 MMOL/L (ref 9–17)
AST SERPL-CCNC: 13 U/L
BILIRUB SERPL-MCNC: 0.28 MG/DL (ref 0.3–1.2)
BUN BLDV-MCNC: 16 MG/DL (ref 8–23)
BUN/CREAT BLD: 15 (ref 9–20)
CALCIUM SERPL-MCNC: 9.4 MG/DL (ref 8.6–10.4)
CHLORIDE BLD-SCNC: 102 MMOL/L (ref 98–107)
CHOLESTEROL/HDL RATIO: 2
CHOLESTEROL: 90 MG/DL
CO2: 25 MMOL/L (ref 20–31)
CREAT SERPL-MCNC: 1.06 MG/DL (ref 0.7–1.2)
ESTIMATED AVERAGE GLUCOSE: 157 MG/DL
GFR AFRICAN AMERICAN: >60 ML/MIN
GFR NON-AFRICAN AMERICAN: >60 ML/MIN
GFR SERPL CREATININE-BSD FRML MDRD: ABNORMAL ML/MIN/{1.73_M2}
GFR SERPL CREATININE-BSD FRML MDRD: ABNORMAL ML/MIN/{1.73_M2}
GLUCOSE BLD-MCNC: 286 MG/DL (ref 70–99)
HBA1C MFR BLD: 7.1 % (ref 4–6)
HDLC SERPL-MCNC: 45 MG/DL
LDL CHOLESTEROL: 23 MG/DL (ref 0–130)
POTASSIUM SERPL-SCNC: 4.2 MMOL/L (ref 3.7–5.3)
PROSTATE SPECIFIC ANTIGEN: 4.33 UG/L
SODIUM BLD-SCNC: 135 MMOL/L (ref 135–144)
TOTAL PROTEIN: 7.6 G/DL (ref 6.4–8.3)
TRIGL SERPL-MCNC: 109 MG/DL
VLDLC SERPL CALC-MCNC: NORMAL MG/DL (ref 1–30)

## 2021-01-25 PROCEDURE — 4040F PNEUMOC VAC/ADMIN/RCVD: CPT | Performed by: NURSE PRACTITIONER

## 2021-01-25 PROCEDURE — 99211 OFF/OP EST MAY X REQ PHY/QHP: CPT

## 2021-01-25 PROCEDURE — 80053 COMPREHEN METABOLIC PANEL: CPT

## 2021-01-25 PROCEDURE — 84153 ASSAY OF PSA TOTAL: CPT

## 2021-01-25 PROCEDURE — 1123F ACP DISCUSS/DSCN MKR DOCD: CPT | Performed by: NURSE PRACTITIONER

## 2021-01-25 PROCEDURE — 3051F HG A1C>EQUAL 7.0%<8.0%: CPT | Performed by: NURSE PRACTITIONER

## 2021-01-25 PROCEDURE — G8427 DOCREV CUR MEDS BY ELIG CLIN: HCPCS | Performed by: NURSE PRACTITIONER

## 2021-01-25 PROCEDURE — 80061 LIPID PANEL: CPT

## 2021-01-25 PROCEDURE — G8484 FLU IMMUNIZE NO ADMIN: HCPCS | Performed by: NURSE PRACTITIONER

## 2021-01-25 PROCEDURE — 36415 COLL VENOUS BLD VENIPUNCTURE: CPT

## 2021-01-25 PROCEDURE — 91301 COVID-19, MODERNA VACCINE 100MCG/0.5ML DOSE: CPT

## 2021-01-25 PROCEDURE — G8417 CALC BMI ABV UP PARAM F/U: HCPCS | Performed by: NURSE PRACTITIONER

## 2021-01-25 PROCEDURE — 83036 HEMOGLOBIN GLYCOSYLATED A1C: CPT

## 2021-01-25 PROCEDURE — 99214 OFFICE O/P EST MOD 30 MIN: CPT | Performed by: NURSE PRACTITIONER

## 2021-01-25 PROCEDURE — 73562 X-RAY EXAM OF KNEE 3: CPT

## 2021-01-25 PROCEDURE — 1036F TOBACCO NON-USER: CPT | Performed by: NURSE PRACTITIONER

## 2021-01-25 ASSESSMENT — PATIENT HEALTH QUESTIONNAIRE - PHQ9
1. LITTLE INTEREST OR PLEASURE IN DOING THINGS: 1
SUM OF ALL RESPONSES TO PHQ QUESTIONS 1-9: 2
SUM OF ALL RESPONSES TO PHQ9 QUESTIONS 1 & 2: 2
SUM OF ALL RESPONSES TO PHQ QUESTIONS 1-9: 2
SUM OF ALL RESPONSES TO PHQ QUESTIONS 1-9: 2

## 2021-01-25 NOTE — PROGRESS NOTES
TANA [address]  [address] [facility], [id number]  [phone]      HPI:     Diabetes  He presents for his follow-up diabetic visit. He has type 2 diabetes mellitus. Disease course: due for labs. There are no hypoglycemic associated symptoms. Pertinent negatives for hypoglycemia include no headaches. Pertinent negatives for diabetes include no blurred vision, no chest pain, no fatigue, no foot paresthesias, no polydipsia, no polyuria and no weight loss. There are no hypoglycemic complications. Diabetic complications include peripheral neuropathy. Risk factors for coronary artery disease include diabetes mellitus, dyslipidemia, male sex, obesity, hypertension and sedentary lifestyle. Current diabetic treatment includes oral agent (monotherapy). He is compliant with treatment most of the time. His weight is stable. He is following a generally unhealthy diet. Meal planning includes avoidance of concentrated sweets. He has had a previous visit with a dietitian. He rarely participates in exercise. Home blood sugar record trend: does not check blood sugar. An ACE inhibitor/angiotensin II receptor blocker is being taken. He does not see a podiatrist.Eye exam is current. Hypertension  This is a chronic problem. The current episode started more than 1 year ago. The problem is unchanged. The problem is controlled. Pertinent negatives include no anxiety, blurred vision, chest pain, headaches, palpitations, peripheral edema or shortness of breath. There are no associated agents to hypertension. Risk factors for coronary artery disease include diabetes mellitus, dyslipidemia, male gender, obesity and sedentary lifestyle. Past treatments include ACE inhibitors, diuretics and beta blockers. The current treatment provides significant improvement. Compliance problems include diet and exercise.     Hyperlipidemia This is a chronic problem. The current episode started more than 1 year ago. Condition status: due for labs. Exacerbating diseases include diabetes and obesity. Factors aggravating his hyperlipidemia include beta blockers, fatty foods and thiazides. Pertinent negatives include no chest pain, leg pain, myalgias or shortness of breath. Current antihyperlipidemic treatment includes statins. Compliance problems include adherence to diet and adherence to exercise. Risk factors for coronary artery disease include diabetes mellitus, dyslipidemia, hypertension, male sex, obesity and a sedentary lifestyle. Gastroesophageal Reflux  He reports no abdominal pain, no chest pain, no coughing, no heartburn, no nausea, no sore throat or no wheezing. This is a chronic problem. The current episode started more than 1 year ago. The problem occurs rarely. The problem has been unchanged. The symptoms are aggravated by certain foods. Pertinent negatives include no fatigue, orthopnea or weight loss. Risk factors include lack of exercise and obesity. He has tried a PPI and a histamine-2 antagonist for the symptoms. The treatment provided significant relief. Knee Pain   The incident occurred more than 1 week ago. The incident occurred at home. There was no injury mechanism. The pain is present in the right knee. The quality of the pain is described as aching. The pain is at a severity of 4/10. The pain is moderate. The pain has been fluctuating since onset. Pertinent negatives include no loss of motion, numbness or tingling. He reports no foreign bodies present. The symptoms are aggravated by movement, palpation and weight bearing. He has tried rest and acetaminophen for the symptoms. The treatment provided mild relief.        Current Outpatient Medications   Medication Sig Dispense Refill    metFORMIN (GLUCOPHAGE-XR) 500 MG extended release tablet TAKE 1 TABLET BY MOUTH TWICE DAILY 180 tablet 1  valsartan-hydroCHLOROthiazide (DIOVAN-HCT) 160-25 MG per tablet TAKE 1 TABLET BY MOUTH DAILY 90 tablet 0    atorvastatin (LIPITOR) 20 MG tablet TAKE 1 TABLET BY MOUTH DAILY 90 tablet 0    famotidine (PEPCID) 20 MG tablet TAKE 1 TABLET TWICE A  tablet 3    metoprolol (LOPRESSOR) 100 MG tablet Take 1 tablet by mouth 2 times daily 180 tablet 3    blood glucose test strips (GLUCOCARD VITAL TEST) strip 1 each by In Vitro route daily Diagnosis E11.42 200 each 6    fluticasone (FLONASE) 50 MCG/ACT nasal spray USE TWO SPRAYS IN EACH NOSTRIL EVERY DAY 1 Bottle 11    tamsulosin (FLOMAX) 0.4 MG capsule TAKE 1 CAPSULE DAILY 90 capsule 3    oxybutynin (DITROPAN XL) 10 MG extended release tablet Take 1 tablet by mouth daily 30 tablet 3    Lancets MISC USE TO TEST BLOOD SUGAR EVERY  each 3    blood glucose monitor kit and supplies Check blood sugar daily. Diagnosis E11.42 1 kit 0    Foot Care Products (DIABETIC INSOLES) MISC 1 Units by Does not apply route daily Equinus contracture of ankle  (primary encounter diagnosis) DM type 2 with diabetic peripheral neuropathy (HCC) Dermatophytosis of nail Foot pain, bilateral 3 each 0    Diabetic Shoe MISC by Does not apply route Equinus contracture of ankle  (primary encounter diagnosis)    DM type 2 with diabetic peripheral neuropathy (HCC)    Dermatophytosis of nail    Foot pain, bilateral 1 each 0    omeprazole (PRILOSEC) 40 MG delayed release capsule TAKE 1 CAPSULE DAILY 90 capsule 4    aspirin 81 MG tablet Take 81 mg by mouth daily. No current facility-administered medications for this visit. No Known Allergies    All patients pastmedical, surgical, social and family history has been reviewed. Subjective:      Review of Systems   Constitutional: Negative for activity change, appetite change, fatigue, fever and weight loss. HENT: Negative for sore throat. Eyes: Negative for blurred vision. Respiratory: Negative for cough, shortness of breath and wheezing. Cardiovascular: Negative for chest pain, palpitations and leg swelling. Gastrointestinal: Negative for abdominal pain, heartburn and nausea. Endocrine: Negative for polydipsia and polyuria. Musculoskeletal: Negative for myalgias. Right knee pain   Neurological: Negative for tingling, numbness and headaches. Objective:      Physical Exam  Vitals signs and nursing note reviewed. Constitutional:       Appearance: Normal appearance. He is obese. HENT:      Head: Normocephalic and atraumatic. Cardiovascular:      Rate and Rhythm: Normal rate and regular rhythm. Heart sounds: Normal heart sounds. Pulmonary:      Effort: Pulmonary effort is normal.      Breath sounds: Normal breath sounds. Musculoskeletal:      Right knee: He exhibits normal range of motion and no swelling. Tenderness found. Medial joint line tenderness noted. Skin:     General: Skin is warm. Capillary Refill: Capillary refill takes less than 2 seconds. Neurological:      General: No focal deficit present. Mental Status: He is alert and oriented to person, place, and time. Assessment:       Diagnosis Orders   1. DM type 2 with diabetic peripheral neuropathy (HCC)  Hemoglobin A1C    Comprehensive Metabolic Panel   2. Obesity, morbid ([address]     3. Essential hypertension     4. Mixed hyperlipidemia  Lipid Panel   5. Gastroesophageal reflux disease without esophagitis     6. Right knee pain, unspecified chronicity  XR KNEE RIGHT (3 VIEWS)       Plan:      1. Diabetes Type 2-due for labs  Continue current medications at this time    2. Obesity-work on diet and exercise    3. HTN-well controlled continue current medication    4. Hyperlipidemia-due for labs  Continue current medication     5. GERD-stable continue current medications    6.  Right knee pain -xray of the right knee will base further treatment on xray results Pt to return in 6 months for follow up sooner if needed  No follow-ups on file. Orders Placed This Encounter   Procedures    XR KNEE RIGHT (3 VIEWS)     Standing Status:   Future     Standing Expiration Date:   1/25/2022     Order Specific Question:   Reason for exam:     Answer:   right knee pain    Hemoglobin A1C     Standing Status:   Future     Standing Expiration Date:   1/25/2022    Comprehensive Metabolic Panel     Please fast for 12 - 14 hours prior to blood draw. May take medication with a sip of water. Standing Status:   Future     Standing Expiration Date:   1/25/2022    Lipid Panel     Standing Status:   Future     Standing Expiration Date:   1/25/2022     Order Specific Question:   Is Patient Fasting?/# of Hours     Answer:   12     No orders of the defined types were placed in this encounter. Patient given educational materials - see patient instructions. All patient questionsanswered. Pt voiced understanding. Reviewed health maintenance.      Electronically signed by Wilfrid Silva CNP on 1/25/2021 at 3:21 PM

## 2021-01-31 ASSESSMENT — ENCOUNTER SYMPTOMS
WHEEZING: 0
COUGH: 0
HEARTBURN: 0
SHORTNESS OF BREATH: 0
NAUSEA: 0
ABDOMINAL PAIN: 0
BLURRED VISION: 0
SORE THROAT: 0

## 2021-02-22 ENCOUNTER — IMMUNIZATION (OUTPATIENT)
Dept: LAB | Age: 76
End: 2021-02-22
Payer: MEDICARE

## 2021-02-22 PROCEDURE — 91301 COVID-19, MODERNA VACCINE 100MCG/0.5ML DOSE: CPT

## 2021-03-08 ENCOUNTER — OFFICE VISIT (OUTPATIENT)
Dept: UROLOGY | Age: 76
End: 2021-03-08
Payer: MEDICARE

## 2021-03-08 VITALS
BODY MASS INDEX: 42.74 KG/M2 | SYSTOLIC BLOOD PRESSURE: 124 MMHG | WEIGHT: 282 LBS | DIASTOLIC BLOOD PRESSURE: 68 MMHG | HEIGHT: 68 IN

## 2021-03-08 DIAGNOSIS — N40.0 ENLARGED PROSTATE: ICD-10-CM

## 2021-03-08 DIAGNOSIS — N40.1 BENIGN LOCALIZED PROSTATIC HYPERPLASIA WITH LOWER URINARY TRACT SYMPTOMS (LUTS): ICD-10-CM

## 2021-03-08 DIAGNOSIS — R97.20 ELEVATED PSA: Primary | ICD-10-CM

## 2021-03-08 PROCEDURE — 99214 OFFICE O/P EST MOD 30 MIN: CPT

## 2021-03-08 PROCEDURE — G8417 CALC BMI ABV UP PARAM F/U: HCPCS | Performed by: UROLOGY

## 2021-03-08 PROCEDURE — 1036F TOBACCO NON-USER: CPT | Performed by: UROLOGY

## 2021-03-08 PROCEDURE — 4040F PNEUMOC VAC/ADMIN/RCVD: CPT | Performed by: UROLOGY

## 2021-03-08 PROCEDURE — 1123F ACP DISCUSS/DSCN MKR DOCD: CPT | Performed by: UROLOGY

## 2021-03-08 PROCEDURE — 99214 OFFICE O/P EST MOD 30 MIN: CPT | Performed by: UROLOGY

## 2021-03-08 PROCEDURE — G8427 DOCREV CUR MEDS BY ELIG CLIN: HCPCS | Performed by: UROLOGY

## 2021-03-08 PROCEDURE — G8484 FLU IMMUNIZE NO ADMIN: HCPCS | Performed by: UROLOGY

## 2021-03-08 RX ORDER — TAMSULOSIN HYDROCHLORIDE 0.4 MG/1
CAPSULE ORAL
Qty: 90 CAPSULE | Refills: 3 | Status: SHIPPED | OUTPATIENT
Start: 2021-03-08 | End: 2021-03-22

## 2021-03-08 RX ORDER — OXYBUTYNIN CHLORIDE 10 MG/1
10 TABLET, EXTENDED RELEASE ORAL DAILY
Qty: 30 TABLET | Refills: 3 | Status: SHIPPED | OUTPATIENT
Start: 2021-03-08 | End: 2021-05-10

## 2021-03-08 NOTE — PROGRESS NOTES
OMAR ALI Aurea Favre, MD        [facility] [address]  Dept: [phone]  Dept Fax: [phone]  Loc: [phone]      [facility] Urology Office Note - Follow up Patient    Patient:  Myriam Hodge  YOB: 1945  Date: 3/8/2021    The patient is a 67 y.o. male who presents today for evaluation of the following problems:   Chief Complaint   Patient presents with    Elevated PSA     6 month with PSA    referred/consultation requested by ROCÍO Zamudio CNP. HISTORY OF PRESENT ILLNESS:     Elevated PSA  Stable. Here with PSA  No family hx of prostate cancer  Here with biopsy results--- negative        Secondary Diagnosis:    Urinary frequency- hx of diabetes. on flomax and ditropan- helpful. At treatment goal        Requested/reviewed records from ROCÍO Zamudio [id number]  office and/or outside physician/[facility]    (Patient's old records have been requested, reviewed and pertinent findings summarized in today's note.)    Procedures Today: N/A      Last several PSA's:  Lab Results   Component Value Date    PSA 4.33 (H) 01/25/2021    PSA 4.46 (H) 02/03/2020    PSA 4.20 (H) 01/09/2020       Last total testosterone:  No results found for: TESTOSTERONE    Urinalysis today:  No results found for this visit on 03/08/21. Last BUN and creatinine:  Lab Results   Component Value Date    BUN 16 01/25/2021     Lab Results   Component Value Date    CREATININE 1.06 01/25/2021       Additional Lab/Culture results: none    Imaging Reviewed during this Office Visit:   Ousmane Maloney MD independently reviewed the images and verified the radiology reports from:    No results found. PAST MEDICAL, FAMILY AND SOCIAL HISTORY:  Past Medical History:   Diagnosis Date    Anemia     chronic, negative work up with EGD and Colonoscopy.     DJD (degenerative joint disease) of lumbar spine     Dry eye syndrome     GERD (gastroesophageal reflux disease)     Hepatic hemangioma     Hypertension     Keratitis     Secondary to dry eye syndrome.  Nuclear sclerotic cataract of both eyes     Obesity, morbid ([address]     Obstructive sleep apnea of adult     non compliant with cpap    Onychomycosis     1, 2, 3, 4, and 5, bilateral.    Type II or unspecified type diabetes mellitus without mention of complication, not stated as uncontrolled      Past Surgical History:   Procedure Laterality Date    COLONOSCOPY  09/15/2006    Diffuse diverticulosis slightly greater in the sigmoid colon and grade 1 internal hemorrhoids.  CYST REMOVAL      right side of  scalp    UMBILICAL HERNIA REPAIR      UPPER GASTROINTESTINAL ENDOSCOPY  09/15/2006    Mild to moderate diffuse gastritis, mild to moderate diffuse duodenitis. Family History   Problem Relation Age of Onset    Ovarian Cancer Mother     High Blood Pressure Sister     Diabetes Sister          at 76    High Blood Pressure Brother         all 4 brothers have it    Stroke Brother     Heart Attack Father         Acute myocardial infarction.     High Blood Pressure Brother     Stroke Brother     Dementia Sister          at 76    Cataracts Neg Hx     Glaucoma Neg Hx      Outpatient Medications Marked as Taking for the 3/8/21 encounter (Office Visit) with Cira Jones MD   Medication Sig Dispense Refill    metFORMIN (GLUCOPHAGE-XR) 500 MG extended release tablet TAKE 1 TABLET BY MOUTH TWICE DAILY 180 tablet 1    valsartan-hydroCHLOROthiazide (DIOVAN-HCT) 160-25 MG per tablet TAKE 1 TABLET BY MOUTH DAILY 90 tablet 0    atorvastatin (LIPITOR) 20 MG tablet TAKE 1 TABLET BY MOUTH DAILY 90 tablet 0    famotidine (PEPCID) 20 MG tablet TAKE 1 TABLET TWICE A  tablet 3    metoprolol (LOPRESSOR) 100 MG tablet Take 1 tablet by mouth 2 times daily 180 tablet 3    blood glucose test strips (GLUCOCARD VITAL TEST) strip 1 each by In Vitro route daily Diagnosis E11.42 200 each 6    fluticasone (FLONASE) 50 MCG/ACT nasal spray USE TWO SPRAYS IN EACH NOSTRIL EVERY DAY 1 Bottle 11    tamsulosin (FLOMAX) 0.4 MG capsule TAKE 1 CAPSULE DAILY 90 capsule 3    oxybutynin (DITROPAN XL) 10 MG extended release tablet Take 1 tablet by mouth daily 30 tablet 3    Lancets MISC USE TO TEST BLOOD SUGAR EVERY  each 3    blood glucose monitor kit and supplies Check blood sugar daily. Diagnosis E11.42 1 kit 0    Foot Care Products (DIABETIC INSOLES) MISC 1 Units by Does not apply route daily Equinus contracture of ankle  (primary encounter diagnosis) DM type 2 with diabetic peripheral neuropathy (HCC) Dermatophytosis of nail Foot pain, bilateral 3 each 0    Diabetic Shoe MISC by Does not apply route Equinus contracture of ankle  (primary encounter diagnosis)    DM type 2 with diabetic peripheral neuropathy (HCC)    Dermatophytosis of nail    Foot pain, bilateral 1 each 0    omeprazole (PRILOSEC) 40 MG delayed release capsule TAKE 1 CAPSULE DAILY 90 capsule 4    aspirin 81 MG tablet Take 81 mg by mouth daily. Patient has no known allergies.   Social History     Tobacco Use   Smoking Status Former Smoker    Years: 15.00    Types: Cigars    Quit date: 1970    Years since quittin.3   Smokeless Tobacco Never Used   Tobacco Comment    quit over 30 years ago      (If patient a smoker, smoking cessation counseling offered)   Social History     Substance and Sexual Activity   Alcohol Use No    Alcohol/week: 0.0 standard drinks    Comment: no alcohol for many years       REVIEW OF SYSTEMS:  Constitutional: negative  Eyes: negative  Respiratory: negative  Cardiovascular: chest pain  Gastrointestinal: negative  Genitourinary: see HPI  Musculoskeletal: negative  Skin: negative   Neurological: negative  Hematological/Lymphatic: negative  Psychological: negative      Physical Exam:    This a 67 y.o. male  Vitals:    21 1011   BP: 124/68     Body mass index is 42.88 kg/m². Constitutional: Patient in no acute distress;           Assessment and Plan        1. Elevated PSA    2. Benign localized prostatic hyperplasia with lower urinary tract symptoms (LUTS)               Plan:      Ditropan and flomax helpful. Continue these (refilled today). Less incontinence. Chronic problem. At treatment goal. +diabetic. Follow up in one year with PSA prior          Prescriptions Ordered:  No orders of the defined types were placed in this encounter. Orders Placed:  No orders of the defined types were placed in this encounter.            Alber Harley MD

## 2021-03-22 ENCOUNTER — OFFICE VISIT (OUTPATIENT)
Dept: OPTOMETRY | Age: 76
End: 2021-03-22
Payer: MEDICARE

## 2021-03-22 DIAGNOSIS — H52.203 ASTIGMATISM OF BOTH EYES WITH PRESBYOPIA: Primary | ICD-10-CM

## 2021-03-22 DIAGNOSIS — N40.0 ENLARGED PROSTATE: ICD-10-CM

## 2021-03-22 DIAGNOSIS — H52.4 ASTIGMATISM OF BOTH EYES WITH PRESBYOPIA: Primary | ICD-10-CM

## 2021-03-22 DIAGNOSIS — E11.9 NON-INSULIN DEPENDENT TYPE 2 DIABETES MELLITUS (HCC): ICD-10-CM

## 2021-03-22 PROCEDURE — 92250 FUNDUS PHOTOGRAPHY W/I&R: CPT | Performed by: OPTOMETRIST

## 2021-03-22 PROCEDURE — 92014 COMPRE OPH EXAM EST PT 1/>: CPT | Performed by: OPTOMETRIST

## 2021-03-22 RX ORDER — PHENYLEPHRINE HCL 2.5 %
1 DROPS OPHTHALMIC (EYE) ONCE
Status: COMPLETED | OUTPATIENT
Start: 2021-03-22 | End: 2021-03-22

## 2021-03-22 RX ORDER — TAMSULOSIN HYDROCHLORIDE 0.4 MG/1
CAPSULE ORAL
Qty: 90 CAPSULE | Refills: 1 | Status: SHIPPED | OUTPATIENT
Start: 2021-03-22 | End: 2022-02-08 | Stop reason: SDUPTHER

## 2021-03-22 RX ORDER — TROPICAMIDE 10 MG/ML
1 SOLUTION/ DROPS OPHTHALMIC ONCE
Status: COMPLETED | OUTPATIENT
Start: 2021-03-22 | End: 2021-03-22

## 2021-03-22 RX ADMIN — TROPICAMIDE 1 DROP: 10 SOLUTION/ DROPS OPHTHALMIC at 14:42

## 2021-03-22 RX ADMIN — Medication 1 DROP: at 14:41

## 2021-03-22 ASSESSMENT — REFRACTION_WEARINGRX
OD_CYLINDER: -0.75
OS_ADD: +2.25
OD_AXIS: 095
OD_ADD: +2.25
OS_CYLINDER: -1.00
OS_SPHERE: +0.75
OD_SPHERE: +1.00
OS_AXIS: 080

## 2021-03-22 ASSESSMENT — TONOMETRY
OD_IOP_MMHG: 22
IOP_METHOD: NON-CONTACT AIR PUFF
OS_IOP_MMHG: 23

## 2021-03-22 ASSESSMENT — VISUAL ACUITY
OS_SC+: -2
OS_SC: 20/25
OD_SC+: -1
METHOD: SNELLEN - LINEAR
OD_SC: 20/25

## 2021-03-22 ASSESSMENT — SLIT LAMP EXAM - LIDS
COMMENTS: NORMAL
COMMENTS: NORMAL

## 2021-03-22 ASSESSMENT — ENCOUNTER SYMPTOMS
EYES NEGATIVE: 0
ALLERGIC/IMMUNOLOGIC NEGATIVE: 0
RESPIRATORY NEGATIVE: 0
GASTROINTESTINAL NEGATIVE: 0

## 2021-03-22 NOTE — PROGRESS NOTES
Christian Cordero presents today for   Chief Complaint   Patient presents with    Ophth Diabetic Exam    Vision Exam   .    HPI     Last Vision Exam: 7/31/2019 Aw  Last Ophthalmology Exam: n/a  Last Filled Glasses Rx: 7/31/2019  Insurance: [employer]  Update: Dm Exam ONLY  Feels vision is ok; does not wish to do glasses at this time. Diabetic:  Sugars:    HmgA1C: 7.1  1/25/2021           Current Outpatient Medications   Medication Sig Dispense Refill    tamsulosin (FLOMAX) 0.4 MG capsule TAKE 1 CAPSULE DAILY 90 capsule 1    oxybutynin (DITROPAN XL) 10 MG extended release tablet Take 1 tablet by mouth daily 30 tablet 3    metFORMIN (GLUCOPHAGE-XR) 500 MG extended release tablet TAKE 1 TABLET BY MOUTH TWICE DAILY 180 tablet 1    valsartan-hydroCHLOROthiazide (DIOVAN-HCT) 160-25 MG per tablet TAKE 1 TABLET BY MOUTH DAILY 90 tablet 0    atorvastatin (LIPITOR) 20 MG tablet TAKE 1 TABLET BY MOUTH DAILY 90 tablet 0    famotidine (PEPCID) 20 MG tablet TAKE 1 TABLET TWICE A  tablet 3    metoprolol (LOPRESSOR) 100 MG tablet Take 1 tablet by mouth 2 times daily 180 tablet 3    blood glucose test strips (GLUCOCARD VITAL TEST) strip 1 each by In Vitro route daily Diagnosis E11.42 200 each 6    fluticasone (FLONASE) 50 MCG/ACT nasal spray USE TWO SPRAYS IN EACH NOSTRIL EVERY DAY 1 Bottle 11    Lancets MISC USE TO TEST BLOOD SUGAR EVERY  each 3    blood glucose monitor kit and supplies Check blood sugar daily.   Diagnosis E11.42 1 kit 0    Foot Care Products (DIABETIC INSOLES) MISC 1 Units by Does not apply route daily Equinus contracture of ankle  (primary encounter diagnosis) DM type 2 with diabetic peripheral neuropathy (HCC) Dermatophytosis of nail Foot pain, bilateral 3 each 0    Diabetic Shoe MISC by Does not apply route Equinus contracture of ankle  (primary encounter diagnosis)    DM type 2 with diabetic peripheral neuropathy (HCC)    Dermatophytosis of nail    Foot pain, bilateral 1 each 0    omeprazole (PRILOSEC) 40 MG delayed release capsule TAKE 1 CAPSULE DAILY 90 capsule 4    aspirin 81 MG tablet Take 81 mg by mouth daily. No current facility-administered medications for this visit. ROS     Positive for: Endocrine    Negative for: Constitutional, Gastrointestinal, Neurological, Skin, Genitourinary, Musculoskeletal, HENT, Cardiovascular, Eyes, Respiratory, Psychiatric, Allergic/Imm, Heme/Lymph          Family History   Problem Relation Age of Onset    Ovarian Cancer Mother     High Blood Pressure Sister     Diabetes Sister          at 76    High Blood Pressure Brother         all 4 brothers have it    Stroke Brother     Heart Attack Father         Acute myocardial infarction.     High Blood Pressure Brother     Stroke Brother     Dementia Sister          at 76    Cataracts Neg Hx     Glaucoma Neg Hx      Social History     Socioeconomic History    Marital status: Single     Spouse name: None    Number of children: None    Years of education: None    Highest education level: None   Occupational History    None   Social Needs    Financial resource strain: None    Food insecurity     Worry: None     Inability: None    Transportation needs     Medical: None     Non-medical: None   Tobacco Use    Smoking status: Former Smoker     Years: 15.00     Types: Cigars     Quit date: 1970     Years since quittin.3    Smokeless tobacco: Never Used    Tobacco comment: quit over 30 years ago   Substance and Sexual Activity    Alcohol use: No     Alcohol/week: 0.0 standard drinks     Comment: no alcohol for many years    Drug use: No    Sexual activity: None   Lifestyle    Physical activity     Days per week: None     Minutes per session: None    Stress: None   Relationships    Social connections     Talks on phone: None     Gets together: None     Attends Yazdanism service: None     Active member of club or organization: None     Attends meetings of clubs or organizations: None     Relationship status: None    Intimate partner violence     Fear of current or ex partner: None     Emotionally abused: None     Physically abused: None     Forced sexual activity: None   Other Topics Concern    None   Social History Narrative    None       Past Medical History:   Diagnosis Date    Anemia     chronic, negative work up with EGD and Colonoscopy.  DJD (degenerative joint disease) of lumbar spine     Dry eye syndrome     GERD (gastroesophageal reflux disease)     Hepatic hemangioma     Hypertension     Keratitis     Secondary to dry eye syndrome.     Nuclear sclerotic cataract of both eyes     Obesity, morbid ([address]     Obstructive sleep apnea of adult     non compliant with cpap    Onychomycosis     1, 2, 3, 4, and 5, bilateral.    Type II or unspecified type diabetes mellitus without mention of complication, not stated as uncontrolled          Main Ophthalmology Exam     External Exam       Right Left    External Normal Normal          Slit Lamp Exam       Right Left    Lids/Lashes Normal Normal    Conjunctiva/Sclera White and quiet White and quiet    Cornea Clear Clear    Anterior Chamber Deep and quiet Deep and quiet    Iris Round and reactive Round and reactive    Lens 2+ Nuclear sclerosis 2+ Nuclear sclerosis    Vitreous Normal Normal          Fundus Exam       Right Left    Disc Normal Normal    C/D Ratio 0.35 0.35    Macula Normal Normal    Vessels Normal Normal    Periphery Normal Normal                  Tonometry     Tonometry (Non-contact air puff, 2:43 PM)       Right Left    Pressure 22 23   IOP.0             18.5  CH:  6.9          6.2  WS: 8.0          2.4                   Visual Acuity (Snellen - Linear)       Right Left    Dist sc 20/25 -1 20/25 -2         Not recorded         Not recorded         Not recorded         Not recorded          Ophthalmology Exam     Wearing Rx       Sphere Cylinder Axis Add    Right +1.00 -0.75 095 +2.25    Left +0.75 -1.00 080 +2.25    Age: 1yrs    Type: fill only if deisred                 Manifest Refraction     Manifest Refraction #2 (Auto)       Sphere Cylinder Axis    Right +2.00 -2.00 098    Left +1.75 -2.00 087               Final Rx       Sphere Cylinder Axis Add    Right +1.00 -1.00 096 +2.25    Left +0.75 -1.00 082 +2.25    Type: fill only if deisred           Neuro/Psych     Neuro/Psych     Oriented x3: Yes    Mood/Affect: Normal                IMPRESSION:  1. Astigmatism of both eyes with presbyopia    2. Non-insulin dependent type 2 diabetes mellitus ([facility] [address]        PLAN:    1. Glasses only if desired  2.  Monitor with yearly dilated exams  optos documented today shows no diabetic retinopathy       Glycemic control as per PCP   Patient Instructions   Glasses if desired     Keep yearly appointments because of diabetes        Return in about 1 year (around 3/22/2022) for complete eye exam.

## 2021-03-22 NOTE — TELEPHONE ENCOUNTER
Bridget Wilosn called requesting a refill of the below medication which has been pended for you:     Requested Prescriptions     Pending Prescriptions Disp Refills    tamsulosin (FLOMAX) 0.4 MG capsule [Pharmacy Med Name: TAMSULOSIN HCL CAPS 0.4MG] 90 capsule 3     Sig: TAKE 1 CAPSULE DAILY       Last Appointment Date: 1/25/2021  Next Appointment Date: 7/26/2021    No Known Allergies

## 2021-04-01 ENCOUNTER — TELEPHONE (OUTPATIENT)
Dept: CARDIOLOGY | Age: 76
End: 2021-04-01

## 2021-04-01 ENCOUNTER — OFFICE VISIT (OUTPATIENT)
Dept: CARDIOLOGY | Age: 76
End: 2021-04-01
Payer: MEDICARE

## 2021-04-01 VITALS
HEART RATE: 68 BPM | WEIGHT: 287 LBS | BODY MASS INDEX: 43.5 KG/M2 | DIASTOLIC BLOOD PRESSURE: 76 MMHG | SYSTOLIC BLOOD PRESSURE: 134 MMHG | HEIGHT: 68 IN

## 2021-04-01 DIAGNOSIS — I20.9 ANGINA PECTORIS (HCC): ICD-10-CM

## 2021-04-01 DIAGNOSIS — I47.1 PAROXYSMAL SUPRAVENTRICULAR TACHYCARDIA (HCC): Primary | ICD-10-CM

## 2021-04-01 PROCEDURE — 1036F TOBACCO NON-USER: CPT | Performed by: INTERNAL MEDICINE

## 2021-04-01 PROCEDURE — 93010 ELECTROCARDIOGRAM REPORT: CPT | Performed by: INTERNAL MEDICINE

## 2021-04-01 PROCEDURE — 93005 ELECTROCARDIOGRAM TRACING: CPT | Performed by: INTERNAL MEDICINE

## 2021-04-01 PROCEDURE — 99214 OFFICE O/P EST MOD 30 MIN: CPT | Performed by: INTERNAL MEDICINE

## 2021-04-01 PROCEDURE — 1123F ACP DISCUSS/DSCN MKR DOCD: CPT | Performed by: INTERNAL MEDICINE

## 2021-04-01 PROCEDURE — G8417 CALC BMI ABV UP PARAM F/U: HCPCS | Performed by: INTERNAL MEDICINE

## 2021-04-01 PROCEDURE — 4040F PNEUMOC VAC/ADMIN/RCVD: CPT | Performed by: INTERNAL MEDICINE

## 2021-04-01 PROCEDURE — G8427 DOCREV CUR MEDS BY ELIG CLIN: HCPCS | Performed by: INTERNAL MEDICINE

## 2021-04-01 NOTE — PROGRESS NOTES
Cardiology Consultation/Follow Up. [facility]    CC: Patient is here for follow up    HPI  Ike Manning is here for posthospitalization follow-up. Feeling better and denies any further episodes of palpitations. He is maintaining normal sinus rhythm. Today he reports pressure-like sensation in the left upper chest, occurs once every few weeks, not rested by exertion, radiates to left shoulder. He does have baseline shortness of breath without any recent worsening  No increase in lower extremity edema      Past Medical:  Past Medical History:   Diagnosis Date    Anemia     chronic, negative work up with EGD and Colonoscopy.  DJD (degenerative joint disease) of lumbar spine     Dry eye syndrome     GERD (gastroesophageal reflux disease)     Hepatic hemangioma     Hypertension     Keratitis     Secondary to dry eye syndrome.  Nuclear sclerotic cataract of both eyes     Obesity, morbid ([address]     Obstructive sleep apnea of adult     non compliant with cpap    Onychomycosis     1, 2, 3, 4, and 5, bilateral.    Type II or unspecified type diabetes mellitus without mention of complication, not stated as uncontrolled        Past Surgical:  Past Surgical History:   Procedure Laterality Date    COLONOSCOPY  09/15/2006    Diffuse diverticulosis slightly greater in the sigmoid colon and grade 1 internal hemorrhoids.  CYST REMOVAL      right side of  scalp    UMBILICAL HERNIA REPAIR  2013    UPPER GASTROINTESTINAL ENDOSCOPY  09/15/2006    Mild to moderate diffuse gastritis, mild to moderate diffuse duodenitis. Family History:  Family History   Problem Relation Age of Onset    Ovarian Cancer Mother     High Blood Pressure Sister     Diabetes Sister          at 76    High Blood Pressure Brother         all 4 brothers have it    Stroke Brother     Heart Attack Father         Acute myocardial infarction.     High Blood Pressure Brother     Stroke Brother     Dementia Sister          at 76    Cataracts Neg Hx     Glaucoma Neg Hx        Social History:  Social History     Tobacco Use    Smoking status: Former Smoker     Years: 15.00     Types: Cigars     Quit date: 1970     Years since quittin.3    Smokeless tobacco: Never Used    Tobacco comment: quit over 30 years ago   Substance Use Topics    Alcohol use: No     Alcohol/week: 0.0 standard drinks     Comment: no alcohol for many years    Drug use: No        REVIEW OF SYSTEMS:    · Constitutional: there has been no unanticipated weight loss. There's been No change in energy level, No change in activity level. · Eyes: No visual changes or diplopia. No scleral icterus. · ENT: No Headaches, hearing loss or vertigo. No mouth sores or sore throat. · Cardiovascular: As described in HPI. · Respiratory: AS HPI  · Gastrointestinal: No abdominal pain, appetite loss, blood in stools. No change in bowel or bladder habits. · Genitourinary: No dysuria, trouble voiding, or hematuria. · Musculoskeletal:  No gait disturbance, No weakness or joint complaints. · Integumentary: No rash or pruritis. · Neurological: No headache, diplopia, change in muscle strength, numbness or tingling  · Psychiatric: No new anxiety or depression. · Endocrine: No temperature intolerance. No excessive thirst, fluid intake, or urination. No tremor. · Hematologic/Lymphatic: No abnormal bruising or bleeding, blood clots or swollen lymph nodes. · Allergic/Immunologic: No nasal congestion or hives.     Medications:  Current Outpatient Medications   Medication Sig Dispense Refill    tamsulosin (FLOMAX) 0.4 MG capsule TAKE 1 CAPSULE DAILY 90 capsule 1    oxybutynin (DITROPAN XL) 10 MG extended release tablet Take 1 tablet by mouth daily 30 tablet 3    metFORMIN (GLUCOPHAGE-XR) 500 MG extended release tablet TAKE 1 TABLET BY MOUTH TWICE DAILY 180 tablet 1    valsartan-hydroCHLOROthiazide (DIOVAN-HCT) 160-25 MG per tablet TAKE 1 TABLET BY MOUTH DAILY 90 tablet 0    atorvastatin (LIPITOR) 20 MG tablet TAKE 1 TABLET BY MOUTH DAILY 90 tablet 0    famotidine (PEPCID) 20 MG tablet TAKE 1 TABLET TWICE A  tablet 3    metoprolol (LOPRESSOR) 100 MG tablet Take 1 tablet by mouth 2 times daily 180 tablet 3    blood glucose test strips (GLUCOCARD VITAL TEST) strip 1 each by In Vitro route daily Diagnosis E11.42 200 each 6    fluticasone (FLONASE) 50 MCG/ACT nasal spray USE TWO SPRAYS IN EACH NOSTRIL EVERY DAY 1 Bottle 11    Lancets MISC USE TO TEST BLOOD SUGAR EVERY  each 3    blood glucose monitor kit and supplies Check blood sugar daily. Diagnosis E11.42 1 kit 0    Foot Care Products (DIABETIC INSOLES) MISC 1 Units by Does not apply route daily Equinus contracture of ankle  (primary encounter diagnosis) DM type 2 with diabetic peripheral neuropathy (HCC) Dermatophytosis of nail Foot pain, bilateral 3 each 0    Diabetic Shoe MISC by Does not apply route Equinus contracture of ankle  (primary encounter diagnosis)    DM type 2 with diabetic peripheral neuropathy (HCC)    Dermatophytosis of nail    Foot pain, bilateral 1 each 0    omeprazole (PRILOSEC) 40 MG delayed release capsule TAKE 1 CAPSULE DAILY 90 capsule 4    aspirin 81 MG tablet Take 81 mg by mouth daily. No current facility-administered medications for this visit. Physical Exam:   Vitals: /76   Pulse 68   Ht 5' 8\" (1.727 m)   Wt 287 lb (130.2 kg)   BMI 43.64 kg/m²   General appearance: alert, oriented and cooperative with exam  HEENT: Head: Normocephalic, no lesions, without obvious abnormality.   Neck: no carotid bruit, no JVD  Lungs: clear to auscultation bilaterally without any wheezing or rales   Heart:  regular S1-S2, no murmur  Abdomen: soft, non-tender; bowel sounds normal; no masses,  no organomegaly  Extremities: 1+ pitting edema bilateral in LE   Neurologic: Mental status: Alert, oriented, thought content appropriate      Labs:  CBC: No results for input(s): WBC, HGB, HCT, PLT in the last 72 hours. BMP: No results for input(s): NA, K, CO2, BUN, CREATININE, LABGLOM, GLUCOSE in the last 72 hours. PT/INR: No results for input(s): PROTIME, INR in the last 72 hours. FASTING LIPID PANEL:  Lab Results   Component Value Date    HDL 45 01/25/2021    TRIG 109 01/25/2021       EKG 4/1/2021  NSR, normal ECG      LAST ECHO:  Results for orders placed or performed during the hospital encounter of 08/04/20   ECHO Complete 2D W Doppler W Color   Result Value Ref Range    Left Ventricular Ejection Fraction 60     LVEF MODALITY ECHO     Narrative    Transthoracic Echocardiography Report (TTE)     Patient Name [facility]      Date of Study           08/05/2020                ROSCOE      Date of      1945  Gender                  Male   Birth      Age          67 year(s)  Race                    Black      Room Number  0214        Height:                 68 inch, 172.72 cm      Corporate ID [id number]    Weight:                 292 pounds, 132.5 kg   #      Patient Acct [de-identified]   BSA:        2.4 m^2     BMI:        44.4 kg/m^2   #      MR #         [id number]     Helio Ip, [facility]      Accession #  [id number]  Interpreting Physician  Starla Avila      Fellow                   Referring Nurse                            Practitioner      Interpreting             Referring Physician     KENNETH Ibanez*   Fellow     Additional Comments  Technically difficult study due to body habitus. Type of Study      TTE procedure:2D Echocardiogram, M-Mode, Doppler, Color Doppler. Procedure Date  Date: 08/05/2020 Start: 09:34 AM    Study Location: [facility]  Technical Quality: Adequate visualization    Indications: Follow-up SVT. History / Tech. Comments:  Procedure explained to patient.     Patient Status: Inpatient    Height: 68 inches Weight: 292.01 pounds BSA: 2.4 m^2 BMI: 44.4 kg/m^2    CONCLUSIONS    Summary  Normal left Shortenin.11 %    LA volume/Index: 45.2 ml /19m^2   Calculated LVEF (%): 60.7 %      Mitral:                                Aortic      Peak E-Wave: 0.69 m/s                  Peak Velocity: 1.26 m/s   Peak A-Wave: 0.89 m/s                  Peak Gradient: 6.35 mmHg   E/A Ratio: 0.78   Peak Gradient: 1.93 mmHg   Deceleration Time: 275 msec                                             Pulmonic:                                             Peak Velocity: 0.91 m/s                                          Peak Gradient: 3.31 mmHg     Septal Wall E' velocity:0.09 m/s  Lateral Wall E' velocity:0.08 m/s           Assessment:  · Supraventricular tachycardia, paroxysmal  · Atypical chest pain   · Hypertension   · Diabetes   · Obesity   · RABIA  · GERD         Plan:   Continue Lopressor 100 mg twice daily and valsartan/hydrochlorothiazide  Aspirin 81 mg daily  Pharmacological stress test due top episodes of chest pain and coronary risk factors  Patient counseled regarding importance of medication compliance  Also counseled regarding heart healthy diet and regular exercise for weight loss      Follow-up in office in 6 months or earlier if needed. Yumiko Rick MD, [address] [facility]  [facility]  [phone]

## 2021-04-01 NOTE — TELEPHONE ENCOUNTER
Patient is scheduled for a lexiscan stress test on 4/13/2021. Patient was advised nothing to eat 4 hours prior to test and will have nothing to eat 4 hours during test. He was advised to contact his PCP to adjust his diabetic medication if indicated. Please call patient to discuss.

## 2021-04-03 DIAGNOSIS — J30.2 SEASONAL ALLERGIC RHINITIS, UNSPECIFIED TRIGGER: ICD-10-CM

## 2021-04-05 RX ORDER — FLUTICASONE PROPIONATE 50 MCG
SPRAY, SUSPENSION (ML) NASAL
Qty: 16 G | Refills: 11 | Status: SHIPPED | OUTPATIENT
Start: 2021-04-05 | End: 2022-07-01

## 2021-04-07 DIAGNOSIS — E11.69 TYPE 2 DIABETES MELLITUS WITH OTHER SPECIFIED COMPLICATION, UNSPECIFIED WHETHER LONG TERM INSULIN USE (HCC): ICD-10-CM

## 2021-04-07 DIAGNOSIS — E11.42 DM TYPE 2 WITH DIABETIC PERIPHERAL NEUROPATHY (HCC): ICD-10-CM

## 2021-04-07 RX ORDER — LANCETS 30 GAUGE
EACH MISCELLANEOUS
Qty: 100 EACH | Refills: 3 | Status: SHIPPED | OUTPATIENT
Start: 2021-04-07 | End: 2022-07-20

## 2021-04-07 NOTE — TELEPHONE ENCOUNTER
Requested Prescriptions     Pending Prescriptions Disp Refills    Lancets [address] [Pharmacy Med Name: Vasyl Banks 30G 100'S] 100 each 3     Sig: USE TO TEST BLOOD SUGAR EVERY DAY     Last Appt:  1/25/2021  Next Appt:   7/26/2021  Med verified in [employer]

## 2021-04-09 DIAGNOSIS — I10 ESSENTIAL HYPERTENSION: ICD-10-CM

## 2021-04-09 DIAGNOSIS — E78.2 MIXED HYPERLIPIDEMIA: ICD-10-CM

## 2021-04-09 RX ORDER — ATORVASTATIN CALCIUM 20 MG/1
TABLET, FILM COATED ORAL
Qty: 90 TABLET | Refills: 1 | Status: SHIPPED | OUTPATIENT
Start: 2021-04-09 | End: 2021-06-21

## 2021-04-09 RX ORDER — VALSARTAN AND HYDROCHLOROTHIAZIDE 160; 25 MG/1; MG/1
TABLET ORAL
Qty: 90 TABLET | Refills: 1 | Status: SHIPPED | OUTPATIENT
Start: 2021-04-09 | End: 2021-06-21

## 2021-04-09 NOTE — TELEPHONE ENCOUNTER
Xin Garza called requesting a refill of the below medication which has been pended for you:     Requested Prescriptions     Pending Prescriptions Disp Refills    valsartan-hydroCHLOROthiazide (DIOVAN-HCT) 160-25 MG per tablet [Pharmacy Med Name: VALSARTAN/HCTZ 160MG/25MG TABLETS] 90 tablet 0     Sig: TAKE 1 TABLET BY MOUTH DAILY    atorvastatin (LIPITOR) 20 MG tablet [Pharmacy Med Name: ATORVASTATIN 20MG TABLETS] 90 tablet 0     Sig: TAKE 1 TABLET BY MOUTH DAILY       Last Appointment Date: 1/25/2021  Next Appointment Date: 7/26/2021    No Known Allergies

## 2021-04-13 ENCOUNTER — HOSPITAL ENCOUNTER (OUTPATIENT)
Dept: NUCLEAR MEDICINE | Age: 76
Discharge: HOME | End: 2021-04-15
Payer: MEDICARE

## 2021-04-13 ENCOUNTER — HOSPITAL ENCOUNTER (OUTPATIENT)
Dept: NON INVASIVE DIAGNOSTICS | Age: 76
Discharge: HOME | End: 2021-04-13
Payer: MEDICARE

## 2021-04-13 DIAGNOSIS — I20.9 ANGINA PECTORIS (HCC): ICD-10-CM

## 2021-04-13 DIAGNOSIS — I47.1 PAROXYSMAL SUPRAVENTRICULAR TACHYCARDIA (HCC): ICD-10-CM

## 2021-04-13 PROCEDURE — 6360000002 HC RX W HCPCS: Performed by: INTERNAL MEDICINE

## 2021-04-13 PROCEDURE — 93018 CV STRESS TEST I&R ONLY: CPT | Performed by: INTERNAL MEDICINE

## 2021-04-13 PROCEDURE — 3430000000 HC RX DIAGNOSTIC RADIOPHARMACEUTICAL: Performed by: INTERNAL MEDICINE

## 2021-04-13 PROCEDURE — 78452 HT MUSCLE IMAGE SPECT MULT: CPT

## 2021-04-13 PROCEDURE — 93017 CV STRESS TEST TRACING ONLY: CPT

## 2021-04-13 PROCEDURE — A9500 TC99M SESTAMIBI: HCPCS | Performed by: INTERNAL MEDICINE

## 2021-04-13 RX ADMIN — TETRAKIS(2-METHOXYISOBUTYLISOCYANIDE)COPPER(I) TETRAFLUOROBORATE 30 MILLICURIE: 1 INJECTION, POWDER, LYOPHILIZED, FOR SOLUTION INTRAVENOUS at 15:09

## 2021-04-13 RX ADMIN — REGADENOSON 0.4 MG: 0.08 INJECTION, SOLUTION INTRAVENOUS at 14:21

## 2021-04-13 RX ADMIN — TETRAKIS(2-METHOXYISOBUTYLISOCYANIDE)COPPER(I) TETRAFLUOROBORATE 10 MILLICURIE: 1 INJECTION, POWDER, LYOPHILIZED, FOR SOLUTION INTRAVENOUS at 15:09

## 2021-04-13 NOTE — PROGRESS NOTES
Patient Name:  Carlin Florentino MRN:  [id number]   :  1945  Age:  67 y.o. Sex: male     Ordering Provider: Priti Castellano MD  Primary Care Provider:  ROCÍO Walters CNP     Indications: Angina Pectoris     Medications:     Current Outpatient Medications:     valsartan-hydroCHLOROthiazide (DIOVAN-HCT) 160-25 MG per tablet, TAKE 1 TABLET BY MOUTH DAILY, Disp: 90 tablet, Rfl: 1    atorvastatin (LIPITOR) 20 MG tablet, TAKE 1 TABLET BY MOUTH DAILY, Disp: 90 tablet, Rfl: 1    Lancets MISC, USE TO TEST BLOOD SUGAR EVERY DAY, Disp: 100 each, Rfl: 3    fluticasone (FLONASE) 50 MCG/ACT nasal spray, SHAKE LIQUID AND USE 2 SPRAYS IN EACH NOSTRIL EVERY DAY, Disp: 16 g, Rfl: 11    tamsulosin (FLOMAX) 0.4 MG capsule, TAKE 1 CAPSULE DAILY, Disp: 90 capsule, Rfl: 1    oxybutynin (DITROPAN XL) 10 MG extended release tablet, Take 1 tablet by mouth daily, Disp: 30 tablet, Rfl: 3    metFORMIN (GLUCOPHAGE-XR) 500 MG extended release tablet, TAKE 1 TABLET BY MOUTH TWICE DAILY, Disp: 180 tablet, Rfl: 1    famotidine (PEPCID) 20 MG tablet, TAKE 1 TABLET TWICE A DAY, Disp: 180 tablet, Rfl: 3    metoprolol (LOPRESSOR) 100 MG tablet, Take 1 tablet by mouth 2 times daily, Disp: 180 tablet, Rfl: 3    blood glucose test strips (GLUCOCARD VITAL TEST) strip, 1 each by In Vitro route daily Diagnosis E11.42, Disp: 200 each, Rfl: 6    blood glucose monitor kit and supplies, Check blood sugar daily.   Diagnosis E11.42, Disp: 1 kit, Rfl: 0    Foot Care Products (DIABETIC INSOLES) MISC, 1 Units by Does not apply route daily Equinus contracture of ankle  (primary encounter diagnosis) DM type 2 with diabetic peripheral neuropathy (HCC) Dermatophytosis of nail Foot pain, bilateral, Disp: 3 each, Rfl: 0    Diabetic Shoe MISC, by Does not apply route Equinus contracture of ankle  (primary encounter diagnosis)  DM type 2 with diabetic peripheral neuropathy (HCC)  Dermatophytosis of nail  Foot pain, bilateral, Disp: 1 each, Rfl: 0    omeprazole (PRILOSEC) 40 MG delayed release capsule, TAKE 1 CAPSULE DAILY, Disp: 90 capsule, Rfl: 4    aspirin 81 MG tablet, Take 81 mg by mouth daily. , Disp: , Rfl:     Current Facility-Administered Medications:     regadenoson (LEXISCAN) injection 0.4 mg, 0.4 mg, Intravenous, Once, Starla Sanchez DO    Facility-Administered Medications Ordered in Other Encounters:     technetium sestamibi (CARDIOLITE) injection 30 millicurie, 30 millicurie, Intravenous, ONCE PRN, Geronimo Wright MD    technetium sestamibi (CARDIOLITE) injection 10 millicurie, 10 millicurie, Intravenous, ONCE PRN, Sheree Sauceda MD      ----------------------------------------------------------------------------------------------------------                Lexiscan 0.4 mg injected over 10 seconds. IV Cardiolite injected 20 seconds post Lexiscan injection. Heart Rate:  57  Resting Blood Pressure:  194/100   ----------------------------------------------------------------------------------------------------------     HR BP   1 min 92 144/84   2 min     3 min 72 172/89   4 min     5 min 68 173/87   6 min     7 min 66 174/86   8 min     9 min 64 175/88   10 min       Symptoms:  Chest Pain:  No      Nausea:  No     Headache:  No    Shortness of Breath:  Yes     Other:  Stomach cramping      Electronically signed by Hiren Handy RN on 4/13/21 at 2:11 PM EDT    ----------------------------------------------------------------------------------------------------------  Resting EKG:  NSR     Arrhythmias:  None     EKG Changes:  None     Maximum Changes:  None     Leads with maximum changes:  None     EKG returned to baseline 0-1 minutes in recovery. Interpretation:    1. Normal ECG portion of stress test.  2. Nuclear perfusion scan report to follow.           Supervising Physician:  Nicole Jurado DO

## 2021-04-14 NOTE — PROCEDURES
[place] [address]                 [address] [place] [id number]                              CARDIAC STRESS TEST    PATIENT NAME: Hafsa Lowry                      :        1945  MED REC NO:   [id number]                             ROOM:  ACCOUNT NO:   [de-identified]                           ADMIT DATE: 2021  PROVIDER:     Venkatesh Colvin MD    DATE OF STUDY:  2021    STRESS TEST    Ordering Provider:  Vicki Madrigal MD    Primary Care Provider:  DIANN Frias    Patient's Age: 67     Height: 5 feet 8 inches  Weight: 287 pounds    INDICATION:  Chest pain, paroxysmal supraventricular tachycardia. Lexiscan 0.4 mg injected over 10 seconds. IV Cardiolite injected 20 seconds post Lexiscan injection. Heart Rate: 57 Resting blood pressure:  194/100              HR   BP  1 min          92   144/84  2 min  3 min          72   172/89  4 min  5 min          68   173/87  6 min  7 min          66   174/86  8 min  9 min          64   175/88  10 min    Symptoms:  Chest Pain: No  Nausea: No  Headache:  No  Shortness of  breath: Yes  Other: Stomach cramping. Resting EKG:  Normal sinus rhythm. Arrhythmias: None. EKG changes:  None. Maximum changes: None. Leads with maximum changes:  None. EKG returned to baseline or 0-1 minutes in recovery. INTERPRETATION:  1. Normal ECG portion of stress test.  2.  Nuclear perfusion scan report to follow.     Nuclear Myocardial Perfusion Imaging (SPECT)    TESTING METHOD  STRESS:   Lexiscan  AGENT:    Cardiolite  REST:          Injection Date:  2021  Time:  1305  Amount:  14.34  mCi  STRESS:   Injection Date:  2021  Time:  1421  Amount:  41.2 mCi  IMAGE TIME:    Rest:  1340  Stress:  1502    EF:  74%  TID:  0.88  LHR:  0.28    FINDINGS:  Ischemia (Reversible Defect):           No  Infarction (Irreversible Defect):       Yes  Ejection fraction Calculated:           Normal IMPRESSION:  1. No ischemia. 2.  Inferior infarction. 3.  Normal LV systolic function.         Amanda Bonilla MD    D: 04/14/2021 14:29:22       T: 04/14/2021 14:32:52     J [email or web address]  Job#: [id number]     Doc#: Unknown    CC:  [address] [place] [address]

## 2021-04-15 ENCOUNTER — TELEPHONE (OUTPATIENT)
Dept: CARDIOLOGY | Age: 76
End: 2021-04-15

## 2021-04-22 ENCOUNTER — OFFICE VISIT (OUTPATIENT)
Dept: PODIATRY | Age: 76
End: 2021-04-22
Payer: MEDICARE

## 2021-04-22 VITALS
BODY MASS INDEX: 43.36 KG/M2 | RESPIRATION RATE: 20 BRPM | WEIGHT: 285.2 LBS | DIASTOLIC BLOOD PRESSURE: 80 MMHG | SYSTOLIC BLOOD PRESSURE: 128 MMHG | HEART RATE: 84 BPM

## 2021-04-22 DIAGNOSIS — E11.42 DM TYPE 2 WITH DIABETIC PERIPHERAL NEUROPATHY (HCC): Primary | ICD-10-CM

## 2021-04-22 DIAGNOSIS — B35.1 DERMATOPHYTOSIS OF NAIL: ICD-10-CM

## 2021-04-22 PROCEDURE — 11721 DEBRIDE NAIL 6 OR MORE: CPT | Performed by: PODIATRIST

## 2021-04-22 PROCEDURE — 99999 PR OFFICE/OUTPT VISIT,PROCEDURE ONLY: CPT | Performed by: PODIATRIST

## 2021-04-22 NOTE — PROGRESS NOTES
Foot Care Worksheet  PCP: ROCÍO Osborn - CNP  Last visit: 01 / 25 / 2021    Nail description:  Thick , Yellow , Crumbly , Marked limitation of ambulation     Pain resulting from thickened and dystrophy of nail plate No    Nails involved  Right   1, 2, 3, 4, 5  (T5-T9)  Left     1, 2, 3, 4, 5  (TA-T4)    Q7 1 Class A Finding - Non traumatic amputation of foot No    Q8 2 Class B Findings - Absent DP pulse No, Absent PT pulse No, Advanced tropic changes (3 required) Yes    Decrease hair growth Yes, Nail changes/thickening Yes, Pigmented changes/discoloration Yes, Skin texture (thin, shiny) Yes, Skin color (rubor/redness) No    Q9 1 Class B and 2 Class C Findings  Claudication No, Temperature change No, Paresthesia No, Burning Yes, Edema Yes
skin.    Neurological: Sensation intact to light touch to level of digits, bilateral.  Protective sensation intact 10/10 sites via 5.07/10g [place] Monofilament, bilateral.  negative Tinel's, bilateral.  negative Valleix sign, bilateral.  Vibratory intact bilateral.  Reflexes Decreased bilateral.  Paresthesias negative. Dysthesias negative. Sharp/dull intact bilateral.    Musculoskeletal: Muscle strength 5/5, bilateral.  Pain absent upon palpation bilateral. Normal medial longitudinal arch, bilateral.  Ankle ROM within normal limits,bilateral.  1st MPJ ROM within normal limits, bilateral.  Dorsally contracted digits present. No other foot deformities. Integument:  Open lesion absent, Bilateral.  Interdigital maceration absent to web spaces,absent Bilateral.  Nails left 1, 2, 3, 4, 5 and right 1, 2, 3, 4, 5 thickened, dystrophic and crumbly, discolored with subungual debris. Fissures absent, Bilateral. Hyperkeratotic tissue is absent. Assessment:    Diagnosis Orders   1. DM type 2 with diabetic peripheral neuropathy ([address]     2. Dermatophytosis of nail         Plan:  Diabetic foot education and exam.  Nails as mentioned above debrided in length and thickness. Patient advised about OTC treatments for nails and callous. Patient will follow up in 10 weeks for routine foot care or PRN if any further problems arise.

## 2021-05-10 RX ORDER — OXYBUTYNIN CHLORIDE 10 MG/1
TABLET, EXTENDED RELEASE ORAL
Qty: 90 TABLET | Refills: 3 | Status: SHIPPED | OUTPATIENT
Start: 2021-05-10 | End: 2022-07-11

## 2021-06-14 DIAGNOSIS — E11.42 DM TYPE 2 WITH DIABETIC PERIPHERAL NEUROPATHY (HCC): ICD-10-CM

## 2021-06-14 RX ORDER — CALCIUM CITRATE/VITAMIN D3 200MG-6.25
TABLET ORAL
Qty: 200 STRIP | Refills: 5 | Status: SHIPPED | OUTPATIENT
Start: 2021-06-14 | End: 2022-06-15

## 2021-06-14 NOTE — TELEPHONE ENCOUNTER
Yeyo Wilson called requesting a refill of the below medication which has been pended for you:     Requested Prescriptions     Pending Prescriptions Disp Refills    TRUE METRIX BLOOD GLUCOSE TEST strip [Pharmacy Med Name: TRUE METRIX BLOOD GLUCOSE TEST STRP] 200 strip 5     Sig: USE TO TEST BLOOD SUGAR DAILY       Last Appointment Date: 1/25/2021  Next Appointment Date: 7/26/2021    No Known Allergies

## 2021-06-20 DIAGNOSIS — E78.2 MIXED HYPERLIPIDEMIA: ICD-10-CM

## 2021-06-20 DIAGNOSIS — I10 ESSENTIAL HYPERTENSION: ICD-10-CM

## 2021-06-21 NOTE — TELEPHONE ENCOUNTER
Tere Mahan called requesting a refill of the below medication which has been pended for you:     Requested Prescriptions     Pending Prescriptions Disp Refills    valsartan-hydroCHLOROthiazide (DIOVAN-HCT) 160-25 MG per tablet [Pharmacy Med Name: VALSARTAN/HCTZ TABS 160/25MG] 90 tablet 3     Sig: TAKE 1 TABLET DAILY    atorvastatin (LIPITOR) 20 MG tablet [Pharmacy Med Name: ATORVASTATIN TABS 20MG] 90 tablet 3     Sig: TAKE 1 TABLET DAILY       Last Appointment Date: 1/25/2021  Next Appointment Date: 7/26/2021    No Known Allergies

## 2021-06-22 RX ORDER — VALSARTAN AND HYDROCHLOROTHIAZIDE 160; 25 MG/1; MG/1
TABLET ORAL
Qty: 90 TABLET | Refills: 1 | Status: SHIPPED | OUTPATIENT
Start: 2021-06-22 | End: 2021-12-16

## 2021-06-22 RX ORDER — ATORVASTATIN CALCIUM 20 MG/1
TABLET, FILM COATED ORAL
Qty: 90 TABLET | Refills: 1 | Status: SHIPPED | OUTPATIENT
Start: 2021-06-22 | End: 2021-12-16

## 2021-07-17 DIAGNOSIS — E11.42 DM TYPE 2 WITH DIABETIC PERIPHERAL NEUROPATHY (HCC): ICD-10-CM

## 2021-07-19 RX ORDER — METFORMIN HYDROCHLORIDE 500 MG/1
TABLET, EXTENDED RELEASE ORAL
Qty: 60 TABLET | Refills: 0 | Status: SHIPPED | OUTPATIENT
Start: 2021-07-19 | End: 2021-08-17

## 2021-07-19 NOTE — TELEPHONE ENCOUNTER
Marcus Nickerson called requesting a refill of the below medication which has been pended for you:     Requested Prescriptions     Pending Prescriptions Disp Refills    metFORMIN (GLUCOPHAGE-XR) 500 MG extended release tablet [Pharmacy Med Name: METFORMIN ER 500MG 24HR TABS] 180 tablet 1     Sig: TAKE 1 TABLET BY MOUTH TWICE DAILY       Last Appointment Date: 1/25/2021  Next Appointment Date: 8/12/2021    No Known Allergies

## 2021-08-14 DIAGNOSIS — K21.9 GASTROESOPHAGEAL REFLUX DISEASE WITHOUT ESOPHAGITIS: ICD-10-CM

## 2021-08-14 DIAGNOSIS — K21.9 GASTROESOPHAGEAL REFLUX DISEASE: ICD-10-CM

## 2021-08-16 RX ORDER — FAMOTIDINE 20 MG/1
TABLET, FILM COATED ORAL
Qty: 60 TABLET | Refills: 2 | Status: SHIPPED | OUTPATIENT
Start: 2021-08-16 | End: 2022-02-09

## 2021-08-16 NOTE — TELEPHONE ENCOUNTER
Nerissa Armando called requesting a refill of the below medication which has been pended for you:     Requested Prescriptions     Pending Prescriptions Disp Refills    famotidine (PEPCID) 20 MG tablet [Pharmacy Med Name: FAMOTIDINE 20MG TABLETS] 60 tablet      Sig: TAKE 1 TABLET BY MOUTH TWICE DAILY       Last Appointment Date: 1/25/2021  Next Appointment Date: 9/17/2021    No Known Allergies

## 2021-08-17 DIAGNOSIS — E11.42 DM TYPE 2 WITH DIABETIC PERIPHERAL NEUROPATHY (HCC): ICD-10-CM

## 2021-08-17 RX ORDER — METFORMIN HYDROCHLORIDE 500 MG/1
TABLET, EXTENDED RELEASE ORAL
Qty: 180 TABLET | Refills: 1 | Status: SHIPPED | OUTPATIENT
Start: 2021-08-17 | End: 2022-02-14

## 2021-08-17 NOTE — TELEPHONE ENCOUNTER
Hermann Elizondo called requesting a refill of the below medication which has been pended for you:     Requested Prescriptions     Pending Prescriptions Disp Refills    metFORMIN (GLUCOPHAGE-XR) 500 MG extended release tablet [Pharmacy Med Name: METFORMIN ER 500MG 24HR TABS] 180 tablet      Sig: TAKE 1 TABLET BY MOUTH TWICE DAILY       Last Appointment Date: 01/25/2021  Next Appointment Date: Visit date not found    No Known Allergies

## 2021-09-09 RX ORDER — METOPROLOL TARTRATE 100 MG/1
TABLET ORAL
Qty: 180 TABLET | Refills: 3 | Status: SHIPPED | OUTPATIENT
Start: 2021-09-09 | End: 2022-09-09

## 2021-09-17 ENCOUNTER — HOSPITAL ENCOUNTER (INPATIENT)
Age: 76
LOS: 3 days | Discharge: HOME | DRG: 872 | End: 2021-09-20
Attending: EMERGENCY MEDICINE | Admitting: FAMILY MEDICINE
Payer: MEDICARE

## 2021-09-17 ENCOUNTER — APPOINTMENT (OUTPATIENT)
Dept: CT IMAGING | Age: 76
DRG: 872 | End: 2021-09-17
Payer: MEDICARE

## 2021-09-17 ENCOUNTER — OFFICE VISIT (OUTPATIENT)
Dept: PODIATRY | Age: 76
DRG: 872 | End: 2021-09-17
Payer: MEDICARE

## 2021-09-17 ENCOUNTER — APPOINTMENT (OUTPATIENT)
Dept: GENERAL RADIOLOGY | Age: 76
DRG: 872 | End: 2021-09-17
Payer: MEDICARE

## 2021-09-17 ENCOUNTER — HOSPITAL ENCOUNTER (EMERGENCY)
Age: 76
Discharge: HOME | DRG: 872 | End: 2021-09-17
Attending: EMERGENCY MEDICINE
Payer: MEDICARE

## 2021-09-17 VITALS
HEART RATE: 76 BPM | WEIGHT: 292.8 LBS | BODY MASS INDEX: 44.52 KG/M2 | DIASTOLIC BLOOD PRESSURE: 70 MMHG | SYSTOLIC BLOOD PRESSURE: 132 MMHG | RESPIRATION RATE: 20 BRPM

## 2021-09-17 VITALS
OXYGEN SATURATION: 96 % | SYSTOLIC BLOOD PRESSURE: 142 MMHG | TEMPERATURE: 99.2 F | DIASTOLIC BLOOD PRESSURE: 86 MMHG | RESPIRATION RATE: 18 BRPM | HEART RATE: 78 BPM

## 2021-09-17 DIAGNOSIS — N39.0 URINARY TRACT INFECTION WITHOUT HEMATURIA, SITE UNSPECIFIED: Primary | ICD-10-CM

## 2021-09-17 DIAGNOSIS — N17.9 AKI (ACUTE KIDNEY INJURY) (HCC): ICD-10-CM

## 2021-09-17 DIAGNOSIS — E04.9 ENLARGED THYROID: ICD-10-CM

## 2021-09-17 DIAGNOSIS — N39.0 URINARY TRACT INFECTION WITH HEMATURIA, SITE UNSPECIFIED: Primary | ICD-10-CM

## 2021-09-17 DIAGNOSIS — B35.1 DERMATOPHYTOSIS OF NAIL: ICD-10-CM

## 2021-09-17 DIAGNOSIS — R31.9 URINARY TRACT INFECTION WITH HEMATURIA, SITE UNSPECIFIED: Primary | ICD-10-CM

## 2021-09-17 DIAGNOSIS — R41.82 ALTERED MENTAL STATUS, UNSPECIFIED ALTERED MENTAL STATUS TYPE: ICD-10-CM

## 2021-09-17 DIAGNOSIS — E11.42 DM TYPE 2 WITH DIABETIC PERIPHERAL NEUROPATHY (HCC): Primary | ICD-10-CM

## 2021-09-17 PROBLEM — N40.1 BENIGN LOCALIZED PROSTATIC HYPERPLASIA WITH LOWER URINARY TRACT SYMPTOMS (LUTS): Status: ACTIVE | Noted: 2021-09-17
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
## 2021-09-17 PROBLEM — N18.31 STAGE 3A CHRONIC KIDNEY DISEASE (HCC): Status: ACTIVE | Noted: 2021-09-17

## 2021-09-17 PROBLEM — E78.2 MIXED HYPERLIPIDEMIA: Status: ACTIVE | Noted: 2021-09-17

## 2021-09-17 PROBLEM — N30.01 ACUTE CYSTITIS WITH HEMATURIA: Status: ACTIVE | Noted: 2021-09-17

## 2021-09-17 PROBLEM — N18.2 CKD (CHRONIC KIDNEY DISEASE) STAGE 2, GFR 60-89 ML/MIN: Status: ACTIVE | Noted: 2021-09-17

## 2021-09-17 LAB
-: ABNORMAL
ABSOLUTE EOS #: 0 K/UL (ref 0–0.44)
ABSOLUTE EOS #: 0.14 K/UL (ref 0–0.44)
ABSOLUTE IMMATURE GRANULOCYTE: 0.03 K/UL (ref 0–0.3)
ABSOLUTE IMMATURE GRANULOCYTE: 0.16 K/UL (ref 0–0.3)
ABSOLUTE LYMPH #: 1.12 K/UL (ref 1.1–3.7)
ABSOLUTE LYMPH #: 1.12 K/UL (ref 1.1–3.7)
ABSOLUTE MONO #: 1.17 K/UL (ref 0.1–1.2)
ABSOLUTE MONO #: 1.76 K/UL (ref 0.1–1.2)
ALBUMIN SERPL-MCNC: 3.6 G/DL (ref 3.5–5.2)
ALBUMIN/GLOBULIN RATIO: 1 (ref 1–2.5)
ALP BLD-CCNC: 116 U/L (ref 40–129)
ALT SERPL-CCNC: 19 U/L (ref 5–41)
AMMONIA: 32 UMOL/L (ref 16–60)
AMORPHOUS: ABNORMAL
ANION GAP SERPL CALCULATED.3IONS-SCNC: 11 MMOL/L (ref 9–17)
ANION GAP SERPL CALCULATED.3IONS-SCNC: 16 MMOL/L (ref 9–17)
AST SERPL-CCNC: 22 U/L
BACTERIA: ABNORMAL
BASOPHILS # BLD: 0 % (ref 0–2)
BASOPHILS # BLD: 0 % (ref 0–2)
BASOPHILS ABSOLUTE: 0 K/UL (ref 0–0.2)
BASOPHILS ABSOLUTE: <0.03 K/UL (ref 0–0.2)
BILIRUB SERPL-MCNC: 0.94 MG/DL (ref 0.3–1.2)
BILIRUBIN URINE: NEGATIVE
BNP INTERPRETATION: NORMAL
BUN BLDV-MCNC: 25 MG/DL (ref 8–23)
BUN BLDV-MCNC: 27 MG/DL (ref 8–23)
BUN/CREAT BLD: 16 (ref 9–20)
BUN/CREAT BLD: 19 (ref 9–20)
CALCIUM SERPL-MCNC: 8.7 MG/DL (ref 8.6–10.4)
CALCIUM SERPL-MCNC: 9 MG/DL (ref 8.6–10.4)
CASTS UA: ABNORMAL /LPF (ref 0–2)
CHLORIDE BLD-SCNC: 95 MMOL/L (ref 98–107)
CHLORIDE BLD-SCNC: 99 MMOL/L (ref 98–107)
CO2: 21 MMOL/L (ref 20–31)
CO2: 25 MMOL/L (ref 20–31)
COLOR: ABNORMAL
COMMENT UA: ABNORMAL
CREAT SERPL-MCNC: 1.34 MG/DL (ref 0.7–1.2)
CREAT SERPL-MCNC: 1.65 MG/DL (ref 0.7–1.2)
CRYSTALS, UA: ABNORMAL /HPF
DIFFERENTIAL TYPE: ABNORMAL
DIFFERENTIAL TYPE: ABNORMAL
EOSINOPHILS RELATIVE PERCENT: 0 % (ref 1–4)
EOSINOPHILS RELATIVE PERCENT: 1 % (ref 1–4)
EPITHELIAL CELLS UA: ABNORMAL /HPF (ref 0–5)
GFR AFRICAN AMERICAN: 49 ML/MIN
GFR AFRICAN AMERICAN: >60 ML/MIN
GFR NON-AFRICAN AMERICAN: 41 ML/MIN
GFR NON-AFRICAN AMERICAN: 52 ML/MIN
GFR SERPL CREATININE-BSD FRML MDRD: ABNORMAL ML/MIN/{1.73_M2}
GLUCOSE BLD-MCNC: 189 MG/DL (ref 70–99)
GLUCOSE BLD-MCNC: 219 MG/DL (ref 70–99)
GLUCOSE URINE: ABNORMAL
HCT VFR BLD CALC: 34.9 % (ref 40.7–50.3)
HCT VFR BLD CALC: 37.4 % (ref 40.7–50.3)
HEMOGLOBIN: 11.2 G/DL (ref 13–17)
HEMOGLOBIN: 11.7 G/DL (ref 13–17)
IMMATURE GRANULOCYTES: 0 %
IMMATURE GRANULOCYTES: 1 %
INR BLD: 1.2
KETONES, URINE: NEGATIVE
LACTIC ACID, SEPSIS WHOLE BLOOD: NORMAL MMOL/L (ref 0.5–1.9)
LACTIC ACID, SEPSIS WHOLE BLOOD: NORMAL MMOL/L (ref 0.5–1.9)
LACTIC ACID, SEPSIS: 0.8 MMOL/L (ref 0.5–1.9)
LACTIC ACID, SEPSIS: 1.4 MMOL/L (ref 0.5–1.9)
LEUKOCYTE ESTERASE, URINE: ABNORMAL
LIPASE: 13 U/L (ref 13–60)
LYMPHOCYTES # BLD: 12 % (ref 24–43)
LYMPHOCYTES # BLD: 7 % (ref 24–43)
MCH RBC QN AUTO: 27.8 PG (ref 25.2–33.5)
MCH RBC QN AUTO: 27.9 PG (ref 25.2–33.5)
MCHC RBC AUTO-ENTMCNC: 31.3 G/DL (ref 25.2–33.5)
MCHC RBC AUTO-ENTMCNC: 32.1 G/DL (ref 25.2–33.5)
MCV RBC AUTO: 86.8 FL (ref 82.6–102.9)
MCV RBC AUTO: 88.8 FL (ref 82.6–102.9)
MONOCYTES # BLD: 11 % (ref 3–12)
MONOCYTES # BLD: 12 % (ref 3–12)
MORPHOLOGY: ABNORMAL
MORPHOLOGY: ABNORMAL
MUCUS: ABNORMAL
NITRITE, URINE: POSITIVE
NRBC AUTOMATED: 0 PER 100 WBC
NRBC AUTOMATED: ABNORMAL PER 100 WBC
OTHER OBSERVATIONS UA: ABNORMAL
PARTIAL THROMBOPLASTIN TIME: 28.2 SEC (ref 23.9–33.8)
PDW BLD-RTO: 13.2 % (ref 11.8–14.4)
PDW BLD-RTO: 13.3 % (ref 11.8–14.4)
PH UA: 5.5 (ref 5–6)
PLATELET # BLD: 213 K/UL (ref 138–453)
PLATELET # BLD: 215 K/UL (ref 138–453)
PLATELET ESTIMATE: ABNORMAL
PLATELET ESTIMATE: ABNORMAL
PMV BLD AUTO: 10.7 FL (ref 8.1–13.5)
PMV BLD AUTO: 10.8 FL (ref 8.1–13.5)
POTASSIUM SERPL-SCNC: 3.7 MMOL/L (ref 3.7–5.3)
POTASSIUM SERPL-SCNC: 3.7 MMOL/L (ref 3.7–5.3)
PRO-BNP: 185 PG/ML
PROTEIN UA: ABNORMAL
PROTHROMBIN TIME: 14.3 SEC (ref 11.5–14.2)
RBC # BLD: 4.02 M/UL (ref 4.21–5.77)
RBC # BLD: 4.21 M/UL (ref 4.21–5.77)
RBC # BLD: ABNORMAL 10*6/UL
RBC # BLD: ABNORMAL 10*6/UL
RBC UA: ABNORMAL /HPF (ref 0–4)
RENAL EPITHELIAL, UA: ABNORMAL /HPF
SARS-COV-2, RAPID: NOT DETECTED
SEG NEUTROPHILS: 74 % (ref 36–65)
SEG NEUTROPHILS: 81 % (ref 36–65)
SEGMENTED NEUTROPHILS ABSOLUTE COUNT: 12.96 K/UL (ref 1.5–8.1)
SEGMENTED NEUTROPHILS ABSOLUTE COUNT: 7.18 K/UL (ref 1.5–8.1)
SODIUM BLD-SCNC: 132 MMOL/L (ref 135–144)
SODIUM BLD-SCNC: 135 MMOL/L (ref 135–144)
SPECIFIC GRAVITY UA: 1.03 (ref 1.01–1.02)
SPECIMEN DESCRIPTION: NORMAL
TOTAL PROTEIN: 7.3 G/DL (ref 6.4–8.3)
TRICHOMONAS: ABNORMAL
TROPONIN INTERP: NORMAL
TROPONIN T: NORMAL NG/ML
TROPONIN, HIGH SENSITIVITY: 16 NG/L (ref 0–22)
TSH SERPL DL<=0.05 MIU/L-ACNC: 0.27 MIU/L (ref 0.3–5)
TURBIDITY: ABNORMAL
URINE HGB: ABNORMAL
UROBILINOGEN, URINE: NORMAL
WBC # BLD: 16 K/UL (ref 3.5–11.3)
WBC # BLD: 9.7 K/UL (ref 3.5–11.3)
WBC # BLD: ABNORMAL 10*3/UL
WBC # BLD: ABNORMAL 10*3/UL
WBC UA: >100 /HPF (ref 0–4)
YEAST: ABNORMAL

## 2021-09-17 PROCEDURE — 81001 URINALYSIS AUTO W/SCOPE: CPT

## 2021-09-17 PROCEDURE — 83605 ASSAY OF LACTIC ACID: CPT

## 2021-09-17 PROCEDURE — 87077 CULTURE AEROBIC IDENTIFY: CPT

## 2021-09-17 PROCEDURE — 6370000000 HC RX 637 (ALT 250 FOR IP): Performed by: EMERGENCY MEDICINE

## 2021-09-17 PROCEDURE — 85610 PROTHROMBIN TIME: CPT

## 2021-09-17 PROCEDURE — 6360000002 HC RX W HCPCS: Performed by: EMERGENCY MEDICINE

## 2021-09-17 PROCEDURE — 85730 THROMBOPLASTIN TIME PARTIAL: CPT

## 2021-09-17 PROCEDURE — 96375 TX/PRO/DX INJ NEW DRUG ADDON: CPT

## 2021-09-17 PROCEDURE — 36415 COLL VENOUS BLD VENIPUNCTURE: CPT

## 2021-09-17 PROCEDURE — 84439 ASSAY OF FREE THYROXINE: CPT

## 2021-09-17 PROCEDURE — 80048 BASIC METABOLIC PNL TOTAL CA: CPT

## 2021-09-17 PROCEDURE — 71045 X-RAY EXAM CHEST 1 VIEW: CPT

## 2021-09-17 PROCEDURE — APPSS30 APP SPLIT SHARED TIME 16-30 MINUTES: Performed by: NURSE PRACTITIONER

## 2021-09-17 PROCEDURE — 82140 ASSAY OF AMMONIA: CPT

## 2021-09-17 PROCEDURE — 11721 DEBRIDE NAIL 6 OR MORE: CPT | Performed by: PODIATRIST

## 2021-09-17 PROCEDURE — 87040 BLOOD CULTURE FOR BACTERIA: CPT

## 2021-09-17 PROCEDURE — 87086 URINE CULTURE/COLONY COUNT: CPT

## 2021-09-17 PROCEDURE — 74176 CT ABD & PELVIS W/O CONTRAST: CPT

## 2021-09-17 PROCEDURE — 84484 ASSAY OF TROPONIN QUANT: CPT

## 2021-09-17 PROCEDURE — 70450 CT HEAD/BRAIN W/O DYE: CPT

## 2021-09-17 PROCEDURE — 99285 EMERGENCY DEPT VISIT HI MDM: CPT

## 2021-09-17 PROCEDURE — 2060000000 HC ICU INTERMEDIATE R&B

## 2021-09-17 PROCEDURE — 83880 ASSAY OF NATRIURETIC PEPTIDE: CPT

## 2021-09-17 PROCEDURE — 87186 SC STD MICRODIL/AGAR DIL: CPT

## 2021-09-17 PROCEDURE — 80179 DRUG ASSAY SALICYLATE: CPT

## 2021-09-17 PROCEDURE — 80306 DRUG TEST PRSMV INSTRMNT: CPT

## 2021-09-17 PROCEDURE — 96365 THER/PROPH/DIAG IV INF INIT: CPT

## 2021-09-17 PROCEDURE — 72125 CT NECK SPINE W/O DYE: CPT

## 2021-09-17 PROCEDURE — 85025 COMPLETE CBC W/AUTO DIFF WBC: CPT

## 2021-09-17 PROCEDURE — 87635 SARS-COV-2 COVID-19 AMP PRB: CPT

## 2021-09-17 PROCEDURE — 96374 THER/PROPH/DIAG INJ IV PUSH: CPT

## 2021-09-17 PROCEDURE — G0480 DRUG TEST DEF 1-7 CLASSES: HCPCS

## 2021-09-17 PROCEDURE — 93005 ELECTROCARDIOGRAM TRACING: CPT | Performed by: EMERGENCY MEDICINE

## 2021-09-17 PROCEDURE — 2580000003 HC RX 258: Performed by: EMERGENCY MEDICINE

## 2021-09-17 PROCEDURE — 80307 DRUG TEST PRSMV CHEM ANLYZR: CPT

## 2021-09-17 PROCEDURE — 0HBRXZZ EXCISION OF TOE NAIL, EXTERNAL APPROACH: ICD-10-PCS | Performed by: PODIATRIST

## 2021-09-17 PROCEDURE — 80053 COMPREHEN METABOLIC PANEL: CPT

## 2021-09-17 PROCEDURE — 83690 ASSAY OF LIPASE: CPT

## 2021-09-17 PROCEDURE — 99999 PR OFFICE/OUTPT VISIT,PROCEDURE ONLY: CPT | Performed by: PODIATRIST

## 2021-09-17 PROCEDURE — 80143 DRUG ASSAY ACETAMINOPHEN: CPT

## 2021-09-17 PROCEDURE — 84443 ASSAY THYROID STIM HORMONE: CPT

## 2021-09-17 RX ORDER — KETOROLAC TROMETHAMINE 30 MG/ML
15 INJECTION, SOLUTION INTRAMUSCULAR; INTRAVENOUS ONCE
Status: COMPLETED | OUTPATIENT
Start: 2021-09-17 | End: 2021-09-17

## 2021-09-17 RX ORDER — SULFAMETHOXAZOLE AND TRIMETHOPRIM 800; 160 MG/1; MG/1
1 TABLET ORAL ONCE
Status: COMPLETED | OUTPATIENT
Start: 2021-09-17 | End: 2021-09-17

## 2021-09-17 RX ORDER — 0.9 % SODIUM CHLORIDE 0.9 %
1000 INTRAVENOUS SOLUTION INTRAVENOUS ONCE
Status: DISCONTINUED | OUTPATIENT
Start: 2021-09-17 | End: 2021-09-18

## 2021-09-17 RX ORDER — SULFAMETHOXAZOLE AND TRIMETHOPRIM 800; 160 MG/1; MG/1
1 TABLET ORAL 2 TIMES DAILY
Qty: 14 TABLET | Refills: 0 | Status: ON HOLD | OUTPATIENT
Start: 2021-09-17 | End: 2021-09-20 | Stop reason: HOSPADM

## 2021-09-17 RX ORDER — 0.9 % SODIUM CHLORIDE 0.9 %
1000 INTRAVENOUS SOLUTION INTRAVENOUS ONCE
Status: COMPLETED | OUTPATIENT
Start: 2021-09-17 | End: 2021-09-18

## 2021-09-17 RX ORDER — ACETAMINOPHEN 500 MG
1000 TABLET ORAL ONCE
Status: COMPLETED | OUTPATIENT
Start: 2021-09-17 | End: 2021-09-17

## 2021-09-17 RX ORDER — ONDANSETRON 2 MG/ML
4 INJECTION INTRAMUSCULAR; INTRAVENOUS ONCE
Status: COMPLETED | OUTPATIENT
Start: 2021-09-17 | End: 2021-09-17

## 2021-09-17 RX ADMIN — ACETAMINOPHEN 1000 MG: 500 TABLET ORAL at 20:30

## 2021-09-17 RX ADMIN — SODIUM CHLORIDE 1000 ML: 9 INJECTION, SOLUTION INTRAVENOUS at 20:32

## 2021-09-17 RX ADMIN — SULFAMETHOXAZOLE AND TRIMETHOPRIM 1 TABLET: 800; 160 TABLET ORAL at 05:20

## 2021-09-17 RX ADMIN — KETOROLAC TROMETHAMINE 15 MG: 30 INJECTION, SOLUTION INTRAMUSCULAR; INTRAVENOUS at 03:52

## 2021-09-17 RX ADMIN — AZITHROMYCIN MONOHYDRATE 500 MG: 500 INJECTION, POWDER, LYOPHILIZED, FOR SOLUTION INTRAVENOUS at 22:19

## 2021-09-17 RX ADMIN — CEFTRIAXONE 1000 MG: 1 INJECTION, POWDER, FOR SOLUTION INTRAMUSCULAR; INTRAVENOUS at 21:41

## 2021-09-17 RX ADMIN — ONDANSETRON 4 MG: 2 INJECTION INTRAMUSCULAR; INTRAVENOUS at 03:52

## 2021-09-17 ASSESSMENT — PAIN SCALES - GENERAL
PAINLEVEL_OUTOF10: 6
PAINLEVEL_OUTOF10: 5

## 2021-09-17 ASSESSMENT — PAIN DESCRIPTION - PAIN TYPE
TYPE: ACUTE PAIN
TYPE: ACUTE PAIN

## 2021-09-17 ASSESSMENT — PAIN DESCRIPTION - FREQUENCY: FREQUENCY: CONTINUOUS

## 2021-09-17 ASSESSMENT — PAIN DESCRIPTION - ORIENTATION
ORIENTATION: LEFT
ORIENTATION: LEFT

## 2021-09-17 ASSESSMENT — PAIN DESCRIPTION - DESCRIPTORS: DESCRIPTORS: SHARP

## 2021-09-17 ASSESSMENT — PAIN DESCRIPTION - ONSET: ONSET: ON-GOING

## 2021-09-17 ASSESSMENT — PAIN DESCRIPTION - LOCATION
LOCATION: BACK
LOCATION: BACK

## 2021-09-17 NOTE — PROGRESS NOTES
Subjective:  Patient presents to [facility] today for routine diabetic foot care. Patient denies any new problems with their feet. Patient's diabetic control has been not changed. No Known Allergies    Past Medical History:   Diagnosis Date    Anemia     chronic, negative work up with EGD and Colonoscopy.  DJD (degenerative joint disease) of lumbar spine     Dry eye syndrome     GERD (gastroesophageal reflux disease)     Hepatic hemangioma     Hypertension     Keratitis     Secondary to dry eye syndrome.  Nuclear sclerotic cataract of both eyes     Obesity, morbid ([address]     Obstructive sleep apnea of adult     non compliant with cpap    Onychomycosis     1, 2, 3, 4, and 5, bilateral.    Type II or unspecified type diabetes mellitus without mention of complication, not stated as uncontrolled        Prior to Admission medications    Medication Sig Start Date End Date Taking?  Authorizing Provider   sulfamethoxazole-trimethoprim (BACTRIM DS) 800-160 MG per tablet Take 1 tablet by mouth 2 times daily for 7 days 9/17/21 9/24/21 Yes Martin Valentine MD   metoprolol (LOPRESSOR) 100 MG tablet TAKE 1 TABLET BY MOUTH TWICE DAILY 9/9/21  Yes Catalina De La Torre MD   metFORMIN (GLUCOPHAGE-XR) 500 MG extended release tablet TAKE 1 TABLET BY MOUTH TWICE DAILY 8/17/21  Yes ROCÍO Castaneda CNP   famotidine (PEPCID) 20 MG tablet TAKE 1 TABLET BY MOUTH TWICE DAILY 8/16/21  Yes ROCÍO Castaneda CNP   valsartan-hydroCHLOROthiazide (DIOVAN-HCT) 160-25 MG per tablet TAKE 1 TABLET DAILY 6/22/21  Yes ROCÍO Castaneda CNP   atorvastatin (LIPITOR) 20 MG tablet TAKE 1 TABLET DAILY 6/22/21  Yes ROCÍO Castaneda CNP   TRUE METRIX BLOOD GLUCOSE TEST strip USE TO TEST BLOOD SUGAR DAILY 6/14/21  Yes ROCÍO Castaneda CNP   oxybutynin (DITROPAN-XL) 10 MG extended release tablet TAKE 1 TABLET DAILY 5/10/21  Yes MD Tigist Garcia MISC USE TO TEST BLOOD SUGAR EVERY DAY 21  Yes ROCÍO Duarte CNP   fluticasone (FLONASE) 50 MCG/ACT nasal spray SHAKE LIQUID AND USE 2 SPRAYS IN EACH NOSTRIL EVERY DAY 21  Yes ROCÍO Castaneda CNP   tamsulosin (FLOMAX) 0.4 MG capsule TAKE 1 CAPSULE DAILY 3/22/21  Yes ROCÍO Castaneda CNP   blood glucose monitor kit and supplies Check blood sugar daily. Diagnosis E11.42 20  Yes ROCÍO Castaneda CNP   Foot Care Products (DIABETIC INSOLES) MISC 1 Units by Does not apply route daily Equinus contracture of ankle  (primary encounter diagnosis) DM type 2 with diabetic peripheral neuropathy ([facility] [address] Dermatophytosis of nail Foot pain, bilateral 10/29/19  Yes Alen Jimenez, DPM   Diabetic Shoe MISC by Does not apply route Equinus contracture of ankle  (primary encounter diagnosis)    DM type 2 with diabetic peripheral neuropathy ([facility] [address]    Dermatophytosis of nail    Foot pain, bilateral 10/29/19  Yes Nonnie Hair Keysha, DPM   omeprazole (PRILOSEC) 40 MG delayed release capsule TAKE 1 CAPSULE DAILY 10/28/19  Yes ROCÍO Castaneda CNP   aspirin 81 MG tablet Take 81 mg by mouth daily. Yes Historical Provider, MD       Social History     Tobacco Use    Smoking status: Former Smoker     Years: 15.00     Types: Cigars     Quit date: 1970     Years since quittin.8    Smokeless tobacco: Never Used    Tobacco comment: quit over 30 years ago   Substance Use Topics    Alcohol use: No     Alcohol/week: 0.0 standard drinks     Comment: no alcohol for many years     ROS: All 14 ROS systems reviewed and pertinent positives noted above, all others negative.   Objective:  Vascular: DP and PT pulses palpable 2/4, bilateral.  CFT <3 seconds, bilateral.  Hair growth present to the level of the digits, bilateral.  Edema absent, bilateral.  Varicosities absent, bilateral. Erythema absent, bilateral. Distal Rubor absent bilateral. Temperature within normal limits bilateral. Hyperpigmentation absent bilateral. No atrophic skin. Neurological: Sensation intact to light touch to level of digits, bilateral.  Protective sensation intact 10/10 sites via 5.07/10g [place] Monofilament, bilateral.  negative Tinel's, bilateral.  negative Valleix sign, bilateral.  Vibratory intact bilateral.  Reflexes Decreased bilateral.  Paresthesias negative. Dysthesias negative. Sharp/dull intact bilateral.    Musculoskeletal: Muscle strength 5/5, bilateral.  Pain absent upon palpation bilateral. Normal medial longitudinal arch, bilateral.  Ankle ROM within normal limits,bilateral.  1st MPJ ROM within normal limits, bilateral.  Dorsally contracted digits present. No other foot deformities. Integument:  Open lesion absent, Bilateral.  Interdigital maceration absent to web spaces,absent Bilateral.  Nails left 1, 2, 3, 4, 5 and right 1, 2, 3, 4, 5 thickened, dystrophic and crumbly, discolored with subungual debris. Fissures absent, Bilateral. Hyperkeratotic tissue is absent. Assessment:    Diagnosis Orders   1. DM type 2 with diabetic peripheral neuropathy ([address]     2. Dermatophytosis of nail         Plan:  Diabetic foot education and exam.  Nails as mentioned above debrided in length and thickness. Patient advised about OTC treatments for nails and callous. Patient will follow up in 10 weeks for routine foot care or PRN if any further problems arise.

## 2021-09-17 NOTE — ED PROVIDER NOTES
VALSARTAN-HYDROCHLOROTHIAZIDE (DIOVAN-HCT) 160-25 MG PER TABLET    TAKE 1 TABLET DAILY       ALLERGIES     has No Known Allergies. FAMILY HISTORY     He indicated that his mother is . He indicated that his father is . He indicated that one of his two sisters is . He indicated that two of his four brothers are alive. He indicated that the status of his neg hx is unknown.     family history includes Dementia in his sister; Diabetes in his sister; Heart Attack in his father; High Blood Pressure in his brother, brother, and sister; Ovarian Cancer in his mother; Stroke in his brother and brother. SOCIAL HISTORY      reports that he quit smoking about 50 years ago. His smoking use included cigars. He quit after 15.00 years of use. He has never used smokeless tobacco. He reports that he does not drink alcohol and does not use drugs. PHYSICAL EXAM     INITIAL VITALS:  oral temperature is 102 °F (38.9 °C). His blood pressure is 153/49 (abnormal) and his pulse is 87. His respiration is 20 and oxygen saturation is 96%. CONSTITUTIONAL: no apparent distress, well appearing, pleasantly confused  SKIN: Hot to the touch, dry, no jaundice, hives or petechiae  EYES: clear conjunctiva, non-icteric sclera  HENT: normocephalic, atraumatic, dry mucus membranes. No hemotympanum, velazquez sign, raccoon eyes or septal hematoma. NECK: Nontender and supple with no nuchal rigidity, full range of motion  PULMONARY: clear to auscultation without wheezes, rhonchi, or rales, normal excursion, no accessory muscle use and no stridor  CARDIOVASCULAR: regular rate, rhythm. Strong radial pulses with intact distal perfusion. Capillary refill <2 seconds. GASTROINTESTINAL: soft, non-tender, non-distended, no palpable masses, no rebound or guarding   GENITOURINARY: No costovertebral angle tenderness to palpation  MUSCULOSKELETAL: No midline spinal tenderness, step off or deformity.  Extremities are otherwise nontender to palpation and nonerythematous. Compartments soft. No peripheral edema. NEUROLOGIC: alert and oriented x to self only, GCS 15, normal mentation and speech. Moves all extremities x 4 without motor or sensory deficit, gait is stable without ataxia. Cranial nerves II through XII intact. No cerebellar signs. No pronator drift. Normal finger-to-nose. PSYCHIATRIC: confused, poor historian    DIAGNOSTIC RESULTS     EKG:  EKG 2024 sinus rhythm, rate 94 bpm, normal axis, normal intervals, no ST elevation or depression, no T wave changes, good R wave progression, Q wave in aVR and 3, new Q wave in lead III but otherwise unchanged from EKG on 4/1/2021    RADIOLOGY:   CT ABDOMEN PELVIS WO CONTRAST Additional Contrast? None    Result Date: 9/17/2021  EXAMINATION: CT OF THE ABDOMEN AND PELVIS WITHOUT CONTRAST 9/17/2021 4:12 am TECHNIQUE: CT of the abdomen and pelvis was performed without the administration of intravenous contrast. Multiplanar reformatted images are provided for review. Dose modulation, iterative reconstruction, and/or weight based adjustment of the mA/kV was utilized to reduce the radiation dose to as low as reasonably achievable. COMPARISON: 08/04/2020 postcontrast abdomen pelvis CT. HISTORY: ORDERING SYSTEM PROVIDED HISTORY: abdominal pain/back pain TECHNOLOGIST PROVIDED HISTORY: abdominal pain/back pain Decision Support Exception - unselect if not a suspected or confirmed emergency medical condition->Emergency Medical Condition (MA) Reason for Exam: Left sided abdominal and back pain Acuity: Acute Type of Exam: Initial FINDINGS: Lower Chest: Mild bibasilar atelectasis. Organs: A calcified granuloma is incidentally noted in the liver centrally. The liver is otherwise unremarkable. The gallbladder, pancreas, spleen, left adrenal gland, kidneys and visualized ureters are normal.  The right adrenal gland is again noted for a small lipid rich adrenal adenoma, stable.  GI/Bowel: Stomach and duodenal sweep demonstrate no acute abnormality. There is no evidence of bowel obstruction. No evidence of abnormal bowel wall thickening or distension. The appendix is visualized and is unremarkable. No evidence of acute appendicitis. Moderate descending and sigmoid colon diverticulosis. Pelvis: The bladder and reproductive organs are unremarkable. Peritoneum/Retroperitoneum: No evidence of ascites or free air. No evidence of lymphadenopathy. Aorta is normal in caliber. Bones/Soft Tissues: Surrounding osseous and soft tissue structures are unremarkable. No acute or concerning abnormality evident. Evidence of old granulomatous disease. Small stable lipid rich right adrenal adenoma. Moderate descending and sigmoid colon diverticulosis. CT head without contrast    Result Date: 9/17/2021  EXAMINATION: CT OF THE HEAD WITHOUT CONTRAST  9/17/2021 9:05 pm TECHNIQUE: CT of the head was performed without the administration of intravenous contrast. Dose modulation, iterative reconstruction, and/or weight based adjustment of the mA/kV was utilized to reduce the radiation dose to as low as reasonably achievable. COMPARISON: None. HISTORY: ORDERING SYSTEM PROVIDED HISTORY: altered mental status, fever, fall TECHNOLOGIST PROVIDED HISTORY: altered mental status, fever, fall Decision Support Exception - unselect if not a suspected or confirmed emergency medical condition->Emergency Medical Condition (MA) Reason for Exam: Altered mental status, fever, fall Acuity: Acute Type of Exam: Initial FINDINGS: BRAIN/VENTRICLES: Evaluation is mildly limited by patient motion and beam attenuation. There is no acute intracranial hemorrhage, mass effect or midline shift. No abnormal extra-axial fluid collection. No evidence of recent territorial infarct. There are nonspecific areas of hypoattenuation in the periventricular white matter and centrum semiovale that are likely related to chronic small vessel ischemic disease.    The ventricles and cisternal spaces are prominent consistent with cerebral atrophy. There is no evidence of hydrocephalus. There is intracranial atherosclerosis. ORBITS: The visualized portion of the orbits demonstrate no acute abnormality. SINUSES: The visualized paranasal sinuses and mastoid air cells demonstrate no acute abnormality. SOFT TISSUES/SKULL:  Right posterior subgaleal scalp lipomas. No acute abnormality of the visualized skull or soft tissues. No acute intracranial abnormality. CT CERVICAL SPINE WO CONTRAST    Result Date: 9/17/2021  EXAMINATION: CT OF THE CERVICAL SPINE WITHOUT CONTRAST 9/17/2021 3:04 pm TECHNIQUE: CT of the cervical spine was performed without the administration of intravenous contrast. Multiplanar reformatted images are provided for review. Dose modulation, iterative reconstruction, and/or weight based adjustment of the mA/kV was utilized to reduce the radiation dose to as low as reasonably achievable. COMPARISON: None. HISTORY: ORDERING SYSTEM PROVIDED HISTORY: fall, altered TECHNOLOGIST PROVIDED HISTORY: fall, altered Decision Support Exception - unselect if not a suspected or confirmed emergency medical condition->Emergency Medical Condition (MA) Reason for Exam: Altered mental status, fever, fall Acuity: Acute Type of Exam: Initial FINDINGS: BONES/ALIGNMENT: There is no acute fracture or traumatic malalignment. Posterior midline defect in the arch of C1 is present, related to failure of closure of the ossification center. DEGENERATIVE CHANGES: Multilevel degenerative changes are present, most prominent at the C5-6 C6-7 and C7-T1 disc levels. Rounded area of sclerosis is present within the T3 vertebral body, differential considerations include a bone island versus blastic metastatic disease. SOFT TISSUES: There is no prevertebral soft tissue swelling. Thyroid gland is enlarged. 1. No evidence of an acute fracture or traumatic malalignment involving the cervical spine 2.  Focal area of sclerosis within the T3 vertebral body. This likely represents a bone island given the Hounsfield unit measurements of greater than 1100 3. Diffuse thyroid enlargement     XR CHEST PORTABLE    Result Date: 9/17/2021  EXAMINATION: ONE XRAY VIEW OF THE CHEST 9/17/2021 9:25 pm COMPARISON: 08/20/2020 HISTORY: ORDERING SYSTEM PROVIDED HISTORY: altered mental status, fever TECHNOLOGIST PROVIDED HISTORY: altered mental status, fever Reason for Exam: fever Acuity: Acute Type of Exam: Initial FINDINGS: The lungs are mildly underinflated resulting in vascular crowding and bibasilar atelectasis. A right retrocardiac opacity is nonspecific. No sizable effusion or pneumothorax. The cardiac silhouette and mediastinal contours are stable. No acute bony abnormality. Hypoaerated lungs. Right basilar airspace opacities may reflect atelectasis or pneumonia. LABS:  Results for orders placed or performed during the hospital encounter of 09/17/21   COVID-19, Rapid    Specimen: Nasopharyngeal Swab   Result Value Ref Range    Specimen Description . NASOPHARYNGEAL SWAB     SARS-CoV-2, Rapid Not Detected Not Detected   CBC auto differential   Result Value Ref Range    WBC 16.0 (H) 3.5 - 11.3 k/uL    RBC 4.02 (L) 4.21 - 5.77 m/uL    Hemoglobin 11.2 (L) 13.0 - 17.0 g/dL    Hematocrit 34.9 (L) 40.7 - 50.3 %    MCV 86.8 82.6 - 102.9 fL    MCH 27.9 25.2 - 33.5 pg    MCHC 32.1 25.2 - 33.5 g/dL    RDW 13.2 11.8 - 14.4 %    Platelets 243 712 - 866 k/uL    MPV 10.8 8.1 - 13.5 fL    NRBC Automated 0.0 0.0 per 100 WBC    Differential Type NOT REPORTED     WBC Morphology NOT REPORTED     RBC Morphology NOT REPORTED     Platelet Estimate NOT REPORTED     Monocytes 11 3 - 12 %    Lymphocytes 7 (L) 24 - 43 %    Seg Neutrophils 81 (H) 36 - 65 %    Eosinophils % 0 (L) 1 - 4 %    Basophils 0 0 - 2 %    Immature Granulocytes 1 (H) 0 %    Absolute Mono # 1.76 (H) 0.10 - 1.20 k/uL    Absolute Lymph # 1.12 1.10 - 3.70 k/uL    Segs Absolute 12.96 (H) 1.50 - 8.10 k/uL    Absolute Eos # 0.00 0.00 - 0.44 k/uL    Basophils Absolute 0.00 0.00 - 0.20 k/uL    Absolute Immature Granulocyte 0.16 0.00 - 0.30 k/uL    Morphology Platelet count adequate     Morphology RBC morphology normal.    Lactate, Sepsis   Result Value Ref Range    Lactic Acid, Sepsis 1.4 0.5 - 1.9 mmol/L    Lactic Acid, Sepsis, Whole Blood NOT REPORTED 0.5 - 1.9 mmol/L   APTT   Result Value Ref Range    PTT 28.2 23.9 - 33.8 sec   Protime-INR   Result Value Ref Range    Protime 14.3 (H) 11.5 - 14.2 sec    INR 1.2    Lipase   Result Value Ref Range    Lipase 13 13 - 60 U/L   Troponin   Result Value Ref Range    Troponin, High Sensitivity 16 0 - 22 ng/L    Troponin T NOT REPORTED <0.03 ng/mL    Troponin Interp NOT REPORTED    Brain Natriuretic Peptide   Result Value Ref Range    Pro- <300 pg/mL    BNP Interpretation Pro-BNP Reference Range:    TOX SCR, BLD, ED   Result Value Ref Range    Acetaminophen Level <5 (L) 10 - 30 ug/mL    Ethanol <10 <10 mg/dL    Ethanol percent NOT REPORTED %    Salicylate Lvl <1 (L) 3 - 10 mg/dL    Toxic Tricyclic Sc,Blood PENDING NEGATIVE   Ammonia   Result Value Ref Range    Ammonia 32 16 - 60 umol/L   TSH WITH REFLEX   Result Value Ref Range    TSH 0.27 (L) 0.30 - 5.00 mIU/L   COMPREHENSIVE METABOLIC PANEL   Result Value Ref Range    Glucose 189 (H) 70 - 99 mg/dL    BUN 27 (H) 8 - 23 mg/dL    CREATININE 1.65 (H) 0.70 - 1.20 mg/dL    Bun/Cre Ratio 16 9 - 20    Calcium 8.7 8.6 - 10.4 mg/dL    Sodium 132 (L) 135 - 144 mmol/L    Potassium 3.7 3.7 - 5.3 mmol/L    Chloride 95 (L) 98 - 107 mmol/L    CO2 21 20 - 31 mmol/L    Anion Gap 16 9 - 17 mmol/L    Alkaline Phosphatase 116 40 - 129 U/L    ALT 19 5 - 41 U/L    AST 22 <40 U/L    Total Bilirubin 0.94 0.3 - 1.2 mg/dL    Total Protein 7.3 6.4 - 8.3 g/dL    Albumin 3.6 3.5 - 5.2 g/dL    Albumin/Globulin Ratio 1.0 1.0 - 2.5    GFR Non- 41 (L) >60 mL/min    GFR  49 (L) >60 mL/min    GFR Comment          GFR Staging NOT REPORTED        EMERGENCY DEPARTMENT COURSE:        The patient was given the following medications:  Orders Placed This Encounter   Medications    0.9 % sodium chloride bolus    acetaminophen (TYLENOL) tablet 1,000 mg    cefTRIAXone (ROCEPHIN) 1000 mg IVPB in 50 mL D5W minibag     Order Specific Question:   Antimicrobial Indications     Answer:   Urinary Tract Infection    0.9 % sodium chloride bolus    azithromycin (ZITHROMAX) 500 mg in D5W 250ml addavial     Order Specific Question:   Antimicrobial Indications     Answer:   Pneumonia (CAP)        Vitals:    Vitals:    09/17/21 1948 09/17/21 2009 09/17/21 2115   BP: (!) 149/82  (!) 153/49   Pulse: 92  87   Resp: 20  20   Temp: 99.6 °F (37.6 °C) 103.2 °F (39.6 °C) 102 °F (38.9 °C)   TempSrc: Temporal Oral Oral   SpO2: 91%  96%     -------------------------  BP: (!) 153/49, Temp: 102 °F (38.9 °C), Pulse: 87, Resp: 20    CONSULTS:  None    CRITICAL CARE:   CRITICAL CARE: There was a high probability of clinically significant/life threatening deterioration in this patient's condition which required my urgent intervention. Total critical care time was 35 minutes. This excludes any time for separately reportable procedures. PROCEDURES:  None    Sepsis Times and Checklist  Vital Signs: BP: (!) 153/49  Pulse: 87  Resp: 20  Temp: 102 °F (38.9 °C) SpO2: 96 %  SIRS (>2)   Temp > 38.3C or < 36C   HR > 90   RR > 20   WBC > 12 or < 4 or >10% bands  SIRS (>2) and confirmed or suspected source of infection = Sepsis      Severe Sepsis Identified:  Date: 9/17/21   Time: 2000  Sepsis Orders:  ·  CBC: Yes  ·  CMP: Yes  ·  PT/PTT: Yes  ·  Blood Cultures x2:  Yes  ·  Urinalysis and Urine Culture: already completed less than 24 hours ago and positive  ·  Lactate: Yes  If lactate > 2.0 MUST repeat within 6 hours  ·  Broad Spectrum Antibiotics Given (within 3 hours of sepsis of identification, after blood cultures):  Yes    (If unable to obtain IV access for IV antibiotics within 3 hours of identification of sepsis, IM antibiotics are acceptable.)    Urosepsis  Community acquired = Ceftriaxone 1 gm IV, if Penicillin allergy then Meropenem 1 gm IV  Hospital acquired = Zosyn 3.375 gm IV or Cefepime 1 gm IV, if Penicillin allergy then Meropenem 1 gm IV    Respiratory  Community Acquired Pneumonia = Ceftriaxone 1 gm IV plus Azithromycin 500 mg IV, if Penicillin allergy then Levofloxacin 750 mg IV. Hospital Acquired Pneumonia = Zosyn 4.5 gm IV plus Vancomycin 15 mg/kg IV, if Penicillin allergy then Meropenem 1 gm IV plus Vancomycin 15 mg/kg IV    IV Fluid Bolus:  Is lactate > 4.0:  No  If lactate >  4.0 OR hypotension (MAP<65 mmHg) (with 2 BP readings) IV fluids MUST be ordered. For persistent hypotension after IV fluid bolus vasopressors must be started (within 6 hours)      DIAGNOSIS/ MDM:   Norrine Habermann is a 67 y.o. male who presents with altered mental status. He arrives febrile and defervesced with Tylenol. Vital signs are otherwise stable upon arrival but he did develop some hypoxia and was placed on 2 L of nasal cannula with improvement. Sepsis work-up was initiated. I reviewed his labs from this morning and urinalysis was positive. Urine culture was already sent. He did have slight elevation in creatinine this morning. CT abdomen pelvis showed no acute process. The patient is alert and oriented to himself only. He denies any focal neurologic deficits and does follow commands. Heart regular rate and rhythm. Lungs clear to auscultation. Abdomen soft nontender. No midline spinal tenderness to palpation. No signs of basilar skull fracture. CBC shows a rise in WBC from 9.7 this morning to 16.0 now. Lactic acid, coags, lipase, troponin, BNP, ED tox screen, ammonia, and Covid are unremarkable. I tried to add a urine drug screen on as an add-on from his urine this morning. TSH is low but he has history of this.   Free T IV is pending. CMP shows worsening of his acute kidney injury with creatinine 1.67 and BUN of 27. He was treated with IV fluids. Chest x-ray shows possible right basilar airspace opacity. CT head and cervical spine show no acute process although he has some chronic changes. I started him on Rocephin and azithromycin to cover both UTI and pneumonia. The patient has been resting comfortably. He will need admitted for further evaluation and treatment. I spoke to the nurse practitioner, Carmen De León, at 10:12 PM who agrees to admit the patient. FINAL IMPRESSION      1. Urinary tract infection with hematuria, site unspecified    2. Enlarged thyroid    3. TITUS (acute kidney injury) ([facility] [address]    4.  Altered mental status, unspecified altered mental status type          DISPOSITION/PLAN   DISPOSITION            (Please note that portions of this note were completed with a voice recognitionprogram.  Efforts were made to edit the dictations but occasionally words are mis-transcribed.)    Nia Marcano DO DO  Emergency Physician Attending         Nia Marcano DO  09/17/21 7040

## 2021-09-17 NOTE — ED PROVIDER NOTES
eMERGENCY dEPARTMENT eNCOUnter      Pt Name: Lisa Bueno  MRN: [id number]  [place] 1945  Date of evaluation: 9/17/2021      CHIEF COMPLAINT       Chief Complaint   Patient presents with    Hyperglycemia     reported at home    Back Pain     left side         HISTORY OF PRESENT ILLNESS    Lisa Bueno is a 67 y.o. male who presents with high blood sugar back pain patient is extremely difficult historian as he is very vague about what is going on he states that he had checked his sugars found to be elevated near 200 denied any symptoms but then states he has been having these episodes of intermittent back pain that wraps around to his front left side but he is not really having much pain at this time no fever chills chest pain or shortness of breath no exacerbating relieving factors with low he says sometimes it is worse when he moves around        REVIEW OF SYSTEMS       Review of systems are all reviewed and negative except stated above in HPI    [address]    has a past medical history of Anemia, DJD (degenerative joint disease) of lumbar spine, Dry eye syndrome, GERD (gastroesophageal reflux disease), Hepatic hemangioma, Hypertension, Keratitis, Nuclear sclerotic cataract of both eyes, Obesity, morbid ([address] Obstructive sleep apnea of adult, Onychomycosis, and Type II or unspecified type diabetes mellitus without mention of complication, not stated as uncontrolled. SURGICAL HISTORY      has a past surgical history that includes Colonoscopy (09/15/2006); Upper gastrointestinal endoscopy (09/15/2006); cyst removal; and Umbilical hernia repair (2013).     CURRENT MEDICATIONS       Discharge Medication List as of 9/17/2021  5:09 AM      CONTINUE these medications which have NOT CHANGED    Details   metoprolol (LOPRESSOR) 100 MG tablet TAKE 1 TABLET BY MOUTH TWICE DAILY, Disp-180 tablet, R-3Normal      metFORMIN (GLUCOPHAGE-XR) 500 MG extended release tablet TAKE 1 TABLET BY MOUTH TWICE DAILY, Disp-180 tablet, R-1**Patient requests 90 days supply**Normal      famotidine (PEPCID) 20 MG tablet TAKE 1 TABLET BY MOUTH TWICE DAILY, Disp-60 tablet, R-2Normal      valsartan-hydroCHLOROthiazide (DIOVAN-HCT) 160-25 MG per tablet TAKE 1 TABLET DAILY, Disp-90 tablet, R-1Normal      atorvastatin (LIPITOR) 20 MG tablet TAKE 1 TABLET DAILY, Disp-90 tablet, R-1Normal      TRUE METRIX BLOOD GLUCOSE TEST strip USE TO TEST BLOOD SUGAR DAILY, Disp-200 strip, R-5Normal      oxybutynin (DITROPAN-XL) 10 MG extended release tablet TAKE 1 TABLET DAILY, Disp-90 tablet, R-3Normal      Lancets MISC Disp-100 each, R-3, NormalUSE TO TEST BLOOD SUGAR EVERY DAY      fluticasone (FLONASE) 50 MCG/ACT nasal spray SHAKE LIQUID AND USE 2 SPRAYS IN EACH NOSTRIL EVERY DAY, Disp-16 g, R-11Normal      tamsulosin (FLOMAX) 0.4 MG capsule TAKE 1 CAPSULE DAILY, Disp-90 capsule, R-1Normal      blood glucose monitor kit and supplies Check blood sugar daily. Diagnosis E11.42, Disp-1 kit, R-0, Print      Foot Care Products (DIABETIC INSOLES) MISC DAILY Starting Tue 10/29/2019, Disp-3 each, R-0, PrintEquinus contracture of ankle  (primary encounter diagnosis) DM type 2 with diabetic peripheral neuropathy ([facility] [address] [address] Dermatophytosis of nail Foot pain, bilateral      Diabetic Shoe MISC Starting Tue 10/29/2019, Disp-1 each, R-0, PrintEquinus contracture of ankle  (primary encounter diagnosis)  DM type 2 with diabetic peripheral neuropathy (HCC)  Dermatophytosis of nail  Foot pain, bilateral      omeprazole (PRILOSEC) 40 MG delayed release capsule TAKE 1 CAPSULE DAILY, Disp-90 capsule, R-4Normal      aspirin 81 MG tablet Take 81 mg by mouth daily. ALLERGIES     has No Known Allergies. FAMILY HISTORY     He indicated that his mother is . He indicated that his father is . He indicated that one of his two sisters is . He indicated that two of his four brothers are alive. He indicated that the status of his neg hx is unknown. family history includes Dementia in his sister; Diabetes in his sister; Heart Attack in his father; High Blood Pressure in his brother, brother, and sister; Ovarian Cancer in his mother; Stroke in his brother and brother. SOCIAL HISTORY      reports that he quit smoking about 50 years ago. His smoking use included cigars. He quit after 15.00 years of use. He has never used smokeless tobacco. He reports that he does not drink alcohol and does not use drugs. PHYSICAL EXAM     INITIAL VITALS:  tympanic temperature is 99.2 °F (37.3 °C). His blood pressure is 142/86 (abnormal) and his pulse is 78. His respiration is 18 and oxygen saturation is 96%. General: Patient alert nontoxic-appearing male in no apparent distress  HEENT: Head is atraumatic conjunctiva are clear mouth shows moist mucous membranes  Neck: Supple no meningismus  Respiratory: Lung sounds are clear bilateral  Cardiac: Heart is regular rate and rhythm  GI: It is obese soft nontender to palpation with no rebound guarding pulsatile mass or bruits bowel sounds are normal  Back: No CVA tenderness no reproducible pain in the SI joints  Neuro: Patient alert oriented x3 cranial nerves II to XII are intact grossly sensation and strength are grossly within normal in upper and lower extremities bilateral    DIFFERENTIAL DIAGNOSIS/ MDM:     Obtain baseline labs CAT scan    DIAGNOSTIC RESULTS     EKG: All EKG's are interpreted by the Emergency Department Physician who either signs or Co-signs this chart in the absence of a cardiologist.        RADIOLOGY:   I directly visualized the following  images and reviewed the radiologist interpretations:  CT ABDOMEN PELVIS WO CONTRAST Additional Contrast? None   Final Result   No acute or concerning abnormality evident. Evidence of old granulomatous disease. Small stable lipid rich right adrenal adenoma. Moderate descending and sigmoid colon diverticulosis.                LABS:  Labs Reviewed   CBC WITH AUTO DIFFERENTIAL - Abnormal; Notable for the following components:       Result Value    Hemoglobin 11.7 (*)     Hematocrit 37.4 (*)     Seg Neutrophils 74 (*)     Lymphocytes 12 (*)     All other components within normal limits   BASIC METABOLIC PANEL - Abnormal; Notable for the following components:    Glucose 219 (*)     BUN 25 (*)     CREATININE 1.34 (*)     GFR Non- 52 (*)     All other components within normal limits   URINALYSIS - Abnormal; Notable for the following components:    Glucose, Ur TRACE (*)     Specific Gravity, UA 1.030 (*)     Urine Hgb 3+ (*)     Protein, UA 2+ (*)     Nitrite, Urine POSITIVE (*)     Leukocyte Esterase, Urine 2+ (*)     All other components within normal limits   MICROSCOPIC URINALYSIS - Abnormal; Notable for the following components:    Bacteria, UA 3+ (*)     Other Observations UA Specimen Cultured (*)     All other components within normal limits   CULTURE, URINE         EMERGENCY DEPARTMENT COURSE:   Vitals:    Vitals:    09/17/21 0321 09/17/21 0323 09/17/21 0529   BP: (!) 159/71  (!) 142/86   Pulse: 68  78   Resp: 18     Temp: 99.5 °F (37.5 °C)  99.2 °F (37.3 °C)   TempSrc: Tympanic  Tympanic   SpO2: 93% 96%      -------------------------  BP: (!) 142/86, Temp: 99.2 °F (37.3 °C), Pulse: 78, Resp: 18    Orders Placed This Encounter   Medications    ketorolac (TORADOL) injection 15 mg    ondansetron (ZOFRAN) injection 4 mg    sulfamethoxazole-trimethoprim (BACTRIM DS;SEPTRA DS) 800-160 MG per tablet 1 tablet     Order Specific Question:   Antimicrobial Indications     Answer:   Urinary Tract Infection    sulfamethoxazole-trimethoprim (BACTRIM DS) 800-160 MG per tablet     Sig: Take 1 tablet by mouth 2 times daily for 7 days     Dispense:  14 tablet     Refill:  0           Re-evaluation Notes    CAT scan per radiologist shows nothing acute labs indicate a urinary tract infection white count is normal at this time will be started on antibiotics prescription for the same follow-up as directed return if worse    CRITICAL CARE:   None      CONSULTS:      PROCEDURES:  None    FINAL IMPRESSION      1. Urinary tract infection without hematuria, site unspecified          DISPOSITION/PLAN   DISPOSITION Decision To Discharge 09/17/2021 05:07:25 AM      Condition on Disposition    Stable    PATIENT REFERRED TO:  Tania Fournier, APRN - CNP  [address] [facility]  [phone]    In 2 days        DISCHARGE MEDICATIONS:  Discharge Medication List as of 9/17/2021  5:09 AM      START taking these medications    Details   sulfamethoxazole-trimethoprim (BACTRIM DS) 800-160 MG per tablet Take 1 tablet by mouth 2 times daily for 7 days, Disp-14 tablet, R-0Normal             (Please note that portions of this note were completed with a voice recognition program.  Efforts were made to edit the dictations but occasionally words are mis-transcribed.)    Candida Bui MD,, MD, F.A.C.E.P.   Attending Emergency Physician        Candida Bui MD  09/17/21 [id number]

## 2021-09-17 NOTE — PROGRESS NOTES
Foot Care Worksheet  PCP: ROCÍO Cabral - CNP  Last visit: 01 / 25 / 2021    Nail description:  Thick , Yellow , Crumbly , Marked limitation of ambulation     Pain resulting from thickened and dystrophy of nail plate No    Nails involved  Right   1, 2, 3, 4, 5  (T5-T9)  Left     1, 2, 3, 4, 5  (TA-T4)    Q7 1 Class A Finding - Non traumatic amputation of foot No    Q8 2 Class B Findings - Absent DP pulse No, Absent PT pulse No, Advanced tropic changes (3 required) Yes    Decrease hair growth Yes, Nail changes/thickening Yes, Pigmented changes/discoloration Yes, Skin texture (thin, shiny) Yes, Skin color (rubor/redness) No    Q9 1 Class B and 2 Class C Findings  Claudication No, Temperature change No, Paresthesia No, Burning Yes, Edema Yes

## 2021-09-18 PROBLEM — E05.90 HYPERTHYROIDISM: Status: ACTIVE | Noted: 2021-09-18

## 2021-09-18 LAB
ABSOLUTE EOS #: 0 K/UL (ref 0–0.44)
ABSOLUTE IMMATURE GRANULOCYTE: 0.15 K/UL (ref 0–0.3)
ABSOLUTE LYMPH #: 1.96 K/UL (ref 1.1–3.7)
ABSOLUTE MONO #: 1.81 K/UL (ref 0.1–1.2)
ACETAMINOPHEN LEVEL: <5 UG/ML (ref 10–30)
AMPHETAMINE SCREEN URINE: NEGATIVE
ANION GAP SERPL CALCULATED.3IONS-SCNC: 10 MMOL/L (ref 9–17)
BARBITURATE SCREEN URINE: NEGATIVE
BASOPHILS # BLD: 0 % (ref 0–2)
BASOPHILS ABSOLUTE: 0 K/UL (ref 0–0.2)
BENZODIAZEPINE SCREEN, URINE: NEGATIVE
BUN BLDV-MCNC: 27 MG/DL (ref 8–23)
BUN/CREAT BLD: 17 (ref 9–20)
BUPRENORPHINE URINE: NEGATIVE
CALCIUM SERPL-MCNC: 8.2 MG/DL (ref 8.6–10.4)
CANNABINOID SCREEN URINE: NEGATIVE
CHLORIDE BLD-SCNC: 102 MMOL/L (ref 98–107)
CO2: 25 MMOL/L (ref 20–31)
COCAINE METABOLITE, URINE: NEGATIVE
CREAT SERPL-MCNC: 1.61 MG/DL (ref 0.7–1.2)
DIFFERENTIAL TYPE: ABNORMAL
EOSINOPHILS RELATIVE PERCENT: 0 % (ref 1–4)
ETHANOL PERCENT: ABNORMAL %
ETHANOL: <10 MG/DL
GFR AFRICAN AMERICAN: 51 ML/MIN
GFR NON-AFRICAN AMERICAN: 42 ML/MIN
GFR SERPL CREATININE-BSD FRML MDRD: ABNORMAL ML/MIN/{1.73_M2}
GFR SERPL CREATININE-BSD FRML MDRD: ABNORMAL ML/MIN/{1.73_M2}
GLUCOSE BLD-MCNC: 164 MG/DL (ref 75–110)
GLUCOSE BLD-MCNC: 166 MG/DL (ref 70–99)
GLUCOSE BLD-MCNC: 169 MG/DL (ref 75–110)
GLUCOSE BLD-MCNC: 216 MG/DL (ref 75–110)
HCT VFR BLD CALC: 31.2 % (ref 40.7–50.3)
HEMOGLOBIN: 9.8 G/DL (ref 13–17)
IMMATURE GRANULOCYTES: 1 %
LYMPHOCYTES # BLD: 13 % (ref 24–43)
MAGNESIUM: 1.1 MG/DL (ref 1.6–2.6)
MAGNESIUM: 1.3 MG/DL (ref 1.6–2.6)
MCH RBC QN AUTO: 27.8 PG (ref 25.2–33.5)
MCHC RBC AUTO-ENTMCNC: 31.4 G/DL (ref 25.2–33.5)
MCV RBC AUTO: 88.4 FL (ref 82.6–102.9)
MDMA URINE: NORMAL
METHADONE SCREEN, URINE: NEGATIVE
METHAMPHETAMINE, URINE: NEGATIVE
MONOCYTES # BLD: 12 % (ref 3–12)
MORPHOLOGY: ABNORMAL
MORPHOLOGY: ABNORMAL
NRBC AUTOMATED: 0 PER 100 WBC
OPIATES, URINE: NEGATIVE
OXYCODONE SCREEN URINE: NEGATIVE
PDW BLD-RTO: 13.5 % (ref 11.8–14.4)
PHENCYCLIDINE, URINE: NEGATIVE
PLATELET # BLD: 187 K/UL (ref 138–453)
PLATELET ESTIMATE: ABNORMAL
PMV BLD AUTO: 10 FL (ref 8.1–13.5)
POTASSIUM SERPL-SCNC: 3.5 MMOL/L (ref 3.7–5.3)
PROPOXYPHENE, URINE: NEGATIVE
RBC # BLD: 3.53 M/UL (ref 4.21–5.77)
RBC # BLD: ABNORMAL 10*6/UL
SALICYLATE LEVEL: <1 MG/DL (ref 3–10)
SEG NEUTROPHILS: 74 % (ref 36–65)
SEGMENTED NEUTROPHILS ABSOLUTE COUNT: 11.18 K/UL (ref 1.5–8.1)
SODIUM BLD-SCNC: 137 MMOL/L (ref 135–144)
TEST INFORMATION: NORMAL
THYROXINE, FREE: 1.29 NG/DL (ref 0.93–1.7)
TOXIC TRICYCLIC SC,BLOOD: NEGATIVE
TRICYCLIC ANTIDEPRESSANTS, UR: NEGATIVE
TROPONIN INTERP: NORMAL
TROPONIN INTERP: NORMAL
TROPONIN T: NORMAL NG/ML
TROPONIN T: NORMAL NG/ML
TROPONIN, HIGH SENSITIVITY: 16 NG/L (ref 0–22)
TROPONIN, HIGH SENSITIVITY: 19 NG/L (ref 0–22)
WBC # BLD: 15.1 K/UL (ref 3.5–11.3)
WBC # BLD: ABNORMAL 10*3/UL

## 2021-09-18 PROCEDURE — 2580000003 HC RX 258: Performed by: INTERNAL MEDICINE

## 2021-09-18 PROCEDURE — 97530 THERAPEUTIC ACTIVITIES: CPT

## 2021-09-18 PROCEDURE — 6370000000 HC RX 637 (ALT 250 FOR IP): Performed by: NURSE PRACTITIONER

## 2021-09-18 PROCEDURE — 36415 COLL VENOUS BLD VENIPUNCTURE: CPT

## 2021-09-18 PROCEDURE — 2700000000 HC OXYGEN THERAPY PER DAY

## 2021-09-18 PROCEDURE — 85025 COMPLETE CBC W/AUTO DIFF WBC: CPT

## 2021-09-18 PROCEDURE — 94761 N-INVAS EAR/PLS OXIMETRY MLT: CPT

## 2021-09-18 PROCEDURE — 99232 SBSQ HOSP IP/OBS MODERATE 35: CPT | Performed by: INTERNAL MEDICINE

## 2021-09-18 PROCEDURE — 80048 BASIC METABOLIC PNL TOTAL CA: CPT

## 2021-09-18 PROCEDURE — 6360000002 HC RX W HCPCS: Performed by: NURSE PRACTITIONER

## 2021-09-18 PROCEDURE — 84484 ASSAY OF TROPONIN QUANT: CPT

## 2021-09-18 PROCEDURE — 2580000003 HC RX 258: Performed by: NURSE PRACTITIONER

## 2021-09-18 PROCEDURE — 2060000000 HC ICU INTERMEDIATE R&B

## 2021-09-18 PROCEDURE — 82947 ASSAY GLUCOSE BLOOD QUANT: CPT

## 2021-09-18 PROCEDURE — 83735 ASSAY OF MAGNESIUM: CPT

## 2021-09-18 PROCEDURE — 97161 PT EVAL LOW COMPLEX 20 MIN: CPT

## 2021-09-18 RX ORDER — METOPROLOL TARTRATE 50 MG/1
100 TABLET, FILM COATED ORAL 2 TIMES DAILY
Status: DISCONTINUED | OUTPATIENT
Start: 2021-09-18 | End: 2021-09-20 | Stop reason: HOSPADM

## 2021-09-18 RX ORDER — SODIUM CHLORIDE 9 MG/ML
INJECTION, SOLUTION INTRAVENOUS CONTINUOUS
Status: DISCONTINUED | OUTPATIENT
Start: 2021-09-18 | End: 2021-09-20 | Stop reason: HOSPADM

## 2021-09-18 RX ORDER — SODIUM CHLORIDE 0.9 % (FLUSH) 0.9 %
5-40 SYRINGE (ML) INJECTION EVERY 12 HOURS SCHEDULED
Status: DISCONTINUED | OUTPATIENT
Start: 2021-09-18 | End: 2021-09-20 | Stop reason: HOSPADM

## 2021-09-18 RX ORDER — SODIUM CHLORIDE FOR INHALATION 0.9 %
3 VIAL, NEBULIZER (ML) INHALATION
Status: DISCONTINUED | OUTPATIENT
Start: 2021-09-18 | End: 2021-09-20 | Stop reason: HOSPADM

## 2021-09-18 RX ORDER — ACETAMINOPHEN 650 MG/1
650 SUPPOSITORY RECTAL EVERY 6 HOURS PRN
Status: DISCONTINUED | OUTPATIENT
Start: 2021-09-18 | End: 2021-09-20 | Stop reason: HOSPADM

## 2021-09-18 RX ORDER — DEXTROSE MONOHYDRATE 50 MG/ML
100 INJECTION, SOLUTION INTRAVENOUS PRN
Status: DISCONTINUED | OUTPATIENT
Start: 2021-09-18 | End: 2021-09-20 | Stop reason: HOSPADM

## 2021-09-18 RX ORDER — ONDANSETRON 4 MG/1
4 TABLET, ORALLY DISINTEGRATING ORAL EVERY 8 HOURS PRN
Status: DISCONTINUED | OUTPATIENT
Start: 2021-09-18 | End: 2021-09-20 | Stop reason: HOSPADM

## 2021-09-18 RX ORDER — VALSARTAN 80 MG/1
160 TABLET ORAL DAILY
Status: DISCONTINUED | OUTPATIENT
Start: 2021-09-18 | End: 2021-09-20 | Stop reason: HOSPADM

## 2021-09-18 RX ORDER — TAMSULOSIN HYDROCHLORIDE 0.4 MG/1
0.4 CAPSULE ORAL DAILY
Status: DISCONTINUED | OUTPATIENT
Start: 2021-09-18 | End: 2021-09-20 | Stop reason: HOSPADM

## 2021-09-18 RX ORDER — SODIUM CHLORIDE 9 MG/ML
25 INJECTION, SOLUTION INTRAVENOUS PRN
Status: DISCONTINUED | OUTPATIENT
Start: 2021-09-18 | End: 2021-09-19

## 2021-09-18 RX ORDER — POTASSIUM CHLORIDE 20 MEQ/1
20 TABLET, EXTENDED RELEASE ORAL ONCE
Status: DISCONTINUED | OUTPATIENT
Start: 2021-09-18 | End: 2021-09-20

## 2021-09-18 RX ORDER — ATORVASTATIN CALCIUM 10 MG/1
20 TABLET, FILM COATED ORAL NIGHTLY
Status: DISCONTINUED | OUTPATIENT
Start: 2021-09-18 | End: 2021-09-20 | Stop reason: HOSPADM

## 2021-09-18 RX ORDER — MAGNESIUM SULFATE 1 G/100ML
1000 INJECTION INTRAVENOUS
Status: COMPLETED | OUTPATIENT
Start: 2021-09-19 | End: 2021-09-19

## 2021-09-18 RX ORDER — 0.9 % SODIUM CHLORIDE 0.9 %
1000 INTRAVENOUS SOLUTION INTRAVENOUS ONCE
Status: DISCONTINUED | OUTPATIENT
Start: 2021-09-18 | End: 2021-09-20 | Stop reason: HOSPADM

## 2021-09-18 RX ORDER — ALBUTEROL SULFATE 2.5 MG/3ML
2.5 SOLUTION RESPIRATORY (INHALATION)
Status: DISCONTINUED | OUTPATIENT
Start: 2021-09-18 | End: 2021-09-20 | Stop reason: HOSPADM

## 2021-09-18 RX ORDER — VALSARTAN AND HYDROCHLOROTHIAZIDE 160; 25 MG/1; MG/1
1 TABLET ORAL DAILY
Status: DISCONTINUED | OUTPATIENT
Start: 2021-09-18 | End: 2021-09-18

## 2021-09-18 RX ORDER — POLYETHYLENE GLYCOL 3350 17 G/17G
17 POWDER, FOR SOLUTION ORAL DAILY PRN
Status: DISCONTINUED | OUTPATIENT
Start: 2021-09-18 | End: 2021-09-20 | Stop reason: HOSPADM

## 2021-09-18 RX ORDER — ONDANSETRON 2 MG/ML
4 INJECTION INTRAMUSCULAR; INTRAVENOUS EVERY 6 HOURS PRN
Status: DISCONTINUED | OUTPATIENT
Start: 2021-09-18 | End: 2021-09-20 | Stop reason: HOSPADM

## 2021-09-18 RX ORDER — NICOTINE POLACRILEX 4 MG
15 LOZENGE BUCCAL PRN
Status: DISCONTINUED | OUTPATIENT
Start: 2021-09-18 | End: 2021-09-20 | Stop reason: HOSPADM

## 2021-09-18 RX ORDER — SODIUM CHLORIDE 0.9 % (FLUSH) 0.9 %
5-40 SYRINGE (ML) INJECTION PRN
Status: DISCONTINUED | OUTPATIENT
Start: 2021-09-18 | End: 2021-09-20 | Stop reason: HOSPADM

## 2021-09-18 RX ORDER — DEXTROSE MONOHYDRATE 25 G/50ML
12.5 INJECTION, SOLUTION INTRAVENOUS PRN
Status: DISCONTINUED | OUTPATIENT
Start: 2021-09-18 | End: 2021-09-20 | Stop reason: HOSPADM

## 2021-09-18 RX ORDER — HYDROCHLOROTHIAZIDE 25 MG/1
25 TABLET ORAL DAILY
Status: DISCONTINUED | OUTPATIENT
Start: 2021-09-18 | End: 2021-09-20 | Stop reason: HOSPADM

## 2021-09-18 RX ORDER — PANTOPRAZOLE SODIUM 40 MG/1
40 TABLET, DELAYED RELEASE ORAL
Status: DISCONTINUED | OUTPATIENT
Start: 2021-09-18 | End: 2021-09-20 | Stop reason: HOSPADM

## 2021-09-18 RX ORDER — OXYBUTYNIN CHLORIDE 5 MG/1
10 TABLET, EXTENDED RELEASE ORAL DAILY
Status: DISCONTINUED | OUTPATIENT
Start: 2021-09-18 | End: 2021-09-20 | Stop reason: HOSPADM

## 2021-09-18 RX ORDER — MAGNESIUM SULFATE 1 G/100ML
1000 INJECTION INTRAVENOUS
Status: COMPLETED | OUTPATIENT
Start: 2021-09-18 | End: 2021-09-18

## 2021-09-18 RX ORDER — FLUTICASONE PROPIONATE 50 MCG
2 SPRAY, SUSPENSION (ML) NASAL DAILY
Status: DISCONTINUED | OUTPATIENT
Start: 2021-09-18 | End: 2021-09-20 | Stop reason: HOSPADM

## 2021-09-18 RX ORDER — FAMOTIDINE 20 MG/1
20 TABLET, FILM COATED ORAL 2 TIMES DAILY
Status: DISCONTINUED | OUTPATIENT
Start: 2021-09-18 | End: 2021-09-20 | Stop reason: HOSPADM

## 2021-09-18 RX ORDER — ACETAMINOPHEN 325 MG/1
650 TABLET ORAL EVERY 6 HOURS PRN
Status: DISCONTINUED | OUTPATIENT
Start: 2021-09-18 | End: 2021-09-20 | Stop reason: HOSPADM

## 2021-09-18 RX ORDER — ASPIRIN 81 MG/1
81 TABLET ORAL DAILY
Status: DISCONTINUED | OUTPATIENT
Start: 2021-09-18 | End: 2021-09-20 | Stop reason: HOSPADM

## 2021-09-18 RX ADMIN — SODIUM CHLORIDE: 9 INJECTION, SOLUTION INTRAVENOUS at 00:29

## 2021-09-18 RX ADMIN — METOPROLOL TARTRATE 100 MG: 50 TABLET, FILM COATED ORAL at 22:04

## 2021-09-18 RX ADMIN — CEFTRIAXONE 1000 MG: 1 INJECTION, POWDER, FOR SOLUTION INTRAMUSCULAR; INTRAVENOUS at 22:05

## 2021-09-18 RX ADMIN — FAMOTIDINE 20 MG: 20 TABLET, FILM COATED ORAL at 22:05

## 2021-09-18 RX ADMIN — FLUTICASONE PROPIONATE 2 SPRAY: 50 SPRAY, METERED NASAL at 09:22

## 2021-09-18 RX ADMIN — METOPROLOL TARTRATE 100 MG: 50 TABLET, FILM COATED ORAL at 09:20

## 2021-09-18 RX ADMIN — ENOXAPARIN SODIUM 40 MG: 40 INJECTION SUBCUTANEOUS at 09:19

## 2021-09-18 RX ADMIN — SODIUM CHLORIDE: 9 INJECTION, SOLUTION INTRAVENOUS at 22:05

## 2021-09-18 RX ADMIN — HYDROCHLOROTHIAZIDE 25 MG: 25 TABLET ORAL at 09:20

## 2021-09-18 RX ADMIN — INSULIN LISPRO 1 UNITS: 100 INJECTION, SOLUTION INTRAVENOUS; SUBCUTANEOUS at 17:14

## 2021-09-18 RX ADMIN — OXYBUTYNIN CHLORIDE 10 MG: 5 TABLET, EXTENDED RELEASE ORAL at 09:20

## 2021-09-18 RX ADMIN — SODIUM CHLORIDE, PRESERVATIVE FREE 10 ML: 5 INJECTION INTRAVENOUS at 09:22

## 2021-09-18 RX ADMIN — ATORVASTATIN CALCIUM 20 MG: 10 TABLET, FILM COATED ORAL at 22:04

## 2021-09-18 RX ADMIN — ASPIRIN 81 MG: 81 TABLET, COATED ORAL at 09:20

## 2021-09-18 RX ADMIN — FAMOTIDINE 20 MG: 20 TABLET, FILM COATED ORAL at 09:20

## 2021-09-18 RX ADMIN — INSULIN LISPRO 2 UNITS: 100 INJECTION, SOLUTION INTRAVENOUS; SUBCUTANEOUS at 13:04

## 2021-09-18 RX ADMIN — PANTOPRAZOLE SODIUM 40 MG: 40 TABLET, DELAYED RELEASE ORAL at 06:37

## 2021-09-18 RX ADMIN — INSULIN LISPRO 2 UNITS: 100 INJECTION, SOLUTION INTRAVENOUS; SUBCUTANEOUS at 09:20

## 2021-09-18 RX ADMIN — MAGNESIUM SULFATE 1000 MG: 1 INJECTION INTRAVENOUS at 09:26

## 2021-09-18 RX ADMIN — INSULIN LISPRO 1 UNITS: 100 INJECTION, SOLUTION INTRAVENOUS; SUBCUTANEOUS at 22:00

## 2021-09-18 RX ADMIN — SODIUM CHLORIDE: 9 INJECTION, SOLUTION INTRAVENOUS at 06:38

## 2021-09-18 RX ADMIN — AZITHROMYCIN MONOHYDRATE 500 MG: 500 INJECTION, POWDER, LYOPHILIZED, FOR SOLUTION INTRAVENOUS at 23:00

## 2021-09-18 RX ADMIN — POLYETHYLENE GLYCOL 3350 17 G: 17 POWDER, FOR SOLUTION ORAL at 13:04

## 2021-09-18 RX ADMIN — VALSARTAN 160 MG: 80 TABLET, FILM COATED ORAL at 09:20

## 2021-09-18 RX ADMIN — SODIUM CHLORIDE: 9 INJECTION, SOLUTION INTRAVENOUS at 09:18

## 2021-09-18 RX ADMIN — MAGNESIUM SULFATE 1000 MG: 1 INJECTION INTRAVENOUS at 09:19

## 2021-09-18 RX ADMIN — TAMSULOSIN HYDROCHLORIDE 0.4 MG: 0.4 CAPSULE ORAL at 09:20

## 2021-09-18 ASSESSMENT — PAIN SCALES - GENERAL
PAINLEVEL_OUTOF10: 0

## 2021-09-18 NOTE — H&P
HOSPITALIST ADMISSION H&P      REASON FOR ADMISSION:  Acute cystitis with hematuria, TITUS  ESTIMATED LENGTH OF STAY:>2 midnights, 3-4 days    ATTENDING/ADMITTING PHYSICIAN: Orly Aguilar MD/ROCÍO Rock CNP  PCP: ROCÍO Tellez CNP    HISTORY OF PRESENT ILLNESS:      The patient is a 67 y.o. male patient of ROCÍO Tellez CNP who presents from the ER. He was brought to ER via squad after falling while walking to his pharmacy. The patient is somewhat of a poor historian, so history is obtained from staff report and EMR as well as the patient's report. The patient was seen early this morning in the ER and diagnosed with a UTI and TITUS -- he was prescribed Bactrim (CT of the abdomen/pelvis showed no acute abnormalities). He was apparently going to the pharmacy this evening to  this prescription, when he fell. He states his legs gave out and he landed in the grass. He reports he has had some increased generalized weakness the past 2 days. He denies any chest pain, headaches, shortness of breath, or LOC. Witnesses called EMS because they were concerned he may have passed out. Blood glucose for EMS was 188. He has a history of BPH with luts, MDRO E. Coli UTI (08/04/2020), and PSVT. He denies any recent fevers, nausea, vomiting, flank pain, hematuria, or diarrhea, but does report some dysuria the past few days. In ER, the patient was found to be confused and febrile with temperature maximum 103.2F. WBC elevated at 16 from 9.7 earlier today, lactic acid 1.4. Head and cervical CT without acute process, but with diffusely enlarged thyroid. Chest xray impression: Hypoaerated lungs. Right basilar airspace opacities may reflect atelectasis or pneumonia. His TITUS worsened throughout the day with serum creatinine 1.65, up from 1.34 (~1 at baseline). Initial troponin 16. TSH low at 0.27.      DMII -- HgbA1C 7.1    Hypertension    Gerd    RABIA -- non compliant with cpap    CKD -- typically low stage 2 at baseline    Stress test negative 04/13/2021, last 2D echo 08/2020 with preserved EF. See below for additional PMH. Patient rqru-oeoklxuhta-pixussjg-available records reviewed, including, but not limited to ER records, imaging results, lab results, office records, personal records, and OARRS -- no signs of abuse or diversion. Past Medical History:   Diagnosis Date    Anemia     chronic, negative work up with EGD and Colonoscopy.  Chest pain 6/9/2016    DJD (degenerative joint disease) of lumbar spine     Dry eye syndrome     GERD (gastroesophageal reflux disease)     Hepatic hemangioma     Hypertension     Keratitis     Secondary to dry eye syndrome.  Nuclear sclerotic cataract of both eyes     Obesity, morbid ([address]     Obstructive sleep apnea of adult     non compliant with cpap    Onychomycosis     1, 2, 3, 4, and 5, bilateral.    Type II or unspecified type diabetes mellitus without mention of complication, not stated as uncontrolled            Past Surgical History:   Procedure Laterality Date    COLONOSCOPY  09/15/2006    Diffuse diverticulosis slightly greater in the sigmoid colon and grade 1 internal hemorrhoids.  CYST REMOVAL      right side of  scalp    UMBILICAL HERNIA REPAIR  2013    UPPER GASTROINTESTINAL ENDOSCOPY  09/15/2006    Mild to moderate diffuse gastritis, mild to moderate diffuse duodenitis. Medications Prior to Admission:    Not in a hospital admission. Allergies:    Patient has no known allergies. Social History:    reports that he quit smoking about 50 years ago. His smoking use included cigars. He quit after 15.00 years of use. He has never used smokeless tobacco. He reports that he does not drink alcohol and does not use drugs. Family History:   family history includes Dementia in his sister; Diabetes in his sister;  Heart Attack in his father; High Blood Pressure in his brother, brother, and sister; Ovarian Cancer in his mother; Stroke in his brother and brother. REVIEW OF SYSTEMS:  See HPI and problem list; otherwise no other new complaints with respect to eyes, ENT, neck, pulmonary, coronary, chest, GI, , endocrine, musculoskeletal, immune system/connective tissue disease, hematologic, neurologic, psychiatric, skin, lymphatics, or malignancies. Code status: patient/family wishes for Full Code at this time.     PHYSICAL EXAM:  Vitals:  BP (!) 132/41   Pulse 81   Temp 102 °F (38.9 °C) (Oral)   Resp 20   SpO2 100%     General: awake, alert and cooperative  HEENT: EMOI, External nose normal, Normocephalic, Atraumatic and Neck with full ROM, oral mucosa pink and dry  Neck: Supple, Carotid Pulses Present, No Masses, Tenderness, Nodularity and No Lymphadenopathy  Chest/Lungs: Clear to Auscultation without Rales, Rhonchi, or Wheezes and Respirations even and unlabored  Cardiac: Regular Rate and Rhythm and Pedal Pulses Palpable Bilaterally  GI/Abdomen: obese, Bowel Sounds Present and Soft, Non-tender, without Guarding or Rebound Tenderness  : Not examined  Extremities/Musculoskeletal: All four extremities without edema and Generalized weakness  Skin: No Cyanosis and Skin warm and dry  Neuro: Alert and Oriented, to Person, to Time, to Place, to Situation (somewhat), No Localizing Signs/Symptoms, follows commands with all 4 extremities and Strength equal bilaterally, answers are slightly delayed and the patient does seem slightly confused at times however answers simple, direct questions correctly  Psychiatric: poor concentration      LABS:    CBC with Differential:    Lab Results   Component Value Date    WBC 16.0 09/17/2021    RBC 4.02 09/17/2021    HGB 11.2 09/17/2021    HCT 34.9 09/17/2021     09/17/2021    MCV 86.8 09/17/2021    MCH 27.9 09/17/2021    MCHC 32.1 09/17/2021    RDW 13.2 09/17/2021    LYMPHOPCT 7 09/17/2021    MONOPCT 11 09/17/2021    BASOPCT 0 09/17/2021    MONOSABS 1.76 09/17/2021    LYMPHSABS 1.12 09/17/2021    EOSABS 0.00 09/17/2021    BASOSABS 0.00 09/17/2021    DIFFTYPE NOT REPORTED 09/17/2021     CMP:    Lab Results   Component Value Date     09/17/2021    K 3.7 09/17/2021    CL 95 09/17/2021    CO2 21 09/17/2021    BUN 27 09/17/2021    CREATININE 1.65 09/17/2021    GFRAA 49 09/17/2021    LABGLOM 41 09/17/2021    GLUCOSE 189 09/17/2021    PROT 7.3 09/17/2021    LABALBU 3.6 09/17/2021    CALCIUM 8.7 09/17/2021    BILITOT 0.94 09/17/2021    ALKPHOS 116 09/17/2021    AST 22 09/17/2021    ALT 19 09/17/2021   Patient wixc-rdsegfdsbh-vzvryxmy-available records reviewed, including, but not limited to, ER reports--labs---imaging--personal notes    Attending Supervising Physician's Attestation Statement  I performed a history and physical examination on the patient and discussed the management with the nurse practitioner. I reviewed and agree with the findings and plan as documented in her note . Electronically signed by Yousif Shah on 9/19/21 at 10:03 PM EDT      ASSESSMENT:      Patient Active Problem List   Diagnosis    Hypertension    GERD (gastroesophageal reflux disease)    Obesity, morbid ([address]    Osteoarthritis of lumbar spine    Hepatic hemangioma    Obstructive sleep apnea of adult    Diabetes ([address]    DM type 2 with diabetic peripheral neuropathy (HCC)    Dermatophytosis of nail    Paroxysmal supraventricular tachycardia ([address]    Acute cystitis with hematuria    TITUS (acute kidney injury) ([address]    Fall    Mixed hyperlipidemia    Benign localized prostatic hyperplasia with lower urinary tract symptoms (LUTS)    CKD (chronic kidney disease) stage 2, GFR 60-89 ml/min       PLAN:    1. Acute cystitis with hematuria -- IV antibiotics, urine culture pending, blood cultures pending, bladder scan prn to monitor for urinary retention  2. TITUS -- IV hydration, monitor I&O and renal function, avoid nephrotoxins  3.  Acute confusion per ER staff report -- he is currently oriented and he has a documented history of being a poor historian, so it is unclear if there is an acute change from his baseline -- nursing to perform bedside swallow evaluation, neuro checks, reorient prn  4. Fall -- unknown if mechanical vs syncopal -- telemetry monitoring, serial troponins, IV hydration, orthostatic vitals, fall precautions, PT/OT eval and treat  5. Hyperthyroidism -- free T4 pending, outpatient follow up  6. DMII -- carb controlled diet, low ISS, monitor and titrate prn  7. RT protocols  8. Home medications reviewed  9.  See orders     Note that over 50 minutes was spent in evaluation of the patient, review of the chart and pertinent records, discussion with family/staff, etc    Olamide Ham, ROCÍO - CNP  ,NP-C, FNP-BC  10:56 PM  9/17/2021

## 2021-09-18 NOTE — CARE COORDINATION
Case Management Initial Discharge Plan  Luis Armando Bill             Met with:patient to discuss discharge plans. Information verified: address, contacts, phone number, , and insurance: Yes  Insurance Provider: Primary:  Payor: MEDICARE / Plan: MEDICARE PART A AND B / Product Type: *No Product type* /                                         Emergency Contact/Next of Kin name & number: verified  Who are involved in patient's support system? self    PCP: ROCÍO Shankar CNP  Date of last visit: 2021    Discharge Planning    Living Arrangements:  Alone     Home has 1 stories  Location of bedroom/bathroom in home 1st floor    Patient able to perform ADL's:Independent    Current Services (outpatient & in home) none    Is patient receiving oral anticoagulation therapy? No      Potential Assistance Needed:  Home Care    Patient agreeable to home care: Yes  Freedom of choice provided:  yes      Expected Discharge date:  21    Patient expects to be discharged to:   home    If home: is the family and/or caregiver wiling & able to provide support at home? no  Who will be providing this support? self    Follow Up Appointment: Best Day/ Time:      Transportation provider: wife    Transportation arrangements needed for discharge: No    Readmission Risk              Risk of Unplanned Readmission:  20             Does patient have a readmission risk score greater than 20?: No  If yes, follow-up appointment must be made within 7 days of discharge.      Goals of Care:       Educated patient on transitional options, provided freedom of choice and are agreeable with plan      Discharge Plan: home with possible home health          Electronically signed by Deepak Hernández RN on 21 at 8:32 AM EDT

## 2021-09-18 NOTE — PROGRESS NOTES
Physical Therapy    Facility/Department: [facility]  PROGRESSIVE CARE  Initial Assessment    NAME: Domigno Farley  : 1945  MRN: [id number]    Date of Service: 2021    Discharge Recommendations:  Home with assist PRN, Home with Home health PT, Continue to assess pending progress        Assessment   Prognosis: Good  Decision Making: Low Complexity  Activity Tolerance  Activity Tolerance: Patient Tolerated treatment well       Patient Diagnosis(es): The primary encounter diagnosis was Urinary tract infection with hematuria, site unspecified. Diagnoses of Enlarged thyroid, TITUS (acute kidney injury) ([address] and Altered mental status, unspecified altered mental status type were also pertinent to this visit. has a past medical history of Anemia, Chest pain, DJD (degenerative joint disease) of lumbar spine, Dry eye syndrome, GERD (gastroesophageal reflux disease), Hepatic hemangioma, Hypertension, Keratitis, Nuclear sclerotic cataract of both eyes, Obesity, morbid ([address] Obstructive sleep apnea of adult, Onychomycosis, and Type II or unspecified type diabetes mellitus without mention of complication, not stated as uncontrolled. has a past surgical history that includes Colonoscopy (09/15/2006); Upper gastrointestinal endoscopy (09/15/2006); cyst removal; and Umbilical hernia repair ().     Restrictions     Vision/Hearing  Vision: Within Functional Limits  Hearing: Within functional limits     Subjective  General  Chart Reviewed: Yes  Patient assessed for rehabilitation services?: Yes  Pain Screening  Patient Currently in Pain: Denies  Vital Signs  Patient Currently in Pain: Denies       Orientation  Orientation  Overall Orientation Status: Within Functional Limits  Social/Functional History  Social/Functional History  Lives With: Alone  Type of Home: Apartment  Home Layout: One level  Bathroom Shower/Tub: Tub/Shower unit  Bathroom Toilet: Standard  Bathroom Equipment: Grab bars in shower  ADL Assistance: Independent  Homemaking Assistance: Independent  Homemaking Responsibilities: Yes  Ambulation Assistance: Independent  Transfer Assistance: Independent  Active : No  Occupation: Retired  Cognition        Objective     Observation/Palpation  Posture: Fair    AROM RLE (degrees)  RLE AROM: WFL  AROM LLE (degrees)  LLE AROM : WFL  Strength RLE  Comment: grossly 4 to 4+/5  Strength LLE  Comment: grossly 4 to 4+/5        Bed mobility  Supine to Sit: Minimal assistance  Transfers  Sit to Stand: Stand by assistance  Stand to sit: Stand by assistance  Ambulation  Ambulation?: Yes  Ambulation 1  Surface: level tile  Device: Rolling Walker  Assistance: Stand by assistance;Contact guard assistance  Quality of Gait: decreased balance with turns, amb with RW cued to keep walker close asya with turns. Feel patient looked worse with RW, but Patient felt like he needed walker for balance and safety. Gait Deviations: Slow Miya  Distance: 15'              Plan   Plan  Times per day: Daily  Current Treatment Recommendations: Strengthening, Transfer Training, Endurance Training, Neuromuscular Re-education, ROM, Home Exercise Program, Gait Training, Balance Training, Safety Education & Training  Safety Devices  Type of devices:  All fall risk precautions in place    G-Code       OutComes Score                                                  AM-PAC Score             Goals  Short term goals  Time Frame for Short term goals: LOS  Short term goal 1: bed mobilty mod I  Short term goal 2: transfers with mod I  Short term goal 3: Amb x 100ft with RW or least restrictive device and mod I  Patient Goals   Patient goals : Return Home       Therapy Time   Individual Concurrent Group Co-treatment   Time In 0945         Time Out 1015         Minutes 30         Timed Code Treatment Minutes: 15 [facility], PT

## 2021-09-18 NOTE — RT PROTOCOL NOTE
RT Inhaler-Nebulizer Bronchodilator Protocol Note    There is a bronchodilator order in the chart from a provider indicating to follow the RT Bronchodilator Protocol and there is an Initiate RT Bronchodilator Protocol order as well (see protocol at bottom of note). The findings from the last RT Protocol Assessment were as follows:  Smoking: <15 Pack years  Surgical Status: No surgery  Xray: Multiple lobe infiltrates/atelectasis/pleural effusion/edema  Respiratory Pattern: RR 12-20  Mental Status: Alert and Oriented  Breath Sounds: Diminished and/or crackles  Cough: Strong, spontaneous, non-productive  Activity Level: Walking unassisted  Oxygen Requirement: Room Air - 2LNC/28% or home setting  Indication for Bronchodilator Therapy: Decreased or absent breath sounds  Bronchodilator Assessment Score: 5    Aerosolized bronchodilator medication orders have been revised according to the RT Bronchodilator Protocol. RT Inhaler-Nebulizer Bronchodilator Protocol:    Respiratory Therapist to perform RT Therapy Protocol Assessment then follow the protocol. No Indications - adjust the frequency to every 6 hours PRN wheezing or bronchospasm, if no treatments needed after 48 hours then discontinue using Per Protocol order mode. If indication present, adjust the RT bronchodilator orders based on the Bronchodilator Assessment Score as follows:    0-6 - enter or revise RT bronchodilator order to Albuterol Inhaler order with frequency of every 2 hours PRN for wheezing or increased work of breathing using Per Protocol order mode. If Albuterol Inhaler not tolerated or not effective, then discontinue the Albuterol Inhaler order and enter Albuterol Nebulizer order with same frequency and PRN reasons. Repeat RT Therapy Protocol Assessment as needed.     7-10 - discontinue any other Inpatient aerosolized bronchodilator medication orders and enter or revise two Albuterol Inhaler orders, one with BID frequency and one with frequency of every 2 hours PRN wheezing or increased work of breathing using Per Protocol order mode. Repeat RT Therapy Protocol Assessment with second treatment then BID and as needed. If Albuterol Inhaler not tolerated or not effective, then discontinue the Albuterol Inhaler orders and enter two Albuterol Nebulizer orders with same frequencies and PRN reasons. 11-13 - discontinue any other Inpatient aerosolized bronchodilator medication orders and enter DuoNeb Nebulizer orders QID frequency and an Albuterol Nebulizer order every 2 hours PRN wheezing or increased work of breathing using Per Protocol order mode. Repeat RT Therapy Protocol Assessment with second treatment then QID and as needed. Greater than 13 - discontinue any other Inpatient bronchodilator aerosolized medication orders and enter DuoNeb Nebulizer order every 4 hours frequency and Albuterol Nebulizer every 2 hours PRN wheezing or increased work of breathing using Per Protocol order mode. Repeat RT Therapy Protocol Assessment with second treatment then every 4 hours and as needed. RT to enter RT Home Evaluation for COPD & MDI Assessment order using Per Protocol order mode.     Electronically signed by Aldo Fernandez RCP on 9/18/2021 at 4:25 AM

## 2021-09-18 NOTE — PROGRESS NOTES
Hospitalist Progress Note  9/18/2021 10:40 AM  Subjective:   Admit Date: 9/17/2021  PCP: Ashanti Mcmanus APRN - CNP  Interval History: He is feeling a little better. Not as steady. Working with PT. Diet: ADULT DIET; Regular; 4 carb choices (60 gm/meal); Low Fat/Low Chol/High Fiber/CHRISTA  Medications:   Scheduled Meds:   aspirin  81 mg Oral Daily    atorvastatin  20 mg Oral Nightly    famotidine  20 mg Oral BID    fluticasone  2 spray Each Nostril Daily    metoprolol tartrate  100 mg Oral BID    pantoprazole  40 mg Oral QAM AC    oxybutynin  10 mg Oral Daily    tamsulosin  0.4 mg Oral Daily    sodium chloride flush  5-40 mL IntraVENous 2 times per day    enoxaparin  40 mg SubCUTAneous Daily    insulin lispro  0-6 Units SubCUTAneous TID WC    insulin lispro  0-3 Units SubCUTAneous Nightly    cefTRIAXone (ROCEPHIN) IV  1,000 mg IntraVENous Q24H    azithromycin  500 mg IntraVENous Q24H    valsartan  160 mg Oral Daily    And    hydroCHLOROthiazide  25 mg Oral Daily    potassium chloride  20 mEq Oral Once    sodium chloride  1,000 mL IntraVENous Once     Continuous Infusions:   sodium chloride      sodium chloride 150 mL/hr at 09/18/21 0918    dextrose       CBC:   Recent Labs     09/17/21  0350 09/17/21 2024 09/18/21  0551   WBC 9.7 16.0* 15.1*   HGB 11.7* 11.2* 9.8*    215 187     BMP:    Recent Labs     09/17/21  0350 09/17/21 2024 09/18/21  0551    132* 137   K 3.7 3.7 3.5*   CL 99 95* 102   CO2 25 21 25   BUN 25* 27* 27*   CREATININE 1.34* 1.65* 1.61*   GLUCOSE 219* 189* 166*     Hepatic:   Recent Labs     09/17/21 2024   AST 22   ALT 19   BILITOT 0.94   ALKPHOS 116     Troponin: No results for input(s): TROPONINI in the last 72 hours. BNP: No results for input(s): BNP in the last 72 hours. Lipids: No results for input(s): CHOL, HDL in the last 72 hours.     Invalid input(s): LDLCALCU  INR:   Recent Labs     09/17/21 2024   INR 1.2       Objective:   Vitals: BP (!) 164/85   Pulse 80   Temp 99 °F (37.2 °C) (Tympanic)   Resp 22   Ht 5' 8\" (1.727 m)   Wt 290 lb 11.2 oz (131.9 kg)   SpO2 94%   BMI 44.20 kg/m²     Intake/Output Summary (Last 24 hours) at 9/18/2021 1040  Last data filed at 9/18/2021 0836  Gross per 24 hour   Intake 360 ml   Output 400 ml   Net -40 ml     Patient Vitals for the past 96 hrs (Last 3 readings):   Weight   09/18/21 0400 290 lb 11.2 oz (131.9 kg)   09/17/21 2348 289 lb 4.8 oz (131.2 kg)   09/17/21 2325 295 lb (133.8 kg)     General appearance: alert and cooperative with exam  HEENT: Head: Normocephalic, no lesions, without obvious abnormality. Neck: no adenopathy, no carotid bruit, no JVD, supple, symmetrical, trachea midline and thyroid not enlarged, symmetric, no tenderness/mass/nodules  Lungs: clear to auscultation bilaterally  Heart: regular rate and rhythm, S1, S2 normal, no murmur, click, rub or gallop  Abdomen: soft, non-tender; bowel sounds normal; no masses,  no organomegaly  Extremities: extremities normal, atraumatic, no cyanosis or edema  Neurologic: Mental status: Alert, oriented, thought content appropriate    Assessment and Plan:         Acute cystitis with hematuria    TITUS (acute kidney injury) ([address]    Fall  Rocephin, IVF, PT  Improving      Diabetes ([address]  Stable. Continue to monitor. Hyperthyroidism  TSH usually low normal now . 27- Free T4 pending    Rodolfo Silva MD, MD  [facility] Hospitalist

## 2021-09-19 ENCOUNTER — APPOINTMENT (OUTPATIENT)
Dept: GENERAL RADIOLOGY | Age: 76
DRG: 872 | End: 2021-09-19
Payer: MEDICARE

## 2021-09-19 PROBLEM — R09.02 HYPOXIA: Status: ACTIVE | Noted: 2021-09-19

## 2021-09-19 LAB
ABSOLUTE EOS #: 0.15 K/UL (ref 0–0.44)
ABSOLUTE IMMATURE GRANULOCYTE: 0.04 K/UL (ref 0–0.3)
ABSOLUTE LYMPH #: 1.12 K/UL (ref 1.1–3.7)
ABSOLUTE MONO #: 1.27 K/UL (ref 0.1–1.2)
ANION GAP SERPL CALCULATED.3IONS-SCNC: 10 MMOL/L (ref 9–17)
BASOPHILS # BLD: 0 % (ref 0–2)
BASOPHILS ABSOLUTE: 0.03 K/UL (ref 0–0.2)
BUN BLDV-MCNC: 17 MG/DL (ref 8–23)
BUN/CREAT BLD: 16 (ref 9–20)
CALCIUM SERPL-MCNC: 8.4 MG/DL (ref 8.6–10.4)
CHLORIDE BLD-SCNC: 104 MMOL/L (ref 98–107)
CO2: 25 MMOL/L (ref 20–31)
CREAT SERPL-MCNC: 1.07 MG/DL (ref 0.7–1.2)
CULTURE: ABNORMAL
DIFFERENTIAL TYPE: ABNORMAL
EKG ATRIAL RATE: 94 BPM
EKG P AXIS: 44 DEGREES
EKG P-R INTERVAL: 172 MS
EKG Q-T INTERVAL: 334 MS
EKG QRS DURATION: 76 MS
EKG QTC CALCULATION (BAZETT): 417 MS
EKG R AXIS: 5 DEGREES
EKG T AXIS: 33 DEGREES
EKG VENTRICULAR RATE: 94 BPM
EOSINOPHILS RELATIVE PERCENT: 2 % (ref 1–4)
GFR AFRICAN AMERICAN: >60 ML/MIN
GFR NON-AFRICAN AMERICAN: >60 ML/MIN
GFR SERPL CREATININE-BSD FRML MDRD: ABNORMAL ML/MIN/{1.73_M2}
GFR SERPL CREATININE-BSD FRML MDRD: ABNORMAL ML/MIN/{1.73_M2}
GLUCOSE BLD-MCNC: 152 MG/DL (ref 70–99)
GLUCOSE BLD-MCNC: 179 MG/DL (ref 75–110)
GLUCOSE BLD-MCNC: 193 MG/DL (ref 75–110)
GLUCOSE BLD-MCNC: 198 MG/DL (ref 75–110)
HCT VFR BLD CALC: 31.8 % (ref 40.7–50.3)
HEMOGLOBIN: 10.1 G/DL (ref 13–17)
IMMATURE GRANULOCYTES: 0 %
LYMPHOCYTES # BLD: 12 % (ref 24–43)
Lab: ABNORMAL
MAGNESIUM: 1.8 MG/DL (ref 1.6–2.6)
MCH RBC QN AUTO: 28 PG (ref 25.2–33.5)
MCHC RBC AUTO-ENTMCNC: 31.8 G/DL (ref 25.2–33.5)
MCV RBC AUTO: 88.1 FL (ref 82.6–102.9)
MONOCYTES # BLD: 14 % (ref 3–12)
NRBC AUTOMATED: 0 PER 100 WBC
PDW BLD-RTO: 13.2 % (ref 11.8–14.4)
PLATELET # BLD: 201 K/UL (ref 138–453)
PLATELET ESTIMATE: ABNORMAL
PMV BLD AUTO: 10.3 FL (ref 8.1–13.5)
POTASSIUM SERPL-SCNC: 3.7 MMOL/L (ref 3.7–5.3)
RBC # BLD: 3.61 M/UL (ref 4.21–5.77)
RBC # BLD: ABNORMAL 10*6/UL
SEG NEUTROPHILS: 72 % (ref 36–65)
SEGMENTED NEUTROPHILS ABSOLUTE COUNT: 6.74 K/UL (ref 1.5–8.1)
SODIUM BLD-SCNC: 139 MMOL/L (ref 135–144)
SPECIMEN DESCRIPTION: ABNORMAL
WBC # BLD: 9.4 K/UL (ref 3.5–11.3)
WBC # BLD: ABNORMAL 10*3/UL

## 2021-09-19 PROCEDURE — 6370000000 HC RX 637 (ALT 250 FOR IP): Performed by: NURSE PRACTITIONER

## 2021-09-19 PROCEDURE — 80048 BASIC METABOLIC PNL TOTAL CA: CPT

## 2021-09-19 PROCEDURE — 2060000000 HC ICU INTERMEDIATE R&B

## 2021-09-19 PROCEDURE — 71046 X-RAY EXAM CHEST 2 VIEWS: CPT

## 2021-09-19 PROCEDURE — 85025 COMPLETE CBC W/AUTO DIFF WBC: CPT

## 2021-09-19 PROCEDURE — 6360000002 HC RX W HCPCS: Performed by: NURSE PRACTITIONER

## 2021-09-19 PROCEDURE — 36415 COLL VENOUS BLD VENIPUNCTURE: CPT

## 2021-09-19 PROCEDURE — 99232 SBSQ HOSP IP/OBS MODERATE 35: CPT | Performed by: INTERNAL MEDICINE

## 2021-09-19 PROCEDURE — 82947 ASSAY GLUCOSE BLOOD QUANT: CPT

## 2021-09-19 PROCEDURE — 2580000003 HC RX 258: Performed by: INTERNAL MEDICINE

## 2021-09-19 PROCEDURE — 97110 THERAPEUTIC EXERCISES: CPT

## 2021-09-19 PROCEDURE — 97116 GAIT TRAINING THERAPY: CPT

## 2021-09-19 PROCEDURE — 83735 ASSAY OF MAGNESIUM: CPT

## 2021-09-19 PROCEDURE — 2580000003 HC RX 258: Performed by: NURSE PRACTITIONER

## 2021-09-19 RX ORDER — SODIUM CHLORIDE 9 MG/ML
25 INJECTION, SOLUTION INTRAVENOUS PRN
Status: DISCONTINUED | OUTPATIENT
Start: 2021-09-19 | End: 2021-09-20 | Stop reason: HOSPADM

## 2021-09-19 RX ADMIN — SODIUM CHLORIDE, PRESERVATIVE FREE 10 ML: 5 INJECTION INTRAVENOUS at 08:42

## 2021-09-19 RX ADMIN — SODIUM CHLORIDE: 9 INJECTION, SOLUTION INTRAVENOUS at 13:53

## 2021-09-19 RX ADMIN — ASPIRIN 81 MG: 81 TABLET, COATED ORAL at 08:40

## 2021-09-19 RX ADMIN — ACETAMINOPHEN 650 MG: 325 TABLET ORAL at 15:20

## 2021-09-19 RX ADMIN — CEFTRIAXONE 1000 MG: 1 INJECTION, POWDER, FOR SOLUTION INTRAMUSCULAR; INTRAVENOUS at 22:32

## 2021-09-19 RX ADMIN — MAGNESIUM SULFATE 1000 MG: 1 INJECTION INTRAVENOUS at 02:20

## 2021-09-19 RX ADMIN — INSULIN LISPRO 1 UNITS: 100 INJECTION, SOLUTION INTRAVENOUS; SUBCUTANEOUS at 12:25

## 2021-09-19 RX ADMIN — HYDROCHLOROTHIAZIDE 25 MG: 25 TABLET ORAL at 08:40

## 2021-09-19 RX ADMIN — METOPROLOL TARTRATE 100 MG: 50 TABLET, FILM COATED ORAL at 21:13

## 2021-09-19 RX ADMIN — OXYBUTYNIN CHLORIDE 10 MG: 5 TABLET, EXTENDED RELEASE ORAL at 08:42

## 2021-09-19 RX ADMIN — FAMOTIDINE 20 MG: 20 TABLET, FILM COATED ORAL at 08:40

## 2021-09-19 RX ADMIN — ATORVASTATIN CALCIUM 20 MG: 10 TABLET, FILM COATED ORAL at 21:13

## 2021-09-19 RX ADMIN — MAGNESIUM SULFATE 1000 MG: 1 INJECTION INTRAVENOUS at 00:45

## 2021-09-19 RX ADMIN — ENOXAPARIN SODIUM 40 MG: 40 INJECTION SUBCUTANEOUS at 08:40

## 2021-09-19 RX ADMIN — FAMOTIDINE 20 MG: 20 TABLET, FILM COATED ORAL at 21:13

## 2021-09-19 RX ADMIN — AZITHROMYCIN MONOHYDRATE 500 MG: 500 INJECTION, POWDER, LYOPHILIZED, FOR SOLUTION INTRAVENOUS at 23:33

## 2021-09-19 RX ADMIN — VALSARTAN 160 MG: 80 TABLET, FILM COATED ORAL at 08:40

## 2021-09-19 RX ADMIN — METOPROLOL TARTRATE 100 MG: 50 TABLET, FILM COATED ORAL at 08:40

## 2021-09-19 RX ADMIN — INSULIN LISPRO 1 UNITS: 100 INJECTION, SOLUTION INTRAVENOUS; SUBCUTANEOUS at 21:13

## 2021-09-19 RX ADMIN — SODIUM CHLORIDE, PRESERVATIVE FREE 10 ML: 5 INJECTION INTRAVENOUS at 21:14

## 2021-09-19 RX ADMIN — INSULIN LISPRO 1 UNITS: 100 INJECTION, SOLUTION INTRAVENOUS; SUBCUTANEOUS at 16:50

## 2021-09-19 RX ADMIN — INSULIN LISPRO 1 UNITS: 100 INJECTION, SOLUTION INTRAVENOUS; SUBCUTANEOUS at 08:41

## 2021-09-19 RX ADMIN — TAMSULOSIN HYDROCHLORIDE 0.4 MG: 0.4 CAPSULE ORAL at 08:40

## 2021-09-19 RX ADMIN — PANTOPRAZOLE SODIUM 40 MG: 40 TABLET, DELAYED RELEASE ORAL at 06:03

## 2021-09-19 ASSESSMENT — PAIN SCALES - GENERAL
PAINLEVEL_OUTOF10: 0

## 2021-09-19 NOTE — PROGRESS NOTES
Physical Therapy  Facility/Department: [facility] CARE  Daily Treatment Note  NAME: Norrine Habermann  : 1945  MRN: [id number]    Date of Service: 2021    Discharge Recommendations:  Home with assist PRN, Home with Home health PT, Continue to assess pending progress        Assessment   Prognosis: Good  Decision Making: Low Complexity  REQUIRES PT FOLLOW UP: Yes  Activity Tolerance  Activity Tolerance: Patient Tolerated treatment well     Patient Diagnosis(es): The primary encounter diagnosis was Urinary tract infection with hematuria, site unspecified. Diagnoses of Enlarged thyroid, TITUS (acute kidney injury) ([facility] and Altered mental status, unspecified altered mental status type were also pertinent to this visit. has a past medical history of Anemia, Chest pain, DJD (degenerative joint disease) of lumbar spine, Dry eye syndrome, GERD (gastroesophageal reflux disease), Hepatic hemangioma, Hypertension, Keratitis, Nuclear sclerotic cataract of both eyes, Obesity, morbid ([address] Obstructive sleep apnea of adult, Onychomycosis, and Type II or unspecified type diabetes mellitus without mention of complication, not stated as uncontrolled. has a past surgical history that includes Colonoscopy (09/15/2006); Upper gastrointestinal endoscopy (09/15/2006); cyst removal; and Umbilical hernia repair (). Restrictions     Subjective   General  Chart Reviewed: Yes  Response To Previous Treatment: Patient with no complaints from previous session. Family / Caregiver Present: No  Subjective  Subjective: Patient seated on EOB upon arrival and willing to participate in therapy. Patient reports he is feeling better today and is hoping to return home soon.   Pain Screening  Patient Currently in Pain: Denies  Pain Assessment  Pain Assessment: 0-10  Pain Level: 0  Vital Signs  Patient Currently in Pain: Denies  Oxygen Therapy  O2 Device: Nasal cannula  O2 Flow Rate (L/min): 2 L/min Home    Plan    Plan  Times per day: Daily  Current Treatment Recommendations: Strengthening, Transfer Training, Endurance Training, Neuromuscular Re-education, ROM, Home Exercise Program, Gait Training, Balance Training, Safety Education & Training  Safety Devices  Type of devices:  All fall risk precautions in place, Patient at risk for falls, Call light within reach, Gait belt, Nurse notified, Left in chair     Therapy Time   Individual Concurrent Group Co-treatment   Time In 0850         Time Out 0914         Minutes [address] [facility] [place], [place]

## 2021-09-19 NOTE — PROGRESS NOTES
Hospitalist Progress Note  9/19/2021 10:15 AM  Subjective:   Admit Date: 9/17/2021  PCP: ROCÍO Berrios - CNP  Interval History: He is feeling better. Urinating OK. He denies any SOB  No CP    Diet: ADULT DIET; Regular; 4 carb choices (60 gm/meal); Low Fat/Low Chol/High Fiber/CHRISTA  Medications:   Scheduled Meds:   aspirin  81 mg Oral Daily    atorvastatin  20 mg Oral Nightly    famotidine  20 mg Oral BID    fluticasone  2 spray Each Nostril Daily    metoprolol tartrate  100 mg Oral BID    pantoprazole  40 mg Oral QAM AC    oxybutynin  10 mg Oral Daily    tamsulosin  0.4 mg Oral Daily    sodium chloride flush  5-40 mL IntraVENous 2 times per day    enoxaparin  40 mg SubCUTAneous Daily    insulin lispro  0-6 Units SubCUTAneous TID WC    insulin lispro  0-3 Units SubCUTAneous Nightly    cefTRIAXone (ROCEPHIN) IV  1,000 mg IntraVENous Q24H    azithromycin  500 mg IntraVENous Q24H    valsartan  160 mg Oral Daily    And    hydroCHLOROthiazide  25 mg Oral Daily    potassium chloride  20 mEq Oral Once    sodium chloride  1,000 mL IntraVENous Once     Continuous Infusions:   sodium chloride      sodium chloride 100 mL/hr at 09/18/21 2205    dextrose       CBC:   Recent Labs     09/17/21 2024 09/18/21  0551 09/19/21  0553   WBC 16.0* 15.1* 9.4   HGB 11.2* 9.8* 10.1*    187 201     BMP:    Recent Labs     09/17/21 2024 09/18/21  0551 09/19/21  0553   * 137 139   K 3.7 3.5* 3.7   CL 95* 102 104   CO2 21 25 25   BUN 27* 27* 17   CREATININE 1.65* 1.61* 1.07   GLUCOSE 189* 166* 152*     Hepatic:   Recent Labs     09/17/21 2024   AST 22   ALT 19   BILITOT 0.94   ALKPHOS 116     Troponin: No results for input(s): TROPONINI in the last 72 hours. BNP: No results for input(s): BNP in the last 72 hours. Lipids: No results for input(s): CHOL, HDL in the last 72 hours.     Invalid input(s): LDLCALCU  INR:   Recent Labs     09/17/21 2024   INR 1.2       Objective:   Vitals: BP (!) 139/56   Pulse 74   Temp 99.8 °F (37.7 °C) (Tympanic)   Resp 23   Ht 5' 8\" (1.727 m)   Wt 293 lb (132.9 kg)   SpO2 95%   BMI 44.55 kg/m²     Intake/Output Summary (Last 24 hours) at 9/19/2021 1015  Last data filed at 9/19/2021 0527  Gross per 24 hour   Intake 3858. 15 ml   Output    Net 7366. 15 ml     Patient Vitals for the past 96 hrs (Last 3 readings):   Weight   09/19/21 0527 293 lb (132.9 kg)   09/18/21 0400 290 lb 11.2 oz (131.9 kg)   09/17/21 2348 289 lb 4.8 oz (131.2 kg)     General appearance: alert and cooperative with exam  HEENT: Head: Normocephalic, no lesions, without obvious abnormality. Neck: no adenopathy, no carotid bruit, no JVD, supple, symmetrical, trachea midline and thyroid not enlarged, symmetric, no tenderness/mass/nodules  Lungs: rales bibasilar  Heart: regular rate and rhythm, S1, S2 normal, no murmur, click, rub or gallop  Abdomen: soft, non-tender; bowel sounds normal; no masses,  no organomegaly  Extremities: extremities normal, atraumatic, no cyanosis or edema  Neurologic: Mental status: Alert, oriented, thought content appropriate    Assessment and Plan:      Acute cystitis with hematuria    TITUS (acute kidney injury) ([address]    Fall  Creat better  Rocephin, IVF, PT  Improving    Hypoxia  CXR on entry atelectasis  Vs pneumonia. Has needed O2, lungs with Bilat basilar rales, [id number] --on rocephin plus zithro- clinically improving-- repeat cxr    Diabetes ([address]  Stable. Continue to monitor. Hyperthyroidism  TSH usually low normal now . 27- normal Free T4-- outpt f/u          Page Suresh MD, MD  [facility] Hospitalist

## 2021-09-20 VITALS
BODY MASS INDEX: 44.41 KG/M2 | RESPIRATION RATE: 22 BRPM | HEART RATE: 73 BPM | DIASTOLIC BLOOD PRESSURE: 78 MMHG | HEIGHT: 68 IN | TEMPERATURE: 99.3 F | SYSTOLIC BLOOD PRESSURE: 149 MMHG | OXYGEN SATURATION: 96 % | WEIGHT: 293 LBS

## 2021-09-20 PROBLEM — R31.9 URINARY TRACT INFECTION WITH HEMATURIA: Status: ACTIVE | Noted: 2020-08-04

## 2021-09-20 LAB
ABSOLUTE EOS #: 0.15 K/UL (ref 0–0.44)
ABSOLUTE IMMATURE GRANULOCYTE: 0.07 K/UL (ref 0–0.3)
ABSOLUTE LYMPH #: 1.1 K/UL (ref 1.1–3.7)
ABSOLUTE MONO #: 1.05 K/UL (ref 0.1–1.2)
ANION GAP SERPL CALCULATED.3IONS-SCNC: 10 MMOL/L (ref 9–17)
BASOPHILS # BLD: 1 % (ref 0–2)
BASOPHILS ABSOLUTE: 0.04 K/UL (ref 0–0.2)
BUN BLDV-MCNC: 13 MG/DL (ref 8–23)
BUN/CREAT BLD: 13 (ref 9–20)
CALCIUM SERPL-MCNC: 8.6 MG/DL (ref 8.6–10.4)
CHLORIDE BLD-SCNC: 102 MMOL/L (ref 98–107)
CO2: 24 MMOL/L (ref 20–31)
CREAT SERPL-MCNC: 1.04 MG/DL (ref 0.7–1.2)
DIFFERENTIAL TYPE: ABNORMAL
EOSINOPHILS RELATIVE PERCENT: 2 % (ref 1–4)
FERRITIN: 621 UG/L (ref 30–400)
GFR AFRICAN AMERICAN: >60 ML/MIN
GFR NON-AFRICAN AMERICAN: >60 ML/MIN
GFR SERPL CREATININE-BSD FRML MDRD: ABNORMAL ML/MIN/{1.73_M2}
GFR SERPL CREATININE-BSD FRML MDRD: ABNORMAL ML/MIN/{1.73_M2}
GLUCOSE BLD-MCNC: 156 MG/DL (ref 70–99)
GLUCOSE BLD-MCNC: 190 MG/DL (ref 75–110)
HCT VFR BLD CALC: 31.7 % (ref 40.7–50.3)
HEMOGLOBIN: 9.9 G/DL (ref 13–17)
IMMATURE GRANULOCYTES: 1 %
IRON SATURATION: 29 % (ref 20–55)
IRON: 44 UG/DL (ref 59–158)
LYMPHOCYTES # BLD: 16 % (ref 24–43)
MCH RBC QN AUTO: 28 PG (ref 25.2–33.5)
MCHC RBC AUTO-ENTMCNC: 31.2 G/DL (ref 25.2–33.5)
MCV RBC AUTO: 89.5 FL (ref 82.6–102.9)
MONOCYTES # BLD: 15 % (ref 3–12)
NRBC AUTOMATED: 0 PER 100 WBC
PDW BLD-RTO: 13.2 % (ref 11.8–14.4)
PLATELET # BLD: 224 K/UL (ref 138–453)
PLATELET ESTIMATE: ABNORMAL
PMV BLD AUTO: 10.2 FL (ref 8.1–13.5)
POTASSIUM SERPL-SCNC: 3.4 MMOL/L (ref 3.7–5.3)
RBC # BLD: 3.54 M/UL (ref 4.21–5.77)
RBC # BLD: ABNORMAL 10*6/UL
SEG NEUTROPHILS: 65 % (ref 36–65)
SEGMENTED NEUTROPHILS ABSOLUTE COUNT: 4.68 K/UL (ref 1.5–8.1)
SODIUM BLD-SCNC: 136 MMOL/L (ref 135–144)
TOTAL IRON BINDING CAPACITY: 153 UG/DL (ref 250–450)
UNSATURATED IRON BINDING CAPACITY: 109 UG/DL (ref 112–347)
WBC # BLD: 7.1 K/UL (ref 3.5–11.3)
WBC # BLD: ABNORMAL 10*3/UL

## 2021-09-20 PROCEDURE — 80048 BASIC METABOLIC PNL TOTAL CA: CPT

## 2021-09-20 PROCEDURE — 6360000002 HC RX W HCPCS: Performed by: NURSE PRACTITIONER

## 2021-09-20 PROCEDURE — 36415 COLL VENOUS BLD VENIPUNCTURE: CPT

## 2021-09-20 PROCEDURE — 85025 COMPLETE CBC W/AUTO DIFF WBC: CPT

## 2021-09-20 PROCEDURE — 82728 ASSAY OF FERRITIN: CPT

## 2021-09-20 PROCEDURE — 83540 ASSAY OF IRON: CPT

## 2021-09-20 PROCEDURE — 83550 IRON BINDING TEST: CPT

## 2021-09-20 PROCEDURE — 2580000003 HC RX 258: Performed by: NURSE PRACTITIONER

## 2021-09-20 PROCEDURE — 6370000000 HC RX 637 (ALT 250 FOR IP): Performed by: NURSE PRACTITIONER

## 2021-09-20 PROCEDURE — 94761 N-INVAS EAR/PLS OXIMETRY MLT: CPT

## 2021-09-20 PROCEDURE — 82947 ASSAY GLUCOSE BLOOD QUANT: CPT

## 2021-09-20 PROCEDURE — 99238 HOSP IP/OBS DSCHRG MGMT 30/<: CPT | Performed by: FAMILY MEDICINE

## 2021-09-20 RX ORDER — GREEN TEA/HOODIA GORDONII 315-12.5MG
1 CAPSULE ORAL 3 TIMES DAILY
Qty: 30 TABLET | Refills: 0 | Status: SHIPPED | OUTPATIENT
Start: 2021-09-20 | End: 2021-09-30

## 2021-09-20 RX ORDER — AZITHROMYCIN 500 MG/1
500 TABLET, FILM COATED ORAL DAILY
Qty: 5 TABLET | Refills: 0 | Status: SHIPPED | OUTPATIENT
Start: 2021-09-20 | End: 2021-09-25

## 2021-09-20 RX ORDER — CEFDINIR 300 MG/1
300 CAPSULE ORAL 2 TIMES DAILY
Qty: 10 CAPSULE | Refills: 0 | Status: SHIPPED | OUTPATIENT
Start: 2021-09-20 | End: 2021-09-25

## 2021-09-20 RX ADMIN — FLUTICASONE PROPIONATE 2 SPRAY: 50 SPRAY, METERED NASAL at 09:24

## 2021-09-20 RX ADMIN — ENOXAPARIN SODIUM 40 MG: 40 INJECTION SUBCUTANEOUS at 09:22

## 2021-09-20 RX ADMIN — SODIUM CHLORIDE, PRESERVATIVE FREE 10 ML: 5 INJECTION INTRAVENOUS at 09:24

## 2021-09-20 RX ADMIN — FAMOTIDINE 20 MG: 20 TABLET, FILM COATED ORAL at 09:23

## 2021-09-20 RX ADMIN — HYDROCHLOROTHIAZIDE 25 MG: 25 TABLET ORAL at 09:23

## 2021-09-20 RX ADMIN — OXYBUTYNIN CHLORIDE 10 MG: 5 TABLET, EXTENDED RELEASE ORAL at 09:23

## 2021-09-20 RX ADMIN — VALSARTAN 160 MG: 80 TABLET, FILM COATED ORAL at 09:23

## 2021-09-20 RX ADMIN — INSULIN LISPRO 1 UNITS: 100 INJECTION, SOLUTION INTRAVENOUS; SUBCUTANEOUS at 09:22

## 2021-09-20 RX ADMIN — METOPROLOL TARTRATE 100 MG: 50 TABLET, FILM COATED ORAL at 09:23

## 2021-09-20 RX ADMIN — TAMSULOSIN HYDROCHLORIDE 0.4 MG: 0.4 CAPSULE ORAL at 09:23

## 2021-09-20 RX ADMIN — PANTOPRAZOLE SODIUM 40 MG: 40 TABLET, DELAYED RELEASE ORAL at 06:03

## 2021-09-20 RX ADMIN — ASPIRIN 81 MG: 81 TABLET, COATED ORAL at 09:23

## 2021-09-20 ASSESSMENT — PAIN SCALES - GENERAL
PAINLEVEL_OUTOF10: 0

## 2021-09-20 NOTE — DISCHARGE INSTR - DIET

## 2021-09-20 NOTE — PROGRESS NOTES
DISCHARGE BARRIERS       Reason for Referral:  SW completed a Psychosocial Assessment for evaluation of patient's mental health, social status, and functional capacity within the community. Patient is a 67year old male admitted due to Acute cystitis with hematuria. Mental Status:  Alert, oriented, and engaging during assessment. Decision Making:  Makes own decisions. Family/Social/Home Environment:  Lives alone. Patient voiced he is thinking about getting back with his ex-wife and moving in with her. Support: Friends  Current Services: Has used [employer] [place] for transportation services. Current DMEs:  None  PCP: DIANN Castaneda and savage no issues affording medication.  status:  None   ADLs and means of transportation: Independent in ADLs and does not drive. Patient reports his friends assist him with transportation. Food insecurity or needed financial assistance: Patient denies any food insecurity or financial concerns at this time. ACP and Code Status: Patient is a full code status and does not have an Advance Directive. SW discussed an Advance Directive which included the patient's choices for care and treatment in the case of a health event that adversely affects decision-making abilities. SW provided education and resources. Patient has no additional questions at this time and has agreed to contact SW should anything change. Collaborative List of SNF/ECF/HH were provided: Patient voiced he was [facility] at this time. No discharge order at this time. Anticipated Needs/Discharge Plan:  Spoke with patient about discharge plan. Patient verbalizes understanding of the plan of care and denies discharge needs or further services at this time. SW provided business card. SW will continue to monitor needs and assist as appropriate.         Electronically signed by LEONCIO Gusman on 9/20/2021 at 12:16 PM

## 2021-09-20 NOTE — DISCHARGE SUMMARY
Discharge Summary    [facility] Patient Status:  Inpatient    1945 MRN [id number]   Location [address] [place] Attending Lindsay Holliday MD   [facility] Day # 3 PCP ROCÍO Tellez CNP     Date of Admission: 2021    Date of Discharge: 2021    Admitting Diagnosis: Enlarged thyroid [E04.9]  TITUS (acute kidney injury) ([facility] [N17.9]  Acute cystitis with hematuria [N30.01]  Urinary tract infection with hematuria, site unspecified [N39.0, R31.9]  Altered mental status, unspecified altered mental status type [R41.82]    Discharge Diagnosis:   UTI with e coli. TITUS resolved  Atelectasis. Generalized weakness improved  Reason for Admission/HPI: Patient with generalized weaknesswith fall and confusion was found to have UTI and TITUS on admission    Hospital Course: Patient was on IV hydration nad Iv rocephin . Had telemetry monitoring and serial troponin were ok.his Creatine has improved close to baseline. urine culture with E coli senstive to rocephin. CXR with atelectasis VS Infection has had some hypoxia initially has improved,has been on zithromax and rocephin. WBC  Count normal lungs clear exam.Had PT/Ot while here has ambulated . Consultations: None    Procedures: None    Complications: None    Disposition: Home    Discharge Condition: [address]    Discharge Medications:    W[facility] [place]   Home Medication Instructions CII:[id number]    Printed on:21 1218   Medication Information                      aspirin 81 MG tablet  Take 81 mg by mouth daily. atorvastatin (LIPITOR) 20 MG tablet  TAKE 1 TABLET DAILY             azithromycin (ZITHROMAX) 500 MG tablet  Take 1 tablet by mouth daily for 5 days             blood glucose monitor kit and supplies  Check blood sugar daily.   Diagnosis E11.42             cefdinir (OMNICEF) 300 MG capsule  Take 1 capsule by mouth 2 times daily for 5 days             Diabetic Shoe MISC  by Does not apply route Equinus contracture of ankle (primary encounter diagnosis)    DM type 2 with diabetic peripheral neuropathy (HCC)    Dermatophytosis of nail    Foot pain, bilateral             famotidine (PEPCID) 20 MG tablet  TAKE 1 TABLET BY MOUTH TWICE DAILY             fluticasone (FLONASE) 50 MCG/ACT nasal spray  SHAKE LIQUID AND USE 2 SPRAYS IN EACH NOSTRIL EVERY DAY             Foot Care Products (DIABETIC INSOLES) MISC  1 Units by Does not apply route daily Equinus contracture of ankle  (primary encounter diagnosis) DM type 2 with diabetic peripheral neuropathy (HCC) Dermatophytosis of nail Foot pain, bilateral             Lancets MISC  USE TO TEST BLOOD SUGAR EVERY DAY             metFORMIN (GLUCOPHAGE-XR) 500 MG extended release tablet  TAKE 1 TABLET BY MOUTH TWICE DAILY             metoprolol (LOPRESSOR) 100 MG tablet  TAKE 1 TABLET BY MOUTH TWICE DAILY             omeprazole (PRILOSEC) 40 MG delayed release capsule  TAKE 1 CAPSULE DAILY             oxybutynin (DITROPAN-XL) 10 MG extended release tablet  TAKE 1 TABLET DAILY             Probiotic Acidophilus (FLORANEX) TABS  Take 1 tablet by mouth 3 times daily for 10 days             tamsulosin (FLOMAX) 0.4 MG capsule  TAKE 1 CAPSULE DAILY             TRUE METRIX BLOOD GLUCOSE TEST strip  USE TO TEST BLOOD SUGAR DAILY             valsartan-hydroCHLOROthiazide (DIOVAN-HCT) 160-25 MG per tablet  TAKE 1 TABLET DAILY                 Follow up Visits:  Follow-up with PCP in 1week      Total Time spent with patient and coordinating care:  30 minutes    Danielle Chen MD  9/20/2021  12:19 PM

## 2021-09-20 NOTE — CARE COORDINATION
Discharge [facility] Case Management Department  Written by: Coco Petresen RN    Patient Name: Ezra Diop  Attending Provider: Estefania Wadsworth MD  Admit Date: 2021  7:43 PM  MRN: [id number]  Account: [de-identified]                     : 1945  Discharge Date: 2021       Disposition: home.  Pt refused home health care    Coco Petersen RN

## 2021-09-20 NOTE — PROGRESS NOTES
Physical Therapy  Facility/Department: [facility]  PROGRESSIVE CARE  Daily Treatment Note  NAME: Beth Nelson  : 1945  MRN: [id number]    Date of Service: 2021    Discharge Recommendations:  Home with assist PRN, Home with Home health PT, Continue to assess pending progress   PT Equipment Recommendations  Equipment Needed: No    Assessment   Prognosis: Good  Decision Making: Low Complexity  REQUIRES PT FOLLOW UP: Yes  Activity Tolerance  Activity Tolerance: Patient Tolerated treatment well     Patient Diagnosis(es): The primary encounter diagnosis was Urinary tract infection with hematuria, site unspecified. Diagnoses of Enlarged thyroid, TITUS (acute kidney injury) ([facility] and Altered mental status, unspecified altered mental status type were also pertinent to this visit. has a past medical history of Anemia, Chest pain, DJD (degenerative joint disease) of lumbar spine, Dry eye syndrome, GERD (gastroesophageal reflux disease), Hepatic hemangioma, Hypertension, Keratitis, Nuclear sclerotic cataract of both eyes, Obesity, morbid ([address] Obstructive sleep apnea of adult, Onychomycosis, and Type II or unspecified type diabetes mellitus without mention of complication, not stated as uncontrolled. has a past surgical history that includes Colonoscopy (09/15/2006); Upper gastrointestinal endoscopy (09/15/2006); cyst removal; and Umbilical hernia repair (). Restrictions     Subjective   General  Chart Reviewed: Yes  Response To Previous Treatment: Patient with no complaints from previous session. Subjective  Subjective: Patient in bed at initiation of session. Agreeable to therapy at this time. Patient denies pain at this time.   Pain Screening  Patient Currently in Pain: Denies  Pain Assessment  Pain Assessment: 0-10  Pain Level: 0  Vital Signs  Patient Currently in Pain: Denies       Orientation  Orientation  Overall Orientation Status: Within Normal Limits  Cognition      Objective   Bed mobility  Supine to Sit: Contact guard assistance  Scooting: Stand by assistance  Transfers  Sit to Stand: Stand by assistance  Stand to sit: Stand by assistance  Ambulation  Ambulation?: Yes  More Ambulation?: Yes  Ambulation 1  Surface: level tile  Device: No Device  Assistance: Stand by assistance;Contact guard assistance  Quality of Gait: Decreased heel strike and toe off. Limited step length. No unsteadiness with turns noted this date. Gait Deviations: Slow Miya  Distance: 25'x2  Comments: patient returns to bedside chair at conclusion of session. Ambulation 2  Quality of Gait 2: 15'x2  Gait Deviations: Slow Miya  Distance: 15'x2  Comments: Gait trained to and from bathroom. Stairs/Curb  Stairs?: No  Neuromuscular Education  NDT Treatment: Gait ;Sitting;Standing  Balance  Posture: Fair  Sitting - Static: Good  Sitting - Dynamic: Good  Standing - Static: Fair  Standing - Dynamic: Fair  Exercises  Quad Sets: 20x  Heelslides: 10x  Gluteal Sets: 20x  Hip Abduction: 10x in supine  Knee Long Arc Quad: 10x with manual resistance. Ankle Pumps: 20x in sitting and supine  Comments: HS curls with manual resistance x10 ea                        G-Code     OutComes Score                                                     AM-PAC Score             Goals  Short term goals  Time Frame for Short term goals: LOS  Short term goal 1: bed mobilty mod I  Short term goal 2: transfers with mod I  Short term goal 3: Amb x 100ft with RW or least restrictive device and mod I  Patient Goals   Patient goals : Return Home    Plan    Plan  Times per day: Daily  Current Treatment Recommendations: Strengthening, Transfer Training, Endurance Training, Neuromuscular Re-education, ROM, Home Exercise Program, Gait Training, Balance Training, Safety Education & Training  Safety Devices  Type of devices:  All fall risk precautions in place, Patient at risk for falls, Call light within reach, Gait belt, Nurse notified, Left in chair     Therapy Time Individual Concurrent Group Co-treatment   Time In 0817         Time Out 0843         Minutes [address] [place], [place]

## 2021-09-20 NOTE — PROGRESS NOTES
Physician Progress Note      Haley Diaz  CSN #:                  [id number]  :                       1945  ADMIT DATE:       2021 7:43 PM  [address] [place] [place] DATE:    PROVIDER #:        Anette Muñoz MD          QUERY TEXT:    Pt admitted with Acute cystitis with hematuria. Pt noted to have SIRS/Sepsis   criteria: Acute cystitis with hematuria + nitrate in Urine, 9-17 WBC 16, LA   1.4, Temp 103.2, resp 24/min. If possible, please document below and in the   progress notes and discharge summary if you are evaluating and /or treating   any of the following: The medical record reflects the following:  Risk Factors: Cystitis  Clinical Indicators: sepsis Criteria:  Acute cystitis with hematuria + nitrate   in Urine, 9-17 WBC 16, LA 1.4, Temp 103.2, resp 24/min  Treatment: Rocephin and Zithromax IV  Thank you. Please call if you have any questions. P) [phone]. Signed   by Seng Franco RN Clinical , CRCR  Options provided:  -- Sepsis, present on admission  -- Sepsis, now resolved,  -- No Sepsis, localized infection only  -- Sepsis was ruled out  -- Other - I will add my own diagnosis  -- Disagree - Not applicable / Not valid  -- Disagree - Clinically unable to determine / Unknown  -- Refer to Clinical Documentation Reviewer    PROVIDER RESPONSE TEXT:    This patient has sepsis which was present on admission.     Query created by: Maurilio Kelly on 2021 9:23 AM      Electronically signed by:  Anette Muñoz MD 2021 10:12 AM

## 2021-09-21 ENCOUNTER — CARE COORDINATION (OUTPATIENT)
Dept: CASE MANAGEMENT | Age: 76
End: 2021-09-21

## 2021-09-21 ENCOUNTER — TELEPHONE (OUTPATIENT)
Dept: FAMILY MEDICINE CLINIC | Age: 76
End: 2021-09-21

## 2021-09-21 DIAGNOSIS — N30.01 ACUTE CYSTITIS WITH HEMATURIA: ICD-10-CM
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
## 2021-09-21 PROCEDURE — 1111F DSCHRG MED/CURRENT MED MERGE: CPT | Performed by: NURSE PRACTITIONER

## 2021-09-21 NOTE — CARE COORDINATION
Erin 45 Transitions Initial Follow Up Call    Call within 2 business days of discharge: Yes    Patient: Sandy Antonio Patient : 1945   MRN: <[id number]>  Reason for Admission: Acute cystitis with hematuria  Discharge Date: 21 RARS: Readmission Risk Score: 15      Last Discharge [facility]       Complaint Diagnosis Description Type Department Provider    21 Fall; Altered Mental Status Urinary tract infection with hematuria, site unspecified . .. ED to Hosp-Admission (Discharged) (Chantale Sma) Sierra Stroud MD; Tabatha. .. 21 Hyperglycemia; Back Pain Urinary tract infection without hematuria, site unspecified ED (DISCHARGE) [facility] ED Lorraine Acosta MD           Spoke with:  Orvinod Jose states he is doing ok. Oriented. Forgetful at times. He denies pain. Denies blood while voiding. Appetite is good. Ambulates without difficulty. States he has had no further falls. Does not use any assistive devices. FBS today 147. Pt. Has picked up new medications ordered at hospital discharge. 1111F medication reconciliation completed. Discussed upcoming appt. On 21. Pt. And wife Stephanie Smith are aware. Janaveronica Lal said he takes a cab for transportation  when wife is working. Denies need for [address] or DME at this time. Facility: [address]    Non-face-to-face services provided:  Obtained and reviewed discharge summary and/or continuity of care documents   Transitions of Care Initial Call    Was this an external facility discharge? No Discharge Facility: n/a    Challenges to be reviewed by the provider   Additional needs identified to be addressed with provider: Yes  none             Method of communication with provider : none      Advance Care Planning:   Does patient have an Advance Directive: reviewed and current, reviewed and needs to be updated, not on file; education provided, not on file, patient declined education, decision maker updated and referral to internal ACP facilitator. Was this a readmission? No  Patient stated reason for admission: fall  confusion  Patients top risk factors for readmission: medical condition-UTI  AMS  TITUS  DM    Care Transition Nurse (CTN) contacted the patient by telephone to perform post hospital discharge assessment. Verified name and  with patient as identifiers. Provided introduction to self, and explanation of the CTN role. CTN reviewed discharge instructions, medical action plan and red flags with patient who verbalized understanding. Patient given an opportunity to ask questions and does not have any further questions or concerns at this time. Were discharge instructions available to patient? Yes. Reviewed appropriate site of care based on symptoms and resources available to patient including: PCP, Specialist and When to call 911. The patient agrees to contact the PCP office for questions related to their healthcare. Medication reconciliation was performed with patient, who verbalizes understanding of administration of home medications. Advised obtaining a 90-day supply of all daily and as-needed medications. Covid Risk Education     Educated patient about risk for severe COVID-19 due to risk factors according to CDC guidelines. LPN CC reviewed discharge instructions, medical action plan and red flag symptoms with the patient who verbalized understanding. Discussed COVID vaccination status: Yes. Education provided on COVID-19 vaccination as appropriate. Discussed exposure protocols and quarantine with CDC Guidelines. Patient was given an opportunity to verbalize any questions and concerns and agrees to contact LPN CC or health care provider for questions related to their healthcare. Reviewed and educated patient on any new and changed medications related to discharge diagnosis. Was patient discharged with a pulse oximeter?  No Discussed and confirmed pulse oximeter discharge instructions and when to notify provider or seek emergency care. LPN CC provided contact information. Plan for follow-up call in 5-7 days based on severity of symptoms and risk factors.   Plan for next call: symptom management-r/t UTI  TITUS, self management-CHECK glucose levels daily, follow up appointment-with PCP and medication management-complete all antibiotics as directed        Care Transitions 24 Hour Call    Care Transitions Interventions         Follow Up  Future Appointments   Date Time Provider Nelia Vizcarra   9/27/2021 11:00 AM ROCÍO Bennett - CNP DFAM [facility]   10/7/2021  1:00 PM MD OPAL Morris DP   12/7/2021 10:00 AM TOBIN EspinosaOD DP   1/31/2022 10:00 AM SCHEDULE, John Mariamar 112 LAB [facility] [place]   3/14/2022  9:40 AM MD NOEL Skinner DP   3/22/2022  9:00 AM TY Peraza [facility]       LEDA Collins LPN Care Transitions Nurse   [phone]

## 2021-09-21 NOTE — TELEPHONE ENCOUNTER
Erin 45 Transitions Initial Follow Up Call    Outreach made within 2 business days of discharge: Yes    Patient: Alyssa Bill Patient : 1945   MRN: [id number]  Reason for Admission: There are no discharge diagnoses documented for the most recent discharge. Discharge Date: 21       Spoke with: patient    Discharge department/facility: [facility]    TCM Interactive Patient Contact:  Was patient able to fill all prescriptions: Yes  Was patient instructed to bring all medications to the follow-up visit: Yes  Is patient taking all medications as directed in the discharge summary?  Yes  Does patient understand their discharge instructions: Yes  Does patient have questions or concerns that need addressed prior to 7-14 day follow up office visit: no    Scheduled appointment with PCP within 7-14 days    Follow Up  Future Appointments   Date Time Provider Nelia Vizcarra   2021 11:00 AM ROCÍO Whitehead - CNP DFAM DPP   10/7/2021  1:00 PM MD OPAL Aranda DPP   2021 10:00 AM TOBIN Self DPP   2022 10:00 AM SCHEDULE, John Mariamar [address] LAB [id number] [facility] [place]   3/14/2022  9:40 AM MD NOEL Carr DPP   3/22/2022  9:00 AM Nancy Gentile, OD DOPT DPP       Myron Marte LPN

## 2021-09-28 ENCOUNTER — CARE COORDINATION (OUTPATIENT)
Dept: CASE MANAGEMENT | Age: 76
End: 2021-09-28

## 2021-09-28 NOTE — CARE COORDINATION
Erin 45 Transitions Follow Up Call    2021    Patient: Judson Hdz  Patient : 1945   MRN: <[id number]>  Reason for Admission: Acute cystitis with hematuria  Discharge Date: 21 RARS: Readmission Risk Score: 15         Spoke with: Spoke to wife Abbi Villavicencio. She requests this writer call the home phone. Subsequent call to home phone. No answer. Left HIPPA compliant message to return call to this writer. Care Transitions Subsequent and Final Call    Subsequent and Final Calls  Care Transitions Interventions  Other Interventions:            Follow Up  Future Appointments   Date Time Provider Nelia Vizcarra   10/7/2021  1:00 PM Donn Schilder, MD DCARDIO [facility]   2021 10:00 AM TOBIN Benavidez DPP   2022 10:00 AM SCHEDULE, John Mariamar [address] LAB [facility] [place]   3/14/2022  9:40 AM MD NOEL Gonzales DPP   3/22/2022  9:00 AM Nancy Mcconnell, TY MACDONALD DPP       LEDA Watkins LPN Care Transitions Nurse   [phone]

## 2021-10-01 ENCOUNTER — CARE COORDINATION (OUTPATIENT)
Dept: CASE MANAGEMENT | Age: 76
End: 2021-10-01

## 2021-10-01 NOTE — CARE COORDINATION
Erin 45 Transitions Follow Up Call    10/1/2021    Patient: Tri Tabor  Patient : 1945   MRN: <[id number]>  Reason for Admission: Acute cystitis with hematuria  Discharge Date: 21 RARS: Readmission Risk Score: 15         Spoke with: Subsequent call. No answer on first number provided. Call to second number. mailbox is full. Final call. Care Transitions Subsequent and Final Call    Subsequent and Final Calls  Care Transitions Interventions  Other Interventions:            Follow Up  Future Appointments   Date Time Provider Nelia Vizcarra   10/7/2021  1:00 PM MD OPAL Workman DPP   2021 10:00 AM TOBIN De La Cruz DPP   2022 10:00 AM SCHEDULE, John Mariamar 112 LAB [facility] [place]   3/14/2022  9:40 AM MD NOEL Corbett DPP   3/22/2022  9:00 AM Nancy Bucio, OD DOPT DPP       LEDA Harding LPN Care Transitions Nurse   [phone]

## 2021-10-05 ENCOUNTER — TELEPHONE (OUTPATIENT)
Dept: FAMILY MEDICINE CLINIC | Age: 76
End: 2021-10-05

## 2021-10-05 NOTE — TELEPHONE ENCOUNTER
Cardiology called and stated patient called regarding antibiotics ordered after he was discharged 9/17/21. Patient took them for 1 day and started feeling better so he stopped taking them. Patient reports he is worried about taking the antibiotics. Please advise.

## 2021-10-05 NOTE — TELEPHONE ENCOUNTER
This writer spoke with patient. Patient states that he did finish some of the antibiotics but he did not know that he had 2 separate antibiotics. Patient states that he is feeling good and is no longer having blood in his urine. Patient was advised that if he starts having painful urination or blood in his urine to go to UC. Patient verbalized understanding.

## 2021-10-07 ENCOUNTER — OFFICE VISIT (OUTPATIENT)
Dept: CARDIOLOGY | Age: 76
End: 2021-10-07
Payer: MEDICARE

## 2021-10-07 VITALS
BODY MASS INDEX: 44.1 KG/M2 | SYSTOLIC BLOOD PRESSURE: 138 MMHG | HEIGHT: 68 IN | WEIGHT: 291 LBS | DIASTOLIC BLOOD PRESSURE: 68 MMHG | HEART RATE: 67 BPM

## 2021-10-07 DIAGNOSIS — I20.9 ANGINA PECTORIS (HCC): ICD-10-CM

## 2021-10-07 DIAGNOSIS — I47.1 PAROXYSMAL SUPRAVENTRICULAR TACHYCARDIA (HCC): Primary | ICD-10-CM

## 2021-10-07 PROCEDURE — 99214 OFFICE O/P EST MOD 30 MIN: CPT | Performed by: INTERNAL MEDICINE

## 2021-10-07 PROCEDURE — 1036F TOBACCO NON-USER: CPT | Performed by: INTERNAL MEDICINE

## 2021-10-07 PROCEDURE — 1111F DSCHRG MED/CURRENT MED MERGE: CPT | Performed by: INTERNAL MEDICINE

## 2021-10-07 PROCEDURE — G8417 CALC BMI ABV UP PARAM F/U: HCPCS | Performed by: INTERNAL MEDICINE

## 2021-10-07 PROCEDURE — 4040F PNEUMOC VAC/ADMIN/RCVD: CPT | Performed by: INTERNAL MEDICINE

## 2021-10-07 PROCEDURE — 93010 ELECTROCARDIOGRAM REPORT: CPT | Performed by: INTERNAL MEDICINE

## 2021-10-07 PROCEDURE — 1123F ACP DISCUSS/DSCN MKR DOCD: CPT | Performed by: INTERNAL MEDICINE

## 2021-10-07 PROCEDURE — 93005 ELECTROCARDIOGRAM TRACING: CPT | Performed by: INTERNAL MEDICINE

## 2021-10-07 PROCEDURE — 99213 OFFICE O/P EST LOW 20 MIN: CPT | Performed by: INTERNAL MEDICINE

## 2021-10-07 PROCEDURE — G8427 DOCREV CUR MEDS BY ELIG CLIN: HCPCS | Performed by: INTERNAL MEDICINE

## 2021-10-07 PROCEDURE — G8484 FLU IMMUNIZE NO ADMIN: HCPCS | Performed by: INTERNAL MEDICINE

## 2021-10-07 RX ORDER — GARLIC EXTRACT 500 MG
1 CAPSULE ORAL DAILY
COMMUNITY
Start: 2021-09-20

## 2021-10-07 NOTE — PROGRESS NOTES
Cardiology Consultation/Follow Up. [facility]    CC: Patient is here for follow up    HPI  Gonzalo Marks is here for follow-up. Feeling better and denies any episodes of palpitations. He is maintaining normal sinus rhythm. No anginal chest pain, no dyspnea on exertion  He had recent hospitalization in 2021 with acute cystitis/E. coli UTI with TITUS which is now resolving. Past Medical:  Past Medical History:   Diagnosis Date    Anemia     chronic, negative work up with EGD and Colonoscopy.  Chest pain 2016    DJD (degenerative joint disease) of lumbar spine     Dry eye syndrome     GERD (gastroesophageal reflux disease)     Hepatic hemangioma     Hypertension     Keratitis     Secondary to dry eye syndrome.  Nuclear sclerotic cataract of both eyes     Obesity, morbid ([address]     Obstructive sleep apnea of adult     non compliant with cpap    Onychomycosis     1, 2, 3, 4, and 5, bilateral.    Type II or unspecified type diabetes mellitus without mention of complication, not stated as uncontrolled        Past Surgical:  Past Surgical History:   Procedure Laterality Date    COLONOSCOPY  09/15/2006    Diffuse diverticulosis slightly greater in the sigmoid colon and grade 1 internal hemorrhoids.  CYST REMOVAL      right side of  scalp    UMBILICAL HERNIA REPAIR  2013    UPPER GASTROINTESTINAL ENDOSCOPY  09/15/2006    Mild to moderate diffuse gastritis, mild to moderate diffuse duodenitis. Family History:  Family History   Problem Relation Age of Onset    Ovarian Cancer Mother     High Blood Pressure Sister     Diabetes Sister          at 76    High Blood Pressure Brother         all 4 brothers have it    Stroke Brother     Heart Attack Father         Acute myocardial infarction.     High Blood Pressure Brother     Stroke Brother     Dementia Sister          at 76    Cataracts Neg Hx     Glaucoma Neg Hx        Social History:  Social History     Tobacco Use    Smoking status: Former Smoker     Years: 15.00     Types: Cigars     Quit date: 1970     Years since quittin.8    Smokeless tobacco: Never Used    Tobacco comment: quit over 30 years ago   Substance Use Topics    Alcohol use: No     Alcohol/week: 0.0 standard drinks     Comment: no alcohol for many years    Drug use: No        REVIEW OF SYSTEMS:    · Constitutional: there has been no unanticipated weight loss. There's been No change in energy level, No change in activity level. · Eyes: No visual changes or diplopia. No scleral icterus. · ENT: No Headaches, hearing loss or vertigo. No mouth sores or sore throat. · Cardiovascular: As described in HPI. · Respiratory: AS HPI  · Gastrointestinal: No abdominal pain, appetite loss, blood in stools. No change in bowel or bladder habits. · Genitourinary: No dysuria, trouble voiding, or hematuria. · Musculoskeletal:  No gait disturbance, No weakness or joint complaints. · Integumentary: No rash or pruritis. · Neurological: No headache, diplopia, change in muscle strength, numbness or tingling  · Psychiatric: No new anxiety or depression. · Endocrine: No temperature intolerance. No excessive thirst, fluid intake, or urination. No tremor. · Hematologic/Lymphatic: No abnormal bruising or bleeding, blood clots or swollen lymph nodes. · Allergic/Immunologic: No nasal congestion or hives.     Medications:  Current Outpatient Medications   Medication Sig Dispense Refill    Lactobacillus Acid-Pectin (ACIDOPHILUS/PECTIN) CAPS Take 1 capsule by mouth daily      metoprolol (LOPRESSOR) 100 MG tablet TAKE 1 TABLET BY MOUTH TWICE DAILY 180 tablet 3    metFORMIN (GLUCOPHAGE-XR) 500 MG extended release tablet TAKE 1 TABLET BY MOUTH TWICE DAILY 180 tablet 1    famotidine (PEPCID) 20 MG tablet TAKE 1 TABLET BY MOUTH TWICE DAILY 60 tablet 2    valsartan-hydroCHLOROthiazide (DIOVAN-HCT) 160-25 MG per tablet TAKE 1 TABLET DAILY 90 tablet 1    atorvastatin (LIPITOR) 20 MG tablet TAKE 1 TABLET DAILY 90 tablet 1    oxybutynin (DITROPAN-XL) 10 MG extended release tablet TAKE 1 TABLET DAILY 90 tablet 3    fluticasone (FLONASE) 50 MCG/ACT nasal spray SHAKE LIQUID AND USE 2 SPRAYS IN EACH NOSTRIL EVERY DAY 16 g 11    tamsulosin (FLOMAX) 0.4 MG capsule TAKE 1 CAPSULE DAILY 90 capsule 1    omeprazole (PRILOSEC) 40 MG delayed release capsule TAKE 1 CAPSULE DAILY 90 capsule 4    TRUE METRIX BLOOD GLUCOSE TEST strip USE TO TEST BLOOD SUGAR DAILY 200 strip 5    Lancets MISC USE TO TEST BLOOD SUGAR EVERY  each 3    blood glucose monitor kit and supplies Check blood sugar daily. Diagnosis E11.42 1 kit 0    Foot Care Products (DIABETIC INSOLES) MISC 1 Units by Does not apply route daily Equinus contracture of ankle  (primary encounter diagnosis) DM type 2 with diabetic peripheral neuropathy (HCC) Dermatophytosis of nail Foot pain, bilateral 3 each 0    Diabetic Shoe MISC by Does not apply route Equinus contracture of ankle  (primary encounter diagnosis)    DM type 2 with diabetic peripheral neuropathy (HCC)    Dermatophytosis of nail    Foot pain, bilateral 1 each 0    aspirin 81 MG tablet Take 81 mg by mouth daily. (Patient not taking: Reported on 10/7/2021)       No current facility-administered medications for this visit. Physical Exam:   Vitals: /68 (Site: Right Upper Arm, Position: Sitting, Cuff Size: Large Adult)   Pulse 67   Ht 5' 8\" (1.727 m)   Wt 291 lb (132 kg)   BMI 44.25 kg/m²      General appearance: alert, oriented and cooperative with exam  HEENT: Head: Normocephalic, no lesions, without obvious abnormality.   Neck: no carotid bruit, no JVD  Lungs: clear to auscultation bilaterally without any wheezing or rales   Heart:  regular S1-S2, no murmur  Abdomen: soft, non-tender; bowel sounds normal; no masses,  no organomegaly  Extremities: 1+ pitting edema bilateral in LE   Neurologic: Mental status: Alert, oriented, thought content appropriate      Labs:  CBC: No results for input(s): WBC, HGB, HCT, PLT in the last 72 hours. BMP: No results for input(s): NA, K, CO2, BUN, CREATININE, LABGLOM, GLUCOSE in the last 72 hours. PT/INR: No results for input(s): PROTIME, INR in the last 72 hours. FASTING LIPID PANEL:  Lab Results   Component Value Date    HDL 45 01/25/2021    TRIG 109 01/25/2021       EKG 10/7/21:  NSR, normal ECG      LAST ECHO 08/2020:  Normal left ventricular diameter. Mild left ventricular hypertrophy. Left ventricular systolic function is normal.  Left ventricular ejection fraction 60 %. Right ventricular dilatation with normal systolic function. Aortic valve is mildly sclerotic. Mitral annular calcification. No pericardial effusion. Nuclear stress test 04/2021  1. No ischemia. 2.  Inferior infarction. 3.  Normal LV systolic function. Assessment:  · Supraventricular tachycardia, paroxysmal in 08/2020, no recurrence on BB  · Atypical chest pain, negative stress test for ischemia in 04/2021  · Hypertension   · Diabetes   · Obesity   · RABIA  · GERD       Plan:   Continue Lopressor 100 mg twice daily and valsartan/hydrochlorothiazide  Aspirin 81 mg daily  Patient counseled regarding importance of medication compliance  Also counseled regarding heart healthy diet and regular exercise for weight loss    Follow-up in office in 6 months or earlier if needed. Dung Jacobsen MD, [address] [facility]  [facility]  [phone]
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
## 2021-11-12 ENCOUNTER — TELEPHONE (OUTPATIENT)
Dept: FAMILY MEDICINE CLINIC | Age: 76
End: 2021-11-12

## 2021-11-12 NOTE — TELEPHONE ENCOUNTER
----- Message from Asmita Minor sent at 11/11/2021  4:37 PM EST -----  Subject: Message to Provider    QUESTIONS  Information for Provider? Kwame Lee from [employer] is requesting that   the most recent chart note be sent to them as a part of a Medicare Audit   Please send by fax to [phone]   ---------------------------------------------------------------------------  --------------  [address]  What is the best way for the office to contact you? OK to leave message on   voicemail  Preferred Call Back Phone Number? [phone]  ---------------------------------------------------------------------------  --------------  SCRIPT ANSWERS  Relationship to Patient? Third Party  Representative Name?  [facility]

## 2021-12-02 ENCOUNTER — IMMUNIZATION (OUTPATIENT)
Dept: LAB | Age: 76
End: 2021-12-02
Payer: MEDICARE

## 2021-12-02 PROCEDURE — 91306 COVID-19, MODERNA BOOSTER VACCINE 0.25ML DOSE: CPT | Performed by: INTERNAL MEDICINE

## 2021-12-02 PROCEDURE — PBSHW COVID-19, MODERNA BOOSTER VACCINE 0.25ML DOSE: Performed by: INTERNAL MEDICINE

## 2021-12-16 DIAGNOSIS — E78.2 MIXED HYPERLIPIDEMIA: ICD-10-CM

## 2021-12-16 DIAGNOSIS — I10 ESSENTIAL HYPERTENSION: ICD-10-CM

## 2021-12-16 RX ORDER — VALSARTAN AND HYDROCHLOROTHIAZIDE 160; 25 MG/1; MG/1
TABLET ORAL
Qty: 90 TABLET | Refills: 0 | Status: SHIPPED | OUTPATIENT
Start: 2021-12-16 | End: 2022-02-17 | Stop reason: SDUPTHER

## 2021-12-16 RX ORDER — ATORVASTATIN CALCIUM 20 MG/1
TABLET, FILM COATED ORAL
Qty: 90 TABLET | Refills: 0 | Status: SHIPPED | OUTPATIENT
Start: 2021-12-16 | End: 2022-02-17 | Stop reason: SDUPTHER

## 2021-12-16 NOTE — TELEPHONE ENCOUNTER
Yennifer Houston called requesting a refill of the below medication which has been pended for you:     Requested Prescriptions     Pending Prescriptions Disp Refills    atorvastatin (LIPITOR) 20 MG tablet [Pharmacy Med Name: ATORVASTATIN TABS 20MG] 90 tablet 3     Sig: TAKE 1 TABLET DAILY    valsartan-hydroCHLOROthiazide (DIOVAN-HCT) 160-25 MG per tablet [Pharmacy Med Name: VALSARTAN/HCTZ TABS 160/25MG] 90 tablet 3     Sig: TAKE 1 TABLET DAILY       Last Appointment Date: 1/25/2021  Next Appointment Date: Visit date not found    No Known Allergies

## 2022-02-08 DIAGNOSIS — N40.0 ENLARGED PROSTATE: ICD-10-CM

## 2022-02-08 RX ORDER — TAMSULOSIN HYDROCHLORIDE 0.4 MG/1
CAPSULE ORAL
Qty: 90 CAPSULE | Refills: 0 | Status: SHIPPED | OUTPATIENT
Start: 2022-02-08 | End: 2022-02-17 | Stop reason: SDUPTHER

## 2022-02-08 NOTE — TELEPHONE ENCOUNTER
Alfredo Ramirez called requesting a refill of the below medication which has been pended for you:     Requested Prescriptions     Pending Prescriptions Disp Refills    tamsulosin (FLOMAX) 0.4 MG capsule [Pharmacy Med Name: TAMSULOSIN HCL CAPS 0.4MG] 90 capsule 3     Sig: TAKE 1 CAPSULE DAILY       Last Appointment Date: 1/25/2021  Next Appointment Date: Visit date not found    No Known Allergies

## 2022-02-09 DIAGNOSIS — K21.9 GASTROESOPHAGEAL REFLUX DISEASE WITHOUT ESOPHAGITIS: ICD-10-CM

## 2022-02-09 DIAGNOSIS — K21.9 GASTROESOPHAGEAL REFLUX DISEASE: ICD-10-CM

## 2022-02-09 RX ORDER — FAMOTIDINE 20 MG/1
TABLET, FILM COATED ORAL
Qty: 180 TABLET | Refills: 0 | Status: SHIPPED | OUTPATIENT
Start: 2022-02-09 | End: 2022-05-10

## 2022-02-09 NOTE — TELEPHONE ENCOUNTER
Corinne Shahid called requesting a refill of the below medication which has been pended for you:     Requested Prescriptions     Pending Prescriptions Disp Refills    famotidine (PEPCID) 20 MG tablet [Pharmacy Med Name: FAMOTIDINE TABS 20MG] 180 tablet 3     Sig: TAKE 1 TABLET TWICE A DAY       Last Appointment Date: 1/25/2021  Next Appointment Date: Visit date not found    No Known Allergies

## 2022-02-12 DIAGNOSIS — E11.42 DM TYPE 2 WITH DIABETIC PERIPHERAL NEUROPATHY (HCC): ICD-10-CM

## 2022-02-14 RX ORDER — METFORMIN HYDROCHLORIDE 500 MG/1
TABLET, EXTENDED RELEASE ORAL
Qty: 30 TABLET | Refills: 1 | Status: SHIPPED | OUTPATIENT
Start: 2022-02-14 | End: 2022-03-21

## 2022-02-14 NOTE — TELEPHONE ENCOUNTER
Angelica Velazco called requesting a refill of the below medication which has been pended for you:     Requested Prescriptions     Pending Prescriptions Disp Refills    metFORMIN (GLUCOPHAGE-XR) 500 MG extended release tablet [Pharmacy Med Name: METFORMIN ER 500MG 24HR TABS] 180 tablet 1     Sig: TAKE 1 TABLET BY MOUTH TWICE DAILY       Last Appointment Date: 1/25/2021  Next Appointment Date: Visit date not found    No Known Allergies

## 2022-02-16 ENCOUNTER — OFFICE VISIT (OUTPATIENT)
Dept: PODIATRY | Age: 77
End: 2022-02-16
Payer: MEDICARE

## 2022-02-16 VITALS
WEIGHT: 295 LBS | HEART RATE: 80 BPM | DIASTOLIC BLOOD PRESSURE: 84 MMHG | BODY MASS INDEX: 44.71 KG/M2 | SYSTOLIC BLOOD PRESSURE: 138 MMHG | HEIGHT: 68 IN

## 2022-02-16 DIAGNOSIS — M21.6X1 EQUINUS DEFORMITY OF BOTH FEET: ICD-10-CM

## 2022-02-16 DIAGNOSIS — M21.6X2 EQUINUS DEFORMITY OF BOTH FEET: ICD-10-CM

## 2022-02-16 DIAGNOSIS — E11.42 DM TYPE 2 WITH DIABETIC PERIPHERAL NEUROPATHY (HCC): Primary | ICD-10-CM

## 2022-02-16 DIAGNOSIS — B35.1 DERMATOPHYTOSIS OF NAIL: ICD-10-CM

## 2022-02-16 PROCEDURE — G8417 CALC BMI ABV UP PARAM F/U: HCPCS | Performed by: PODIATRIST

## 2022-02-16 PROCEDURE — 1123F ACP DISCUSS/DSCN MKR DOCD: CPT | Performed by: PODIATRIST

## 2022-02-16 PROCEDURE — G8427 DOCREV CUR MEDS BY ELIG CLIN: HCPCS | Performed by: PODIATRIST

## 2022-02-16 PROCEDURE — 4040F PNEUMOC VAC/ADMIN/RCVD: CPT | Performed by: PODIATRIST

## 2022-02-16 PROCEDURE — 11721 DEBRIDE NAIL 6 OR MORE: CPT | Performed by: PODIATRIST

## 2022-02-16 PROCEDURE — G8484 FLU IMMUNIZE NO ADMIN: HCPCS | Performed by: PODIATRIST

## 2022-02-16 PROCEDURE — 99213 OFFICE O/P EST LOW 20 MIN: CPT | Performed by: PODIATRIST

## 2022-02-16 PROCEDURE — 1036F TOBACCO NON-USER: CPT | Performed by: PODIATRIST

## 2022-02-16 NOTE — PROGRESS NOTES
Foot Care Worksheet  PCP: ROCÍO Magallon - CNP  Last visit: 01 / 25 / 2021    Nail description:  Thick , Yellow , Crumbly , Marked limitation of ambulation     Pain resulting from thickened and dystrophy of nail plate No    Nails involved  Right   1, 2, 3, 4, 5  (T5-T9)  Left     1, 2, 3, 4, 5  (TA-T4)    Q7 1 Class A Finding - Non traumatic amputation of foot No    Q8 2 Class B Findings - Absent DP pulse No, Absent PT pulse No, Advanced tropic changes (3 required) Yes    Decrease hair growth Yes, Nail changes/thickening Yes, Pigmented changes/discoloration Yes, Skin texture (thin, shiny) Yes, Skin color (rubor/redness) No    Q9 1 Class B and 2 Class C Findings  Claudication No, Temperature change No, Paresthesia No, Burning Yes, Edema Yes

## 2022-02-16 NOTE — PROGRESS NOTES
Subjective:  Patient presents to [facility] today for generalized soreness in both feet. No new trauma. No new activity. Patient thinks that shoes could be wearing out. Data control is been stable. Patient also presents for diabetic foot care  No Known Allergies    Past Medical History:   Diagnosis Date    Anemia     chronic, negative work up with EGD and Colonoscopy.  Chest pain 6/9/2016    DJD (degenerative joint disease) of lumbar spine     Dry eye syndrome     GERD (gastroesophageal reflux disease)     Hepatic hemangioma     Hypertension     Keratitis     Secondary to dry eye syndrome.  Nuclear sclerotic cataract of both eyes     Obesity, morbid ([address]     Obstructive sleep apnea of adult     non compliant with cpap    Onychomycosis     1, 2, 3, 4, and 5, bilateral.    Type II or unspecified type diabetes mellitus without mention of complication, not stated as uncontrolled        Prior to Admission medications    Medication Sig Start Date End Date Taking?  Authorizing Provider   metFORMIN (GLUCOPHAGE-XR) 500 MG extended release tablet TAKE 1 TABLET BY MOUTH TWICE DAILY 2/14/22  Yes ROCÍO Carbajal CNP   famotidine (PEPCID) 20 MG tablet TAKE 1 TABLET TWICE A DAY 2/9/22  Yes ROCÍO Carbajal CNP   tamsulosin (FLOMAX) 0.4 MG capsule TAKE 1 CAPSULE DAILY 2/8/22  Yes ROCÍO Carbajal CNP   atorvastatin (LIPITOR) 20 MG tablet TAKE 1 TABLET DAILY 12/16/21  Yes ROCÍO Carbajal CNP   valsartan-hydroCHLOROthiazide (DIOVAN-HCT) 160-25 MG per tablet TAKE 1 TABLET DAILY 12/16/21  Yes ROCÍO Carbajal CNP   Lactobacillus Acid-Pectin (ACIDOPHILUS/PECTIN) CAPS Take 1 capsule by mouth daily 9/20/21  Yes Historical Provider, MD   metoprolol (LOPRESSOR) 100 MG tablet TAKE 1 TABLET BY MOUTH TWICE DAILY 9/9/21  Yes Simi Hsieh MD   TRUE METRIX BLOOD GLUCOSE TEST strip USE TO TEST BLOOD SUGAR DAILY 6/14/21 Yes ROCÍO Marie CNP   oxybutynin (DITROPAN-XL) 10 MG extended release tablet TAKE 1 TABLET DAILY 5/10/21  Yes Ale Sharp MD   Lancets MISC USE TO TEST BLOOD SUGAR EVERY DAY 21  Yes ROCÍO Marie CNP   fluticasone (FLONASE) 50 MCG/ACT nasal spray SHAKE LIQUID AND USE 2 SPRAYS IN EACH NOSTRIL EVERY DAY 21  Yes ROCÍO Marie CNP   blood glucose monitor kit and supplies Check blood sugar daily. Diagnosis E11.42 20  Yes ROCÍO Marie CNP   Foot Care Products (DIABETIC INSOLES) MISC 1 Units by Does not apply route daily Equinus contracture of ankle  (primary encounter diagnosis) DM type 2 with diabetic peripheral neuropathy ([facility] [address] Dermatophytosis of nail Foot pain, bilateral 10/29/19  Yes Jayy Reyes DPM   Diabetic Shoe MISC by Does not apply route Equinus contracture of ankle  (primary encounter diagnosis)    DM type 2 with diabetic peripheral neuropathy ([facility] [address]    Dermatophytosis of nail    Foot pain, bilateral 10/29/19  Yes Jayy Reyes DPM   omeprazole (PRILOSEC) 40 MG delayed release capsule TAKE 1 CAPSULE DAILY 10/28/19  Yes ROCÍO Marie CNP   aspirin 81 MG tablet Take 81 mg by mouth daily    Yes Historical Provider, MD       Social History     Tobacco Use    Smoking status: Former Smoker     Years: 15.00     Types: Cigars     Quit date: 1970     Years since quittin.2    Smokeless tobacco: Never Used    Tobacco comment: quit over 30 years ago   Substance Use Topics    Alcohol use: No     Alcohol/week: 0.0 standard drinks     Comment: no alcohol for many years     ROS: All 14 ROS systems reviewed and pertinent positives noted above, all others negative.   Objective:  Vascular: DP and PT pulses palpable 2/4, bilateral.  CFT <3 seconds, bilateral.  Hair growth present to the level of the digits, bilateral.  Edema absent, bilateral.  Varicosities absent, bilateral. Erythema absent, bilateral. Distal Rubor absent bilateral.  Temperature within normal limits bilateral. Hyperpigmentation absent bilateral. No atrophic skin. Neurological: Sensation intact to light touch to level of digits, bilateral.  Protective sensation intact 10/10 sites via 5.07/10g Kirklin-Marly Monofilament, bilateral.  negative Tinel's, bilateral.  negative Valleix sign, bilateral.  Vibratory intact bilateral.  Reflexes Decreased bilateral.  Paresthesias negative. Dysthesias negative. Sharp/dull intact bilateral.    Musculoskeletal: Muscle strength 5/5, bilateral.  Pain absent upon palpation bilateral. Normal medial longitudinal arch, bilateral.  Ankle ROM decresaed,bilateral.  1st MPJ ROM within normal limits, bilateral.  Dorsally contracted digits present. No other foot deformities. Integument:  Open lesion absent, Bilateral.  Interdigital maceration absent to web spaces,absent Bilateral.  Nails left 1, 2, 3, 4, 5 and right 1, 2, 3, 4, 5 thickened, dystrophic and crumbly, discolored with subungual debris. Fissures absent, Bilateral. Hyperkeratotic tissue is absent. Assessment:    Diagnosis Orders   1. DM type 2 with diabetic peripheral neuropathy ([facility] [address]     2. Dermatophytosis of nail     3. Equinus deformity of both feet         Plan:  Diabetic foot education and exam.  Nails as mentioned above debrided in length and thickness. Patient advised about OTC treatments for nails and callous. Patient will follow up in 10 weeks for routine foot care or PRN if any further problems arise. Orders Placed This Encounter   Medications    Diabetic Shoe MISC     Sig: by Does not apply route Dispense DM shoe and insoles  DM type 2 with diabetic peripheral neuropathy ([facility] [address]  (primary encounter diagnosis)  Dermatophytosis of nail  Equinus deformity of both feet     Dispense:  1 each     Refill:  0     Is low level medical decision making. Patient stable chronic condition. 1 new prescription given.   No new testing overall lower extremity of the current treatment course.

## 2022-02-17 ENCOUNTER — OFFICE VISIT (OUTPATIENT)
Dept: FAMILY MEDICINE CLINIC | Age: 77
End: 2022-02-17
Payer: MEDICARE

## 2022-02-17 VITALS
DIASTOLIC BLOOD PRESSURE: 78 MMHG | BODY MASS INDEX: 44.56 KG/M2 | HEIGHT: 68 IN | SYSTOLIC BLOOD PRESSURE: 132 MMHG | OXYGEN SATURATION: 95 % | HEART RATE: 68 BPM | WEIGHT: 294 LBS

## 2022-02-17 DIAGNOSIS — I10 ESSENTIAL HYPERTENSION: ICD-10-CM

## 2022-02-17 DIAGNOSIS — N40.0 ENLARGED PROSTATE: ICD-10-CM

## 2022-02-17 DIAGNOSIS — E78.2 MIXED HYPERLIPIDEMIA: ICD-10-CM

## 2022-02-17 DIAGNOSIS — E11.42 DM TYPE 2 WITH DIABETIC PERIPHERAL NEUROPATHY (HCC): Primary | ICD-10-CM

## 2022-02-17 DIAGNOSIS — E66.01 OBESITY, CLASS III, BMI 40-49.9 (MORBID OBESITY) (HCC): ICD-10-CM

## 2022-02-17 PROCEDURE — 1036F TOBACCO NON-USER: CPT | Performed by: NURSE PRACTITIONER

## 2022-02-17 PROCEDURE — G8427 DOCREV CUR MEDS BY ELIG CLIN: HCPCS | Performed by: NURSE PRACTITIONER

## 2022-02-17 PROCEDURE — G8417 CALC BMI ABV UP PARAM F/U: HCPCS | Performed by: NURSE PRACTITIONER

## 2022-02-17 PROCEDURE — 4040F PNEUMOC VAC/ADMIN/RCVD: CPT | Performed by: NURSE PRACTITIONER

## 2022-02-17 PROCEDURE — 1123F ACP DISCUSS/DSCN MKR DOCD: CPT | Performed by: NURSE PRACTITIONER

## 2022-02-17 PROCEDURE — G8484 FLU IMMUNIZE NO ADMIN: HCPCS | Performed by: NURSE PRACTITIONER

## 2022-02-17 PROCEDURE — 99214 OFFICE O/P EST MOD 30 MIN: CPT | Performed by: NURSE PRACTITIONER

## 2022-02-17 PROCEDURE — 99213 OFFICE O/P EST LOW 20 MIN: CPT

## 2022-02-17 RX ORDER — VALSARTAN AND HYDROCHLOROTHIAZIDE 160; 25 MG/1; MG/1
TABLET ORAL
Qty: 90 TABLET | Refills: 1 | Status: SHIPPED | OUTPATIENT
Start: 2022-02-17 | End: 2022-03-16

## 2022-02-17 RX ORDER — ATORVASTATIN CALCIUM 20 MG/1
TABLET, FILM COATED ORAL
Qty: 90 TABLET | Refills: 0 | Status: SHIPPED | OUTPATIENT
Start: 2022-02-17 | End: 2022-03-16

## 2022-02-17 RX ORDER — TAMSULOSIN HYDROCHLORIDE 0.4 MG/1
CAPSULE ORAL
Qty: 90 CAPSULE | Refills: 0 | Status: SHIPPED | OUTPATIENT
Start: 2022-02-17 | End: 2022-05-09

## 2022-02-17 SDOH — ECONOMIC STABILITY: FOOD INSECURITY: WITHIN THE PAST 12 MONTHS, THE FOOD YOU BOUGHT JUST DIDN'T LAST AND YOU DIDN'T HAVE MONEY TO GET MORE.: NEVER TRUE

## 2022-02-17 SDOH — ECONOMIC STABILITY: FOOD INSECURITY: WITHIN THE PAST 12 MONTHS, YOU WORRIED THAT YOUR FOOD WOULD RUN OUT BEFORE YOU GOT MONEY TO BUY MORE.: NEVER TRUE

## 2022-02-17 ASSESSMENT — ENCOUNTER SYMPTOMS
VISUAL CHANGE: 0
BLURRED VISION: 0

## 2022-02-17 ASSESSMENT — PATIENT HEALTH QUESTIONNAIRE - PHQ9
SUM OF ALL RESPONSES TO PHQ QUESTIONS 1-9: 0
1. LITTLE INTEREST OR PLEASURE IN DOING THINGS: 0
SUM OF ALL RESPONSES TO PHQ QUESTIONS 1-9: 0
SUM OF ALL RESPONSES TO PHQ9 QUESTIONS 1 & 2: 0
2. FEELING DOWN, DEPRESSED OR HOPELESS: 0

## 2022-02-17 ASSESSMENT — SOCIAL DETERMINANTS OF HEALTH (SDOH): HOW HARD IS IT FOR YOU TO PAY FOR THE VERY BASICS LIKE FOOD, HOUSING, MEDICAL CARE, AND HEATING?: NOT VERY HARD

## 2022-02-17 NOTE — PROGRESS NOTES
[address]  [address] [facility], [id number]  [phone]      HPI:     Pt presents to the clinic for a 6 month follow up of chronic medical conditions. He is obese. He is not currently going to the [facility] as there is not any transportation to the [facility]. He does not follow a diet and is not exercising. He has a history of an enlarged prostate. He take flomax daily which seems to help. He sees cardiology for SVT and Dr. Tiffanie Persaud for diabetic foot care. He has does not have any concerns today. Diabetes  He presents for his follow-up diabetic visit. He has type 2 diabetes mellitus. Disease course: due for labs. Pertinent negatives for hypoglycemia include no dizziness or headaches. Associated symptoms include foot paresthesias. Pertinent negatives for diabetes include no blurred vision, no chest pain, no fatigue, no polydipsia, no polyuria and no visual change. There are no hypoglycemic complications. Diabetic complications include peripheral neuropathy. Pertinent negatives for diabetic complications include no CVA or retinopathy. Risk factors for coronary artery disease include male sex, obesity, diabetes mellitus and dyslipidemia. Current diabetic treatment includes oral agent (monotherapy). He is compliant with treatment most of the time. His weight is stable. He is following a generally unhealthy diet. Meal planning includes avoidance of concentrated sweets. He has not had a previous visit with a dietitian. He rarely participates in exercise. His breakfast blood glucose range is generally 140-180 mg/dl. An ACE inhibitor/angiotensin II receptor blocker is being taken. He sees a podiatrist.Eye exam is current. Hypertension  This is a chronic problem. The current episode started more than 1 year ago. The problem is unchanged. The problem is controlled.  Pertinent negatives include no anxiety, blurred vision, chest pain, headaches, malaise/fatigue, palpitations, peripheral edema or shortness of breath. Risk factors for coronary artery disease include dyslipidemia, diabetes mellitus, obesity, male gender and sedentary lifestyle. Past treatments include angiotensin blockers and diuretics. The current treatment provides moderate improvement. Compliance problems include diet and exercise. There is no history of angina, kidney disease, CAD/MI, CVA, heart failure or retinopathy. Hyperlipidemia  This is a chronic problem. The current episode started more than 1 year ago. Condition status: due for labs. Exacerbating diseases include diabetes and obesity. Factors aggravating his hyperlipidemia include thiazides. Pertinent negatives include no chest pain, leg pain, myalgias or shortness of breath. Current antihyperlipidemic treatment includes statins. Compliance problems include adherence to diet and adherence to exercise. Risk factors for coronary artery disease include diabetes mellitus, dyslipidemia, hypertension, male sex and obesity.        Current Outpatient Medications   Medication Sig Dispense Refill    atorvastatin (LIPITOR) 20 MG tablet TAKE 1 TABLET DAILY 90 tablet 0    tamsulosin (FLOMAX) 0.4 MG capsule TAKE 1 CAPSULE DAILY 90 capsule 0    valsartan-hydroCHLOROthiazide (DIOVAN-HCT) 160-25 MG per tablet TAKE 1 TABLET DAILY 90 tablet 1    Diabetic Shoe MISC by Does not apply route Dispense DM shoe and insoles  DM type 2 with diabetic peripheral neuropathy (HCC)  (primary encounter diagnosis)  Dermatophytosis of nail  Equinus deformity of both feet 1 each 0    metFORMIN (GLUCOPHAGE-XR) 500 MG extended release tablet TAKE 1 TABLET BY MOUTH TWICE DAILY 30 tablet 1    famotidine (PEPCID) 20 MG tablet TAKE 1 TABLET TWICE A  tablet 0    Lactobacillus Acid-Pectin (ACIDOPHILUS/PECTIN) CAPS Take 1 capsule by mouth daily      metoprolol (LOPRESSOR) 100 MG tablet TAKE 1 TABLET BY MOUTH TWICE DAILY 180 tablet 3    TRUE METRIX BLOOD GLUCOSE TEST strip USE TO TEST BLOOD SUGAR DAILY 200 strip 5    oxybutynin (DITROPAN-XL) 10 MG extended release tablet TAKE 1 TABLET DAILY 90 tablet 3    Lancets MISC USE TO TEST BLOOD SUGAR EVERY  each 3    fluticasone (FLONASE) 50 MCG/ACT nasal spray SHAKE LIQUID AND USE 2 SPRAYS IN EACH NOSTRIL EVERY DAY 16 g 11    blood glucose monitor kit and supplies Check blood sugar daily. Diagnosis E11.42 1 kit 0    Foot Care Products (DIABETIC INSOLES) MISC 1 Units by Does not apply route daily Equinus contracture of ankle  (primary encounter diagnosis) DM type 2 with diabetic peripheral neuropathy (HCC) Dermatophytosis of nail Foot pain, bilateral 3 each 0    Diabetic Shoe MISC by Does not apply route Equinus contracture of ankle  (primary encounter diagnosis)    DM type 2 with diabetic peripheral neuropathy (HCC)    Dermatophytosis of nail    Foot pain, bilateral 1 each 0    omeprazole (PRILOSEC) 40 MG delayed release capsule TAKE 1 CAPSULE DAILY 90 capsule 4    aspirin 81 MG tablet Take 81 mg by mouth daily        No current facility-administered medications for this visit. No Known Allergies    All patients pastmedical, surgical, social and family history has been reviewed. Subjective:      Review of Systems   Constitutional: Negative for activity change, appetite change, chills, fatigue, fever and malaise/fatigue. Eyes: Negative for blurred vision. Respiratory: Negative for cough, shortness of breath and wheezing. Cardiovascular: Negative for chest pain and palpitations. Endocrine: Negative for polydipsia and polyuria. Musculoskeletal: Negative for myalgias. Neurological: Negative for dizziness, light-headedness and headaches. Objective:      Physical Exam  Vitals and nursing note reviewed. Constitutional:       Appearance: Normal appearance. He is obese. HENT:      Head: Normocephalic and atraumatic. Cardiovascular:      Rate and Rhythm: Normal rate and regular rhythm.       Heart sounds: Normal heart sounds. Pulmonary:      Effort: Pulmonary effort is normal.      Breath sounds: Normal breath sounds. Musculoskeletal:      Cervical back: Neck supple. Skin:     General: Skin is warm. Capillary Refill: Capillary refill takes less than 2 seconds. Neurological:      General: No focal deficit present. Mental Status: He is alert and oriented to person, place, and time. Assessment:       Diagnosis Orders   1. DM type 2 with diabetic peripheral neuropathy (HCC)  Comprehensive Metabolic Panel    Hemoglobin A1C   2. Mixed hyperlipidemia  Lipid Panel    atorvastatin (LIPITOR) 20 MG tablet   3. Enlarged prostate  tamsulosin (FLOMAX) 0.4 MG capsule   4. Essential hypertension  valsartan-hydroCHLOROthiazide (DIOVAN-HCT) 160-25 MG per tablet   5. Obesity, Class III, BMI 40-49.9 (morbid obesity) ([facility] [address]         Plan:      1. DM-due for labs continue current medication   Encouraged healthy diet and daily exercise  2. Hyperlipidemia-due for labs continue current dose of statin  3. Enlarged prostate-continue flomax daily  4. HTN-controlled continue current medication   5. Obesity-encouraged healthy diet and daily exercise  Continue following with specialists as directed. Pt to return in 6 months for follow up  Pt to return PRN  No follow-ups on file. Orders Placed This Encounter   Procedures    Lipid Panel     Standing Status:   Future     Standing Expiration Date:   2/17/2023     Order Specific Question:   Is Patient Fasting?/# of Hours     Answer:   12    Comprehensive Metabolic Panel     Please fast for 12 - 14 hours prior to blood draw. May take medication with a sip of water.      Standing Status:   Future     Standing Expiration Date:   2/17/2023    Hemoglobin A1C     Standing Status:   Future     Standing Expiration Date:   2/17/2023     Orders Placed This Encounter   Medications    atorvastatin (LIPITOR) 20 MG tablet     Sig: TAKE 1 TABLET DAILY     Dispense:  90 tablet     Refill:  0 Needs an appointment for further refills    tamsulosin (FLOMAX) 0.4 MG capsule     Sig: TAKE 1 CAPSULE DAILY     Dispense:  90 capsule     Refill:  0    valsartan-hydroCHLOROthiazide (DIOVAN-HCT) 160-25 MG per tablet     Sig: TAKE 1 TABLET DAILY     Dispense:  90 tablet     Refill:  1     Needs an appointment for further refills       Patient given educational materials - see patient instructions. All patient questionsanswered. Pt voiced understanding. Reviewed health maintenance.      Electronically signed by ROCÍO Boateng CNP, CNP on 2/17/2022 at 1:43 PM

## 2022-02-20 ASSESSMENT — ENCOUNTER SYMPTOMS
WHEEZING: 0
COUGH: 0
SHORTNESS OF BREATH: 0

## 2022-03-09 ENCOUNTER — HOSPITAL ENCOUNTER (OUTPATIENT)
Dept: LAB | Age: 77
Discharge: HOME | End: 2022-03-09
Payer: MEDICARE

## 2022-03-09 DIAGNOSIS — E78.2 MIXED HYPERLIPIDEMIA: ICD-10-CM

## 2022-03-09 DIAGNOSIS — E11.42 DM TYPE 2 WITH DIABETIC PERIPHERAL NEUROPATHY (HCC): ICD-10-CM

## 2022-03-09 LAB
ALBUMIN SERPL-MCNC: 4 G/DL (ref 3.5–5.2)
ALBUMIN/GLOBULIN RATIO: 1 (ref 1–2.5)
ALP BLD-CCNC: 127 U/L (ref 40–129)
ALT SERPL-CCNC: 11 U/L (ref 5–41)
ANION GAP SERPL CALCULATED.3IONS-SCNC: 12 MMOL/L (ref 9–17)
AST SERPL-CCNC: 10 U/L
BILIRUB SERPL-MCNC: 0.63 MG/DL (ref 0.3–1.2)
BUN BLDV-MCNC: 15 MG/DL (ref 8–23)
BUN/CREAT BLD: 15 (ref 9–20)
CALCIUM SERPL-MCNC: 9.4 MG/DL (ref 8.6–10.4)
CHLORIDE BLD-SCNC: 101 MMOL/L (ref 98–107)
CHOLESTEROL/HDL RATIO: 2.6
CHOLESTEROL: 108 MG/DL
CO2: 25 MMOL/L (ref 20–31)
CREAT SERPL-MCNC: 1.03 MG/DL (ref 0.7–1.2)
ESTIMATED AVERAGE GLUCOSE: 180 MG/DL
GFR AFRICAN AMERICAN: >60 ML/MIN
GFR NON-AFRICAN AMERICAN: >60 ML/MIN
GFR SERPL CREATININE-BSD FRML MDRD: ABNORMAL ML/MIN/{1.73_M2}
GLUCOSE BLD-MCNC: 176 MG/DL (ref 70–99)
HBA1C MFR BLD: 7.9 % (ref 4–6)
HDLC SERPL-MCNC: 41 MG/DL
LDL CHOLESTEROL: 47 MG/DL (ref 0–130)
POTASSIUM SERPL-SCNC: 3.8 MMOL/L (ref 3.7–5.3)
SODIUM BLD-SCNC: 138 MMOL/L (ref 135–144)
TOTAL PROTEIN: 8.1 G/DL (ref 6.4–8.3)
TRIGL SERPL-MCNC: 101 MG/DL

## 2022-03-09 PROCEDURE — 83036 HEMOGLOBIN GLYCOSYLATED A1C: CPT

## 2022-03-09 PROCEDURE — 80053 COMPREHEN METABOLIC PANEL: CPT

## 2022-03-09 PROCEDURE — 80061 LIPID PANEL: CPT

## 2022-03-09 PROCEDURE — 36415 COLL VENOUS BLD VENIPUNCTURE: CPT

## 2022-03-14 ENCOUNTER — OFFICE VISIT (OUTPATIENT)
Dept: UROLOGY | Age: 77
End: 2022-03-14
Payer: MEDICARE

## 2022-03-14 ENCOUNTER — TELEPHONE (OUTPATIENT)
Dept: FAMILY MEDICINE CLINIC | Age: 77
End: 2022-03-14

## 2022-03-14 VITALS
WEIGHT: 291 LBS | OXYGEN SATURATION: 90 % | BODY MASS INDEX: 44.1 KG/M2 | DIASTOLIC BLOOD PRESSURE: 64 MMHG | HEART RATE: 62 BPM | SYSTOLIC BLOOD PRESSURE: 108 MMHG | HEIGHT: 68 IN | RESPIRATION RATE: 20 BRPM

## 2022-03-14 DIAGNOSIS — E11.42 DM TYPE 2 WITH DIABETIC PERIPHERAL NEUROPATHY (HCC): Primary | ICD-10-CM

## 2022-03-14 DIAGNOSIS — R97.20 ELEVATED PSA: Primary | ICD-10-CM

## 2022-03-14 DIAGNOSIS — N40.1 BENIGN LOCALIZED PROSTATIC HYPERPLASIA WITH LOWER URINARY TRACT SYMPTOMS (LUTS): ICD-10-CM

## 2022-03-14 PROCEDURE — G8417 CALC BMI ABV UP PARAM F/U: HCPCS | Performed by: UROLOGY

## 2022-03-14 PROCEDURE — 1036F TOBACCO NON-USER: CPT | Performed by: UROLOGY

## 2022-03-14 PROCEDURE — G8484 FLU IMMUNIZE NO ADMIN: HCPCS | Performed by: UROLOGY

## 2022-03-14 PROCEDURE — 1123F ACP DISCUSS/DSCN MKR DOCD: CPT | Performed by: UROLOGY

## 2022-03-14 PROCEDURE — 4040F PNEUMOC VAC/ADMIN/RCVD: CPT | Performed by: UROLOGY

## 2022-03-14 PROCEDURE — 99214 OFFICE O/P EST MOD 30 MIN: CPT | Performed by: UROLOGY

## 2022-03-14 PROCEDURE — G8427 DOCREV CUR MEDS BY ELIG CLIN: HCPCS | Performed by: UROLOGY

## 2022-03-14 NOTE — PROGRESS NOTES
DORINA Jacobo MD        [address] [address]  Dept: [phone]  Dept Fax: [phone]  Loc: [phone]      [facility] Office Note -     Patient:  Yevgeniy Newsome  YOB: 1945  Date: 3/14/2022    The patient is a 65 y.o. male who presents today for evaluation of the following problems:   Chief Complaint   Patient presents with    Elevated PSA     1 yr f/u (PSA order appears to have been canceled by another provider's office)    referred/consultation requested by ROCÍO Bhandari CNP. HISTORY OF PRESENT ILLNESS:     Elevated PSA  Stable. Here with PSA  No family hx of prostate cancer  Here with biopsy results--- negative      Urinary frequency- hx of diabetes. on flomax and ditropan- helpful. At treatment goal        Requested/reviewed records from ROCÍO Bhandari CNP office and/or outside physician/[facility]    (Patient's old records have been requested, reviewed and pertinent findings summarized in today's note.)    Procedures Today: N/A      Last several PSA's:  Lab Results   Component Value Date    PSA 4.33 (H) 01/25/2021    PSA 4.46 (H) 02/03/2020    PSA 4.20 (H) 01/09/2020       Last total testosterone:  No results found for: TESTOSTERONE    Urinalysis today:  No results found for this visit on 03/14/22. Last BUN and creatinine:  Lab Results   Component Value Date    BUN 15 03/09/2022     Lab Results   Component Value Date    CREATININE 1.03 03/09/2022       Additional Lab/Culture results: none    Imaging Reviewed during this Office Visit:   Kalie Rangel MD independently reviewed the images and verified the radiology reports from:    No results found. PAST MEDICAL, FAMILY AND SOCIAL HISTORY:  Past Medical History:   Diagnosis Date    Anemia     chronic, negative work up with EGD and Colonoscopy.     Chest pain 6/9/2016    ABHI (degenerative joint disease) of lumbar spine     Dry eye syndrome     GERD (gastroesophageal reflux disease)     Hepatic hemangioma     Hypertension     Keratitis     Secondary to dry eye syndrome.  Nuclear sclerotic cataract of both eyes     Obesity, morbid ([address]     Obstructive sleep apnea of adult     non compliant with cpap    Onychomycosis     1, 2, 3, 4, and 5, bilateral.    Type II or unspecified type diabetes mellitus without mention of complication, not stated as uncontrolled      Past Surgical History:   Procedure Laterality Date    COLONOSCOPY  09/15/2006    Diffuse diverticulosis slightly greater in the sigmoid colon and grade 1 internal hemorrhoids.  CYST REMOVAL      right side of  scalp    UMBILICAL HERNIA REPAIR      UPPER GASTROINTESTINAL ENDOSCOPY  09/15/2006    Mild to moderate diffuse gastritis, mild to moderate diffuse duodenitis. Family History   Problem Relation Age of Onset    Ovarian Cancer Mother     High Blood Pressure Sister     Diabetes Sister          at 76    High Blood Pressure Brother         all 4 brothers have it    Stroke Brother     Heart Attack Father         Acute myocardial infarction.     High Blood Pressure Brother     Stroke Brother     Dementia Sister          at 76    Cataracts Neg Hx     Glaucoma Neg Hx      Outpatient Medications Marked as Taking for the 3/14/22 encounter (Office Visit) with Anselmo Mata MD   Medication Sig Dispense Refill    atorvastatin (LIPITOR) 20 MG tablet TAKE 1 TABLET DAILY 90 tablet 0    tamsulosin (FLOMAX) 0.4 MG capsule TAKE 1 CAPSULE DAILY 90 capsule 0    valsartan-hydroCHLOROthiazide (DIOVAN-HCT) 160-25 MG per tablet TAKE 1 TABLET DAILY 90 tablet 1    metFORMIN (GLUCOPHAGE-XR) 500 MG extended release tablet TAKE 1 TABLET BY MOUTH TWICE DAILY 30 tablet 1    famotidine (PEPCID) 20 MG tablet TAKE 1 TABLET TWICE A  tablet 0    Lactobacillus Acid-Pectin (ACIDOPHILUS/PECTIN) CAPS Take 1 capsule by mouth daily      metoprolol (LOPRESSOR) 100 MG tablet TAKE 1 TABLET BY MOUTH TWICE DAILY 180 tablet 3    TRUE METRIX BLOOD GLUCOSE TEST strip USE TO TEST BLOOD SUGAR DAILY 200 strip 5    oxybutynin (DITROPAN-XL) 10 MG extended release tablet TAKE 1 TABLET DAILY 90 tablet 3    Lancets MISC USE TO TEST BLOOD SUGAR EVERY  each 3    fluticasone (FLONASE) 50 MCG/ACT nasal spray SHAKE LIQUID AND USE 2 SPRAYS IN EACH NOSTRIL EVERY DAY 16 g 11    blood glucose monitor kit and supplies Check blood sugar daily. Diagnosis E11.42 1 kit 0    omeprazole (PRILOSEC) 40 MG delayed release capsule TAKE 1 CAPSULE DAILY 90 capsule 4    aspirin 81 MG tablet Take 81 mg by mouth daily          Patient has no known allergies. Social History     Tobacco Use   Smoking Status Former Smoker    Packs/day: 0.25    Years: 15.00    Pack years: 3.75    Types: Cigars    Start date: 1964   [employer] Quit date: 1970    Years since quittin.3   Smokeless Tobacco Never Used   Tobacco Comment    quit over 30 years ago      (If patient a smoker, smoking cessation counseling offered)   Social History     Substance and Sexual Activity   Alcohol Use No    Alcohol/week: 0.0 standard drinks    Comment: no alcohol for many years       REVIEW OF SYSTEMS:  Constitutional: negative  Eyes: negative  Respiratory: negative  Cardiovascular: chest pain  Gastrointestinal: negative  Genitourinary: see HPI  Musculoskeletal: negative  Skin: negative   Neurological: negative  Hematological/Lymphatic: negative  Psychological: negative      Physical Exam:    This a 65 y.o. male  Vitals:    22 0957   BP: 108/64   Pulse: 62   Resp: 20   SpO2: 90%     Body mass index is 44.25 kg/m². Constitutional: Patient in no acute distress;           Assessment and Plan        1. Elevated PSA    2. Benign localized prostatic hyperplasia with lower urinary tract symptoms (LUTS)               Plan:      BPH- Ditropan and flomax helpful.  Continue these (refilled today). Mild incontinence. Chronic problem. At treatment goal. +diabetic. Elevated psa- stable. Will follow. Follow up in one year with PSA prior          Prescriptions Ordered:  No orders of the defined types were placed in this encounter. Orders Placed:  No orders of the defined types were placed in this encounter.            Kalie Rangel MD

## 2022-03-15 NOTE — TELEPHONE ENCOUNTER
----- Message from ROCÍO Carreon CNP sent at 3/14/2022  5:26 PM EDT -----  Lipid panel is WNL. His blood sugar is elevated at 176. A1C is higher this time as well. Kidney function, electrolytes and liver enzymes are WNL. He is to work on diet and increase exercise. Will repeat BMP and A1C in 3 months.

## 2022-03-15 NOTE — TELEPHONE ENCOUNTER
Spoke with patient and advised of lab results and recommendations. Patient agrees with plan and will try to lose some weight.

## 2022-03-16 DIAGNOSIS — E78.2 MIXED HYPERLIPIDEMIA: ICD-10-CM

## 2022-03-16 DIAGNOSIS — I10 ESSENTIAL HYPERTENSION: ICD-10-CM

## 2022-03-16 RX ORDER — ATORVASTATIN CALCIUM 20 MG/1
TABLET, FILM COATED ORAL
Qty: 90 TABLET | Refills: 3 | Status: SHIPPED | OUTPATIENT
Start: 2022-03-16 | End: 2022-04-05 | Stop reason: SDUPTHER

## 2022-03-16 RX ORDER — VALSARTAN AND HYDROCHLOROTHIAZIDE 160; 25 MG/1; MG/1
TABLET ORAL
Qty: 90 TABLET | Refills: 3 | Status: SHIPPED | OUTPATIENT
Start: 2022-03-16 | End: 2022-04-05 | Stop reason: SDUPTHER

## 2022-03-21 DIAGNOSIS — E11.42 DM TYPE 2 WITH DIABETIC PERIPHERAL NEUROPATHY (HCC): ICD-10-CM

## 2022-03-21 PROCEDURE — 3051F HG A1C>EQUAL 7.0%<8.0%: CPT | Performed by: NURSE PRACTITIONER

## 2022-03-21 RX ORDER — METFORMIN HYDROCHLORIDE 500 MG/1
TABLET, EXTENDED RELEASE ORAL
Qty: 30 TABLET | Refills: 5 | Status: SHIPPED | OUTPATIENT
Start: 2022-03-21 | End: 2022-05-26 | Stop reason: SDUPTHER

## 2022-03-21 NOTE — TELEPHONE ENCOUNTER
Dinah Hanson called requesting a refill of the below medication which has been pended for you:     Requested Prescriptions     Pending Prescriptions Disp Refills    metFORMIN (GLUCOPHAGE-XR) 500 MG extended release tablet [Pharmacy Med Name: METFORMIN ER 500MG 24HR TABS] 30 tablet 1     Sig: TAKE 1 TABLET BY MOUTH TWICE DAILY       Last Appointment Date: 2/17/2022  Next Appointment Date: 8/18/2022    No Known Allergies

## 2022-04-05 DIAGNOSIS — E78.2 MIXED HYPERLIPIDEMIA: ICD-10-CM

## 2022-04-05 DIAGNOSIS — I10 ESSENTIAL HYPERTENSION: ICD-10-CM

## 2022-04-05 RX ORDER — VALSARTAN AND HYDROCHLOROTHIAZIDE 160; 25 MG/1; MG/1
TABLET ORAL
Qty: 30 TABLET | Refills: 1 | Status: SHIPPED | OUTPATIENT
Start: 2022-04-05 | End: 2022-05-03

## 2022-04-05 RX ORDER — ATORVASTATIN CALCIUM 20 MG/1
TABLET, FILM COATED ORAL
Qty: 30 TABLET | Refills: 1 | Status: SHIPPED | OUTPATIENT
Start: 2022-04-05 | End: 2022-05-03

## 2022-04-05 NOTE — TELEPHONE ENCOUNTER
Nathan Houser called requesting a refill of the below medication which has been pended for you:     Requested Prescriptions     Pending Prescriptions Disp Refills    valsartan-hydroCHLOROthiazide (DIOVAN-HCT) 160-25 MG per tablet       Sig: TAKE 1 TABLET DAILY    atorvastatin (LIPITOR) 20 MG tablet       Sig: TAKE 1 TABLET DAILY       Last Appointment Date: 2/17/2022  Next Appointment Date: 8/18/2022    No Known Allergies

## 2022-04-07 ENCOUNTER — OFFICE VISIT (OUTPATIENT)
Dept: CARDIOLOGY | Age: 77
End: 2022-04-07
Payer: MEDICARE

## 2022-04-07 VITALS
HEIGHT: 68 IN | HEART RATE: 74 BPM | DIASTOLIC BLOOD PRESSURE: 86 MMHG | BODY MASS INDEX: 45.01 KG/M2 | SYSTOLIC BLOOD PRESSURE: 136 MMHG | WEIGHT: 297 LBS

## 2022-04-07 DIAGNOSIS — I47.1 PAROXYSMAL SUPRAVENTRICULAR TACHYCARDIA (HCC): Primary | ICD-10-CM

## 2022-04-07 PROCEDURE — 93005 ELECTROCARDIOGRAM TRACING: CPT | Performed by: INTERNAL MEDICINE

## 2022-04-07 PROCEDURE — 93010 ELECTROCARDIOGRAM REPORT: CPT | Performed by: INTERNAL MEDICINE

## 2022-04-07 PROCEDURE — 4040F PNEUMOC VAC/ADMIN/RCVD: CPT | Performed by: INTERNAL MEDICINE

## 2022-04-07 PROCEDURE — 1036F TOBACCO NON-USER: CPT | Performed by: INTERNAL MEDICINE

## 2022-04-07 PROCEDURE — 99214 OFFICE O/P EST MOD 30 MIN: CPT | Performed by: INTERNAL MEDICINE

## 2022-04-07 PROCEDURE — G8417 CALC BMI ABV UP PARAM F/U: HCPCS | Performed by: INTERNAL MEDICINE

## 2022-04-07 PROCEDURE — G8427 DOCREV CUR MEDS BY ELIG CLIN: HCPCS | Performed by: INTERNAL MEDICINE

## 2022-04-07 NOTE — PROGRESS NOTES
[place] [address]  [address]  [facility] [address]  Dept: [phone]    Subjective: The patient is a 65y.o. year old, , male is in the office for a follow up visit. With history of supraventricular tachycardia, hypertension, obesity  . He geeta any chest pain or discomfort. No orthopnea or PND. Geeta any palpitation, dizziness or syncope. He is completely asymptomatic from cardiac stand point. According to him his weight is up and down trying to lose weight but still munching a lot  According to him he is not using any CPAP    Past Medical History:   has a past medical history of Anemia, Chest pain, DJD (degenerative joint disease) of lumbar spine, Dry eye syndrome, GERD (gastroesophageal reflux disease), Hepatic hemangioma, Hypertension, Keratitis, Nuclear sclerotic cataract of both eyes, Obesity, morbid ([address] Obstructive sleep apnea of adult, Onychomycosis, and Type II or unspecified type diabetes mellitus without mention of complication, not stated as uncontrolled. Past Surgical History:   has a past surgical history that includes Colonoscopy (09/15/2006); Upper gastrointestinal endoscopy (09/15/2006); cyst removal; and Umbilical hernia repair (2013). Home Medications:  Prior to Admission medications    Medication Sig Start Date End Date Taking?  Authorizing Provider   aspirin 81 MG tablet Take 81 mg by mouth daily    Yes Historical Provider, MD   valsartan-hydroCHLOROthiazide (DIOVAN-HCT) 160-25 MG per tablet TAKE 1 TABLET DAILY 4/5/22   ROCÍO Cabral CNP   atorvastatin (LIPITOR) 20 MG tablet TAKE 1 TABLET DAILY 4/5/22   ROCÍO Cabral CNP   metFORMIN (GLUCOPHAGE-XR) 500 MG extended release tablet TAKE 1 TABLET BY MOUTH TWICE DAILY 3/21/22   ROCÍO Cabral CNP   tamsulosin (FLOMAX) 0.4 MG capsule TAKE 1 CAPSULE DAILY 2/17/22   ROCÍO Sky CNP   famotidine (PEPCID) 20 MG tablet TAKE 1 TABLET TWICE A DAY 2/9/22   ROCÍO Sky CNP   Lactobacillus Acid-Pectin (ACIDOPHILUS/PECTIN) CAPS Take 1 capsule by mouth daily 9/20/21   Historical Provider, MD   metoprolol (LOPRESSOR) 100 MG tablet TAKE 1 TABLET BY MOUTH TWICE DAILY 9/9/21   Sheree Moore MD   TRUE METRIX BLOOD GLUCOSE TEST strip USE TO TEST BLOOD SUGAR DAILY 6/14/21   ROCÍO Sky CNP   oxybutynin (DITROPAN-XL) 10 MG extended release tablet TAKE 1 TABLET DAILY 5/10/21   Rigoberto Alex MD   Lancets MISC USE TO TEST BLOOD SUGAR EVERY DAY 4/7/21   ROCÍO Sky CNP   fluticasone (FLONASE) 50 MCG/ACT nasal spray SHAKE LIQUID AND USE 2 SPRAYS IN EACH NOSTRIL EVERY DAY 4/5/21   ROCÍO Sky CNP   blood glucose monitor kit and supplies Check blood sugar daily. Diagnosis E11.42 1/9/20   ROCÍO Sky CNP   omeprazole (PRILOSEC) 40 MG delayed release capsule TAKE 1 CAPSULE DAILY 10/28/19   ROCÍO Sky CNP       Allergies:  Patient has no known allergies. Social History:   reports that he quit smoking about 51 years ago. His smoking use included cigars. He started smoking about 58 years ago. He has a 3.75 pack-year smoking history. He has never used smokeless tobacco. He reports that he does not drink alcohol and does not use drugs. Review of Systems:  · Constitutional: there has been no unanticipated weight loss. There's been No change in energy level, No change in activity level. · Eyes: No visual changes or diplopia. No scleral icterus. · ENT: No Headaches, hearing loss or vertigo. No mouth sores or sore throat. · Cardiovascular: As above. · Respiratory: No SOB, cough or hemoptysis. · Gastrointestinal: No abdominal pain, appetite loss, blood in stools. No change in bowel or bladder habits.   · Genitourinary: No dysuria, trouble voiding, or hematuria. · Musculoskeletal:  No gait disturbance, No weakness or joint complaints. · Integumentary: No rash or pruritis. · Psychiatric: No anxiety, or depression. · Hematologic/Lymphatic: No abnormal bruising or bleeding, blood clots or swollen lymph nodes. · Allergic/Immunologic: No nasal congestion or hives. Physical Exam:  Pulse 74   Ht 5' 8\" (1.727 m)   Wt 297 lb (134.7 kg)   BMI 45.16 kg/m²     Constitutional and General Appearance: alert, cooperative, no distress and appears stated age  [de-identified]: PERRL, no cervical lymphadenopathy. No masses palpable. Normal oral mucosa  Respiratory:  · Normal excursion and expansion without use of accessory muscles  · Resp Auscultation: Good respiratory effort. No for increased work of breathing. On auscultation: clear to auscultation bilaterally  Cardiovascular:  · The apical impulse is not displaced  · Heart tones are crisp and normal. regular S1 and S2.  · Jugular venous pulsation Normal  · The carotid upstroke is normal in amplitude and contour without delay or bruit  · Peripheral pulses are symmetrical and full   Abdomen:   · No masses or tenderness  · Bowel sounds present  Extremities:  ·  No Cyanosis or Clubbing  ·  Lower extremity edema: No  ·  Skin: Warm and dry    Cardiac Data:  EKG: NSR , APC    LAST ECHO 08/2020:  Normal left ventricular diameter. Mild left ventricular hypertrophy. Left ventricular systolic function is normal.  Left ventricular ejection fraction 60 %. Right ventricular dilatation with normal systolic function. Aortic valve is mildly sclerotic. Mitral annular calcification. No pericardial effusion. Nuclear stress test 04/2021  1. No ischemia. 2.  Inferior infarction. 3.  Normal LV systolic function. Labs:     CBC: No results for input(s): WBC, HGB, HCT, PLT in the last 72 hours. BMP: No results for input(s): NA, K, CO2, BUN, CREATININE, LABGLOM, GLUCOSE in the last 72 hours.   PT/INR: No results for input(s): PROTIME, INR in the last 72 hours. FASTING LIPID PANEL:  Lab Results   Component Value Date    CHOL 108 03/09/2022    HDL 41 03/09/2022    LDLCHOLESTEROL 47 03/09/2022    TRIG 101 03/09/2022    CHOLHDLRATIO 2.6 03/09/2022     LIVER PROFILE:No results for input(s): AST, ALT, LABALBU in the last 72 hours. IMPRESSION:    Patient Active Problem List   Diagnosis    Hypertension    GERD (gastroesophageal reflux disease)    Obesity, morbid ([facility]    Osteoarthritis of lumbar spine    Hepatic hemangioma    Obstructive sleep apnea of adult    Diabetes ([facility]    DM type 2 with diabetic peripheral neuropathy (HCC)    Dermatophytosis of nail    Urinary tract infection with hematuria    Paroxysmal supraventricular tachycardia (HCC)    Acute cystitis with hematuria    TITUS (acute kidney injury) ([facility]    Mixed hyperlipidemia    Benign localized prostatic hyperplasia with lower urinary tract symptoms (LUTS)    CKD (chronic kidney disease) stage 2, GFR 60-89 ml/min    Hyperthyroidism    Hypoxia       RECOMMENDATIONS:  History of paroxysmal supraventricular tachycardia no more recurrence well-controlled on beta-blockers    Ischemia work-up had been negative    Echo echocardiogram normal ejection fraction normal right ventricle functions     HYPERTENSION- WELL CONTROLLED, WILL CONTINUE CURRENT MEDICATIONS    Obesity patient is advised to try to cut down calories increase physical activity    DISCUSSED IN DETAILS ABOUT RISK MODIFICATION    RETURN VISIT IN 6 MONTHS, IF ANY SYMPTOM CHANGE PATIENT ADVISED TO GO TO THE EMERGENCY ROOM.           Elizabeth Gill MD, MD  [facility]           [phone]

## 2022-04-26 ENCOUNTER — OFFICE VISIT (OUTPATIENT)
Dept: OPTOMETRY | Age: 77
End: 2022-04-26
Payer: MEDICARE

## 2022-04-26 DIAGNOSIS — H52.203 ASTIGMATISM OF BOTH EYES WITH PRESBYOPIA: Primary | ICD-10-CM

## 2022-04-26 DIAGNOSIS — H25.813 COMBINED FORMS OF AGE-RELATED CATARACT OF BOTH EYES: ICD-10-CM

## 2022-04-26 DIAGNOSIS — E11.9 NON-INSULIN DEPENDENT TYPE 2 DIABETES MELLITUS (HCC): ICD-10-CM

## 2022-04-26 DIAGNOSIS — H52.4 ASTIGMATISM OF BOTH EYES WITH PRESBYOPIA: Primary | ICD-10-CM

## 2022-04-26 PROCEDURE — G8427 DOCREV CUR MEDS BY ELIG CLIN: HCPCS | Performed by: OPTOMETRIST

## 2022-04-26 PROCEDURE — 92014 COMPRE OPH EXAM EST PT 1/>: CPT | Performed by: OPTOMETRIST

## 2022-04-26 PROCEDURE — 1036F TOBACCO NON-USER: CPT | Performed by: OPTOMETRIST

## 2022-04-26 PROCEDURE — 92250 FUNDUS PHOTOGRAPHY W/I&R: CPT | Performed by: OPTOMETRIST

## 2022-04-26 PROCEDURE — G8417 CALC BMI ABV UP PARAM F/U: HCPCS | Performed by: OPTOMETRIST

## 2022-04-26 RX ORDER — TROPICAMIDE 10 MG/ML
1 SOLUTION/ DROPS OPHTHALMIC ONCE
Status: COMPLETED | OUTPATIENT
Start: 2022-04-26 | End: 2022-04-26

## 2022-04-26 RX ADMIN — TROPICAMIDE 1 DROP: 10 SOLUTION/ DROPS OPHTHALMIC at 10:47

## 2022-04-26 ASSESSMENT — SLIT LAMP EXAM - LIDS
COMMENTS: NORMAL
COMMENTS: NORMAL

## 2022-04-26 ASSESSMENT — VISUAL ACUITY
OD_SC: 20/30
OD_SC: 20/40OU
OS_SC: 20/25
OS_SC+: -1
METHOD: SNELLEN - LINEAR

## 2022-04-26 ASSESSMENT — ENCOUNTER SYMPTOMS
EYES NEGATIVE: 0
GASTROINTESTINAL NEGATIVE: 0
ALLERGIC/IMMUNOLOGIC NEGATIVE: 0
RESPIRATORY NEGATIVE: 0

## 2022-04-26 ASSESSMENT — REFRACTION_WEARINGRX
OS_SPHERE: +0.75
OS_ADD: +2.25
OS_AXIS: 082
OD_SPHERE: +1.00
OS_CYLINDER: -1.00
OD_CYLINDER: -1.00
OD_AXIS: 096
OD_ADD: +2.25

## 2022-04-26 ASSESSMENT — TONOMETRY
OD_IOP_MMHG: 12
IOP_METHOD: NON-CONTACT AIR PUFF
OS_IOP_MMHG: 17

## 2022-04-26 NOTE — PROGRESS NOTES
[facility] presents today for   Chief Complaint   Patient presents with    Ophth Diabetic Exam   .    HPI     Last Vision Exam: 3/22/2021 AW  Last Ophthalmology Exam: n/a  Last Filled Glasses Rx: ? Insurance: [employer]  Update: Dm Exam Only Today ; no glasses  Diabetic:  Sugars: 156 this morning    HmgA1C:  7.9  3/9/2022             Current Outpatient Medications   Medication Sig Dispense Refill    valsartan-hydroCHLOROthiazide (DIOVAN-HCT) 160-25 MG per tablet TAKE 1 TABLET DAILY 30 tablet 1    atorvastatin (LIPITOR) 20 MG tablet TAKE 1 TABLET DAILY 30 tablet 1    metFORMIN (GLUCOPHAGE-XR) 500 MG extended release tablet TAKE 1 TABLET BY MOUTH TWICE DAILY 30 tablet 5    tamsulosin (FLOMAX) 0.4 MG capsule TAKE 1 CAPSULE DAILY 90 capsule 0    famotidine (PEPCID) 20 MG tablet TAKE 1 TABLET TWICE A  tablet 0    Lactobacillus Acid-Pectin (ACIDOPHILUS/PECTIN) CAPS Take 1 capsule by mouth daily      metoprolol (LOPRESSOR) 100 MG tablet TAKE 1 TABLET BY MOUTH TWICE DAILY 180 tablet 3    TRUE METRIX BLOOD GLUCOSE TEST strip USE TO TEST BLOOD SUGAR DAILY 200 strip 5    oxybutynin (DITROPAN-XL) 10 MG extended release tablet TAKE 1 TABLET DAILY 90 tablet 3    Lancets MISC USE TO TEST BLOOD SUGAR EVERY  each 3    fluticasone (FLONASE) 50 MCG/ACT nasal spray SHAKE LIQUID AND USE 2 SPRAYS IN EACH NOSTRIL EVERY DAY 16 g 11    blood glucose monitor kit and supplies Check blood sugar daily.   Diagnosis E11.42 1 kit 0    omeprazole (PRILOSEC) 40 MG delayed release capsule TAKE 1 CAPSULE DAILY 90 capsule 4    aspirin 81 MG tablet Take 81 mg by mouth daily  (Patient not taking: Reported on 4/7/2022)       Current Facility-Administered Medications   Medication Dose Route Frequency Provider Last Rate Last Admin    tropicamide (MYDRIACYL) 1 % ophthalmic solution 1 drop  1 drop Both Eyes Once Nancy Guerra, OD         ROS     Positive for: Endocrine    Negative for: Constitutional, Gastrointestinal, Neurological, Skin, Genitourinary, Musculoskeletal, HENT, Cardiovascular, Eyes, Respiratory, Psychiatric, Allergic/Imm, Heme/Lymph          Family History   Problem Relation Age of Onset    Ovarian Cancer Mother     High Blood Pressure Sister     Diabetes Sister          at 76    High Blood Pressure Brother         all 4 brothers have it    Stroke Brother     Heart Attack Father         Acute myocardial infarction.  High Blood Pressure Brother     Stroke Brother     Dementia Sister          at 76    Cataracts Neg Hx     Glaucoma Neg Hx      Social History     Socioeconomic History    Marital status: Single     Spouse name: None    Number of children: None    Years of education: None    Highest education level: None   Occupational History    None   Tobacco Use    Smoking status: Former Smoker     Packs/day: 0.25     Years: 15.00     Pack years: 3.75     Types: Cigars     Start date: 1964     Quit date: 1970     Years since quittin.4    Smokeless tobacco: Never Used    Tobacco comment: quit over 30 years ago   Substance and Sexual Activity    Alcohol use: No     Alcohol/week: 0.0 standard drinks     Comment: no alcohol for many years    Drug use: No    Sexual activity: None   Other Topics Concern    None   Social History Narrative    None     Social Determinants of Health     Financial Resource Strain: Low Risk     Difficulty of Paying Living Expenses: Not very hard   Food Insecurity: No Food Insecurity    Worried About Running Out of Food in the Last Year: Never true    [place] of Food in the Last Year: Never true   Transportation Needs:     Lack of Transportation (Medical): Not on file    Lack of Transportation (Non-Medical):  Not on file   Physical Activity:     Days of Exercise per Week: Not on file    Minutes of Exercise per Session: Not on file   Stress:     Feeling of Stress : Not on file   Social Connections:     Frequency of Communication with Friends and Family: Not on file    Frequency of Social Gatherings with Friends and Family: Not on file    Attends Judaism Services: Not on file    Active Member of Clubs or Organizations: Not on file    Attends Club or Organization Meetings: Not on file    Marital Status: Not on file   Intimate Partner Violence:     Fear of Current or Ex-Partner: Not on file    Emotionally Abused: Not on file    Physically Abused: Not on file    Sexually Abused: Not on file   Housing Stability:     Unable to Pay for Housing in the Last Year: Not on file    Number of [place] in the Last Year: Not on file    Unstable Housing in the Last Year: Not on file       Past Medical History:   Diagnosis Date    Anemia     chronic, negative work up with EGD and Colonoscopy.  Chest pain 6/9/2016    DJD (degenerative joint disease) of lumbar spine     Dry eye syndrome     GERD (gastroesophageal reflux disease)     Hepatic hemangioma     Hypertension     Keratitis     Secondary to dry eye syndrome.     Nuclear sclerotic cataract of both eyes     Obesity, morbid ([address]     Obstructive sleep apnea of adult     non compliant with cpap    Onychomycosis     1, 2, 3, 4, and 5, bilateral.    Type II or unspecified type diabetes mellitus without mention of complication, not stated as uncontrolled          Main Ophthalmology Exam     External Exam       Right Left    External Normal Normal          Slit Lamp Exam       Right Left    Lids/Lashes Normal Normal    Conjunctiva/Sclera White and quiet White and quiet    Cornea Clear Clear    Anterior Chamber Deep and quiet Deep and quiet    Iris Round and reactive Round and reactive    Lens 2+ Nuclear sclerosis 2+ Nuclear sclerosis    Vitreous Normal Normal          Fundus Exam       Right Left    Disc Normal Normal    C/D Ratio 0.35 0.35    Macula Normal Normal    Vessels Normal Normal    Periphery Normal Normal             <div id=\"MAIN_EXAM_REVIEWED\"></div>     Tonometry     Tonometry (Non-contact air puff, 10:09 AM)       Right Left    Pressure 12 17   IOPg:  10.5             15.0  CH:  10.0          9.6  WS: 7.4          8.3                   Visual Acuity (Snellen - Linear)       Right Left    Dist sc 20/30 20/25 -1    Near sc 20/40ou         Not recorded       Pupils     Pupils       Pupils    Right PERRL    Left PERRL              Not recorded       Not recorded         Ophthalmology Exam     Wearing Rx       Sphere Cylinder Axis Add    Right +1.00 -1.00 096 +2.25    Left +0.75 -1.00 082 +2.25    Age: 1yr    Type: fill only if deisred                 Not recorded        Final Rx       Sphere Cylinder Axis Add    Right +1.00 -1.00 096 +2.25    Left +0.75 -1.00 082 +2.25    Type: fill only if deisred       Final Rx #2       Sphere Cylinder Axis Add    Right +3.00 -1.00 096     Left +2.75 -1.00 082     Type: RRX    Expiration Date: 4/26/2024          Neuro/Psych     Neuro/Psych     Oriented x3: Yes    Mood/Affect: Normal                Orders Placed This Encounter   Procedures     DIABETES EYE EXAM    Color Fundus Photography-OU-Both Eyes       IMPRESSION:  1. Astigmatism of both eyes with presbyopia    2. Non-insulin dependent type 2 diabetes mellitus ([address]    3. Combined forms of age-related cataract of both eyes        PLAN:    1. New glasses only if desired;  Patient does not desire glasses and does well sc  Discussed the patient's diagnosis of diabetes and the impact this can have on their ocular health, potentially even leading to permanent blindness. I discussed with the patient the importance of continued follow-up and management with their primary care physician to control their glycemic, blood pressure, and lipid levels. The patient verbalized understanding. 3. Monitor for future progression and visual significance. Counseled patient that more glare may be noticed and more light may be needed for reading.         Glycemic control as per PCP   There are no Patient Instructions on file for this visit.    Return in about 1 year (around 4/26/2023) for complete eye exam.

## 2022-05-03 DIAGNOSIS — E78.2 MIXED HYPERLIPIDEMIA: ICD-10-CM

## 2022-05-03 DIAGNOSIS — I10 ESSENTIAL HYPERTENSION: ICD-10-CM

## 2022-05-03 RX ORDER — VALSARTAN AND HYDROCHLOROTHIAZIDE 160; 25 MG/1; MG/1
TABLET ORAL
Qty: 90 TABLET | Refills: 1 | Status: SHIPPED | OUTPATIENT
Start: 2022-05-03

## 2022-05-03 RX ORDER — ATORVASTATIN CALCIUM 20 MG/1
TABLET, FILM COATED ORAL
Qty: 90 TABLET | Refills: 1 | Status: SHIPPED | OUTPATIENT
Start: 2022-05-03 | End: 2022-11-04

## 2022-05-03 NOTE — TELEPHONE ENCOUNTER
Patient is requesting a 90 day supply of these medications.  Scripts are pended    [facility] called requesting a refill of the below medication which has been pended for you:     Requested Prescriptions     Pending Prescriptions Disp Refills    atorvastatin (LIPITOR) 20 MG tablet [Pharmacy Med Name: ATORVASTATIN 20MG TABLETS] 90 tablet      Sig: TAKE 1 TABLET BY MOUTH DAILY    valsartan-hydroCHLOROthiazide (DIOVAN-HCT) 160-25 MG per tablet [Pharmacy Med Name: VALSARTAN/HCTZ 160MG/25MG TABLETS] 90 tablet      Sig: TAKE 1 TABLET BY MOUTH DAILY       Last Appointment Date: 2/17/2022  Next Appointment Date: 8/18/2022    No Known Allergies

## 2022-05-09 DIAGNOSIS — N40.0 ENLARGED PROSTATE: ICD-10-CM

## 2022-05-09 RX ORDER — TAMSULOSIN HYDROCHLORIDE 0.4 MG/1
CAPSULE ORAL
Qty: 90 CAPSULE | Refills: 1 | Status: SHIPPED | OUTPATIENT
Start: 2022-05-09

## 2022-05-09 NOTE — TELEPHONE ENCOUNTER
Luis Manuel Ayala called requesting a refill of the below medication which has been pended for you:     Requested Prescriptions     Pending Prescriptions Disp Refills    tamsulosin (FLOMAX) 0.4 MG capsule [Pharmacy Med Name: TAMSULOSIN HCL CAPS 0.4MG] 90 capsule 3     Sig: TAKE 1 CAPSULE DAILY       Last Appointment Date: 2/17/2022  Next Appointment Date: 8/18/2022    No Known Allergies

## 2022-05-10 DIAGNOSIS — K21.9 GASTROESOPHAGEAL REFLUX DISEASE: ICD-10-CM

## 2022-05-10 DIAGNOSIS — K21.9 GASTROESOPHAGEAL REFLUX DISEASE WITHOUT ESOPHAGITIS: ICD-10-CM

## 2022-05-10 NOTE — TELEPHONE ENCOUNTER
Tami Carpio called requesting a refill of the below medication which has been pended for you:     Requested Prescriptions     Pending Prescriptions Disp Refills    famotidine (PEPCID) 20 MG tablet [Pharmacy Med Name: FAMOTIDINE TABS 20MG] 180 tablet 3     Sig: TAKE 1 TABLET TWICE A DAY (NEED APPOINTMENT FOR FURTHER REFILLS)       Last Appointment Date: 2/17/2022  Next Appointment Date: 8/18/2022    No Known Allergies

## 2022-05-11 RX ORDER — FAMOTIDINE 20 MG/1
20 TABLET, FILM COATED ORAL 2 TIMES DAILY
Qty: 180 TABLET | Refills: 0 | Status: SHIPPED | OUTPATIENT
Start: 2022-05-11 | End: 2022-08-08

## 2022-05-19 ENCOUNTER — OFFICE VISIT (OUTPATIENT)
Dept: PODIATRY | Age: 77
End: 2022-05-19
Payer: MEDICARE

## 2022-05-19 VITALS
HEART RATE: 72 BPM | WEIGHT: 292.4 LBS | RESPIRATION RATE: 20 BRPM | SYSTOLIC BLOOD PRESSURE: 126 MMHG | BODY MASS INDEX: 44.46 KG/M2 | DIASTOLIC BLOOD PRESSURE: 70 MMHG

## 2022-05-19 DIAGNOSIS — B35.1 DERMATOPHYTOSIS OF NAIL: ICD-10-CM

## 2022-05-19 DIAGNOSIS — E11.42 DM TYPE 2 WITH DIABETIC PERIPHERAL NEUROPATHY (HCC): Primary | ICD-10-CM

## 2022-05-19 PROCEDURE — 11721 DEBRIDE NAIL 6 OR MORE: CPT | Performed by: PODIATRIST

## 2022-05-19 PROCEDURE — 99999 PR OFFICE/OUTPT VISIT,PROCEDURE ONLY: CPT | Performed by: PODIATRIST

## 2022-05-19 NOTE — PROGRESS NOTES
Subjective:  Patient presents to [facility] today for routine diabetic foot care. Patient's diabetic control has been not changed. No Known Allergies    Past Medical History:   Diagnosis Date    Anemia     chronic, negative work up with EGD and Colonoscopy.  Chest pain 6/9/2016    DJD (degenerative joint disease) of lumbar spine     Dry eye syndrome     GERD (gastroesophageal reflux disease)     Hepatic hemangioma     Hypertension     Keratitis     Secondary to dry eye syndrome.  Nuclear sclerotic cataract of both eyes     Obesity, morbid ([address]     Obstructive sleep apnea of adult     non compliant with cpap    Onychomycosis     1, 2, 3, 4, and 5, bilateral.    Type II or unspecified type diabetes mellitus without mention of complication, not stated as uncontrolled        Prior to Admission medications    Medication Sig Start Date End Date Taking?  Authorizing Provider   famotidine (PEPCID) 20 MG tablet Take 1 tablet by mouth 2 times daily 5/11/22  Yes Josefina Maza,    tamsulosin (FLOMAX) 0.4 MG capsule TAKE 1 CAPSULE DAILY 5/9/22  Yes Dalton Henderson MD   atorvastatin (LIPITOR) 20 MG tablet TAKE 1 TABLET BY MOUTH DAILY 5/3/22  Yes Viry Borjas MD   valsartan-hydroCHLOROthiazide (DIOVAN-HCT) 160-25 MG per tablet TAKE 1 TABLET BY MOUTH DAILY 5/3/22  Yes Viry Borjas MD   metFORMIN (GLUCOPHAGE-XR) 500 MG extended release tablet TAKE 1 TABLET BY MOUTH TWICE DAILY 3/21/22  Yes ROCÍO Goode CNP   Lactobacillus Acid-Pectin (ACIDOPHILUS/PECTIN) CAPS Take 1 capsule by mouth daily 9/20/21  Yes Historical Provider, MD   metoprolol (LOPRESSOR) 100 MG tablet TAKE 1 TABLET BY MOUTH TWICE DAILY 9/9/21  Yes Denton Reyes MD   TRUE METRIX BLOOD GLUCOSE TEST strip USE TO TEST BLOOD SUGAR DAILY 6/14/21  Yes ROCÍO Goode CNP   oxybutynin (DITROPAN-XL) 10 MG extended release tablet TAKE 1 TABLET DAILY 5/10/21  Yes Kay Mendes MD   Lancets MISC USE TO TEST BLOOD SUGAR EVERY DAY 21  Yes ROCÍO Duarte CNP   fluticasone (FLONASE) 50 MCG/ACT nasal spray SHAKE LIQUID AND USE 2 SPRAYS IN EACH NOSTRIL EVERY DAY 21  Yes ROCÍO Sky CNP   blood glucose monitor kit and supplies Check blood sugar daily. Diagnosis E11.42 20  Yes ROCÍO Sky CNP   omeprazole (PRILOSEC) 40 MG delayed release capsule TAKE 1 CAPSULE DAILY 10/28/19  Yes ROCÍO Sky CNP   aspirin 81 MG tablet Take 81 mg by mouth daily    Yes Historical Provider, MD       Social History     Tobacco Use    Smoking status: Former Smoker     Packs/day: 0.25     Years: 15.00     Pack years: 3.75     Types: Cigars     Start date: 1964     Quit date: 1970     Years since quittin.4    Smokeless tobacco: Never Used    Tobacco comment: quit over 30 years ago   Substance Use Topics    Alcohol use: No     Alcohol/week: 0.0 standard drinks     Comment: no alcohol for many years     ROS: All 14 ROS systems reviewed and pertinent positives noted above, all others negative. Objective:  Vascular: DP and PT pulses palpable 2/4, bilateral.  CFT <3 seconds, bilateral.  Hair growth present to the level of the digits, bilateral.  Edema absent, bilateral.  Varicosities absent, bilateral. Erythema absent, bilateral. Distal Rubor absent bilateral.  Temperature within normal limits bilateral. Hyperpigmentation absent bilateral. No atrophic skin. Neurological: Sensation intact to light touch to level of digits, bilateral.  Protective sensation intact 10/10 sites via 5.07/10g [place]-Marly Monofilament, bilateral.  negative Tinel's, bilateral.  negative Valleix sign, bilateral.  Vibratory intact bilateral.  Reflexes Decreased bilateral.  Paresthesias negative. Dysthesias negative.   Sharp/dull intact bilateral.    Musculoskeletal: Muscle strength 5/5, bilateral.  Pain absent upon palpation bilateral. Normal medial longitudinal arch, bilateral.  Ankle ROM within normal limits,bilateral.  1st MPJ ROM within normal limits, bilateral.  Dorsally contracted digits present. No other foot deformities. Integument:  Open lesion absent, Bilateral.  Interdigital maceration absent to web spaces,absent Bilateral.  Nails left 1, 2, 3, 4, 5 and right 1, 2, 3, 4, 5 thickened, dystrophic and crumbly, discolored with subungual debris. Fissures absent, Bilateral. Hyperkeratotic tissue is absent. Assessment:    Diagnosis Orders   1. DM type 2 with diabetic peripheral neuropathy ([address]     2. Dermatophytosis of nail         Plan:  Diabetic foot education and exam.  Nails as mentioned above debrided in length and thickness. Patient advised about OTC treatments for nails and callous. Patient will follow up in 10 weeks for routine foot care or PRN if any further problems arise.

## 2022-05-19 NOTE — PROGRESS NOTES
Foot Care Worksheet  PCP: ROCÍO Salazar - CNP  Last visit: 02 / 17 / 2022    Nail description:  Thick , Yellow , Crumbly , Marked limitation of ambulation     Pain resulting from thickened and dystrophy of nail plate No    Nails involved  Right   1, 2, 3, 4, 5  (T5-T9)  Left     1, 2, 3, 4, 5  (TA-T4)    Q7 1 Class A Finding - Non traumatic amputation of foot No    Q8 2 Class B Findings - Absent DP pulse No, Absent PT pulse No, Advanced tropic changes (3 required) Yes    Decrease hair growth Yes, Nail changes/thickening Yes, Pigmented changes/discoloration Yes, Skin texture (thin, shiny) Yes, Skin color (rubor/redness) No    Q9 1 Class B and 2 Class C Findings  Claudication No, Temperature change No, Paresthesia No, Burning Yes, Edema Yes

## 2022-05-26 DIAGNOSIS — E11.42 DM TYPE 2 WITH DIABETIC PERIPHERAL NEUROPATHY (HCC): ICD-10-CM

## 2022-05-26 RX ORDER — METFORMIN HYDROCHLORIDE 500 MG/1
TABLET, EXTENDED RELEASE ORAL
Qty: 180 TABLET | Refills: 1 | Status: SHIPPED | OUTPATIENT
Start: 2022-05-26

## 2022-06-15 DIAGNOSIS — E11.42 DM TYPE 2 WITH DIABETIC PERIPHERAL NEUROPATHY (HCC): ICD-10-CM

## 2022-06-15 RX ORDER — CALCIUM CITRATE/VITAMIN D3 200MG-6.25
TABLET ORAL
Qty: 200 STRIP | Refills: 0 | Status: SHIPPED | OUTPATIENT
Start: 2022-06-15

## 2022-06-15 NOTE — TELEPHONE ENCOUNTER
Apolonia Reid called requesting a refill of the below medication which has been pended for you:     Requested Prescriptions     Pending Prescriptions Disp Refills    TRUE METRIX BLOOD GLUCOSE TEST strip [Pharmacy Med Name: TRUE METRIX BLOOD GLUCOSE TEST STRP] 200 strip 5     Sig: USE TO TEST BLOOD SUGAR DAILY       Last Appointment Date: 2/17/2022  Next Appointment Date: 8/18/2022    No Known Allergies

## 2022-07-01 DIAGNOSIS — J30.2 SEASONAL ALLERGIC RHINITIS, UNSPECIFIED TRIGGER: ICD-10-CM

## 2022-07-01 RX ORDER — FLUTICASONE PROPIONATE 50 MCG
SPRAY, SUSPENSION (ML) NASAL
Qty: 16 G | Refills: 0 | Status: SHIPPED | OUTPATIENT
Start: 2022-07-01 | End: 2022-08-16

## 2022-07-01 NOTE — TELEPHONE ENCOUNTER
[facility] pt, [facility] notes      Ciro Ellington called requesting a refill of the below medication which has been pended for you:     Requested Prescriptions     Pending Prescriptions Disp Refills    fluticasone (FLONASE) 50 MCG/ACT nasal spray [Pharmacy Med Name: FLUTICASONE 50MCG CHRISTA SP (120SP) RX] 16 g 0     Sig: SHAKE LIQUID AND USE 2 SPRAYS IN EACH NOSTRIL EVERY DAY       Last Appointment Date: 2/17/2022  Next Appointment Date: 8/18/2022    No Known Allergies

## 2022-07-11 RX ORDER — OXYBUTYNIN CHLORIDE 10 MG/1
TABLET, EXTENDED RELEASE ORAL
Qty: 90 TABLET | Refills: 3 | Status: SHIPPED | OUTPATIENT
Start: 2022-07-11

## 2022-07-20 DIAGNOSIS — E11.42 DM TYPE 2 WITH DIABETIC PERIPHERAL NEUROPATHY (HCC): ICD-10-CM

## 2022-07-20 DIAGNOSIS — E11.69 TYPE 2 DIABETES MELLITUS WITH OTHER SPECIFIED COMPLICATION, UNSPECIFIED WHETHER LONG TERM INSULIN USE (HCC): ICD-10-CM

## 2022-07-20 RX ORDER — LANCETS 33 GAUGE
EACH MISCELLANEOUS
Qty: 100 EACH | Refills: 3 | Status: SHIPPED | OUTPATIENT
Start: 2022-07-20

## 2022-08-08 DIAGNOSIS — K21.9 GASTROESOPHAGEAL REFLUX DISEASE WITHOUT ESOPHAGITIS: ICD-10-CM

## 2022-08-08 DIAGNOSIS — K21.9 GASTROESOPHAGEAL REFLUX DISEASE: ICD-10-CM

## 2022-08-08 RX ORDER — FAMOTIDINE 20 MG/1
TABLET, FILM COATED ORAL
Qty: 180 TABLET | Refills: 1 | Status: SHIPPED | OUTPATIENT
Start: 2022-08-08 | End: 2022-09-01 | Stop reason: SDUPTHER

## 2022-08-08 NOTE — TELEPHONE ENCOUNTER
Heriberto Ocampo called requesting a refill of the below medication which has been pended for you:     Requested Prescriptions     Pending Prescriptions Disp Refills    famotidine (PEPCID) 20 MG tablet [Pharmacy Med Name: FAMOTIDINE TABS 20MG] 180 tablet 3     Sig: TAKE 1 TABLET TWICE A DAY       Last Appointment Date: 02/17/2022  Next Appointment Date: 08/18/2022    No Known Allergies

## 2022-08-10 ENCOUNTER — OFFICE VISIT (OUTPATIENT)
Dept: PODIATRY | Age: 77
End: 2022-08-10
Payer: MEDICARE

## 2022-08-10 VITALS
HEIGHT: 68 IN | WEIGHT: 294 LBS | BODY MASS INDEX: 44.56 KG/M2 | DIASTOLIC BLOOD PRESSURE: 80 MMHG | HEART RATE: 82 BPM | SYSTOLIC BLOOD PRESSURE: 134 MMHG

## 2022-08-10 DIAGNOSIS — B35.1 DERMATOPHYTOSIS OF NAIL: ICD-10-CM

## 2022-08-10 DIAGNOSIS — E11.42 DM TYPE 2 WITH DIABETIC PERIPHERAL NEUROPATHY (HCC): Primary | ICD-10-CM

## 2022-08-10 PROCEDURE — 11721 DEBRIDE NAIL 6 OR MORE: CPT | Performed by: PODIATRIST

## 2022-08-10 PROCEDURE — 99999 PR OFFICE/OUTPT VISIT,PROCEDURE ONLY: CPT | Performed by: PODIATRIST

## 2022-08-10 NOTE — PROGRESS NOTES
Subjective:  Patient presents to [facility] today for routine diabetic foot care. Patient's diabetic control has been not changed. No Known Allergies    Past Medical History:   Diagnosis Date    Anemia     chronic, negative work up with EGD and Colonoscopy. Chest pain 6/9/2016    DJD (degenerative joint disease) of lumbar spine     Dry eye syndrome     GERD (gastroesophageal reflux disease)     Hepatic hemangioma     Hypertension     Keratitis     Secondary to dry eye syndrome. Nuclear sclerotic cataract of both eyes     Obesity, morbid ([address]     Obstructive sleep apnea of adult     non compliant with cpap    Onychomycosis     1, 2, 3, 4, and 5, bilateral.    Type II or unspecified type diabetes mellitus without mention of complication, not stated as uncontrolled        Prior to Admission medications    Medication Sig Start Date End Date Taking?  Authorizing Provider   famotidine (PEPCID) 20 MG tablet TAKE 1 TABLET TWICE A DAY 8/8/22  Yes Severo Pinna, APRN - CNP   Lancets Micro Thin 33G MISC USE TO TEST BLOOD SUGAR EVERY DAY 7/20/22  Yes Livier Castellanos MD   oxybutynin (DITROPAN-XL) 10 MG extended release tablet TAKE 1 TABLET DAILY 7/11/22  Yes Geri Dao MD   fluticasone [facility]) 50 MCG/ACT nasal spray SHAKE LIQUID AND USE 2 SPRAYS IN EACH NOSTRIL EVERY DAY 7/1/22  Yes ROCÍO Scott CNP   TRUE METRIX BLOOD GLUCOSE TEST strip USE TO TEST BLOOD SUGAR DAILY 6/15/22  Yes CHRISTIAN Marte   metFORMIN (GLUCOPHAGE-XR) 500 mg extended release tablet TAKE 1 TABLET BY MOUTH TWICE DAILY 5/26/22  Yes ROCÍO Scott CNP   tamsulosin (FLOMAX) 0.4 MG capsule TAKE 1 CAPSULE DAILY 5/9/22  Yes Merton Bloch, MD   atorvastatin (LIPITOR) 20 MG tablet TAKE 1 TABLET BY MOUTH DAILY 5/3/22  Yes Raghu Holloway MD   valsartan-hydroCHLOROthiazide (DIOVAN-HCT) 160-25 MG per tablet TAKE 1 TABLET BY MOUTH DAILY 5/3/22  Yes Raghu Holloway MD   Lactobacillus Acid-Pectin (ACIDOPHILUS/PECTIN) CAPS Take 1 capsule by mouth daily 21  Yes Historical Provider, MD   metoprolol (LOPRESSOR) 100 MG tablet TAKE 1 TABLET BY MOUTH TWICE DAILY 21  Yes Sergio Chaparro MD   blood glucose monitor kit and supplies Check blood sugar daily. Diagnosis E11.42 20  Yes ROCÍO Pressley CNP   omeprazole (PRILOSEC) 40 MG delayed release capsule TAKE 1 CAPSULE DAILY 10/28/19  Yes ROCÍO Pressley CNP   aspirin 81 MG tablet Take 81 mg by mouth daily    Yes Historical Provider, MD       Social History     Tobacco Use    Smoking status: Former     Packs/day: 0.25     Years: 15.00     Pack years: 3.75     Types: Cigars, Cigarettes     Start date: 1964     Quit date: 1970     Years since quittin.7    Smokeless tobacco: Never    Tobacco comments:     quit over 30 years ago   Substance Use Topics    Alcohol use: No     Alcohol/week: 0.0 standard drinks     Comment: no alcohol for many years     ROS: All 14 ROS systems reviewed and pertinent positives noted above, all others negative. Objective:  Vascular: DP and PT pulses palpable 2/4, bilateral.  CFT <3 seconds, bilateral.  Hair growth present to the level of the digits, bilateral.  Edema absent, bilateral.  Varicosities absent, bilateral. Erythema absent, bilateral. Distal Rubor absent bilateral.  Temperature within normal limits bilateral. Hyperpigmentation absent bilateral. No atrophic skin. Neurological: Sensation intact to light touch to level of digits, bilateral.  Protective sensation intact 10/10 sites via 5.07/10g [place]-Marly Monofilament, bilateral.  negative Tinel's, bilateral.  negative Valleix sign, bilateral.  Vibratory intact bilateral.  Reflexes Decreased bilateral.  Paresthesias negative. Dysthesias negative.   Sharp/dull intact bilateral.    Musculoskeletal: Muscle strength 5/5, bilateral.  Pain absent upon palpation bilateral. Normal medial longitudinal arch, bilateral.  Ankle ROM within

## 2022-08-10 NOTE — PROGRESS NOTES
Foot Care Worksheet  PCP: ROCÍO Garcia - CNP  Last visit: 02 / 17 / 2022    Nail description:  Thick , Yellow , Crumbly , Marked limitation of ambulation     Pain resulting from thickened and dystrophy of nail plate No    Nails involved  Right   1, 2, 3, 4, 5  (T5-T9)  Left     1, 2, 3, 4, 5  (TA-T4)    Q7 1 Class A Finding - Non traumatic amputation of foot No    Q8 2 Class B Findings - Absent DP pulse No, Absent PT pulse No, Advanced tropic changes (3 required) Yes    Decrease hair growth Yes, Nail changes/thickening Yes, Pigmented changes/discoloration Yes, Skin texture (thin, shiny) Yes, Skin color (rubor/redness) No    Q9 1 Class B and 2 Class C Findings  Claudication No, Temperature change No, Paresthesia No, Burning Yes, Edema Yes

## 2022-08-16 DIAGNOSIS — J30.2 SEASONAL ALLERGIC RHINITIS, UNSPECIFIED TRIGGER: ICD-10-CM

## 2022-08-16 RX ORDER — FLUTICASONE PROPIONATE 50 MCG
SPRAY, SUSPENSION (ML) NASAL
Qty: 48 G | Refills: 3 | Status: SHIPPED | OUTPATIENT
Start: 2022-08-16

## 2022-08-16 NOTE — TELEPHONE ENCOUNTER
Katie Godinez called requesting a refill of the below medication which has been pended for you:     Requested Prescriptions     Pending Prescriptions Disp Refills    fluticasone (FLONASE) 50 MCG/ACT nasal spray [Pharmacy Med Name: FLUTICASONE 50MCG NASAL SP (120) RX] 48 g      Sig: SHAKE LIQUID AND USE 2 SPRAYS IN EACH NOSTRIL EVERY DAY       Last Appointment Date: Visit date not found  Next Appointment Date: 08/18/2022    No Known Allergies

## 2022-08-18 ENCOUNTER — OFFICE VISIT (OUTPATIENT)
Dept: FAMILY MEDICINE CLINIC | Age: 77
End: 2022-08-18
Payer: MEDICARE

## 2022-08-18 VITALS
OXYGEN SATURATION: 97 % | BODY MASS INDEX: 43.19 KG/M2 | SYSTOLIC BLOOD PRESSURE: 132 MMHG | DIASTOLIC BLOOD PRESSURE: 70 MMHG | WEIGHT: 285 LBS | HEART RATE: 72 BPM | HEIGHT: 68 IN

## 2022-08-18 DIAGNOSIS — R07.9 CHEST PAIN, UNSPECIFIED TYPE: Primary | ICD-10-CM

## 2022-08-18 DIAGNOSIS — E05.90 HYPERTHYROIDISM: ICD-10-CM

## 2022-08-18 DIAGNOSIS — R07.9 CHEST PAIN, UNSPECIFIED TYPE: ICD-10-CM

## 2022-08-18 DIAGNOSIS — I10 PRIMARY HYPERTENSION: ICD-10-CM

## 2022-08-18 DIAGNOSIS — E11.69 TYPE 2 DIABETES MELLITUS WITH OTHER SPECIFIED COMPLICATION, UNSPECIFIED WHETHER LONG TERM INSULIN USE (HCC): ICD-10-CM

## 2022-08-18 DIAGNOSIS — E78.2 MIXED HYPERLIPIDEMIA: ICD-10-CM

## 2022-08-18 PROCEDURE — G8427 DOCREV CUR MEDS BY ELIG CLIN: HCPCS | Performed by: NURSE PRACTITIONER

## 2022-08-18 PROCEDURE — 99212 OFFICE O/P EST SF 10 MIN: CPT

## 2022-08-18 PROCEDURE — 99214 OFFICE O/P EST MOD 30 MIN: CPT | Performed by: NURSE PRACTITIONER

## 2022-08-18 PROCEDURE — 1124F ACP DISCUSS-NO DSCNMKR DOCD: CPT | Performed by: NURSE PRACTITIONER

## 2022-08-18 PROCEDURE — 1036F TOBACCO NON-USER: CPT | Performed by: NURSE PRACTITIONER

## 2022-08-18 PROCEDURE — 3051F HG A1C>EQUAL 7.0%<8.0%: CPT | Performed by: NURSE PRACTITIONER

## 2022-08-18 PROCEDURE — 93010 ELECTROCARDIOGRAM REPORT: CPT | Performed by: NURSE PRACTITIONER

## 2022-08-18 PROCEDURE — 99212 OFFICE O/P EST SF 10 MIN: CPT | Performed by: NURSE PRACTITIONER

## 2022-08-18 PROCEDURE — G8417 CALC BMI ABV UP PARAM F/U: HCPCS | Performed by: NURSE PRACTITIONER

## 2022-08-18 ASSESSMENT — PATIENT HEALTH QUESTIONNAIRE - PHQ9
2. FEELING DOWN, DEPRESSED OR HOPELESS: 0
SUM OF ALL RESPONSES TO PHQ9 QUESTIONS 1 & 2: 0
SUM OF ALL RESPONSES TO PHQ QUESTIONS 1-9: 0
1. LITTLE INTEREST OR PLEASURE IN DOING THINGS: 0
SUM OF ALL RESPONSES TO PHQ QUESTIONS 1-9: 0

## 2022-08-18 NOTE — PROGRESS NOTES
[address] [place] [address]. [address] [place] [address], [id number]  [phone]      HPI:     Pt presents to the clinic for a 6 month follow up of chronic medical conditions. He has a history of hyperthyroidism. He is due for labs. He is not currently on any therapy for his thyroid. He is doing well overall. He has no further concerns. Diabetes  He presents for his follow-up diabetic visit. He has type 2 diabetes mellitus. Disease course: due for labs. There are no hypoglycemic associated symptoms. Pertinent negatives for hypoglycemia include no dizziness or headaches. Associated symptoms include chest pain. Pertinent negatives for diabetes include no blurred vision, no fatigue, no foot paresthesias, no polydipsia and no polyuria. There are no hypoglycemic complications. Diabetic complications include heart disease and peripheral neuropathy. Risk factors for coronary artery disease include male sex, hypertension, obesity, diabetes mellitus, dyslipidemia, family history and sedentary lifestyle. Current diabetic treatment includes oral agent (monotherapy). He is compliant with treatment most of the time. His weight is stable. He is following a generally unhealthy diet. Meal planning includes avoidance of concentrated sweets. He rarely participates in exercise. His breakfast blood glucose range is generally 130-140 mg/dl. An ACE inhibitor/angiotensin II receptor blocker is being taken. He sees a podiatrist.Eye exam is current. Chest Pain   This is a new problem. The current episode started 1 to 4 weeks ago. The onset quality is undetermined. The problem occurs intermittently. The problem has been unchanged. The pain is present in the substernal region. The pain is at a severity of 5/10. The pain is moderate. The quality of the pain is described as sharp. The pain does not radiate.  Pertinent negatives include no cough, dizziness, exertional chest pressure, fever, headaches, irregular heartbeat, leg pain, lower extremity edema, malaise/fatigue, nausea, near-syncope, palpitations, shortness of breath, syncope or vomiting. The pain is aggravated by nothing. He has tried rest for the symptoms. The treatment provided moderate relief. Risk factors include being elderly, lack of exercise, male gender, obesity and sedentary lifestyle. His past medical history is significant for diabetes, heart disease, hyperlipidemia and thyroid problem. Hypertension  This is a chronic problem. The current episode started more than 1 year ago. The problem is unchanged. The problem is controlled. Associated symptoms include chest pain. Pertinent negatives include no anxiety, blurred vision, headaches, malaise/fatigue, palpitations, peripheral edema or shortness of breath. There are no associated agents to hypertension. Risk factors for coronary artery disease include dyslipidemia, diabetes mellitus, male gender and obesity. Past treatments include angiotensin blockers and diuretics. The current treatment provides significant improvement. Compliance problems include diet and exercise. Identifiable causes of hypertension include a thyroid problem. Hyperlipidemia  This is a chronic problem. The current episode started more than 1 year ago. Condition status: due for labs. Exacerbating diseases include diabetes and obesity. Factors aggravating his hyperlipidemia include fatty foods and thiazides. Associated symptoms include chest pain. Pertinent negatives include no leg pain, myalgias or shortness of breath. Current antihyperlipidemic treatment includes statins. The current treatment provides moderate improvement of lipids. Compliance problems include adherence to diet and adherence to exercise. Risk factors for coronary artery disease include diabetes mellitus, dyslipidemia, hypertension, male sex, a sedentary lifestyle and obesity.      Current Outpatient Medications   Medication Sig Dispense Refill    fluticasone (FLONASE) 50 MCG/ACT nasal spray SHAKE LIQUID AND USE 2 SPRAYS IN EACH NOSTRIL EVERY DAY 48 g 3    famotidine (PEPCID) 20 MG tablet TAKE 1 TABLET TWICE A  tablet 1    Lancets Micro Thin 33G MISC USE TO TEST BLOOD SUGAR EVERY  each 3    oxybutynin (DITROPAN-XL) 10 MG extended release tablet TAKE 1 TABLET DAILY 90 tablet 3    TRUE METRIX BLOOD GLUCOSE TEST strip USE TO TEST BLOOD SUGAR DAILY 200 strip 0    metFORMIN (GLUCOPHAGE-XR) 500 mg extended release tablet TAKE 1 TABLET BY MOUTH TWICE DAILY 180 tablet 1    tamsulosin (FLOMAX) 0.4 MG capsule TAKE 1 CAPSULE DAILY 90 capsule 1    atorvastatin (LIPITOR) 20 MG tablet TAKE 1 TABLET BY MOUTH DAILY 90 tablet 1    valsartan-hydroCHLOROthiazide (DIOVAN-HCT) 160-25 MG per tablet TAKE 1 TABLET BY MOUTH DAILY 90 tablet 1    Lactobacillus Acid-Pectin (ACIDOPHILUS/PECTIN) CAPS Take 1 capsule by mouth daily      metoprolol (LOPRESSOR) 100 MG tablet TAKE 1 TABLET BY MOUTH TWICE DAILY 180 tablet 3    blood glucose monitor kit and supplies Check blood sugar daily. Diagnosis E11.42 1 kit 0    omeprazole (PRILOSEC) 40 MG delayed release capsule TAKE 1 CAPSULE DAILY 90 capsule 4    aspirin 81 MG tablet Take 81 mg by mouth daily  (Patient not taking: Reported on 8/18/2022)       No current facility-administered medications for this visit. No Known Allergies    All patients pastmedical, surgical, social and family history has been reviewed. Subjective:      Review of Systems   Constitutional:  Negative for activity change, appetite change, fatigue, fever and malaise/fatigue. Eyes:  Negative for blurred vision. Respiratory:  Negative for cough, shortness of breath and wheezing. Cardiovascular:  Positive for chest pain. Negative for palpitations, syncope and near-syncope. Gastrointestinal:  Negative for diarrhea, nausea and vomiting. Endocrine: Negative for polydipsia and polyuria. Musculoskeletal:  Negative for myalgias. Skin: Negative.     Neurological:  Negative for dizziness and headaches. Objective:      Physical Exam  Vitals and nursing note reviewed. Constitutional:       Appearance: Normal appearance. He is obese. HENT:      Head: Normocephalic and atraumatic. Right Ear: Tympanic membrane, ear canal and external ear normal.      Left Ear: Tympanic membrane, ear canal and external ear normal.   Cardiovascular:      Rate and Rhythm: Normal rate and regular rhythm. Heart sounds: Normal heart sounds. Pulmonary:      Effort: Pulmonary effort is normal.      Breath sounds: Normal breath sounds. Musculoskeletal:      Cervical back: Neck supple. Skin:     General: Skin is warm. Capillary Refill: Capillary refill takes less than 2 seconds. Neurological:      General: No focal deficit present. Mental Status: He is alert and oriented to person, place, and time. Assessment:       Diagnosis Orders   1. Chest pain, unspecified type  EKG 12 Lead      2. Type 2 diabetes mellitus with other specified complication, unspecified whether long term insulin use ([facility] [address]        3. Hyperthyroidism  TSH    T4, Free      4. Primary hypertension        5. Mixed hyperlipidemia            Plan:      Chest pain-normal EKG. Keep appt with cardiology  Type 2 diabetes-due for labs. Continue current therapy at this time  Hyperthyroidism-due for labs. HTN-controlled. Continue current medication   Mixed hyperlipidemia-due for labs. Continue current medication   Encourage healthy diet and daily exercise  Pt to return in 6 months for follow up, keep appt with specialists  Pt to return PRN   No follow-ups on file.   Orders Placed This Encounter   Procedures    TSH     Standing Status:   Future     Standing Expiration Date:   8/18/2023    T4, Free     Standing Status:   Future     Standing Expiration Date:   8/18/2023    EKG 12 Lead     Standing Status:   Future     Number of Occurrences:   1     Standing Expiration Date:   8/18/2023     Order Specific Question: Reason for Exam?     Answer:   Shortness of Breath     No orders of the defined types were placed in this encounter. Patient given educational materials - see patient instructions. All patient questionsanswered. Pt voiced understanding. Reviewed health maintenance.      Electronically signed by ROCÍO Briseno CNP, CNP on 8/21/2022 at 3:54 PM

## 2022-08-21 ASSESSMENT — ENCOUNTER SYMPTOMS
SHORTNESS OF BREATH: 0
BLURRED VISION: 0
WHEEZING: 0
COUGH: 0
DIARRHEA: 0
NAUSEA: 0
VOMITING: 0

## 2022-09-01 DIAGNOSIS — K21.9 GASTROESOPHAGEAL REFLUX DISEASE: ICD-10-CM

## 2022-09-01 DIAGNOSIS — K21.9 GASTROESOPHAGEAL REFLUX DISEASE WITHOUT ESOPHAGITIS: ICD-10-CM

## 2022-09-01 RX ORDER — FAMOTIDINE 20 MG/1
TABLET, FILM COATED ORAL
Qty: 180 TABLET | Refills: 1 | Status: SHIPPED | OUTPATIENT
Start: 2022-09-01

## 2022-09-01 NOTE — TELEPHONE ENCOUNTER
Katie Godinez called requesting a refill of the below medication which has been pended for you:     Requested Prescriptions     Pending Prescriptions Disp Refills    famotidine (PEPCID) 20 MG tablet         Last Appointment Date: 8/18/2022  Next Appointment Date: 2/24/2023    No Known Allergies

## 2022-09-09 RX ORDER — METOPROLOL TARTRATE 100 MG/1
TABLET ORAL
Qty: 180 TABLET | Refills: 3 | Status: SHIPPED | OUTPATIENT
Start: 2022-09-09

## 2022-11-04 DIAGNOSIS — E78.2 MIXED HYPERLIPIDEMIA: ICD-10-CM

## 2022-11-04 RX ORDER — ATORVASTATIN CALCIUM 20 MG/1
TABLET, FILM COATED ORAL
Qty: 90 TABLET | Refills: 1 | Status: SHIPPED | OUTPATIENT
Start: 2022-11-04

## 2022-11-04 NOTE — TELEPHONE ENCOUNTER
Cass Queen called requesting a refill of the below medication which has been pended for you:     Requested Prescriptions     Pending Prescriptions Disp Refills    atorvastatin (LIPITOR) 20 MG tablet [Pharmacy Med Name: ATORVASTATIN 20MG TABLETS] 90 tablet 1     Sig: TAKE 1 TABLET BY MOUTH DAILY       Last Appointment Date: 08/18/2022  Next Appointment Date: 02/24/2023    No Known Allergies

## 2022-12-07 ENCOUNTER — OFFICE VISIT (OUTPATIENT)
Dept: PODIATRY | Age: 77
End: 2022-12-07
Payer: MEDICARE

## 2022-12-07 VITALS
HEART RATE: 68 BPM | HEIGHT: 68 IN | SYSTOLIC BLOOD PRESSURE: 132 MMHG | BODY MASS INDEX: 44.86 KG/M2 | WEIGHT: 296 LBS | DIASTOLIC BLOOD PRESSURE: 78 MMHG

## 2022-12-07 DIAGNOSIS — B35.1 DERMATOPHYTOSIS OF NAIL: ICD-10-CM

## 2022-12-07 DIAGNOSIS — E11.42 DM TYPE 2 WITH DIABETIC PERIPHERAL NEUROPATHY (HCC): Primary | ICD-10-CM

## 2022-12-07 PROCEDURE — 99999 PR OFFICE/OUTPT VISIT,PROCEDURE ONLY: CPT | Performed by: PODIATRIST

## 2022-12-07 PROCEDURE — 11721 DEBRIDE NAIL 6 OR MORE: CPT | Performed by: PODIATRIST

## 2022-12-07 NOTE — PROGRESS NOTES
Subjective:  Patient presents to [facility] today for routine diabetic foot care. Patient's diabetic control has been not changed. No Known Allergies    Past Medical History:   Diagnosis Date    Anemia     chronic, negative work up with EGD and Colonoscopy. Chest pain 6/9/2016    DJD (degenerative joint disease) of lumbar spine     Dry eye syndrome     GERD (gastroesophageal reflux disease)     Hepatic hemangioma     Hypertension     Keratitis     Secondary to dry eye syndrome. Nuclear sclerotic cataract of both eyes     Obesity, morbid ([address]     Obstructive sleep apnea of adult     non compliant with cpap    Onychomycosis     1, 2, 3, 4, and 5, bilateral.    Type II or unspecified type diabetes mellitus without mention of complication, not stated as uncontrolled        Prior to Admission medications    Medication Sig Start Date End Date Taking?  Authorizing Provider   atorvastatin (LIPITOR) 20 MG tablet TAKE 1 TABLET BY MOUTH DAILY 11/4/22  Yes ROCÍO Cruz CNP   metoprolol (LOPRESSOR) 100 MG tablet TAKE 1 TABLET BY MOUTH TWICE DAILY 9/9/22  Yes Chantel Funes MD   famotidine (PEPCID) 20 MG tablet Take 1 tablet twice a day 9/1/22  Yes ROCÍO Duarte CNP   fluticasone (FLONASE) 50 MCG/ACT nasal spray SHAKE LIQUID AND USE 2 SPRAYS IN EACH NOSTRIL EVERY DAY 8/16/22  Yes ROCÍO Cruz CNP   Lancets Micro Thin 33G MISC USE TO TEST BLOOD SUGAR EVERY DAY 7/20/22  Yes Duncan Clark MD   oxybutynin (DITROPAN-XL) 10 MG extended release tablet TAKE 1 TABLET DAILY 7/11/22  Yes Sofia Hoff MD   TRUE METRIX BLOOD GLUCOSE TEST strip USE TO TEST BLOOD SUGAR DAILY 6/15/22  Yes CHRISTIAN Huerta   metFORMIN (GLUCOPHAGE-XR) 500 mg extended release tablet TAKE 1 TABLET BY MOUTH TWICE DAILY 5/26/22  Yes ROCÍO Arita CNP   tamsulosin (FLOMAX) 0.4 MG capsule TAKE 1 CAPSULE DAILY 5/9/22  Yes Chari Martins MD   valsartan-hydroCHLOROthiazide (DIOVAN-HCT) 160-25 MG per tablet TAKE 1 TABLET BY MOUTH DAILY 5/3/22  Yes Jasson Duncan MD   Lactobacillus Acid-Pectin (ACIDOPHILUS/PECTIN) CAPS Take 1 capsule by mouth daily 21  Yes Historical Provider, MD   blood glucose monitor kit and supplies Check blood sugar daily. Diagnosis E11.42 20  Yes [facility] Nando, APRN - CNP   omeprazole (PRILOSEC) 40 MG delayed release capsule TAKE 1 CAPSULE DAILY 10/28/19  Yes [facility] Nando, APRN - CNP   aspirin 81 MG tablet Take 81 mg by mouth daily   Patient not taking: No sig reported    Historical Provider, MD       Social History     Tobacco Use    Smoking status: Former     Packs/day: 0.25     Years: 15.00     Pack years: 3.75     Types: Cigars, Cigarettes     Start date: 1964     Quit date: 1970     Years since quittin.0    Smokeless tobacco: Never    Tobacco comments:     quit over 30 years ago   Substance Use Topics    Alcohol use: No     Alcohol/week: 0.0 standard drinks     Comment: no alcohol for many years     ROS: All 14 ROS systems reviewed and pertinent positives noted above, all others negative. Objective:  Vascular: DP and PT pulses palpable 2/4, bilateral.  CFT <3 seconds, bilateral.  Hair growth present to the level of the digits, bilateral.  Edema absent, bilateral.  Varicosities absent, bilateral. Erythema absent, bilateral. Distal Rubor absent bilateral.  Temperature within normal limits bilateral. Hyperpigmentation absent bilateral. No atrophic skin. Neurological: Sensation intact to light touch to level of digits, bilateral.  Protective sensation intact 10/10 sites via 5.07/10g [place]-Marly Monofilament, bilateral.  negative Tinel's, bilateral.  negative Valleix sign, bilateral.  Vibratory intact bilateral.  Reflexes Decreased bilateral.  Paresthesias negative. Dysthesias negative.   Sharp/dull intact bilateral.    Musculoskeletal: Muscle strength 5/5, bilateral.  Pain absent upon palpation bilateral. Normal medial longitudinal arch, bilateral.  Ankle ROM within normal limits,bilateral.  1st MPJ ROM within normal limits, bilateral.  Dorsally contracted digits present. No other foot deformities. Integument:  Open lesion absent, Bilateral.  Interdigital maceration absent to web spaces,absent Bilateral.  Nails left 1, 2, 3, 4, 5 and right 1, 2, 3, 4, 5 thickened, dystrophic and crumbly, discolored with subungual debris. Fissures absent, Bilateral. Hyperkeratotic tissue is absent. Assessment:    Diagnosis Orders   1. DM type 2 with diabetic peripheral neuropathy ([facility] [address]        2. Dermatophytosis of nail            Plan:  Diabetic foot education and exam.  Nails as mentioned above debrided in length and thickness. Patient advised about OTC treatments for nails and callous. Patient will follow up in 10 weeks for routine foot care or PRN if any further problems arise.

## 2022-12-12 DIAGNOSIS — E11.42 DM TYPE 2 WITH DIABETIC PERIPHERAL NEUROPATHY (HCC): ICD-10-CM

## 2022-12-12 DIAGNOSIS — E11.69 TYPE 2 DIABETES MELLITUS WITH OTHER SPECIFIED COMPLICATION, UNSPECIFIED WHETHER LONG TERM INSULIN USE (HCC): ICD-10-CM

## 2022-12-12 DIAGNOSIS — N40.0 ENLARGED PROSTATE: ICD-10-CM

## 2022-12-12 RX ORDER — TAMSULOSIN HYDROCHLORIDE 0.4 MG/1
CAPSULE ORAL
Qty: 90 CAPSULE | Refills: 1 | Status: SHIPPED | OUTPATIENT
Start: 2022-12-12

## 2022-12-12 NOTE — TELEPHONE ENCOUNTER
yanet Sanchez called requesting a refill of the below medication which has been pended for you:     Requested Prescriptions     Pending Prescriptions Disp Refills    tamsulosin (FLOMAX) 0.4 MG capsule 90 capsule 1       Last Appointment Date: 8/18/2022  Next Appointment Date: 12/12/2022    No Known Allergies

## 2022-12-13 RX ORDER — GLUCOSAMINE HCL/CHONDROITIN SU 500-400 MG
CAPSULE ORAL
Qty: 50 STRIP | Refills: 5 | Status: SHIPPED | OUTPATIENT
Start: 2022-12-13

## 2022-12-13 RX ORDER — LANCETS 33 GAUGE
EACH MISCELLANEOUS
Qty: 100 EACH | Refills: 3 | Status: SHIPPED | OUTPATIENT
Start: 2022-12-13

## 2022-12-25 ENCOUNTER — HOSPITAL ENCOUNTER (INPATIENT)
Age: 77
LOS: 3 days | Discharge: HOME | DRG: 194 | End: 2022-12-28
Attending: EMERGENCY MEDICINE | Admitting: FAMILY MEDICINE
Payer: MEDICARE

## 2022-12-25 ENCOUNTER — APPOINTMENT (OUTPATIENT)
Dept: GENERAL RADIOLOGY | Age: 77
DRG: 194 | End: 2022-12-25
Payer: MEDICARE

## 2022-12-25 DIAGNOSIS — R09.02 HYPOXIA: ICD-10-CM

## 2022-12-25 DIAGNOSIS — J10.1 INFLUENZA A: Primary | ICD-10-CM

## 2022-12-25 DIAGNOSIS — R77.8 ELEVATED TROPONIN: ICD-10-CM

## 2022-12-25 PROBLEM — Z91.81 HISTORY OF FALLING: Status: ACTIVE | Noted: 2022-12-25

## 2022-12-25 PROBLEM — R31.9 URINARY TRACT INFECTION WITH HEMATURIA: Status: RESOLVED | Noted: 2020-08-04 | Resolved: 2022-12-25

## 2022-12-25 PROBLEM — N30.01 ACUTE CYSTITIS WITH HEMATURIA: Status: RESOLVED | Noted: 2021-09-17 | Resolved: 2022-12-25

## 2022-12-25 PROBLEM — Z91.199 NONCOMPLIANCE: Status: ACTIVE | Noted: 2022-12-25

## 2022-12-25 PROBLEM — N17.9 AKI (ACUTE KIDNEY INJURY) (HCC): Status: RESOLVED | Noted: 2021-09-17 | Resolved: 2022-12-25

## 2022-12-25 PROBLEM — N39.0 URINARY TRACT INFECTION WITH HEMATURIA: Status: RESOLVED | Noted: 2020-08-04 | Resolved: 2022-12-25

## 2022-12-25 LAB
ABSOLUTE EOS #: 0 K/UL (ref 0–0.44)
ABSOLUTE IMMATURE GRANULOCYTE: 0 K/UL (ref 0–0.3)
ABSOLUTE LYMPH #: 0.5 K/UL (ref 1.1–3.7)
ABSOLUTE MONO #: 1.51 K/UL (ref 0.1–1.2)
ALBUMIN SERPL-MCNC: 3.7 G/DL (ref 3.5–5.2)
ALBUMIN/GLOBULIN RATIO: 0.9 (ref 1–2.5)
ALP BLD-CCNC: 105 U/L (ref 40–129)
ALT SERPL-CCNC: 13 U/L (ref 5–41)
ANION GAP SERPL CALCULATED.3IONS-SCNC: 12 MMOL/L (ref 9–17)
AST SERPL-CCNC: 17 U/L
BASOPHILS # BLD: 1 % (ref 0–2)
BASOPHILS ABSOLUTE: 0.07 K/UL (ref 0–0.2)
BILIRUB SERPL-MCNC: 0.7 MG/DL (ref 0.3–1.2)
BUN BLDV-MCNC: 19 MG/DL (ref 8–23)
BUN/CREAT BLD: 12 (ref 9–20)
CALCIUM SERPL-MCNC: 8.8 MG/DL (ref 8.6–10.4)
CHLORIDE BLD-SCNC: 99 MMOL/L (ref 98–107)
CO2: 24 MMOL/L (ref 20–31)
CREAT SERPL-MCNC: 1.57 MG/DL (ref 0.7–1.2)
EOSINOPHILS RELATIVE PERCENT: 0 % (ref 1–4)
FLU A ANTIGEN: POSITIVE
FLU B ANTIGEN: NEGATIVE
GFR SERPL CREATININE-BSD FRML MDRD: 45 ML/MIN/1.73M2
GLUCOSE BLD-MCNC: 187 MG/DL (ref 75–110)
GLUCOSE BLD-MCNC: 215 MG/DL (ref 70–99)
HCT VFR BLD CALC: 35.4 % (ref 40.7–50.3)
HEMOGLOBIN: 11.2 G/DL (ref 13–17)
IMMATURE GRANULOCYTES: 0 %
LACTIC ACID, SEPSIS: 1.2 MMOL/L (ref 0.5–1.9)
LACTIC ACID, SEPSIS: 2.5 MMOL/L (ref 0.5–1.9)
LYMPHOCYTES # BLD: 7 % (ref 24–43)
MCH RBC QN AUTO: 27.1 PG (ref 25.2–33.5)
MCHC RBC AUTO-ENTMCNC: 31.6 G/DL (ref 25.2–33.5)
MCV RBC AUTO: 85.5 FL (ref 82.6–102.9)
MONOCYTES # BLD: 21 % (ref 3–12)
MORPHOLOGY: ABNORMAL
MORPHOLOGY: ABNORMAL
NRBC AUTOMATED: 0 PER 100 WBC
PDW BLD-RTO: 14.1 % (ref 11.8–14.4)
PLATELET # BLD: 204 K/UL (ref 138–453)
PMV BLD AUTO: 10.2 FL (ref 8.1–13.5)
POTASSIUM SERPL-SCNC: 4.1 MMOL/L (ref 3.7–5.3)
RBC # BLD: 4.14 M/UL (ref 4.21–5.77)
SARS-COV-2, RAPID: NOT DETECTED
SEG NEUTROPHILS: 71 % (ref 36–65)
SEGMENTED NEUTROPHILS ABSOLUTE COUNT: 5.12 K/UL (ref 1.5–8.1)
SODIUM BLD-SCNC: 135 MMOL/L (ref 135–144)
SPECIMEN DESCRIPTION: NORMAL
TOTAL PROTEIN: 7.9 G/DL (ref 6.4–8.3)
TROPONIN, HIGH SENSITIVITY: 18 NG/L (ref 0–22)
TROPONIN, HIGH SENSITIVITY: 25 NG/L (ref 0–22)
TROPONIN, HIGH SENSITIVITY: 31 NG/L (ref 0–22)
TROPONIN, HIGH SENSITIVITY: 38 NG/L (ref 0–22)
WBC # BLD: 7.2 K/UL (ref 3.5–11.3)

## 2022-12-25 PROCEDURE — 71045 X-RAY EXAM CHEST 1 VIEW: CPT

## 2022-12-25 PROCEDURE — 2580000003 HC RX 258: Performed by: NURSE PRACTITIONER

## 2022-12-25 PROCEDURE — 36415 COLL VENOUS BLD VENIPUNCTURE: CPT

## 2022-12-25 PROCEDURE — 87635 SARS-COV-2 COVID-19 AMP PRB: CPT

## 2022-12-25 PROCEDURE — 83605 ASSAY OF LACTIC ACID: CPT

## 2022-12-25 PROCEDURE — 82947 ASSAY GLUCOSE BLOOD QUANT: CPT

## 2022-12-25 PROCEDURE — 2060000000 HC ICU INTERMEDIATE R&B

## 2022-12-25 PROCEDURE — 80053 COMPREHEN METABOLIC PANEL: CPT

## 2022-12-25 PROCEDURE — 6370000000 HC RX 637 (ALT 250 FOR IP): Performed by: EMERGENCY MEDICINE

## 2022-12-25 PROCEDURE — 6370000000 HC RX 637 (ALT 250 FOR IP): Performed by: NURSE PRACTITIONER

## 2022-12-25 PROCEDURE — 87804 INFLUENZA ASSAY W/OPTIC: CPT

## 2022-12-25 PROCEDURE — 99222 1ST HOSP IP/OBS MODERATE 55: CPT | Performed by: NURSE PRACTITIONER

## 2022-12-25 PROCEDURE — 85025 COMPLETE CBC W/AUTO DIFF WBC: CPT

## 2022-12-25 PROCEDURE — 93005 ELECTROCARDIOGRAM TRACING: CPT | Performed by: EMERGENCY MEDICINE

## 2022-12-25 PROCEDURE — 84484 ASSAY OF TROPONIN QUANT: CPT

## 2022-12-25 PROCEDURE — 87040 BLOOD CULTURE FOR BACTERIA: CPT

## 2022-12-25 PROCEDURE — 99285 EMERGENCY DEPT VISIT HI MDM: CPT

## 2022-12-25 RX ORDER — ONDANSETRON 2 MG/ML
4 INJECTION INTRAMUSCULAR; INTRAVENOUS EVERY 6 HOURS PRN
Status: DISCONTINUED | OUTPATIENT
Start: 2022-12-25 | End: 2022-12-28 | Stop reason: HOSPADM

## 2022-12-25 RX ORDER — INSULIN LISPRO 100 [IU]/ML
0-4 INJECTION, SOLUTION INTRAVENOUS; SUBCUTANEOUS NIGHTLY
Status: DISCONTINUED | OUTPATIENT
Start: 2022-12-25 | End: 2022-12-28 | Stop reason: HOSPADM

## 2022-12-25 RX ORDER — ACETAMINOPHEN 650 MG/1
650 SUPPOSITORY RECTAL EVERY 6 HOURS PRN
Status: DISCONTINUED | OUTPATIENT
Start: 2022-12-25 | End: 2022-12-28 | Stop reason: HOSPADM

## 2022-12-25 RX ORDER — HYDROCHLOROTHIAZIDE 25 MG/1
25 TABLET ORAL DAILY
Status: DISCONTINUED | OUTPATIENT
Start: 2022-12-26 | End: 2022-12-27

## 2022-12-25 RX ORDER — SODIUM CHLORIDE FOR INHALATION 0.9 %
3 VIAL, NEBULIZER (ML) INHALATION
Status: DISCONTINUED | OUTPATIENT
Start: 2022-12-25 | End: 2022-12-26

## 2022-12-25 RX ORDER — FLUTICASONE PROPIONATE 50 MCG
2 SPRAY, SUSPENSION (ML) NASAL DAILY
Status: DISCONTINUED | OUTPATIENT
Start: 2022-12-26 | End: 2022-12-28 | Stop reason: HOSPADM

## 2022-12-25 RX ORDER — ALBUTEROL SULFATE 2.5 MG/3ML
2.5 SOLUTION RESPIRATORY (INHALATION)
Status: DISCONTINUED | OUTPATIENT
Start: 2022-12-25 | End: 2022-12-26

## 2022-12-25 RX ORDER — POLYETHYLENE GLYCOL 3350 17 G/17G
17 POWDER, FOR SOLUTION ORAL DAILY PRN
Status: DISCONTINUED | OUTPATIENT
Start: 2022-12-25 | End: 2022-12-28 | Stop reason: HOSPADM

## 2022-12-25 RX ORDER — ACETAMINOPHEN 500 MG
1000 TABLET ORAL ONCE
Status: COMPLETED | OUTPATIENT
Start: 2022-12-25 | End: 2022-12-25

## 2022-12-25 RX ORDER — FUROSEMIDE 10 MG/ML
10 INJECTION INTRAMUSCULAR; INTRAVENOUS ONCE
Status: COMPLETED | OUTPATIENT
Start: 2022-12-26 | End: 2022-12-26

## 2022-12-25 RX ORDER — ASPIRIN 81 MG/1
81 TABLET ORAL DAILY
Status: DISCONTINUED | OUTPATIENT
Start: 2022-12-26 | End: 2022-12-28 | Stop reason: HOSPADM

## 2022-12-25 RX ORDER — SODIUM CHLORIDE 9 MG/ML
25 INJECTION, SOLUTION INTRAVENOUS PRN
Status: DISCONTINUED | OUTPATIENT
Start: 2022-12-25 | End: 2022-12-28 | Stop reason: HOSPADM

## 2022-12-25 RX ORDER — INSULIN GLARGINE 100 [IU]/ML
5 INJECTION, SOLUTION SUBCUTANEOUS NIGHTLY
Status: DISCONTINUED | OUTPATIENT
Start: 2022-12-25 | End: 2022-12-28 | Stop reason: HOSPADM

## 2022-12-25 RX ORDER — GUAIFENESIN 600 MG/1
600 TABLET, EXTENDED RELEASE ORAL 2 TIMES DAILY
Status: DISCONTINUED | OUTPATIENT
Start: 2022-12-25 | End: 2022-12-28 | Stop reason: HOSPADM

## 2022-12-25 RX ORDER — SODIUM CHLORIDE 0.9 % (FLUSH) 0.9 %
5-40 SYRINGE (ML) INJECTION PRN
Status: DISCONTINUED | OUTPATIENT
Start: 2022-12-25 | End: 2022-12-28 | Stop reason: HOSPADM

## 2022-12-25 RX ORDER — SODIUM CHLORIDE 0.9 % (FLUSH) 0.9 %
5-40 SYRINGE (ML) INJECTION EVERY 12 HOURS SCHEDULED
Status: DISCONTINUED | OUTPATIENT
Start: 2022-12-25 | End: 2022-12-28 | Stop reason: HOSPADM

## 2022-12-25 RX ORDER — ALBUTEROL SULFATE 2.5 MG/3ML
2.5 SOLUTION RESPIRATORY (INHALATION)
Status: DISCONTINUED | OUTPATIENT
Start: 2022-12-25 | End: 2022-12-28 | Stop reason: HOSPADM

## 2022-12-25 RX ORDER — ATORVASTATIN CALCIUM 10 MG/1
20 TABLET, FILM COATED ORAL DAILY
Status: DISCONTINUED | OUTPATIENT
Start: 2022-12-26 | End: 2022-12-28 | Stop reason: HOSPADM

## 2022-12-25 RX ORDER — INSULIN LISPRO 100 [IU]/ML
0-4 INJECTION, SOLUTION INTRAVENOUS; SUBCUTANEOUS
Status: DISCONTINUED | OUTPATIENT
Start: 2022-12-26 | End: 2022-12-28 | Stop reason: HOSPADM

## 2022-12-25 RX ORDER — DEXTROSE MONOHYDRATE 100 MG/ML
INJECTION, SOLUTION INTRAVENOUS CONTINUOUS PRN
Status: DISCONTINUED | OUTPATIENT
Start: 2022-12-25 | End: 2022-12-28 | Stop reason: HOSPADM

## 2022-12-25 RX ORDER — SODIUM CHLORIDE 9 MG/ML
INJECTION, SOLUTION INTRAVENOUS CONTINUOUS
Status: DISCONTINUED | OUTPATIENT
Start: 2022-12-25 | End: 2022-12-28 | Stop reason: HOSPADM

## 2022-12-25 RX ORDER — VALSARTAN 40 MG/1
40 TABLET ORAL DAILY
Status: DISCONTINUED | OUTPATIENT
Start: 2022-12-26 | End: 2022-12-25 | Stop reason: DRUGHIGH

## 2022-12-25 RX ORDER — ONDANSETRON 4 MG/1
4 TABLET, ORALLY DISINTEGRATING ORAL EVERY 8 HOURS PRN
Status: DISCONTINUED | OUTPATIENT
Start: 2022-12-25 | End: 2022-12-28 | Stop reason: HOSPADM

## 2022-12-25 RX ORDER — TAMSULOSIN HYDROCHLORIDE 0.4 MG/1
0.4 CAPSULE ORAL DAILY
Status: DISCONTINUED | OUTPATIENT
Start: 2022-12-26 | End: 2022-12-28 | Stop reason: HOSPADM

## 2022-12-25 RX ORDER — OXYBUTYNIN CHLORIDE 5 MG/1
10 TABLET, EXTENDED RELEASE ORAL NIGHTLY
Status: DISCONTINUED | OUTPATIENT
Start: 2022-12-25 | End: 2022-12-28 | Stop reason: HOSPADM

## 2022-12-25 RX ORDER — METOPROLOL TARTRATE 50 MG/1
100 TABLET, FILM COATED ORAL 2 TIMES DAILY
Status: DISCONTINUED | OUTPATIENT
Start: 2022-12-25 | End: 2022-12-28 | Stop reason: HOSPADM

## 2022-12-25 RX ORDER — FAMOTIDINE 20 MG/1
20 TABLET, FILM COATED ORAL 2 TIMES DAILY
Status: DISCONTINUED | OUTPATIENT
Start: 2022-12-25 | End: 2022-12-28 | Stop reason: HOSPADM

## 2022-12-25 RX ORDER — ACETAMINOPHEN 325 MG/1
650 TABLET ORAL EVERY 6 HOURS PRN
Status: DISCONTINUED | OUTPATIENT
Start: 2022-12-25 | End: 2022-12-28 | Stop reason: HOSPADM

## 2022-12-25 RX ORDER — INSULIN LISPRO 100 [IU]/ML
5 INJECTION, SOLUTION INTRAVENOUS; SUBCUTANEOUS
Status: DISCONTINUED | OUTPATIENT
Start: 2022-12-26 | End: 2022-12-28 | Stop reason: HOSPADM

## 2022-12-25 RX ORDER — OSELTAMIVIR PHOSPHATE 30 MG/1
30 CAPSULE ORAL 2 TIMES DAILY
Status: DISCONTINUED | OUTPATIENT
Start: 2022-12-25 | End: 2022-12-26

## 2022-12-25 RX ORDER — VALSARTAN 80 MG/1
160 TABLET ORAL DAILY
Status: DISCONTINUED | OUTPATIENT
Start: 2022-12-26 | End: 2022-12-28 | Stop reason: HOSPADM

## 2022-12-25 RX ORDER — VALSARTAN AND HYDROCHLOROTHIAZIDE 160; 25 MG/1; MG/1
1 TABLET ORAL DAILY
Status: DISCONTINUED | OUTPATIENT
Start: 2022-12-26 | End: 2022-12-25 | Stop reason: RX

## 2022-12-25 RX ADMIN — SODIUM CHLORIDE: 9 INJECTION, SOLUTION INTRAVENOUS at 22:34

## 2022-12-25 RX ADMIN — FAMOTIDINE 20 MG: 20 TABLET, FILM COATED ORAL at 22:47

## 2022-12-25 RX ADMIN — INSULIN GLARGINE 5 UNITS: 100 INJECTION, SOLUTION SUBCUTANEOUS at 22:47

## 2022-12-25 RX ADMIN — OSELTAMIVIR PHOSPHATE 30 MG: 30 CAPSULE ORAL at 22:47

## 2022-12-25 RX ADMIN — ACETAMINOPHEN 325MG 650 MG: 325 TABLET ORAL at 22:47

## 2022-12-25 RX ADMIN — SODIUM CHLORIDE, PRESERVATIVE FREE 10 ML: 5 INJECTION INTRAVENOUS at 22:34

## 2022-12-25 RX ADMIN — GUAIFENESIN 600 MG: 600 TABLET, EXTENDED RELEASE ORAL at 22:47

## 2022-12-25 RX ADMIN — OXYBUTYNIN CHLORIDE 10 MG: 5 TABLET, EXTENDED RELEASE ORAL at 22:47

## 2022-12-25 RX ADMIN — ACETAMINOPHEN 1000 MG: 500 TABLET ORAL at 12:00

## 2022-12-25 RX ADMIN — METOPROLOL TARTRATE 100 MG: 50 TABLET, FILM COATED ORAL at 22:46

## 2022-12-25 ASSESSMENT — ENCOUNTER SYMPTOMS
TROUBLE SWALLOWING: 0
SHORTNESS OF BREATH: 0
ABDOMINAL PAIN: 0
NAUSEA: 0
BACK PAIN: 0
DIARRHEA: 0
SORE THROAT: 0
VOMITING: 0
WHEEZING: 0
COUGH: 1

## 2022-12-25 ASSESSMENT — LIFESTYLE VARIABLES
HOW OFTEN DO YOU HAVE A DRINK CONTAINING ALCOHOL: NEVER
HOW MANY STANDARD DRINKS CONTAINING ALCOHOL DO YOU HAVE ON A TYPICAL DAY: PATIENT DOES NOT DRINK

## 2022-12-25 NOTE — ED PROVIDER NOTES
[facility]  eMERGENCY dEPARTMENT eNCOUnter      Pt Name: Juan Antonio Brewster  MRN: [id number]  [place] 1945  Date of evaluation: 12/25/2022      CHIEF COMPLAINT       Chief Complaint   Patient presents with    Cough    Fatigue     Pt presents by DFD was on his way to short stop when he fell. Reports generalized weakness and fatigue. Fall         HISTORY OF PRESENT ILLNESS    Juan Antonio Brewster is a 65 y.o. male who presents with store and he fell is not sure why he fell he did not lose consciousness he says he just felt generally weak he has had a fever and cough for the last day or so. He denies any chest pain denies any headache and neck pain denies any extremity injury he is brought in by squad to be checked out for his fever and weakness      REVIEW OF SYSTEMS       Review of Systems   Constitutional:  Positive for fatigue. Negative for chills and fever. HENT:  Negative for congestion, dental problem, sore throat and trouble swallowing. Eyes:  Negative for visual disturbance. Respiratory:  Positive for cough. Negative for shortness of breath and wheezing. Cardiovascular:  Negative for chest pain, palpitations and leg swelling. Gastrointestinal:  Negative for abdominal pain, diarrhea, nausea and vomiting. Genitourinary:  Negative for difficulty urinating, dysuria and testicular pain. Musculoskeletal:  Negative for back pain, joint swelling and neck pain. Skin:  Negative for rash. Neurological:  Negative for dizziness, syncope, weakness and headaches. Hematological:  Negative for adenopathy. Does not bruise/bleed easily. Psychiatric/Behavioral:  Negative for confusion and suicidal ideas.         PAST MEDICAL HISTORY    has a past medical history of Anemia, Chest pain, DJD (degenerative joint disease) of lumbar spine, Dry eye syndrome, GERD (gastroesophageal reflux disease), Hepatic hemangioma, Hypertension, Keratitis, Nuclear sclerotic cataract of both eyes, Obesity, morbid ([address] Obstructive sleep apnea of adult, Onychomycosis, and Type II or unspecified type diabetes mellitus without mention of complication, not stated as uncontrolled. SURGICAL HISTORY      has a past surgical history that includes Colonoscopy (09/15/2006); Upper gastrointestinal endoscopy (09/15/2006); cyst removal; and Umbilical hernia repair (). CURRENT MEDICATIONS       Previous Medications    ASPIRIN 81 MG TABLET    Take 81 mg by mouth daily     ATORVASTATIN (LIPITOR) 20 MG TABLET    TAKE 1 TABLET BY MOUTH DAILY    BLOOD GLUCOSE MONITOR KIT AND SUPPLIES    Check blood sugar daily. Diagnosis E11.42    BLOOD GLUCOSE MONITOR STRIPS    Patient checks blood sugar daily    FAMOTIDINE (PEPCID) 20 MG TABLET    Take 1 tablet twice a day    FLUTICASONE (FLONASE) 50 MCG/ACT NASAL SPRAY    SHAKE LIQUID AND USE 2 SPRAYS IN EACH NOSTRIL EVERY DAY    LACTOBACILLUS ACID-PECTIN (ACIDOPHILUS/PECTIN) CAPS    Take 1 capsule by mouth daily    LANCETS MICRO THIN 33G MISC    USE TO TEST BLOOD SUGAR EVERY DAY    METFORMIN (GLUCOPHAGE-XR) 500 MG EXTENDED RELEASE TABLET    TAKE 1 TABLET BY MOUTH TWICE DAILY    METOPROLOL (LOPRESSOR) 100 MG TABLET    TAKE 1 TABLET BY MOUTH TWICE DAILY    OMEPRAZOLE (PRILOSEC) 40 MG DELAYED RELEASE CAPSULE    TAKE 1 CAPSULE DAILY    OXYBUTYNIN (DITROPAN-XL) 10 MG EXTENDED RELEASE TABLET    TAKE 1 TABLET DAILY    TAMSULOSIN (FLOMAX) 0.4 MG CAPSULE    TAKE 1 CAPSULE DAILY    TRUE METRIX BLOOD GLUCOSE TEST STRIP    USE TO TEST BLOOD SUGAR DAILY    VALSARTAN-HYDROCHLOROTHIAZIDE (DIOVAN-HCT) 160-25 MG PER TABLET    TAKE 1 TABLET BY MOUTH DAILY       ALLERGIES     has No Known Allergies. FAMILY HISTORY     He indicated that his mother is . He indicated that his father is . He indicated that one of his two sisters is . He indicated that two of his four brothers are alive.  He indicated that the status of his neg hx is unknown.     family history includes Dementia in his sister; Diabetes in his sister; Heart Attack in his father; High Blood Pressure in his brother, brother, and sister; Ovarian Cancer in his mother; Stroke in his brother and brother. SOCIAL HISTORY      reports that he quit smoking about 52 years ago. His smoking use included cigars and cigarettes. He started smoking about 59 years ago. He has a 3.75 pack-year smoking history. He has never used smokeless tobacco. He reports that he does not drink alcohol and does not use drugs. PHYSICAL EXAM     INITIAL VITALS:  tympanic temperature is 101.5 °F (38.6 °C) (abnormal). His blood pressure is 152/81 (abnormal) and his pulse is 104 (abnormal). His respiration is 18 and oxygen saturation is 92%. Physical Exam  Constitutional:       Appearance: He is well-developed. He is obese. Comments: Febrile but nontoxic   HENT:      Head: Normocephalic and atraumatic. Right Ear: External ear normal.      Left Ear: External ear normal.   Eyes:      Pupils: Pupils are equal, round, and reactive to light. Cardiovascular:      Rate and Rhythm: Normal rate and regular rhythm. Pulmonary:      Effort: Pulmonary effort is normal.      Breath sounds: Normal breath sounds. Abdominal:      General: Bowel sounds are normal.      Palpations: Abdomen is soft. Musculoskeletal:         General: Normal range of motion. Cervical back: Normal range of motion and neck supple. Skin:     General: Skin is warm and dry. Neurological:      General: No focal deficit present. Mental Status: He is alert and oriented to person, place, and time.    Psychiatric:         Behavior: Behavior normal.         DIFFERENTIAL DIAGNOSIS/ MDM:     Cough fever weakness we will do a work-up rule out sepsis rule out influenza or COVID    DIAGNOSTIC RESULTS     EKG: All EKG's are interpreted by the Emergency Department Physician who either signs or Co-signs this chart in the absence of a cardiologist.  EKG shows a lot of artifact sinus tachycardia rate of 102 bpm parables 212 ms QRS duration 76 ms QT corrected 419 ms axis is 3 there is no obvious ST elevation or depression      RADIOLOGY:   I directly visualized the following  images and reviewed the radiologist interpretations:       EXAMINATION:   ONE XRAY VIEW OF THE CHEST       12/25/2022 11:59 am       COMPARISON:   09/19/2021       HISTORY:   ORDERING SYSTEM PROVIDED HISTORY: shortness of breath   TECHNOLOGIST PROVIDED HISTORY:   shortness of breath   Reason for Exam: Cough; Fatigue; Fall       65year-old male with cough, fatigue, fall, shortness of breath       FINDINGS:   Cardiac and mediastinal contours within normal limits. Trachea midline. No   pneumothorax. No acute focal airspace consolidation or pleural effusions. Mild pulmonary vascular congestion. No acute osseous abnormality. Impression   Mild pulmonary vascular congestion.              ED BEDSIDE ULTRASOUND:       LABS:  Labs Reviewed   RAPID INFLUENZA A/B ANTIGENS - Abnormal; Notable for the following components:       Result Value    Flu A Antigen POSITIVE (*)     All other components within normal limits   CBC WITH AUTO DIFFERENTIAL - Abnormal; Notable for the following components:    RBC 4.14 (*)     Hemoglobin 11.2 (*)     Hematocrit 35.4 (*)     Monocytes 21 (*)     Lymphocytes 7 (*)     Seg Neutrophils 71 (*)     Eosinophils % 0 (*)     Absolute Mono # 1.51 (*)     Absolute Lymph # 0.50 (*)     All other components within normal limits   COMPREHENSIVE METABOLIC PANEL W/ REFLEX TO MG FOR LOW K - Abnormal; Notable for the following components:    Glucose 215 (*)     Creatinine 1.57 (*)     Est, Glom Filt Rate 45 (*)     Albumin/Globulin Ratio 0.9 (*)     All other components within normal limits   LACTATE, SEPSIS - Abnormal; Notable for the following components:    Lactic Acid, Sepsis 2.5 (*)     All other components within normal limits   TROPONIN - Abnormal; Notable for the following components: Troponin, High Sensitivity 25 (*)     All other components within normal limits   TROPONIN - Abnormal; Notable for the following components:    Troponin, High Sensitivity 31 (*)     All other components within normal limits   CULTURE, BLOOD 1   CULTURE, BLOOD 1   COVID-19, RAPID   LACTATE, SEPSIS   TROPONIN       Positive influenza    EMERGENCY DEPARTMENT COURSE:   Vitals:    Vitals:    12/25/22 1140   BP: (!) 152/81   Pulse: (!) 104   Resp: 18   Temp: (!) 101.5 °F (38.6 °C)   TempSrc: Tympanic   SpO2: 92%     -------------------------  BP: (!) 152/81, Temp: (!) 101.5 °F (38.6 °C), Heart Rate: (!) 104, Resp: 18        Re-evaluation Notes    Oxygen saturations are in the mid 90s on O2 he did drop down to the 80s prior he denies any chest pain EKG shows no ischemic changes but his troponins have gradually increased  CRITICAL CARE:   None        CONSULTS: I discussed the elevated troponin with Dr. Myles Ruiz he thought was secondary to hypoxia that he was safe to stay here as he has no chest pain and his overall troponins are staying fairly low      PROCEDURES:  None    FINAL IMPRESSION      1. Influenza A    2. Hypoxia    3. Elevated troponin          DISPOSITION/PLAN   DISPOSITION Decision To Admit    Condition on Disposition    Stable    PATIENT REFERRED TO:  No follow-up provider specified. DISCHARGE MEDICATIONS:  New Prescriptions    No medications on file       (Please note that portions of this note were completed with a voice recognition program.  Efforts were made to edit the dictations but occasionally words are mis-transcribed.)    Diya Cesar MD,, MD, F.A.A.E.M.   Attending Emergency Physician                           Diya Cesar MD  12/25/22 5639

## 2022-12-25 NOTE — ED NOTES
Second call to [facility] answering service. They state that the cardiologist is \"unavailable\". They state that he will call back when he is available.      Digna Pickens RN  12/25/22 9178

## 2022-12-26 LAB
ABSOLUTE EOS #: <0.03 K/UL (ref 0–0.44)
ABSOLUTE IMMATURE GRANULOCYTE: <0.03 K/UL (ref 0–0.3)
ABSOLUTE LYMPH #: 1.09 K/UL (ref 1.1–3.7)
ABSOLUTE MONO #: 1.27 K/UL (ref 0.1–1.2)
ANION GAP SERPL CALCULATED.3IONS-SCNC: 11 MMOL/L (ref 9–17)
BASOPHILS # BLD: 0 % (ref 0–2)
BASOPHILS ABSOLUTE: 0.03 K/UL (ref 0–0.2)
BUN BLDV-MCNC: 20 MG/DL (ref 8–23)
BUN/CREAT BLD: 15 (ref 9–20)
CALCIUM SERPL-MCNC: 8.2 MG/DL (ref 8.6–10.4)
CHLORIDE BLD-SCNC: 103 MMOL/L (ref 98–107)
CO2: 23 MMOL/L (ref 20–31)
CREAT SERPL-MCNC: 1.3 MG/DL (ref 0.7–1.2)
EKG ATRIAL RATE: 102 BPM
EKG ATRIAL RATE: 95 BPM
EKG P AXIS: 13 DEGREES
EKG P AXIS: 31 DEGREES
EKG P-R INTERVAL: 168 MS
EKG P-R INTERVAL: 212 MS
EKG Q-T INTERVAL: 322 MS
EKG Q-T INTERVAL: 362 MS
EKG QRS DURATION: 76 MS
EKG QRS DURATION: 80 MS
EKG QTC CALCULATION (BAZETT): 419 MS
EKG QTC CALCULATION (BAZETT): 454 MS
EKG R AXIS: 3 DEGREES
EKG R AXIS: 6 DEGREES
EKG T AXIS: 33 DEGREES
EKG T AXIS: 6 DEGREES
EKG VENTRICULAR RATE: 102 BPM
EKG VENTRICULAR RATE: 95 BPM
EOSINOPHILS RELATIVE PERCENT: 0 % (ref 1–4)
GFR SERPL CREATININE-BSD FRML MDRD: 56 ML/MIN/1.73M2
GLUCOSE BLD-MCNC: 144 MG/DL (ref 75–110)
GLUCOSE BLD-MCNC: 151 MG/DL (ref 75–110)
GLUCOSE BLD-MCNC: 156 MG/DL (ref 70–99)
GLUCOSE BLD-MCNC: 203 MG/DL (ref 75–110)
HCT VFR BLD CALC: 33.4 % (ref 40.7–50.3)
HEMOGLOBIN: 10.4 G/DL (ref 13–17)
IMMATURE GRANULOCYTES: 0 %
LYMPHOCYTES # BLD: 16 % (ref 24–43)
MCH RBC QN AUTO: 26.9 PG (ref 25.2–33.5)
MCHC RBC AUTO-ENTMCNC: 31.1 G/DL (ref 25.2–33.5)
MCV RBC AUTO: 86.5 FL (ref 82.6–102.9)
MONOCYTES # BLD: 19 % (ref 3–12)
NRBC AUTOMATED: 0 PER 100 WBC
PDW BLD-RTO: 14.2 % (ref 11.8–14.4)
PLATELET # BLD: 191 K/UL (ref 138–453)
PMV BLD AUTO: 9.9 FL (ref 8.1–13.5)
POTASSIUM SERPL-SCNC: 4 MMOL/L (ref 3.7–5.3)
RBC # BLD: 3.86 M/UL (ref 4.21–5.77)
SEG NEUTROPHILS: 65 % (ref 36–65)
SEGMENTED NEUTROPHILS ABSOLUTE COUNT: 4.37 K/UL (ref 1.5–8.1)
SODIUM BLD-SCNC: 137 MMOL/L (ref 135–144)
TROPONIN, HIGH SENSITIVITY: 31 NG/L (ref 0–22)
WBC # BLD: 6.8 K/UL (ref 3.5–11.3)

## 2022-12-26 PROCEDURE — 6370000000 HC RX 637 (ALT 250 FOR IP): Performed by: FAMILY MEDICINE

## 2022-12-26 PROCEDURE — 97161 PT EVAL LOW COMPLEX 20 MIN: CPT | Performed by: PHYSICAL THERAPIST

## 2022-12-26 PROCEDURE — 6360000002 HC RX W HCPCS: Performed by: NURSE PRACTITIONER

## 2022-12-26 PROCEDURE — 85025 COMPLETE CBC W/AUTO DIFF WBC: CPT

## 2022-12-26 PROCEDURE — 99222 1ST HOSP IP/OBS MODERATE 55: CPT | Performed by: FAMILY MEDICINE

## 2022-12-26 PROCEDURE — 2060000000 HC ICU INTERMEDIATE R&B

## 2022-12-26 PROCEDURE — 6370000000 HC RX 637 (ALT 250 FOR IP): Performed by: NURSE PRACTITIONER

## 2022-12-26 PROCEDURE — 84484 ASSAY OF TROPONIN QUANT: CPT

## 2022-12-26 PROCEDURE — 80048 BASIC METABOLIC PNL TOTAL CA: CPT

## 2022-12-26 PROCEDURE — 36415 COLL VENOUS BLD VENIPUNCTURE: CPT

## 2022-12-26 PROCEDURE — 2580000003 HC RX 258: Performed by: NURSE PRACTITIONER

## 2022-12-26 PROCEDURE — 82947 ASSAY GLUCOSE BLOOD QUANT: CPT

## 2022-12-26 RX ORDER — OSELTAMIVIR PHOSPHATE 75 MG/1
75 CAPSULE ORAL 2 TIMES DAILY
Status: DISCONTINUED | OUTPATIENT
Start: 2022-12-26 | End: 2022-12-28 | Stop reason: HOSPADM

## 2022-12-26 RX ADMIN — VALSARTAN 160 MG: 80 TABLET, FILM COATED ORAL at 09:01

## 2022-12-26 RX ADMIN — SODIUM CHLORIDE, PRESERVATIVE FREE 10 ML: 5 INJECTION INTRAVENOUS at 09:03

## 2022-12-26 RX ADMIN — INSULIN LISPRO 1 UNITS: 100 INJECTION, SOLUTION INTRAVENOUS; SUBCUTANEOUS at 12:33

## 2022-12-26 RX ADMIN — FLUTICASONE PROPIONATE 2 SPRAY: 50 SPRAY, METERED NASAL at 09:03

## 2022-12-26 RX ADMIN — ACETAMINOPHEN 325MG 650 MG: 325 TABLET ORAL at 09:00

## 2022-12-26 RX ADMIN — HYDROCHLOROTHIAZIDE 25 MG: 25 TABLET ORAL at 09:02

## 2022-12-26 RX ADMIN — OXYBUTYNIN CHLORIDE 10 MG: 5 TABLET, EXTENDED RELEASE ORAL at 20:35

## 2022-12-26 RX ADMIN — INSULIN LISPRO 5 UNITS: 100 INJECTION, SOLUTION INTRAVENOUS; SUBCUTANEOUS at 12:33

## 2022-12-26 RX ADMIN — INSULIN GLARGINE 5 UNITS: 100 INJECTION, SOLUTION SUBCUTANEOUS at 20:35

## 2022-12-26 RX ADMIN — GUAIFENESIN 600 MG: 600 TABLET, EXTENDED RELEASE ORAL at 09:02

## 2022-12-26 RX ADMIN — METOPROLOL TARTRATE 100 MG: 50 TABLET, FILM COATED ORAL at 09:03

## 2022-12-26 RX ADMIN — METOPROLOL TARTRATE 100 MG: 50 TABLET, FILM COATED ORAL at 20:35

## 2022-12-26 RX ADMIN — INSULIN LISPRO 5 UNITS: 100 INJECTION, SOLUTION INTRAVENOUS; SUBCUTANEOUS at 18:02

## 2022-12-26 RX ADMIN — FUROSEMIDE 10 MG: 10 INJECTION, SOLUTION INTRAMUSCULAR; INTRAVENOUS at 09:01

## 2022-12-26 RX ADMIN — SODIUM CHLORIDE: 9 INJECTION, SOLUTION INTRAVENOUS at 08:41

## 2022-12-26 RX ADMIN — INSULIN LISPRO 5 UNITS: 100 INJECTION, SOLUTION INTRAVENOUS; SUBCUTANEOUS at 09:02

## 2022-12-26 RX ADMIN — ASPIRIN 81 MG: 81 TABLET, COATED ORAL at 10:33

## 2022-12-26 RX ADMIN — TAMSULOSIN HYDROCHLORIDE 0.4 MG: 0.4 CAPSULE ORAL at 09:02

## 2022-12-26 RX ADMIN — ATORVASTATIN CALCIUM 20 MG: 10 TABLET, FILM COATED ORAL at 09:01

## 2022-12-26 RX ADMIN — OSELTAMIVIR PHOSPHATE 75 MG: 75 CAPSULE ORAL at 20:35

## 2022-12-26 RX ADMIN — SODIUM CHLORIDE: 9 INJECTION, SOLUTION INTRAVENOUS at 18:04

## 2022-12-26 RX ADMIN — OSELTAMIVIR PHOSPHATE 30 MG: 30 CAPSULE ORAL at 09:00

## 2022-12-26 RX ADMIN — FAMOTIDINE 20 MG: 20 TABLET, FILM COATED ORAL at 21:46

## 2022-12-26 RX ADMIN — GUAIFENESIN 600 MG: 600 TABLET, EXTENDED RELEASE ORAL at 20:35

## 2022-12-26 RX ADMIN — FAMOTIDINE 20 MG: 20 TABLET, FILM COATED ORAL at 09:02

## 2022-12-26 RX ADMIN — ACETAMINOPHEN 325MG 650 MG: 325 TABLET ORAL at 04:37

## 2022-12-26 ASSESSMENT — PAIN SCALES - GENERAL: PAINLEVEL_OUTOF10: 0

## 2022-12-26 NOTE — PLAN OF CARE
Problem: Discharge Planning  Goal: Discharge to home or other facility with appropriate resources  Outcome: Progressing  Flowsheets  Taken 12/25/2022 2208  Discharge to home or other facility with appropriate resources:   Identify barriers to discharge with patient and caregiver   Identify discharge learning needs (meds, wound care, etc)   Refer to discharge planning if patient needs post-hospital services based on physician order or complex needs related to functional status, cognitive ability or social support system   Arrange for needed discharge resources and transportation as appropriate  Taken 12/25/2022 2156  Discharge to home or other facility with appropriate resources:   Identify barriers to discharge with patient and caregiver   Arrange for needed discharge resources and transportation as appropriate   Identify discharge learning needs (meds, wound care, etc)   Arrange for interpreters to assist at discharge as needed     Problem: Safety - Adult  Goal: Free from fall injury  Outcome: Progressing

## 2022-12-26 NOTE — CARE COORDINATION
Post Acute Facility/Agency List     Provided patient with the following list, the list includes the overall star ratings obtained from CMS per the Medicare Web site (www.Medicare.gov):     [] [address]  [] Acute Inpatient Rehabilitation Facilities  [] Skilled Nursing Facilities  [x] Home Care    Provided verbal instructions on how to utilize the QR Code to obtain additional detailed star ratings from www. Medicare. gov     offered to print and provide the detailed list:    []Accepted   [x]Declined

## 2022-12-26 NOTE — CARE COORDINATION
Case Management Initial Discharge Plan  Heron Chauhan             Met with:patient to discuss discharge plans. Information verified: address, contacts, phone number, , and insurance: Yes  Insurance Provider: Primary:  Payor: MEDICARE / Plan: MEDICARE PART A AND B / Product Type: *No Product type* /                                         Emergency Contact/Next of Kin name & number: verified  Who are involved in patient's support system? Child, wife(currently )    PCP: ROCÍO Medina CNP  Date of last visit: see chart    Discharge Planning    Living Arrangements:  Alone     Home has 1 story  Level entry to get into front door    Patient able to perform ADL's:Independent    Current Services (outpatient & in home) none    Is patient receiving oral anticoagulation therapy? No    Potential Assistance Needed:  Home Care    Patient agreeable to home care: Yes  Freedom of choice provided:  yes    Prior SNF/Rehab Placement and Facility: no  Agreeable to SNF/Rehab: No  [place] of choice provided: no      Expected Discharge date:       Patient expects to be discharged to: If home: is the family and/or caregiver wiling & able to provide support at home? yes  Who will be providing this support? Child, wife    Follow Up Appointment: Best Day/ Time:      Transportation provider: family  Transportation arrangements needed for discharge: No    Readmission Risk              Risk of Unplanned Readmission:  15             Does patient have a readmission risk score greater than 20?: No  If yes, follow-up appointment must be made within 7 days of discharge.      Goals of Care:       Educated patient on transitional options, provided freedom of choice and is agreeable with plan      Discharge Plan: home with possible home health          Electronically signed by Jacklyn Perez RN on 22 at 1:17 PM EST

## 2022-12-26 NOTE — PROGRESS NOTES
rounding in PCU. Assessment: Patient said he was doing better but still not very alert. Patient spoke about reconciling with his wife who will be coming to see him later in the week as the weather improves. Intervention: Engaged in conversation and active listening. Prayed with Patient. Outcome: Patient expressed appreciation for visit and offer of continued prayer. Plan: Chaplains are available on site or on call 24/7 for spiritual and emotional support. 12/26/22 1354   Encounter Summary   Encounter Overview/Reason  Initial Encounter;Spiritual/Emotional Needs   Service Provided For: Patient   Referral/Consult From: Rounding   Support System Spouse   Last Encounter  12/26/22   Complexity of Encounter Low   Begin Time 1347   End Time  1351   Total Time Calculated 4 min   Encounter    Type Initial Screen/Assessment   Spiritual/Emotional needs   Type Spiritual Support   Assessment/Intervention/Outcome   Assessment Coping; Impaired resilience;Passive   Intervention Active listening;Prayer (assurance of)/Susie   Outcome Acceptance;Encouraged;Expressed Gratitude;Engaged in conversation     Electronically signed by Karla Henning on 12/26/2022 at 1:56 PM

## 2022-12-26 NOTE — PROGRESS NOTES
Physical Therapy  Facility/Department: [address]  Physical Therapy Initial Assessment    Name: Tri Heading  : 1945  MRN: [id number]  Date of Service: 2022    Discharge Recommendations:  Home with assist PRN, Home with Home health PT          Patient Diagnosis(es): The primary encounter diagnosis was Influenza A. Diagnoses of Hypoxia and Elevated troponin were also pertinent to this visit. Past Medical History:  has a past medical history of Acute cystitis with hematuria, TITUS (acute kidney injury) ([facility] [address] Anemia, Chest pain, DJD (degenerative joint disease) of lumbar spine, Dry eye syndrome, Fall, GERD (gastroesophageal reflux disease), Hepatic hemangioma, Hypertension, Keratitis, Nuclear sclerotic cataract of both eyes, Obesity, morbid ([facility] [address] Obstructive sleep apnea of adult, Onychomycosis, Type II or unspecified type diabetes mellitus without mention of complication, not stated as uncontrolled, and Urinary tract infection with hematuria. Past Surgical History:  has a past surgical history that includes Colonoscopy (09/15/2006); Upper gastrointestinal endoscopy (09/15/2006); cyst removal; and Umbilical hernia repair (). Assessment   Body Structures, Functions, Activity Limitations Requiring Skilled Therapeutic Intervention: Decreased endurance;Decreased balance  Therapy Prognosis: Good  Decision Making: Low Complexity  Activity Tolerance  Activity Tolerance: Patient limited by endurance; Patient limited by fatigue;Treatment limited secondary to medical complications     Plan   Physcial Therapy Plan  General Plan: 3-5 times per week  Current Treatment Recommendations: Gait training, Transfer training  Safety Devices  Type of Devices: Left in bed, Call light within reach, Bed alarm in place     Restrictions        Subjective   General  Chart Reviewed: Yes  Response To Previous Treatment: Not applicable  Family / Caregiver Present: No  Follows Commands: Within Functional Limits Social/Functional History  Social/Functional History  Lives With: Alone  Type of Home: Apartment  Home Layout: One level  Home Access: Level entry  ADL Assistance: Independent  Homemaking Assistance: Independent  Ambulation Assistance: Independent  Transfer Assistance: Independent  Active : No  IADL Comments: Regularly walks approx 5 min to the store  Vision/Hearing       Cognition   Orientation  Overall Orientation Status: Within Functional Limits     Objective   Heart Rate: 77  Heart Rate Source: Monitor  BP: 139/66  BP Location: Left upper arm  BP Method: Automatic  Patient Position: Semi fowlers  MAP (Calculated): 90  Resp: 18  SpO2: 96 %  O2 Device: Nasal cannula     Observation/Palpation  Observation: In bed on O2                       Bed mobility  Rolling to Left: Independent  Rolling to Right: Independent  Supine to Sit: Modified independent  Sit to Supine: Modified independent  Scooting: Modified independent  Transfers  Sit to Stand: Modified independent  Stand to Sit: Modified independent  Ambulation  Surface: Level tile  Device: Rolling Walker  Assistance: Stand by assistance  Quality of Gait: Fatigued in 1 min, with O2 on  Gait Deviations: Slow Miya  Distance: 8 ft     Balance  Sitting - Static: Good  Sitting - Dynamic: Good  Standing - Static: Good  Standing - Dynamic: Fair           OutComes Score                                                  AM-PAC Score             Tinneti Score       Goals  Short Term Goals  Time Frame for Short Term Goals: 1 day  Short Term Goal 1: Assess functional status  Long Term Goals  Time Frame for Long Term Goals : 2-3 days  Long Term Goal 1: Gait with RW or no device 40-50 ft       Education         Therapy Time   Individual Concurrent Group Co-treatment   Time In           Time Out           Minutes                   Anna Vázquez PT

## 2022-12-26 NOTE — PLAN OF CARE
Problem: Discharge Planning  Goal: Discharge to home or other facility with appropriate resources  12/26/2022 0916 by Asiya Serna RN  Outcome: Progressing  Flowsheets (Taken 12/26/2022 0914)  Discharge to home or other facility with appropriate resources:   Identify barriers to discharge with patient and caregiver   Arrange for needed discharge resources and transportation as appropriate   Identify discharge learning needs (meds, wound care, etc)   Refer to discharge planning if patient needs post-hospital services based on physician order or complex needs related to functional status, cognitive ability or social support system  12/26/2022 0309 by Alonzo Cheung RN  Outcome: Progressing  Flowsheets  Taken 12/25/2022 2208  Discharge to home or other facility with appropriate resources:   Identify barriers to discharge with patient and caregiver   Identify discharge learning needs (meds, wound care, etc)   Refer to discharge planning if patient needs post-hospital services based on physician order or complex needs related to functional status, cognitive ability or social support system   Arrange for needed discharge resources and transportation as appropriate  Taken 12/25/2022 2156  Discharge to home or other facility with appropriate resources:   Identify barriers to discharge with patient and caregiver   Arrange for needed discharge resources and transportation as appropriate   Identify discharge learning needs (meds, wound care, etc)   Arrange for interpreters to assist at discharge as needed     Problem: Safety - Adult  Goal: Free from fall injury  12/26/2022 0916 by Asiya Serna RN  Outcome: Progressing  12/26/2022 0309 by Alonzo Cheung RN  Outcome: Progressing     Problem: Chronic Conditions and Co-morbidities  Goal: Patient's chronic conditions and co-morbidity symptoms are monitored and maintained or improved  Outcome: Progressing  Flowsheets (Taken 12/26/2022 0914)  Care Plan - Patient's Chronic Conditions and Co-Morbidity Symptoms are Monitored and Maintained or Improved:   Monitor and assess patient's chronic conditions and comorbid symptoms for stability, deterioration, or improvement   Collaborate with multidisciplinary team to address chronic and comorbid conditions and prevent exacerbation or deterioration   Update acute care plan with appropriate goals if chronic or comorbid symptoms are exacerbated and prevent overall improvement and discharge     Problem: Respiratory - Adult  Goal: Achieves optimal ventilation and oxygenation  Outcome: Progressing

## 2022-12-26 NOTE — H&P
HOSPITALIST ADMISSION H&P      REASON FOR ADMISSION:  influenza A with hypoxia, TITUS  ESTIMATED LENGTH OF STAY:>2 midnights, 3-4 days    ATTENDING/ADMITTING PHYSICIAN: Pato Thapa MD  PCP: ROCÍO Leone CNP    HISTORY OF PRESENT ILLNESS:      The patient is a 65 y.o. male patient of ROCÍO Leone CNP who presents from the ER. The patient is a poor historian and sometimes has conflicting answers, so some history is gathered from EMR and staff report. Apparently EMS was called because the patient fell while walking to a local convenience store. The patient states his legs got weak and he sat down, but then later states that he fell onto his buttocks. He denies any injuries. He reports a cough for a couple weeks and some shortness of breath. When asked if he's had diarrhea he states, \"I've been using the bathroom\", but goes into no further detail. He does appear fatigued. In ER, he was hypoxic with sats in the 80's on room air, requiring 2-3L nasal cannula oxygen. He tested positive for influenza A. Covid-19 testing negative. Chest xray impression: Mild pulmonary vascular congestion. Temperature maximum 101.6F, WBC 7.2, lactic acid 2.5 --> 1.2, sepsis ruled out 12/25/2022. EKG showed sinus tachycardia with first degree AVB and PACs, no acute ST/T wave changes. Troponin 18 --> 25 --> 31. Dr. Jennifer Thakkar, cardiology, was consulted from ER, stated patient could stay here and requested a couple more troponin levels to trend, but felt his troponins were likely increasing due to his hypoxia. Stress test 04/2021 was negative for ischemia, previous inferior infarction, preserved EF. Last 2D echo was in 2020.     TITUS -- CKD stage 2 at baseline, creatinine up to 1.57 from ~1 at baseline    Hypertension -- reports he has not taken his blood pressure pills today    DMII -- HgbA1C 7.9 -- glucose 215 in ER    RABIA -- noncompliant with cpap    Chronic anemia -- stable with Hgb 11.2 (typically 9-10)    In ER, he received 1G PO tylenol     See below for additional PMH. Patient dqlm-khptgivfap-[id number]-available records reviewed, including, but not limited to ER records, imaging results, lab results, office records, personal records, and OARRS -- no signs of abuse or diversion. Past Medical History:   Diagnosis Date    Acute cystitis with hematuria 09/17/2021    TITUS (acute kidney injury) ([facility] [address] 09/17/2021    Anemia     chronic, negative work up with EGD and Colonoscopy. Chest pain 06/09/2016    DJD (degenerative joint disease) of lumbar spine     Dry eye syndrome     Fall     GERD (gastroesophageal reflux disease)     Hepatic hemangioma     Hypertension     Keratitis     Secondary to dry eye syndrome. Nuclear sclerotic cataract of both eyes     Obesity, morbid ([address]     Obstructive sleep apnea of adult     non compliant with cpap    Onychomycosis     1, 2, 3, 4, and 5, bilateral.    Type II or unspecified type diabetes mellitus without mention of complication, not stated as uncontrolled     Urinary tract infection with hematuria 08/04/2020           Past Surgical History:   Procedure Laterality Date    COLONOSCOPY  09/15/2006    Diffuse diverticulosis slightly greater in the sigmoid colon and grade 1 internal hemorrhoids. CYST REMOVAL      right side of  scalp    UMBILICAL HERNIA REPAIR  2013    UPPER GASTROINTESTINAL ENDOSCOPY  09/15/2006    Mild to moderate diffuse gastritis, mild to moderate diffuse duodenitis. Allergies:    Patient has no known allergies. Social History:    reports that he quit smoking about 52 years ago. His smoking use included cigars and cigarettes. He started smoking about 59 years ago. He has a 3.75 pack-year smoking history. He has never used smokeless tobacco. He reports that he does not drink alcohol and does not use drugs. Family History:   family history includes Dementia in his sister; Diabetes in his sister;  Heart Attack in his father; High Blood Pressure in his brother, brother, and sister; Ovarian Cancer in his mother; Stroke in his brother and brother. REVIEW OF SYSTEMS:  See HPI and problem list; otherwise no other new complaints with respect to eyes, ENT, neck, pulmonary, coronary, chest, GI, , endocrine, musculoskeletal, hematologic, lymphatic, allergic/immunologic, neurologic, psychiatric, or skin. Code status: patient/family wishes for Full Code at this time.     PHYSICAL EXAM:  Vitals:  BP (!) 173/105   Pulse (!) 104   Temp 100 °F (37.8 °C) (Tympanic)   Resp 18   SpO2 94%     General: awake, alert, and cooperative, appears fatigued  HEENT: EOMI, External nose normal, Normocephalic, Atraumatic, Neck with full ROM, and with nasal cannula oxygen in place  Neck: Supple, Carotid Pulses Present, No Masses, Tenderness, Nodularity, and No Lymphadenopathy  Chest/Lungs: Clear to Auscultation without Rales, Rhonchi, or Wheezes, Bases Diminished Bilaterally, and Respirations even and unlabored  Cardiac: Regular Rate and Rhythm and Pedal Pulses Palpable Bilaterally  GI/Abdomen:  protuberant, Bowel Sounds Present, and Soft, Non-tender, without Guarding or Rebound Tenderness  : Not examined  Extremities/Musculoskeletal: All four extremities without edema and Generalized weakness  Skin: No Cyanosis and Skin warm and dry  Neuro:  oriented to person and No Localizing Signs/Symptoms  Psychiatric: poor concentration and flat affect      LABS:    CBC with Differential:    Lab Results   Component Value Date/Time    WBC 7.2 12/25/2022 12:20 PM    RBC 4.14 12/25/2022 12:20 PM    HGB 11.2 12/25/2022 12:20 PM    HCT 35.4 12/25/2022 12:20 PM     12/25/2022 12:20 PM    MCV 85.5 12/25/2022 12:20 PM    MCH 27.1 12/25/2022 12:20 PM    MCHC 31.6 12/25/2022 12:20 PM    RDW 14.1 12/25/2022 12:20 PM    LYMPHOPCT 7 12/25/2022 12:20 PM    MONOPCT 21 12/25/2022 12:20 PM    BASOPCT 1 12/25/2022 12:20 PM    MONOSABS 1.51 12/25/2022 12:20 PM    LYMPHSABS 0.50 12/25/2022 12:20 PM    EOSABS 0.00 12/25/2022 12:20 PM    BASOSABS 0.07 12/25/2022 12:20 PM    DIFFTYPE NOT REPORTED 09/20/2021 05:39 AM     CMP:    Lab Results   Component Value Date/Time     12/25/2022 12:20 PM    K 4.1 12/25/2022 12:20 PM    CL 99 12/25/2022 12:20 PM    CO2 24 12/25/2022 12:20 PM    BUN 19 12/25/2022 12:20 PM    CREATININE 1.57 12/25/2022 12:20 PM    GFRAA >60 03/09/2022 09:28 AM    LABGLOM 45 12/25/2022 12:20 PM    GLUCOSE 215 12/25/2022 12:20 PM    PROT 7.9 12/25/2022 12:20 PM    LABALBU 3.7 12/25/2022 12:20 PM    CALCIUM 8.8 12/25/2022 12:20 PM    BILITOT 0.7 12/25/2022 12:20 PM    ALKPHOS 105 12/25/2022 12:20 PM    AST 17 12/25/2022 12:20 PM    ALT 13 12/25/2022 12:20 PM       ASSESSMENT:      Patient Active Problem List   Diagnosis    Hypertension    GERD (gastroesophageal reflux disease)    Obesity, morbid (HCC)    Osteoarthritis of lumbar spine    Hepatic hemangioma    Obstructive sleep apnea of adult    Anemia    DM type 2 with diabetic peripheral neuropathy (HCC)    Dermatophytosis of nail    Paroxysmal supraventricular tachycardia (HCC)    TITUS (acute kidney injury) ([facility] [address]    Mixed hyperlipidemia    Benign localized prostatic hyperplasia with lower urinary tract symptoms (LUTS)    CKD (chronic kidney disease) stage 2, GFR 60-89 ml/min    Hyperthyroidism    Hypoxia    Noncompliance    Influenza A    History of falling       PLAN:    1. Influenza A with hypoxia -- supplemental oxygen prn, RT protocols, mednebs, tamiflu, mucinex, droplet isolation, continuous pulse oximetry, ezpap, acapella, supportive care  2. TITUS -- IV hydration, monitor I&O and renal function, monitor for urinary retention (has history of)  3. Hypertension -- resume home medications and monitor  4. Elevating troponins -- cardiology aware, telemetry monitoring, serial troponins  5. DMII -- carb controlled diet, insulins, monitor and titrate prn  6. Home medications reviewed  7. DVT prophylaxis -- SCDs  8.  See orders     Note that over 50 minutes was spent in reviewing and obtaining history, physical examination and evaluation of the patient, placing orders, providing education, coordinating care, reviewing test results, documenting in the medical record, and discussion/communicating with patient/caregiver/family.     Electronically signed by ROCÍO Alarcon CNP, ROCÍO, NP-C, FNP-BC on 12/25/2022 at 8:07 PM  Hospitalist/Nocturnist

## 2022-12-27 ENCOUNTER — APPOINTMENT (OUTPATIENT)
Dept: GENERAL RADIOLOGY | Age: 77
DRG: 194 | End: 2022-12-27
Payer: MEDICARE

## 2022-12-27 LAB
ABSOLUTE EOS #: <0.03 K/UL (ref 0–0.44)
ABSOLUTE IMMATURE GRANULOCYTE: <0.03 K/UL (ref 0–0.3)
ABSOLUTE LYMPH #: 1.52 K/UL (ref 1.1–3.7)
ABSOLUTE MONO #: 0.74 K/UL (ref 0.1–1.2)
ANION GAP SERPL CALCULATED.3IONS-SCNC: 11 MMOL/L (ref 9–17)
BASOPHILS # BLD: 1 % (ref 0–2)
BASOPHILS ABSOLUTE: <0.03 K/UL (ref 0–0.2)
BUN BLDV-MCNC: 17 MG/DL (ref 8–23)
BUN/CREAT BLD: 14 (ref 9–20)
CALCIUM SERPL-MCNC: 8.4 MG/DL (ref 8.6–10.4)
CHLORIDE BLD-SCNC: 103 MMOL/L (ref 98–107)
CO2: 23 MMOL/L (ref 20–31)
CREAT SERPL-MCNC: 1.21 MG/DL (ref 0.7–1.2)
EOSINOPHILS RELATIVE PERCENT: 0 % (ref 1–4)
GFR SERPL CREATININE-BSD FRML MDRD: >60 ML/MIN/1.73M2
GLUCOSE BLD-MCNC: 120 MG/DL (ref 70–99)
GLUCOSE BLD-MCNC: 149 MG/DL (ref 75–110)
GLUCOSE BLD-MCNC: 163 MG/DL (ref 75–110)
GLUCOSE BLD-MCNC: 215 MG/DL (ref 75–110)
HCT VFR BLD CALC: 35.2 % (ref 40.7–50.3)
HEMOGLOBIN: 10.8 G/DL (ref 13–17)
IMMATURE GRANULOCYTES: 0 %
LYMPHOCYTES # BLD: 35 % (ref 24–43)
MCH RBC QN AUTO: 27.1 PG (ref 25.2–33.5)
MCHC RBC AUTO-ENTMCNC: 30.7 G/DL (ref 25.2–33.5)
MCV RBC AUTO: 88.2 FL (ref 82.6–102.9)
MONOCYTES # BLD: 17 % (ref 3–12)
NRBC AUTOMATED: 0 PER 100 WBC
PDW BLD-RTO: 13.9 % (ref 11.8–14.4)
PLATELET # BLD: 202 K/UL (ref 138–453)
PMV BLD AUTO: 10.2 FL (ref 8.1–13.5)
POTASSIUM SERPL-SCNC: 3.8 MMOL/L (ref 3.7–5.3)
RBC # BLD: 3.99 M/UL (ref 4.21–5.77)
SEG NEUTROPHILS: 48 % (ref 36–65)
SEGMENTED NEUTROPHILS ABSOLUTE COUNT: 2.08 K/UL (ref 1.5–8.1)
SODIUM BLD-SCNC: 137 MMOL/L (ref 135–144)
WBC # BLD: 4.4 K/UL (ref 3.5–11.3)

## 2022-12-27 PROCEDURE — 71045 X-RAY EXAM CHEST 1 VIEW: CPT

## 2022-12-27 PROCEDURE — 6370000000 HC RX 637 (ALT 250 FOR IP): Performed by: NURSE PRACTITIONER

## 2022-12-27 PROCEDURE — 36415 COLL VENOUS BLD VENIPUNCTURE: CPT

## 2022-12-27 PROCEDURE — 2580000003 HC RX 258: Performed by: FAMILY MEDICINE

## 2022-12-27 PROCEDURE — 2060000000 HC ICU INTERMEDIATE R&B

## 2022-12-27 PROCEDURE — 80048 BASIC METABOLIC PNL TOTAL CA: CPT

## 2022-12-27 PROCEDURE — 97116 GAIT TRAINING THERAPY: CPT

## 2022-12-27 PROCEDURE — 85025 COMPLETE CBC W/AUTO DIFF WBC: CPT

## 2022-12-27 PROCEDURE — 6370000000 HC RX 637 (ALT 250 FOR IP): Performed by: FAMILY MEDICINE

## 2022-12-27 PROCEDURE — 99232 SBSQ HOSP IP/OBS MODERATE 35: CPT | Performed by: FAMILY MEDICINE

## 2022-12-27 PROCEDURE — 82947 ASSAY GLUCOSE BLOOD QUANT: CPT

## 2022-12-27 RX ORDER — METOPROLOL TARTRATE 5 MG/5ML
2.5 INJECTION INTRAVENOUS EVERY 6 HOURS PRN
Status: DISCONTINUED | OUTPATIENT
Start: 2022-12-27 | End: 2022-12-28 | Stop reason: HOSPADM

## 2022-12-27 RX ADMIN — VALSARTAN 160 MG: 80 TABLET, FILM COATED ORAL at 10:07

## 2022-12-27 RX ADMIN — HYDROCHLOROTHIAZIDE 25 MG: 25 TABLET ORAL at 10:07

## 2022-12-27 RX ADMIN — INSULIN GLARGINE 5 UNITS: 100 INJECTION, SOLUTION SUBCUTANEOUS at 21:04

## 2022-12-27 RX ADMIN — GUAIFENESIN 600 MG: 600 TABLET, EXTENDED RELEASE ORAL at 21:04

## 2022-12-27 RX ADMIN — ATORVASTATIN CALCIUM 20 MG: 10 TABLET, FILM COATED ORAL at 10:06

## 2022-12-27 RX ADMIN — INSULIN LISPRO 5 UNITS: 100 INJECTION, SOLUTION INTRAVENOUS; SUBCUTANEOUS at 11:44

## 2022-12-27 RX ADMIN — OSELTAMIVIR PHOSPHATE 75 MG: 75 CAPSULE ORAL at 10:05

## 2022-12-27 RX ADMIN — FAMOTIDINE 20 MG: 20 TABLET, FILM COATED ORAL at 10:07

## 2022-12-27 RX ADMIN — SODIUM CHLORIDE: 9 INJECTION, SOLUTION INTRAVENOUS at 13:03

## 2022-12-27 RX ADMIN — ASPIRIN 81 MG: 81 TABLET, COATED ORAL at 10:06

## 2022-12-27 RX ADMIN — METOPROLOL TARTRATE 100 MG: 50 TABLET, FILM COATED ORAL at 21:03

## 2022-12-27 RX ADMIN — INSULIN LISPRO 5 UNITS: 100 INJECTION, SOLUTION INTRAVENOUS; SUBCUTANEOUS at 10:01

## 2022-12-27 RX ADMIN — FAMOTIDINE 20 MG: 20 TABLET, FILM COATED ORAL at 21:03

## 2022-12-27 RX ADMIN — INSULIN LISPRO 5 UNITS: 100 INJECTION, SOLUTION INTRAVENOUS; SUBCUTANEOUS at 17:01

## 2022-12-27 RX ADMIN — GUAIFENESIN 600 MG: 600 TABLET, EXTENDED RELEASE ORAL at 10:06

## 2022-12-27 RX ADMIN — OSELTAMIVIR PHOSPHATE 75 MG: 75 CAPSULE ORAL at 21:03

## 2022-12-27 RX ADMIN — INSULIN LISPRO 1 UNITS: 100 INJECTION, SOLUTION INTRAVENOUS; SUBCUTANEOUS at 11:43

## 2022-12-27 RX ADMIN — ACETAMINOPHEN 325MG 650 MG: 325 TABLET ORAL at 00:13

## 2022-12-27 RX ADMIN — FLUTICASONE PROPIONATE 2 SPRAY: 50 SPRAY, METERED NASAL at 10:08

## 2022-12-27 RX ADMIN — TAMSULOSIN HYDROCHLORIDE 0.4 MG: 0.4 CAPSULE ORAL at 10:06

## 2022-12-27 RX ADMIN — METOPROLOL TARTRATE 100 MG: 50 TABLET, FILM COATED ORAL at 10:06

## 2022-12-27 RX ADMIN — OXYBUTYNIN CHLORIDE 10 MG: 5 TABLET, EXTENDED RELEASE ORAL at 21:03

## 2022-12-27 NOTE — PROGRESS NOTES
Physical Therapy  Facility/Department: [facility]  PROGRESSIVE CARE  Daily Treatment Note  NAME: Tenzin Arce  : 1945  MRN: [id number]    Date of Service: 2022    Discharge Recommendations:  Home with assist PRN, Home with Home health PT        Patient Diagnosis(es): The primary encounter diagnosis was Influenza A. Diagnoses of Hypoxia and Elevated troponin were also pertinent to this visit. Assessment   Activity Tolerance: Patient limited by endurance; Patient limited by fatigue;Treatment limited secondary to medical complications     Plan    Physcial Therapy Plan  General Plan: 3-5 times per week  Current Treatment Recommendations: Gait training;Transfer training     Restrictions        Subjective    Subjective  Subjective: Pt in bedside hair upon arrival. Pt pleasant and agreeable to session. Pain: denies. just fatigue  Orientation  Overall Orientation Status: Within Functional Limits     Objective   Vitals  SpO2: 99 % (94% after ambulation)  O2 Device: Nasal cannula  Bed Mobility Training  Bed Mobility Training: No  Balance  Sitting: Intact  Standing: Intact  Transfer Training  Transfer Training: Yes  Overall Level of Assistance: Modified independent  Sit to Stand: Modified independent  Stand to Sit: Modified independent  Gait Training  Gait Training: Yes  Gait  Overall Level of Assistance: Stand-by assistance  Interventions:  (line awareness)  Distance (ft): 20 Feet  Assistive Device: Walker, rolling     PT Exercises  Exercise Treatment: pt with diffiuclty following exercise commands despite verbal and manual cues  A/AROM Exercises: 20x LAQ, 5x seated marches, 10x HR/TR  Resistive Exercises: pt unable to perform HS curls with resistance due to being unable to follow one step commands     Safety Devices  Type of Devices: Left in chair;Call light within reach; Chair alarm in place;Nurse notified;Gait belt       Goals  Short Term Goals  Time Frame for Short Term Goals: 1 day  Short Term Goal 1: Assess functional status  Long Term Goals  Time Frame for Long Term Goals : 2-3 days  Long Term Goal 1: Gait with RW or no device 40-50 ft    Education       Therapy Time   Individual Concurrent Group Co-treatment   Time In 1336         Time Out 1351         Minutes 15                 Idania Jurado, PTA

## 2022-12-27 NOTE — PLAN OF CARE
Problem: Discharge Planning  Goal: Discharge to home or other facility with appropriate resources  Outcome: Progressing     Problem: Safety - Adult  Goal: Free from fall injury  Outcome: Progressing     Problem: Chronic Conditions and Co-morbidities  Goal: Patient's chronic conditions and co-morbidity symptoms are monitored and maintained or improved  Outcome: Progressing     Problem: Respiratory - Adult  Goal: Achieves optimal ventilation and oxygenation  Outcome: Progressing     Problem: Neurosensory - Adult  Goal: Achieves maximal functionality and self care  Outcome: Progressing     Problem: Cardiovascular - Adult  Goal: Maintains optimal cardiac output and hemodynamic stability  Outcome: Progressing  Goal: Absence of cardiac dysrhythmias or at baseline  Outcome: Progressing     Problem: Skin/Tissue Integrity - Adult  Goal: Skin integrity remains intact  Outcome: Progressing     Problem: Musculoskeletal - Adult  Goal: Return mobility to safest level of function  Outcome: Progressing     Problem: Gastrointestinal - Adult  Goal: Minimal or absence of nausea and vomiting  Outcome: Progressing     Problem: Genitourinary - Adult  Goal: Absence of urinary retention  Outcome: Progressing     Problem: Infection - Adult  Goal: Absence of infection at discharge  Outcome: Progressing     Problem: Metabolic/Fluid and Electrolytes - Adult  Goal: Electrolytes maintained within normal limits  Outcome: Progressing  Goal: Hemodynamic stability and optimal renal function maintained  Outcome: Progressing  Goal: Glucose maintained within prescribed range  Outcome: Progressing     Problem: Hematologic - Adult  Goal: Maintains hematologic stability  Outcome: Progressing

## 2022-12-27 NOTE — PROGRESS NOTES
Hospitalist Progress Note  12/27/2022 10:44 AM  Subjective:   Admit Date: 12/25/2022  PCP: ROCÍO Dickson - CNP    Interval History: Patient with influenza A breathing is stable still on supplemental oxygen ,has a cough no chest pain. Spiked a temp last night. Creatinine is trending down. WBC stable    Diet: ADULT DIET; Regular; 4 carb choices (60 gm/meal); Low Fat/Low Chol/High Fiber/CHRISTA  Medications:   Scheduled Meds:   oseltamivir  75 mg Oral BID    aspirin  81 mg Oral Daily    atorvastatin  20 mg Oral Daily    famotidine  20 mg Oral BID    fluticasone  2 spray Each Nostril Daily    metoprolol tartrate  100 mg Oral BID    tamsulosin  0.4 mg Oral Daily    oxybutynin  10 mg Oral Nightly    sodium chloride flush  5-40 mL IntraVENous 2 times per day    guaiFENesin  600 mg Oral BID    insulin lispro  0-4 Units SubCUTAneous TID WC    insulin lispro  0-4 Units SubCUTAneous Nightly    insulin glargine  5 Units SubCUTAneous Nightly    insulin lispro  5 Units SubCUTAneous TID WC    hydroCHLOROthiazide  25 mg Oral Daily    valsartan  160 mg Oral Daily     Continuous Infusions:   sodium chloride 100 mL/hr at 12/27/22 0647    sodium chloride      dextrose       CBC:   Recent Labs     12/25/22  1220 12/26/22  0357 12/27/22  0545   WBC 7.2 6.8 4.4   HGB 11.2* 10.4* 10.8*    191 202     BMP:    Recent Labs     12/25/22  1220 12/26/22  0357 12/27/22  0545    137 137   K 4.1 4.0 3.8   CL 99 103 103   CO2 24 23 23   BUN 19 20 17   CREATININE 1.57* 1.30* 1.21*   GLUCOSE 215* 156* 120*     Hepatic:   Recent Labs     12/25/22  1220   AST 17   ALT 13   BILITOT 0.7   ALKPHOS 105     Troponin: No results for input(s): TROPONINI in the last 72 hours. BNP: No results for input(s): BNP in the last 72 hours. Lipids: No results for input(s): CHOL, HDL in the last 72 hours. Invalid input(s): LDLCALCU  INR: No results for input(s): INR in the last 72 hours.     Objective:   Vitals: BP (!) 164/70   Pulse 81 Temp 100 °F (37.8 °C) (Tympanic)   Resp 20   Ht 5' 8\" (1.727 m)   Wt 291 lb 1.6 oz (132 kg)   SpO2 96%   BMI 44.26 kg/m²   General appearance: alert and cooperative with exam  HEENT: Eye: Normal external eye, conjunctiva, lids cornea, JALEESA. Neck: no adenopathy, no carotid bruit, no JVD, supple, symmetrical, trachea midline, and thyroid not enlarged, symmetric, no tenderness/mass/nodules  Lungs: clear to auscultation bilaterally  Heart: regular rate and rhythm, S1, S2 normal, no murmur, click, rub or gallop  Abdomen: soft, non-tender; bowel sounds normal; no masses,  no organomegaly  Extremities: extremities normal, atraumatic, no cyanosis or edema  Neurologic: Mental status: Alert, oriented, thought content appropriate    Assessment and Plan:   Influenza A with hypoxia  HTN  TITUS improving     PLAN:Discontinue HCTZ cut back on IV ,additional IV lopressor 2.5 mg prn as needed for BP control. Wean down oxygen as possible ,continue rest of plan. Home tomorrow if improved  Patient Active Problem List:     Hypertension     GERD (gastroesophageal reflux disease)     Obesity, morbid (HCC)     Osteoarthritis of lumbar spine     Hepatic hemangioma     Obstructive sleep apnea of adult     Anemia     DM type 2 with diabetic peripheral neuropathy (HCC)     Dermatophytosis of nail     Paroxysmal supraventricular tachycardia (HCC)     TITUS (acute kidney injury) ([facility] [address]     Mixed hyperlipidemia     Benign localized prostatic hyperplasia with lower urinary tract symptoms (LUTS)     CKD (chronic kidney disease) stage 2, GFR 60-89 ml/min     Hyperthyroidism     Hypoxia     Noncompliance     Influenza A     History of falling      Sommer Cowart MD, MD  Rounding Hospitalist

## 2022-12-28 ENCOUNTER — APPOINTMENT (OUTPATIENT)
Dept: GENERAL RADIOLOGY | Age: 77
DRG: 194 | End: 2022-12-28
Payer: MEDICARE

## 2022-12-28 VITALS
SYSTOLIC BLOOD PRESSURE: 125 MMHG | TEMPERATURE: 98.8 F | DIASTOLIC BLOOD PRESSURE: 64 MMHG | RESPIRATION RATE: 17 BRPM | BODY MASS INDEX: 44.31 KG/M2 | OXYGEN SATURATION: 93 % | HEART RATE: 67 BPM | HEIGHT: 68 IN | WEIGHT: 292.4 LBS

## 2022-12-28 LAB
ABSOLUTE EOS #: 0.04 K/UL (ref 0–0.44)
ABSOLUTE IMMATURE GRANULOCYTE: <0.03 K/UL (ref 0–0.3)
ABSOLUTE LYMPH #: 1.56 K/UL (ref 1.1–3.7)
ABSOLUTE MONO #: 0.51 K/UL (ref 0.1–1.2)
ANION GAP SERPL CALCULATED.3IONS-SCNC: 8 MMOL/L (ref 9–17)
BASOPHILS # BLD: 1 % (ref 0–2)
BASOPHILS ABSOLUTE: <0.03 K/UL (ref 0–0.2)
BUN BLDV-MCNC: 17 MG/DL (ref 8–23)
BUN/CREAT BLD: 15 (ref 9–20)
CALCIUM SERPL-MCNC: 8.5 MG/DL (ref 8.6–10.4)
CHLORIDE BLD-SCNC: 104 MMOL/L (ref 98–107)
CO2: 27 MMOL/L (ref 20–31)
CREAT SERPL-MCNC: 1.11 MG/DL (ref 0.7–1.2)
EOSINOPHILS RELATIVE PERCENT: 1 % (ref 1–4)
GFR SERPL CREATININE-BSD FRML MDRD: >60 ML/MIN/1.73M2
GLUCOSE BLD-MCNC: 144 MG/DL (ref 70–99)
GLUCOSE BLD-MCNC: 220 MG/DL (ref 75–110)
HCT VFR BLD CALC: 32.8 % (ref 40.7–50.3)
HEMOGLOBIN: 10.1 G/DL (ref 13–17)
IMMATURE GRANULOCYTES: 0 %
LYMPHOCYTES # BLD: 36 % (ref 24–43)
MCH RBC QN AUTO: 27 PG (ref 25.2–33.5)
MCHC RBC AUTO-ENTMCNC: 30.8 G/DL (ref 25.2–33.5)
MCV RBC AUTO: 87.7 FL (ref 82.6–102.9)
MONOCYTES # BLD: 12 % (ref 3–12)
NRBC AUTOMATED: 0 PER 100 WBC
PDW BLD-RTO: 13.7 % (ref 11.8–14.4)
PLATELET # BLD: 216 K/UL (ref 138–453)
PMV BLD AUTO: 10.4 FL (ref 8.1–13.5)
POTASSIUM SERPL-SCNC: 4 MMOL/L (ref 3.7–5.3)
RBC # BLD: 3.74 M/UL (ref 4.21–5.77)
SEG NEUTROPHILS: 50 % (ref 36–65)
SEGMENTED NEUTROPHILS ABSOLUTE COUNT: 2.15 K/UL (ref 1.5–8.1)
SODIUM BLD-SCNC: 139 MMOL/L (ref 135–144)
WBC # BLD: 4.3 K/UL (ref 3.5–11.3)

## 2022-12-28 PROCEDURE — 80048 BASIC METABOLIC PNL TOTAL CA: CPT

## 2022-12-28 PROCEDURE — 2580000003 HC RX 258: Performed by: FAMILY MEDICINE

## 2022-12-28 PROCEDURE — 82947 ASSAY GLUCOSE BLOOD QUANT: CPT

## 2022-12-28 PROCEDURE — 6370000000 HC RX 637 (ALT 250 FOR IP): Performed by: NURSE PRACTITIONER

## 2022-12-28 PROCEDURE — 36415 COLL VENOUS BLD VENIPUNCTURE: CPT

## 2022-12-28 PROCEDURE — 99238 HOSP IP/OBS DSCHRG MGMT 30/<: CPT | Performed by: FAMILY MEDICINE

## 2022-12-28 PROCEDURE — 94760 N-INVAS EAR/PLS OXIMETRY 1: CPT

## 2022-12-28 PROCEDURE — 6370000000 HC RX 637 (ALT 250 FOR IP): Performed by: FAMILY MEDICINE

## 2022-12-28 PROCEDURE — 85025 COMPLETE CBC W/AUTO DIFF WBC: CPT

## 2022-12-28 PROCEDURE — 71045 X-RAY EXAM CHEST 1 VIEW: CPT

## 2022-12-28 RX ORDER — OSELTAMIVIR PHOSPHATE 75 MG/1
75 CAPSULE ORAL 2 TIMES DAILY
Qty: 4 CAPSULE | Refills: 0 | Status: SHIPPED | OUTPATIENT
Start: 2022-12-28 | End: 2022-12-30

## 2022-12-28 RX ORDER — ASPIRIN 81 MG/1
81 TABLET ORAL DAILY
Qty: 30 TABLET | Refills: 0 | COMMUNITY
Start: 2022-12-29

## 2022-12-28 RX ORDER — GUAIFENESIN 600 MG/1
600 TABLET, EXTENDED RELEASE ORAL 2 TIMES DAILY
Qty: 20 TABLET | Refills: 0 | Status: CANCELLED | OUTPATIENT
Start: 2022-12-28 | End: 2023-01-07

## 2022-12-28 RX ADMIN — VALSARTAN 160 MG: 80 TABLET, FILM COATED ORAL at 09:28

## 2022-12-28 RX ADMIN — SODIUM CHLORIDE: 9 INJECTION, SOLUTION INTRAVENOUS at 04:29

## 2022-12-28 RX ADMIN — FAMOTIDINE 20 MG: 20 TABLET, FILM COATED ORAL at 09:26

## 2022-12-28 RX ADMIN — INSULIN LISPRO 5 UNITS: 100 INJECTION, SOLUTION INTRAVENOUS; SUBCUTANEOUS at 09:53

## 2022-12-28 RX ADMIN — GUAIFENESIN 600 MG: 600 TABLET, EXTENDED RELEASE ORAL at 09:26

## 2022-12-28 RX ADMIN — INSULIN LISPRO 1 UNITS: 100 INJECTION, SOLUTION INTRAVENOUS; SUBCUTANEOUS at 13:16

## 2022-12-28 RX ADMIN — ASPIRIN 81 MG: 81 TABLET, COATED ORAL at 09:26

## 2022-12-28 RX ADMIN — INSULIN LISPRO 5 UNITS: 100 INJECTION, SOLUTION INTRAVENOUS; SUBCUTANEOUS at 13:17

## 2022-12-28 RX ADMIN — ATORVASTATIN CALCIUM 20 MG: 10 TABLET, FILM COATED ORAL at 09:26

## 2022-12-28 RX ADMIN — METOPROLOL TARTRATE 100 MG: 50 TABLET, FILM COATED ORAL at 09:28

## 2022-12-28 RX ADMIN — FLUTICASONE PROPIONATE 2 SPRAY: 50 SPRAY, METERED NASAL at 09:31

## 2022-12-28 RX ADMIN — TAMSULOSIN HYDROCHLORIDE 0.4 MG: 0.4 CAPSULE ORAL at 09:26

## 2022-12-28 RX ADMIN — OSELTAMIVIR PHOSPHATE 75 MG: 75 CAPSULE ORAL at 09:26

## 2022-12-28 ASSESSMENT — PAIN SCALES - GENERAL
PAINLEVEL_OUTOF10: 0

## 2022-12-28 NOTE — PLAN OF CARE
Problem: Discharge Planning  Goal: Discharge to home or other facility with appropriate resources  12/27/2022 2338 by Marie Mckeon RN  Outcome: Progressing  Flowsheets (Taken 12/27/2022 2112)  Discharge to home or other facility with appropriate resources: Identify barriers to discharge with patient and caregiver  12/27/2022 1107 by Rodney Dick RN  Outcome: Progressing     Problem: Safety - Adult  Goal: Free from fall injury  12/27/2022 2338 by Marie Mckeon RN  Outcome: Progressing  12/27/2022 1107 by Rodney Dick RN  Outcome: Progressing     Problem: Chronic Conditions and Co-morbidities  Goal: Patient's chronic conditions and co-morbidity symptoms are monitored and maintained or improved  12/27/2022 2338 by Marie Mckeon RN  Outcome: Progressing  Flowsheets (Taken 12/27/2022 2112)  Care Plan - Patient's Chronic Conditions and Co-Morbidity Symptoms are Monitored and Maintained or Improved: Monitor and assess patient's chronic conditions and comorbid symptoms for stability, deterioration, or improvement  12/27/2022 1107 by Rodney Dick RN  Outcome: Progressing     Problem: Respiratory - Adult  Goal: Achieves optimal ventilation and oxygenation  12/27/2022 2338 by Marie Mckeon RN  Outcome: Progressing  Flowsheets (Taken 12/27/2022 2112)  Achieves optimal ventilation and oxygenation: Assess for changes in respiratory status  12/27/2022 1107 by Rodney Dick RN  Outcome: Progressing     Problem: Neurosensory - Adult  Goal: Achieves maximal functionality and self care  12/27/2022 2338 by Marie Mckeon RN  Outcome: Progressing  Flowsheets (Taken 12/27/2022 2112)  Achieves maximal functionality and self care: Monitor swallowing and airway patency with patient fatigue and changes in neurological status  12/27/2022 1107 by Rodney Dick RN  Outcome: Progressing     Problem: Cardiovascular - Adult  Goal: Maintains optimal cardiac output and hemodynamic stability  12/27/2022 2338 by Grupo Patel RN  Outcome: Progressing  Flowsheets (Taken 12/27/2022 2112)  Maintains optimal cardiac output and hemodynamic stability: Monitor blood pressure and heart rate  12/27/2022 1107 by Zackary Dodson RN  Outcome: Progressing  Goal: Absence of cardiac dysrhythmias or at baseline  12/27/2022 2338 by Grupo Patel RN  Outcome: Progressing  Flowsheets (Taken 12/27/2022 2112)  Absence of cardiac dysrhythmias or at baseline: Monitor cardiac rate and rhythm  12/27/2022 1107 by Zackary Dodson RN  Outcome: Progressing     Problem: Skin/Tissue Integrity - Adult  Goal: Skin integrity remains intact  12/27/2022 2338 by Grupo Patel RN  Outcome: Progressing  Flowsheets (Taken 12/27/2022 2112)  Skin Integrity Remains Intact: Monitor for areas of redness and/or skin breakdown  12/27/2022 1107 by Zackary Dodson RN  Outcome: Progressing     Problem: Musculoskeletal - Adult  Goal: Return mobility to safest level of function  12/27/2022 2338 by Grupo Patel RN  Outcome: Progressing  Flowsheets (Taken 12/27/2022 2112)  Return Mobility to Safest Level of Function: Assess patient stability and activity tolerance for standing, transferring and ambulating with or without assistive devices  12/27/2022 1107 by Zackary Dodson RN  Outcome: Progressing     Problem: Gastrointestinal - Adult  Goal: Minimal or absence of nausea and vomiting  12/27/2022 2338 by Grupo Patel RN  Outcome: Progressing  Flowsheets (Taken 12/27/2022 2112)  Minimal or absence of nausea and vomiting: Administer IV fluids as ordered to ensure adequate hydration  12/27/2022 1107 by Zackary Dodson RN  Outcome: Progressing     Problem: Genitourinary - Adult  Goal: Absence of urinary retention  12/27/2022 2338 by Grupo Patel RN  Outcome: Progressing  Flowsheets (Taken 12/27/2022 2112)  Absence of urinary retention: Assess patients ability to void and empty bladder  12/27/2022 1107 by Zackary Dodson RN  Outcome: Progressing     Problem: Infection - Adult  Goal: Absence of infection at discharge  12/27/2022 2338 by Jonathon Alexandra RN  Outcome: Progressing  Flowsheets (Taken 12/27/2022 2112)  Absence of infection at discharge: Assess and monitor for signs and symptoms of infection  12/27/2022 1107 by Juan Thakur RN  Outcome: Progressing     Problem: Metabolic/Fluid and Electrolytes - Adult  Goal: Electrolytes maintained within normal limits  12/27/2022 2338 by Jonathon Alexandra RN  Outcome: Progressing  Flowsheets (Taken 12/27/2022 2112)  Electrolytes maintained within normal limits: Monitor labs and assess patient for signs and symptoms of electrolyte imbalances  12/27/2022 1107 by Juan Thakur RN  Outcome: Progressing  Goal: Hemodynamic stability and optimal renal function maintained  12/27/2022 2338 by Jonathon Alexandra RN  Outcome: Progressing  Flowsheets (Taken 12/27/2022 2112)  Hemodynamic stability and optimal renal function maintained: Monitor labs and assess for signs and symptoms of volume excess or deficit  12/27/2022 1107 by Juan Thakur RN  Outcome: Progressing  Goal: Glucose maintained within prescribed range  12/27/2022 2338 by Jonathon Alexandra RN  Outcome: Progressing  Flowsheets (Taken 12/27/2022 2112)  Glucose maintained within prescribed range: Monitor blood glucose as ordered  12/27/2022 1107 by Juan Thakur RN  Outcome: Progressing     Problem: Hematologic - Adult  Goal: Maintains hematologic stability  12/27/2022 2338 by Jonathon Alexandra RN  Outcome: Progressing  Flowsheets (Taken 12/27/2022 2112)  Maintains hematologic stability: Assess for signs and symptoms of bleeding or hemorrhage  12/27/2022 1107 by Juan Thakur RN  Outcome: Progressing

## 2022-12-28 NOTE — DISCHARGE INSTR - DIET

## 2022-12-28 NOTE — DISCHARGE SUMMARY
Hospitalist Discharge Summary    Alejandro Mcrae  :  1945  MRN:  [id number]    Admit date:  2022  Discharge date:  22    Admitting Physician:  Lily Rodriguez MD    Discharge Diagnoses:   Patient Active Problem List   Diagnosis    Hypertension    GERD (gastroesophageal reflux disease)    Obesity, morbid ([facility]    Osteoarthritis of lumbar spine    Hepatic hemangioma    Obstructive sleep apnea of adult    Anemia    DM type 2 with diabetic peripheral neuropathy (HCC)    Dermatophytosis of nail    Paroxysmal supraventricular tachycardia ([facility]    TITUS (acute kidney injury) ([facility]    Mixed hyperlipidemia    Benign localized prostatic hyperplasia with lower urinary tract symptoms (LUTS)    CKD (chronic kidney disease) stage 2, GFR 60-89 ml/min    Hyperthyroidism    Hypoxia    Noncompliance    Influenza A    History of falling        Admission Condition:  fair      Discharged Condition:  good    Hospital Course/Treatments   admitted with sob, pt was positive for influenza a , pt was placed on oxygen and tamiflu, pt was monitored closely, pt did well, pt wiil be d/c home with following meds    Discharge Medications:         Medication List        START taking these medications      aspirin 81 MG EC tablet  Take 1 tablet by mouth daily  Start taking on: 2022  Replaces: aspirin 81 MG tablet     oseltamivir 75 MG capsule  Commonly known as: TAMIFLU  Take 1 capsule by mouth 2 times daily for 4 doses            CONTINUE taking these medications      atorvastatin 20 MG tablet  Commonly known as: LIPITOR  TAKE 1 TABLET BY MOUTH DAILY     blood glucose monitor kit and supplies  Check blood sugar daily.   Diagnosis E11.42     famotidine 20 MG tablet  Commonly known as: PEPCID  Take 1 tablet twice a day     fluticasone 50 MCG/ACT nasal spray  Commonly known as: FLONASE  SHAKE LIQUID AND USE 2 SPRAYS IN EACH NOSTRIL EVERY DAY     Lancets Micro Thin 33G Misc  USE TO TEST BLOOD SUGAR EVERY DAY     metFORMIN 500 MG extended release tablet  Commonly known as: GLUCOPHAGE-XR  TAKE 1 TABLET BY MOUTH TWICE DAILY     metoprolol 100 MG tablet  Commonly known as: LOPRESSOR  TAKE 1 TABLET BY MOUTH TWICE DAILY     omeprazole 40 MG delayed release capsule  Commonly known as: PRILOSEC  TAKE 1 CAPSULE DAILY     oxybutynin 10 MG extended release tablet  Commonly known as: DITROPAN-XL  TAKE 1 TABLET DAILY     tamsulosin 0.4 MG capsule  Commonly known as: FLOMAX  TAKE 1 CAPSULE DAILY     * True Metrix Blood Glucose Test strip  Generic drug: blood glucose test strips  USE TO TEST BLOOD SUGAR DAILY     * blood glucose test strips  Patient checks blood sugar daily     valsartan-hydroCHLOROthiazide 160-25 MG per tablet  Commonly known as: DIOVAN-HCT  TAKE 1 TABLET BY MOUTH DAILY           * This list has 2 medication(s) that are the same as other medications prescribed for you. Read the directions carefully, and ask your doctor or other care provider to review them with you. STOP taking these medications      acidophilus/pectin Caps     aspirin 81 MG tablet  Replaced by: aspirin 81 MG EC tablet               Where to Get Your Medications        These medications were sent to [facility]  [address], [place] [address]      Phone: [phone]   oseltamivir 75 MG capsule       You can get these medications from any pharmacy    You don't need a prescription for these medications  aspirin 81 MG EC tablet         Consults:  IP CONSULT TO HOSPITALIST    Significant Diagnostic Studies:  XR CHEST PORTABLE    Result Date: 12/25/2022  EXAMINATION: ONE XRAY VIEW OF THE CHEST 12/25/2022 11:59 am COMPARISON: 09/19/2021 HISTORY: ORDERING SYSTEM PROVIDED HISTORY: shortness of breath TECHNOLOGIST PROVIDED HISTORY: shortness of breath Reason for Exam: Cough; Fatigue;  Fall 40-year-old male with cough, fatigue, fall, shortness of breath FINDINGS: Cardiac and mediastinal contours within normal limits. Trachea midline. No pneumothorax. No acute focal airspace consolidation or pleural effusions. Mild pulmonary vascular congestion. No acute osseous abnormality. Mild pulmonary vascular congestion. Disposition:   home    Discharge Instructions: Activity: activity as tolerated  Diet:  regular diet    Follow up with ROCÍO Elizondo CNP in 1 weeks.     Signed:  Dori Pringle MD  12/28/2022, 1:35 PM    Time spent in discharge of this pt is more than 30 minutes in examination,evaluvation,  counseling and review of medication and discharge plan

## 2022-12-28 NOTE — PROGRESS NOTES
CLINICAL PHARMACY NOTE: MEDS TO BEDS    Total # of Prescriptions Filled: 1   The following medications were delivered to the patient:  Tamiflu 75mg    Additional Documentation:

## 2022-12-28 NOTE — PROGRESS NOTES
Physical Therapy    Attempted to see patient for physical therapy treatment this date. Patient declined therapy at this time stating that Ebenezer Quinones is not feeling up to it at the moment and would like to try to rest.\" Nursing informed. Will try back at a later time to see patient.      Sophia Nagel, PT

## 2022-12-28 NOTE — PLAN OF CARE
Problem: Discharge Planning  Goal: Discharge to home or other facility with appropriate resources  12/28/2022 1102 by Ishaan Cameron RN  Outcome: Progressing  Flowsheets (Taken 12/28/2022 0200 by Veronica Kowalski, RN)  Discharge to home or other facility with appropriate resources:   Identify barriers to discharge with patient and caregiver   Arrange for needed discharge resources and transportation as appropriate   Identify discharge learning needs (meds, wound care, etc)  12/27/2022 2338 by Nichelle Gustafson RN  Outcome: Progressing  Flowsheets (Taken 12/27/2022 2112)  Discharge to home or other facility with appropriate resources: Identify barriers to discharge with patient and caregiver     Problem: Safety - Adult  Goal: Free from fall injury  12/28/2022 1102 by Ishaan Cameron RN  Outcome: Progressing  12/27/2022 2338 by Nichelle Gustafson RN  Outcome: Progressing     Problem: Chronic Conditions and Co-morbidities  Goal: Patient's chronic conditions and co-morbidity symptoms are monitored and maintained or improved  12/28/2022 1102 by Ishaan Cameron RN  Outcome: Progressing  Flowsheets (Taken 12/28/2022 0200 by Veronica Kowalski RN)  Care Plan - Patient's Chronic Conditions and Co-Morbidity Symptoms are Monitored and Maintained or Improved:   Monitor and assess patient's chronic conditions and comorbid symptoms for stability, deterioration, or improvement   Collaborate with multidisciplinary team to address chronic and comorbid conditions and prevent exacerbation or deterioration  12/27/2022 2338 by Nichelle Gustafson RN  Outcome: Progressing  [address] (Taken 12/27/2022 2112)  Care Plan - Patient's Chronic Conditions and Co-Morbidity Symptoms are Monitored and Maintained or Improved: Monitor and assess patient's chronic conditions and comorbid symptoms for stability, deterioration, or improvement     Problem: Respiratory - Adult  Goal: Achieves optimal ventilation and oxygenation  12/28/2022 1102 by Tash SANDERS Esther Claudio RN  Outcome: Progressing  Flowsheets (Taken 12/28/2022 0200 by Jordy Finch RN)  Achieves optimal ventilation and oxygenation:   Assess for changes in respiratory status   Assess for changes in mentation and behavior   Position to facilitate oxygenation and minimize respiratory effort   Assess and instruct to report shortness of breath or any respiratory difficulty   Respiratory therapy support as indicated   Encourage broncho-pulmonary hygiene including cough, deep breathe, incentive spirometry  12/27/2022 2338 by Tiara Weiner RN  Outcome: Progressing  Flowsheets (Taken 12/27/2022 2112)  Achieves optimal ventilation and oxygenation: Assess for changes in respiratory status     Problem: Neurosensory - Adult  Goal: Achieves maximal functionality and self care  12/28/2022 1102 by Cameron Arnold RN  Outcome: Progressing  Flowsheets (Taken 12/28/2022 0200 by Jordy Finch RN)  Achieves maximal functionality and self care:   Monitor swallowing and airway patency with patient fatigue and changes in neurological status   Encourage and assist patient to increase activity and self care with guidance from physical therapy/occupational therapy  12/27/2022 2338 by Tiara Weiner RN  Outcome: Progressing  [address] (Taken 12/27/2022 2112)  Achieves maximal functionality and self care: Monitor swallowing and airway patency with patient fatigue and changes in neurological status     Problem: Cardiovascular - Adult  Goal: Maintains optimal cardiac output and hemodynamic stability  12/28/2022 1102 by Cameron Arnold RN  Outcome: Progressing  Flowsheets (Taken 12/28/2022 0200 by Jordy Finch RN)  Maintains optimal cardiac output and hemodynamic stability:   Monitor blood pressure and heart rate   Monitor urine output and notify Licensed Independent Practitioner for values outside of normal range   Assess for signs of decreased cardiac output   Administer fluid and/or volume expanders as ordered   Administer vasoactive medications as ordered  12/27/2022 2338 by Ino Ratliff RN  Outcome: Progressing  Flowsheets (Taken 12/27/2022 2112)  Maintains optimal cardiac output and hemodynamic stability: Monitor blood pressure and heart rate  Goal: Absence of cardiac dysrhythmias or at baseline  12/28/2022 1102 by Guerita Morocho RN  Outcome: Progressing  Flowsheets (Taken 12/28/2022 0200 by Deepak Wolf RN)  Absence of cardiac dysrhythmias or at baseline:   Monitor cardiac rate and rhythm   Assess for signs of decreased cardiac output  12/27/2022 2338 by Ino Ratliff RN  Outcome: Progressing  [address] (Taken 12/27/2022 2112)  Absence of cardiac dysrhythmias or at baseline: Monitor cardiac rate and rhythm     Problem: Skin/Tissue Integrity - Adult  Goal: Skin integrity remains intact  12/28/2022 1102 by Guerita Morocho RN  Outcome: Progressing  Flowsheets (Taken 12/28/2022 0200 by Deepak Wofl RN)  Skin Integrity Remains Intact:   Monitor for areas of redness and/or skin breakdown   Assess vascular access sites hourly  12/27/2022 2338 by Ino Ratliff RN  Outcome: Progressing  Flowsheets (Taken 12/27/2022 2112)  Skin Integrity Remains Intact: Monitor for areas of redness and/or skin breakdown     Problem: Musculoskeletal - Adult  Goal: Return mobility to safest level of function  12/28/2022 1102 by Guerita Morocho RN  Outcome: Progressing  Flowsheets (Taken 12/28/2022 0200 by Deepak Wolf RN)  Return Mobility to Safest Level of Function:   Assess patient stability and activity tolerance for standing, transferring and ambulating with or without assistive devices   Assist with transfers and ambulation using safe patient handling equipment as needed  12/27/2022 2338 by Ino Ratliff RN  Outcome: Progressing  Flowsheets (Taken 12/27/2022 2112)  Return Mobility to Safest Level of Function: Assess patient stability and activity tolerance for standing, transferring and ambulating with or without assistive devices Problem: Gastrointestinal - Adult  Goal: Minimal or absence of nausea and vomiting  12/28/2022 1102 by Amita Jenkins RN  Outcome: Progressing  Flowsheets (Taken 12/28/2022 0200 by Henrry Cardoso RN)  Minimal or absence of nausea and vomiting:   Administer IV fluids as ordered to ensure adequate hydration   Provide nonpharmacologic comfort measures as appropriate  12/27/2022 2338 by Bertram Lo RN  Outcome: Progressing  Flowsheets (Taken 12/27/2022 2112)  Minimal or absence of nausea and vomiting: Administer IV fluids as ordered to ensure adequate hydration     Problem: Genitourinary - Adult  Goal: Absence of urinary retention  12/28/2022 1102 by Amita Jenkins RN  Outcome: Progressing  Flowsheets (Taken 12/28/2022 0200 by Henrry Cardoso RN)  Absence of urinary retention:   Assess patients ability to void and empty bladder   Monitor intake/output and perform bladder scan as needed  12/27/2022 2338 by Bertram Lo RN  Outcome: Progressing  Flowsheets (Taken 12/27/2022 2112)  Absence of urinary retention: Assess patients ability to void and empty bladder     Problem: Infection - Adult  Goal: Absence of infection at discharge  12/28/2022 1102 by Amita Jenkins RN  Outcome: Progressing  Flowsheets (Taken 12/28/2022 0200 by Henrry Cardoso RN)  Absence of infection at discharge:   Assess and monitor for signs and symptoms of infection   Monitor lab/diagnostic results   Monitor all insertion sites i.e., indwelling lines, tubes and drains   Administer medications as ordered   Instruct and encourage patient and family to use good hand hygiene technique  12/27/2022 2338 by Bertram Lo RN  Outcome: Progressing  Flowsheets (Taken 12/27/2022 2112)  Absence of infection at discharge: Assess and monitor for signs and symptoms of infection     Problem: Metabolic/Fluid and Electrolytes - Adult  Goal: Electrolytes maintained within normal limits  12/28/2022 1102 by Amita Jenkins RN  Outcome: Progressing  Flowsheets (Taken 12/28/2022 0200 by Jada Fry RN)  Electrolytes maintained within normal limits:   Monitor labs and assess patient for signs and symptoms of electrolyte imbalances   Administer electrolyte replacement as ordered   Monitor response to electrolyte replacements, including repeat lab results as appropriate  12/27/2022 2338 by Parish Chan RN  Outcome: Progressing  [address] (Taken 12/27/2022 2112)  Electrolytes maintained within normal limits: Monitor labs and assess patient for signs and symptoms of electrolyte imbalances  Goal: Hemodynamic stability and optimal renal function maintained  12/28/2022 1102 by Lisbeth Seals RN  Outcome: Progressing  Flowsheets (Taken 12/28/2022 0200 by Jada Fry RN)  Hemodynamic stability and optimal renal function maintained:   Monitor labs and assess for signs and symptoms of volume excess or deficit   Monitor intake, output and patient weight   Monitor response to interventions for patient's volume status, including labs, urine output, blood pressure (other measures as available)   Encourage oral intake as appropriate  12/27/2022 2338 by Parish Chan RN  Outcome: Progressing  [address] (Taken 12/27/2022 2112)  Hemodynamic stability and optimal renal function maintained: Monitor labs and assess for signs and symptoms of volume excess or deficit  Goal: Glucose maintained within prescribed range  12/28/2022 1102 by Lisbeth Seals RN  Outcome: Progressing  Flowsheets (Taken 12/28/2022 0200 by Jada Fry RN)  Glucose maintained within prescribed range:   Monitor blood glucose as ordered   Assess for signs and symptoms of hyperglycemia and hypoglycemia   Administer ordered medications to maintain glucose within target range  12/27/2022 2338 by Parish Chan RN  Outcome: Progressing  Flowsheets (Taken 12/27/2022 2112)  Glucose maintained within prescribed range: Monitor blood glucose as ordered     Problem: Hematologic - Adult  Goal: Maintains hematologic stability  12/28/2022 1102 by Zackary Dodson RN  Outcome: Progressing  Flowsheets (Taken 12/28/2022 0200 by Bg Juarez RN)  Maintains hematologic stability:   Assess for signs and symptoms of bleeding or hemorrhage   Monitor labs for bleeding or clotting disorders  12/27/2022 2338 by Grupo Patel RN  Outcome: Progressing  Flowsheets (Taken 12/27/2022 2112)  Maintains hematologic stability: Assess for signs and symptoms of bleeding or hemorrhage

## 2022-12-29 ENCOUNTER — CARE COORDINATION (OUTPATIENT)
Dept: CASE MANAGEMENT | Age: 77
End: 2022-12-29

## 2022-12-29 NOTE — CARE COORDINATION
Care Transitions Outreach Attempt    Call within 2 business days of discharge: Yes   Attempted to reach patient for transitions of care follow up. Unable to reach patient. Patient: Ruby Villafana Patient : 1945 MRN: <[id number]>    Last Discharge  Street       Date Complaint Diagnosis Description Type Department Provider    22 Cough; Fatigue; Fall Influenza A . .. ED to Hosp-Admission (Discharged) (ADMITTED) William Maldonado MD; Marques Patterson. .. # 1 attempt-unable to reach patient, unable to leave message, mailbox full//JU    Was this an external facility discharge?  No Discharge Facility: [facility] [place]    Noted following upcoming appointments from discharge chart review:   [facility] follow up appointment(s):   Future Appointments   Date Time Provider Nelia Vizcarra   2023  9:00 AM Brendon Orta DPM DPOD [facility]   2023  9:40 AM ROCÍO Andrade - CNP DFAM [facility]   3/13/2023 10:00 AM MD NOEL Yusuf DPP     Non-[facility] follow up appointment(s):

## 2022-12-30 ENCOUNTER — CARE COORDINATION (OUTPATIENT)
Dept: CASE MANAGEMENT | Age: 77
End: 2022-12-30

## 2022-12-30 DIAGNOSIS — E11.42 DM TYPE 2 WITH DIABETIC PERIPHERAL NEUROPATHY (HCC): ICD-10-CM

## 2022-12-30 LAB
CULTURE: NORMAL
CULTURE: NORMAL
SPECIMEN DESCRIPTION: NORMAL
SPECIMEN DESCRIPTION: NORMAL

## 2022-12-30 NOTE — CARE COORDINATION
Care Transitions Outreach Attempt    Call within 2 business days of discharge: Yes   Attempted to reach patient for transitions of care follow up. Unable to reach patient. Patient: Myrtle Dockery Patient : 1945 MRN: <[id number]>    Last Discharge  Street       Date Complaint Diagnosis Description Type Department Provider    22 Cough; Fatigue; Fall Influenza A . .. ED to Hosp-Admission (Discharged) (ADMITTED) Andriy Ashley MD; Charmian Leventhal. .. # 2 attempt-Attempted initial 24 hour hospital follow up call. Left a [employer] compliant message with name and call back information. Requested return call to [phone].   2 unsuccessful attempts, care transitions episode resolved//JU  Was this an external facility discharge?  No Discharge Facility: [address] [place] [address]    Noted following upcoming appointments from discharge chart review:   [address] follow up appointment(s):   Future Appointments   Date Time Provider Nelia Vizcarra   2023  9:00 AM Brian Hernandez DPM [facility]   2023  9:40 AM ROCÍO López - CNP [facility]   3/13/2023 10:00 AM MD NOEL Cortes DPP     Non-[facility] follow up appointment(s):
09-Sep-2021 14:23

## 2023-01-01 NOTE — PROGRESS NOTES
Physician Progress Note      PATIENT:               Niko Alvarenga  CSN #:                  [id number]  :                       1945  ADMIT DATE:       2022 11:37 AM  DISCH DATE:        2022 5:30 PM  RESPONDING  PROVIDER #:        Magen Jacobson MD          QUERY TEXT:    Patient admitted with Influenza A, noted to have elevated troponin levels. Per   H&P , \"troponins likely increasing due to his hypoxia\". If possible,   please document in progress notes and discharge summary if you are evaluating   and/or treating any of the following: The medical record reflects the following:  Risk Factors: PMH of HTN, CKD, RABIA, HLD, morbid obesity, and 1st degree AV   block. Clinical Indicators: In the setting of above risk factors, ED Provider Note   : Influenza A, Hypoxia, Elevated troponin. H&P : In ER, he was   hypoxic with sats in the 80's on room air, requiring 2-3L nasal cannula   oxygen. He tested positive for influenza A. EKG showed sinus tachycardia with   first degree AVB and PACs, no acute ST/T wave changes. Troponin 18 --> 25 -->   31. Cardiology, was consulted from ER, felt his troponins were likely   increasing due to his hypoxia. Trops 18 > 25 > 31 > 38 > 31. Treatment: Supplemental O2, treatment of Influenza A. Options provided:  -- Elevated troponins secondary to demand ischemia  -- Elevated troponins not clinically significant  -- Other - I will add my own diagnosis  -- Disagree - Not applicable / Not valid  -- Disagree - Clinically unable to determine / Unknown  -- Refer to Clinical Documentation Reviewer    PROVIDER RESPONSE TEXT:    This patient has elevated troponin levels secondary to demand ischemia.     Query created by: Pranav Shepard on 2022 2:15 PM      Electronically signed by:  Magen Jacobson MD 2023 2:45 PM

## 2023-01-03 DIAGNOSIS — E11.42 DM TYPE 2 WITH DIABETIC PERIPHERAL NEUROPATHY (HCC): ICD-10-CM

## 2023-01-03 RX ORDER — METFORMIN HYDROCHLORIDE 500 MG/1
TABLET, EXTENDED RELEASE ORAL
Qty: 180 TABLET | Refills: 1 | OUTPATIENT
Start: 2023-01-03

## 2023-01-03 RX ORDER — METFORMIN HYDROCHLORIDE 500 MG/1
TABLET, EXTENDED RELEASE ORAL
Qty: 180 TABLET | Refills: 1 | Status: SHIPPED | OUTPATIENT
Start: 2023-01-03

## 2023-01-03 NOTE — TELEPHONE ENCOUNTER
Darleene Pinion called requesting a refill of the below medication which has been pended for you:     Requested Prescriptions     Pending Prescriptions Disp Refills    metFORMIN (GLUCOPHAGE-XR) 500 MG extended release tablet [Pharmacy Med Name: METFORMIN ER 500MG 24HR TABS] 30 tablet      Sig: TAKE 1 TABLET BY MOUTH TWICE DAILY       Last Appointment Date: 8/18/2022  Next Appointment Date: 02/24/2023    No Known Allergies

## 2023-01-19 ENCOUNTER — OFFICE VISIT (OUTPATIENT)
Dept: FAMILY MEDICINE CLINIC | Age: 78
End: 2023-01-19

## 2023-01-19 VITALS
HEART RATE: 63 BPM | RESPIRATION RATE: 18 BRPM | DIASTOLIC BLOOD PRESSURE: 68 MMHG | OXYGEN SATURATION: 94 % | SYSTOLIC BLOOD PRESSURE: 124 MMHG | WEIGHT: 289 LBS | BODY MASS INDEX: 43.8 KG/M2 | HEIGHT: 68 IN

## 2023-01-19 DIAGNOSIS — J10.1 INFLUENZA A: Primary | ICD-10-CM

## 2023-01-19 DIAGNOSIS — Z09 HOSPITAL DISCHARGE FOLLOW-UP: ICD-10-CM

## 2023-01-19 ASSESSMENT — PATIENT HEALTH QUESTIONNAIRE - PHQ9
SUM OF ALL RESPONSES TO PHQ QUESTIONS 1-9: 0
1. LITTLE INTEREST OR PLEASURE IN DOING THINGS: 0
SUM OF ALL RESPONSES TO PHQ QUESTIONS 1-9: 0
2. FEELING DOWN, DEPRESSED OR HOPELESS: 0
SUM OF ALL RESPONSES TO PHQ QUESTIONS 1-9: 0
SUM OF ALL RESPONSES TO PHQ QUESTIONS 1-9: 0
SUM OF ALL RESPONSES TO PHQ9 QUESTIONS 1 & 2: 0

## 2023-01-19 NOTE — PROGRESS NOTES
Post-Discharge Transitional Care Follow Up      Mila Mcmahon   YOB: 1945    Date of Office Visit:  1/19/2023  Date of Hospital Admission: 12/25/22  Date of Hospital Discharge: 12/28/22  Readmission Risk Score (high >=14%. Medium >=10%):Readmission Risk Score: 11.9      Care management risk score Rising risk (score 2-5) and Complex Care (Scores >=6): No Risk Score On File     Non face to face  following discharge, date last encounter closed (first attempt may have been earlier): *No documented post hospital discharge outreach found in the last 14 days     Call initiated 2 business days of discharge: *No response recorded in the last 14 days     Influenza A-pt is finished with tamiflu. He has recovered well. He is due for a routine follow up and will get that scheduled. Hospital discharge follow-up  -     GA DISCHARGE MEDS RECONCILED W/ CURRENT OUTPATIENT MED LIST      Medical Decision Making: moderate complexity         Subjective:   HPI  Pt presents to the clinic for a hospital follow up. He was walking outside on Antonia Day and fell. He was found and transported to the hospital via EMS. He states that he has been coughing for a few days prior to being admitted. Once he was at the hospital he was found to have influenza A and required oxygen as his sats were in the 80's. He was given tamiflu, IV fluids, and supplemental oxygen. He did well and was discharged on 12/28/2022. He is feeling much better today in the office. He is not requiring oxygen. He did not require it on discharge. He denies shortness of breath or coughing. He is eating and drinking appropriately. He denies further concerns. Inpatient course: Discharge summary reviewed- see chart.     Interval history/Current status: improved    Patient Active Problem List   Diagnosis    Hypertension    GERD (gastroesophageal reflux disease)    Obesity, morbid (HCC)    Osteoarthritis of lumbar spine    Hepatic hemangioma    Obstructive sleep apnea of adult    Anemia    DM type 2 with diabetic peripheral neuropathy (HCC)    Dermatophytosis of nail    Paroxysmal supraventricular tachycardia (HCC)    TITUS (acute kidney injury) ([facility] [address]    Mixed hyperlipidemia    Benign localized prostatic hyperplasia with lower urinary tract symptoms (LUTS)    CKD (chronic kidney disease) stage 2, GFR 60-89 ml/min    Hyperthyroidism    Hypoxia    Noncompliance    History of falling       Medications listed as ordered at the time of discharge from hospital     Medication List            Accurate as of January 19, 2023 11:59 PM. If you have any questions, ask your nurse or doctor. CONTINUE taking these medications      aspirin 81 MG EC tablet  Take 1 tablet by mouth daily     blood glucose monitor kit and supplies  Check blood sugar daily. Diagnosis E11.42     famotidine 20 MG tablet  Commonly known as: PEPCID  Take 1 tablet twice a day     fluticasone 50 MCG/ACT nasal spray  Commonly known as: FLONASE  SHAKE LIQUID AND USE 2 SPRAYS IN EACH NOSTRIL EVERY DAY     Lancets Micro Thin 33G Misc  USE TO TEST BLOOD SUGAR EVERY DAY     metFORMIN 500 MG extended release tablet  Commonly known as: GLUCOPHAGE-XR  TAKE 1 TABLET BY MOUTH TWICE DAILY     metoprolol 100 MG tablet  Commonly known as: LOPRESSOR  TAKE 1 TABLET BY MOUTH TWICE DAILY     omeprazole 40 MG delayed release capsule  Commonly known as: PRILOSEC  TAKE 1 CAPSULE DAILY     tamsulosin 0.4 MG capsule  Commonly known as: FLOMAX  TAKE 1 CAPSULE DAILY     * True Metrix Blood Glucose Test strip  Generic drug: blood glucose test strips  USE TO TEST BLOOD SUGAR DAILY     * blood glucose test strips  Patient checks blood sugar daily           * This list has 2 medication(s) that are the same as other medications prescribed for you. Read the directions carefully, and ask your doctor or other care provider to review them with you.                    Medications marked \"taking\" at this time  Outpatient Medications Marked as Taking for the 1/19/23 encounter (Office Visit) with ROCÍO Falcon CNP   Medication Sig Dispense Refill    metFORMIN (GLUCOPHAGE-XR) 500 MG extended release tablet TAKE 1 TABLET BY MOUTH TWICE DAILY 180 tablet 1    aspirin 81 MG EC tablet Take 1 tablet by mouth daily 30 tablet 0    blood glucose monitor kit and supplies Check blood sugar daily. Diagnosis E11.42 1 kit 0    blood glucose monitor strips Patient checks blood sugar daily 50 strip 5    Lancets Micro Thin 33G MISC USE TO TEST BLOOD SUGAR EVERY  each 3    tamsulosin (FLOMAX) 0.4 MG capsule TAKE 1 CAPSULE DAILY 90 capsule 1    [DISCONTINUED] atorvastatin (LIPITOR) 20 MG tablet TAKE 1 TABLET BY MOUTH DAILY 90 tablet 1    metoprolol (LOPRESSOR) 100 MG tablet TAKE 1 TABLET BY MOUTH TWICE DAILY 180 tablet 3    famotidine (PEPCID) 20 MG tablet Take 1 tablet twice a day 180 tablet 1    fluticasone (FLONASE) 50 MCG/ACT nasal spray SHAKE LIQUID AND USE 2 SPRAYS IN EACH NOSTRIL EVERY DAY 48 g 3    [DISCONTINUED] oxybutynin (DITROPAN-XL) 10 MG extended release tablet TAKE 1 TABLET DAILY 90 tablet 3    TRUE METRIX BLOOD GLUCOSE TEST strip USE TO TEST BLOOD SUGAR DAILY 200 strip 0    [DISCONTINUED] valsartan-hydroCHLOROthiazide (DIOVAN-HCT) 160-25 MG per tablet TAKE 1 TABLET BY MOUTH DAILY 90 tablet 1    omeprazole (PRILOSEC) 40 MG delayed release capsule TAKE 1 CAPSULE DAILY 90 capsule 4        Medications patient taking as of now reconciled against medications ordered at time of hospital discharge: Yes    Review of Systems   Constitutional:  Negative for activity change, appetite change, fatigue and fever. Respiratory:  Negative for cough, shortness of breath and wheezing. Cardiovascular:  Negative for chest pain and palpitations. Neurological:  Negative for light-headedness and headaches.      Objective:    /68   Pulse 63   Resp 18   Ht 5' 8\" (1.727 m)   Wt 289 lb (131.1 kg)   SpO2 94%   BMI 43.94 kg/m² Physical Exam  Vitals and nursing note reviewed. Constitutional:       Appearance: Normal appearance. He is obese. HENT:      Head: Normocephalic and atraumatic. Right Ear: Tympanic membrane, ear canal and external ear normal.      Left Ear: Tympanic membrane, ear canal and external ear normal.      Nose: Nose normal.      Mouth/Throat:      Mouth: Mucous membranes are moist.      Pharynx: Oropharynx is clear. Cardiovascular:      Rate and Rhythm: Normal rate and regular rhythm. Heart sounds: Normal heart sounds. Pulmonary:      Effort: Pulmonary effort is normal.      Breath sounds: Normal breath sounds. Skin:     Capillary Refill: Capillary refill takes less than 2 seconds. Neurological:      General: No focal deficit present. Mental Status: He is alert and oriented to person, place, and time. An electronic signature was used to authenticate this note.   --ROCÍO Villagran - CNP

## 2023-01-24 PROBLEM — J10.1 INFLUENZA A: Status: RESOLVED | Noted: 2022-12-25 | Resolved: 2023-01-24

## 2023-01-25 DIAGNOSIS — I10 ESSENTIAL HYPERTENSION: ICD-10-CM

## 2023-01-25 DIAGNOSIS — E78.2 MIXED HYPERLIPIDEMIA: ICD-10-CM

## 2023-01-25 RX ORDER — OXYBUTYNIN CHLORIDE 10 MG/1
TABLET, EXTENDED RELEASE ORAL
Qty: 90 TABLET | Refills: 3 | OUTPATIENT
Start: 2023-01-25

## 2023-01-25 RX ORDER — VALSARTAN AND HYDROCHLOROTHIAZIDE 160; 25 MG/1; MG/1
TABLET ORAL
Qty: 90 TABLET | Refills: 0 | Status: SHIPPED | OUTPATIENT
Start: 2023-01-25

## 2023-01-25 RX ORDER — OXYBUTYNIN CHLORIDE 10 MG/1
TABLET, EXTENDED RELEASE ORAL
Qty: 90 TABLET | Refills: 3 | Status: SHIPPED | OUTPATIENT
Start: 2023-01-25

## 2023-01-25 RX ORDER — ATORVASTATIN CALCIUM 20 MG/1
TABLET, FILM COATED ORAL
Qty: 90 TABLET | Refills: 0 | Status: SHIPPED | OUTPATIENT
Start: 2023-01-25

## 2023-01-25 NOTE — TELEPHONE ENCOUNTER
Apolinar Saenz called requesting a refill of the below medication which has been pended for you:     Requested Prescriptions     Pending Prescriptions Disp Refills    valsartan-hydroCHLOROthiazide (DIOVAN-HCT) 160-25 MG per tablet 90 tablet 0     Sig: TAKE 1 TABLET BY MOUTH DAILY    atorvastatin (LIPITOR) 20 MG tablet 90 tablet 0     Sig: TAKE 1 TABLET BY MOUTH DAILY     Refused Prescriptions Disp Refills    oxybutynin (DITROPAN-XL) 10 MG extended release tablet 90 tablet 3       Last Appointment Date: 1/19/2023  Next Appointment Date: 2/24/23    No Known Allergies

## 2023-01-27 ASSESSMENT — ENCOUNTER SYMPTOMS
COUGH: 0
SHORTNESS OF BREATH: 0
WHEEZING: 0

## 2023-02-24 ENCOUNTER — TELEPHONE (OUTPATIENT)
Dept: FAMILY MEDICINE CLINIC | Age: 78
End: 2023-02-24

## 2023-03-01 DIAGNOSIS — K21.9 GASTROESOPHAGEAL REFLUX DISEASE: ICD-10-CM

## 2023-03-01 DIAGNOSIS — K21.9 GASTROESOPHAGEAL REFLUX DISEASE WITHOUT ESOPHAGITIS: ICD-10-CM

## 2023-03-02 ENCOUNTER — OFFICE VISIT (OUTPATIENT)
Dept: PODIATRY | Age: 78
End: 2023-03-02
Payer: MEDICARE

## 2023-03-02 VITALS
HEIGHT: 68 IN | DIASTOLIC BLOOD PRESSURE: 70 MMHG | HEART RATE: 62 BPM | WEIGHT: 268 LBS | BODY MASS INDEX: 40.62 KG/M2 | SYSTOLIC BLOOD PRESSURE: 132 MMHG

## 2023-03-02 DIAGNOSIS — E11.42 DM TYPE 2 WITH DIABETIC PERIPHERAL NEUROPATHY (HCC): Primary | ICD-10-CM

## 2023-03-02 DIAGNOSIS — B35.1 DERMATOPHYTOSIS OF NAIL: ICD-10-CM

## 2023-03-02 PROCEDURE — 11721 DEBRIDE NAIL 6 OR MORE: CPT | Performed by: PODIATRIST

## 2023-03-02 PROCEDURE — 99999 PR OFFICE/OUTPT VISIT,PROCEDURE ONLY: CPT | Performed by: PODIATRIST

## 2023-03-02 RX ORDER — FAMOTIDINE 20 MG/1
TABLET, FILM COATED ORAL
Qty: 180 TABLET | Refills: 1 | OUTPATIENT
Start: 2023-03-02

## 2023-03-02 NOTE — PROGRESS NOTES
Subjective:  Patient presents to [facility] today for routine diabetic foot care. Patient's diabetic control has been not changed. No Known Allergies    Past Medical History:   Diagnosis Date    Acute cystitis with hematuria 09/17/2021    TITUS (acute kidney injury) ([facility] [address] 09/17/2021    Anemia     chronic, negative work up with EGD and Colonoscopy. Chest pain 06/09/2016    DJD (degenerative joint disease) of lumbar spine     Dry eye syndrome     Fall     GERD (gastroesophageal reflux disease)     Hepatic hemangioma     Hypertension     Keratitis     Secondary to dry eye syndrome. Nuclear sclerotic cataract of both eyes     Obesity, morbid ([facility] [address]     Obstructive sleep apnea of adult     non compliant with cpap    Onychomycosis     1, 2, 3, 4, and 5, bilateral.    Type II or unspecified type diabetes mellitus without mention of complication, not stated as uncontrolled     Urinary tract infection with hematuria 08/04/2020       Prior to Admission medications    Medication Sig Start Date End Date Taking? Authorizing Provider   valsartan-hydroCHLOROthiazide (DIOVAN-HCT) 160-25 MG per tablet TAKE 1 TABLET BY MOUTH DAILY 1/25/23  Yes ROCÍO Ernst CNP   atorvastatin (LIPITOR) 20 MG tablet TAKE 1 TABLET BY MOUTH DAILY 1/25/23  Yes ROCÍO Ernst CNP   oxybutynin (DITROPAN-XL) 10 MG extended release tablet TAKE 1 TABLET DAILY 1/25/23  Yes ROCÍO Ernst CNP   metFORMIN (GLUCOPHAGE-XR) 500 MG extended release tablet TAKE 1 TABLET BY MOUTH TWICE DAILY 1/3/23  Yes ROCÍO Ernst CNP   aspirin 81 MG EC tablet Take 1 tablet by mouth daily 12/29/22  Yes Yogesh Solis MD   blood glucose monitor kit and supplies Check blood sugar daily.   Diagnosis E11.42 12/13/22  Yes ROCÍO Ernst CNP   blood glucose monitor strips Patient checks blood sugar daily 12/13/22  Yes ROCÍO Ernst CNP   Lancets Micro Thin 33G MISC USE TO TEST BLOOD SUGAR EVERY DAY 22  Yes ROCÍO Gonzales CNP   tamsulosin (FLOMAX) 0.4 MG capsule TAKE 1 CAPSULE DAILY 22  Yes ROCÍO Gonzales CNP   metoprolol (LOPRESSOR) 100 MG tablet TAKE 1 TABLET BY MOUTH TWICE DAILY 22  Yes Oj Centeno MD   famotidine (PEPCID) 20 MG tablet Take 1 tablet twice a day 22  Yes ROCÍO Duarte CNP   fluticasone (FLONASE) 50 MCG/ACT nasal spray SHAKE LIQUID AND USE 2 SPRAYS IN EACH NOSTRIL EVERY DAY 22  Yes ROCÍO Gonzales CNP   TRUE METRIX BLOOD GLUCOSE TEST strip USE TO TEST BLOOD SUGAR DAILY 6/15/22  Yes CHRISTIAN Jiang   omeprazole (PRILOSEC) 40 MG delayed release capsule TAKE 1 CAPSULE DAILY 10/28/19  Yes ROCÍO Gonzales CNP       Social History     Tobacco Use    Smoking status: Former     Packs/day: 0.25     Years: 15.00     Pack years: 3.75     Types: Cigars, Cigarettes     Start date: 1964     Quit date: 1970     Years since quittin.2    Smokeless tobacco: Never    Tobacco comments:     quit over 30 years ago   Substance Use Topics    Alcohol use: No     Alcohol/week: 0.0 standard drinks     Comment: no alcohol for many years     ROS: All 14 ROS systems reviewed and pertinent positives noted above, all others negative. Objective:  Vascular: DP and PT pulses palpable 2/4, bilateral.  CFT <3 seconds, bilateral.  Hair growth present to the level of the digits, bilateral.  Edema absent, bilateral.  Varicosities absent, bilateral. Erythema absent, bilateral. Distal Rubor absent bilateral.  Temperature within normal limits bilateral. Hyperpigmentation absent bilateral. No atrophic skin.     Neurological: Sensation intact to light touch to level of digits, bilateral.  Protective sensation intact 10/10 sites via 5.07/10g [place]arly Monofilament, bilateral.  negative Tinel's, bilateral.  negative Valleix sign, bilateral. Vibratory intact bilateral.  Reflexes Decreased bilateral.  Paresthesias negative. Dysthesias negative. Sharp/dull intact bilateral.    Musculoskeletal: Muscle strength 5/5, bilateral.  Pain absent upon palpation bilateral. Normal medial longitudinal arch, bilateral.  Ankle ROM within normal limits,bilateral.  1st MPJ ROM within normal limits, bilateral.  Dorsally contracted digits present. No other foot deformities. Integument:  Open lesion absent, Bilateral.  Interdigital maceration absent to web spaces,absent Bilateral.  Nails left 1, 2, 3, 4, 5 and right 1, 2, 3, 4, 5 thickened, dystrophic and crumbly, discolored with subungual debris. Fissures absent, Bilateral. Hyperkeratotic tissue is absent. Assessment:    Diagnosis Orders   1. DM type 2 with diabetic peripheral neuropathy ([address]        2. Dermatophytosis of nail             Plan:  Diabetic foot education and exam.  Nails as mentioned above debrided in length and thickness. Patient advised about OTC treatments for nails and callous. Patient will follow up in 10 weeks for routine foot care or PRN if any further problems arise.

## 2023-03-15 DIAGNOSIS — K21.9 GASTROESOPHAGEAL REFLUX DISEASE WITHOUT ESOPHAGITIS: ICD-10-CM

## 2023-03-15 DIAGNOSIS — N40.0 ENLARGED PROSTATE: ICD-10-CM

## 2023-03-15 DIAGNOSIS — K21.9 GASTROESOPHAGEAL REFLUX DISEASE: ICD-10-CM

## 2023-03-15 RX ORDER — TAMSULOSIN HYDROCHLORIDE 0.4 MG/1
CAPSULE ORAL
Qty: 90 CAPSULE | Refills: 1 | OUTPATIENT
Start: 2023-03-15

## 2023-03-15 RX ORDER — FAMOTIDINE 20 MG/1
TABLET, FILM COATED ORAL
Qty: 180 TABLET | Refills: 1 | OUTPATIENT
Start: 2023-03-15

## 2023-03-30 ENCOUNTER — OFFICE VISIT (OUTPATIENT)
Dept: FAMILY MEDICINE CLINIC | Age: 78
End: 2023-03-30
Payer: MEDICARE

## 2023-03-30 VITALS
WEIGHT: 287 LBS | DIASTOLIC BLOOD PRESSURE: 82 MMHG | BODY MASS INDEX: 43.5 KG/M2 | SYSTOLIC BLOOD PRESSURE: 132 MMHG | OXYGEN SATURATION: 96 % | HEART RATE: 64 BPM | HEIGHT: 68 IN

## 2023-03-30 DIAGNOSIS — K21.9 GASTROESOPHAGEAL REFLUX DISEASE WITHOUT ESOPHAGITIS: ICD-10-CM

## 2023-03-30 DIAGNOSIS — E11.42 DM TYPE 2 WITH DIABETIC PERIPHERAL NEUROPATHY (HCC): ICD-10-CM

## 2023-03-30 DIAGNOSIS — E78.2 MIXED HYPERLIPIDEMIA: ICD-10-CM

## 2023-03-30 DIAGNOSIS — E05.90 HYPERTHYROIDISM: ICD-10-CM

## 2023-03-30 DIAGNOSIS — R41.3 MEMORY IMPAIRMENT: ICD-10-CM

## 2023-03-30 DIAGNOSIS — I10 PRIMARY HYPERTENSION: ICD-10-CM

## 2023-03-30 DIAGNOSIS — N40.0 ENLARGED PROSTATE: ICD-10-CM

## 2023-03-30 DIAGNOSIS — Z00.00 MEDICARE ANNUAL WELLNESS VISIT, SUBSEQUENT: Primary | ICD-10-CM

## 2023-03-30 PROCEDURE — G8417 CALC BMI ABV UP PARAM F/U: HCPCS | Performed by: NURSE PRACTITIONER

## 2023-03-30 PROCEDURE — 1036F TOBACCO NON-USER: CPT | Performed by: NURSE PRACTITIONER

## 2023-03-30 PROCEDURE — G0439 PPPS, SUBSEQ VISIT: HCPCS | Performed by: NURSE PRACTITIONER

## 2023-03-30 PROCEDURE — 3074F SYST BP LT 130 MM HG: CPT | Performed by: NURSE PRACTITIONER

## 2023-03-30 PROCEDURE — 3078F DIAST BP <80 MM HG: CPT | Performed by: NURSE PRACTITIONER

## 2023-03-30 PROCEDURE — 1124F ACP DISCUSS-NO DSCNMKR DOCD: CPT | Performed by: NURSE PRACTITIONER

## 2023-03-30 PROCEDURE — G8427 DOCREV CUR MEDS BY ELIG CLIN: HCPCS | Performed by: NURSE PRACTITIONER

## 2023-03-30 PROCEDURE — G8484 FLU IMMUNIZE NO ADMIN: HCPCS | Performed by: NURSE PRACTITIONER

## 2023-03-30 PROCEDURE — 99213 OFFICE O/P EST LOW 20 MIN: CPT | Performed by: NURSE PRACTITIONER

## 2023-03-30 RX ORDER — TAMSULOSIN HYDROCHLORIDE 0.4 MG/1
CAPSULE ORAL
Qty: 90 CAPSULE | Refills: 1 | Status: SHIPPED | OUTPATIENT
Start: 2023-03-30

## 2023-03-30 RX ORDER — FAMOTIDINE 20 MG/1
TABLET, FILM COATED ORAL
Qty: 180 TABLET | Refills: 1 | Status: SHIPPED | OUTPATIENT
Start: 2023-03-30

## 2023-03-30 SDOH — ECONOMIC STABILITY: HOUSING INSECURITY
IN THE LAST 12 MONTHS, WAS THERE A TIME WHEN YOU DID NOT HAVE A STEADY PLACE TO SLEEP OR SLEPT IN A SHELTER (INCLUDING NOW)?: NO

## 2023-03-30 SDOH — ECONOMIC STABILITY: FOOD INSECURITY: WITHIN THE PAST 12 MONTHS, THE FOOD YOU BOUGHT JUST DIDN'T LAST AND YOU DIDN'T HAVE MONEY TO GET MORE.: NEVER TRUE

## 2023-03-30 SDOH — ECONOMIC STABILITY: FOOD INSECURITY: WITHIN THE PAST 12 MONTHS, YOU WORRIED THAT YOUR FOOD WOULD RUN OUT BEFORE YOU GOT MONEY TO BUY MORE.: NEVER TRUE

## 2023-03-30 SDOH — ECONOMIC STABILITY: INCOME INSECURITY: HOW HARD IS IT FOR YOU TO PAY FOR THE VERY BASICS LIKE FOOD, HOUSING, MEDICAL CARE, AND HEATING?: SOMEWHAT HARD

## 2023-03-30 ASSESSMENT — ANXIETY QUESTIONNAIRES
1. FEELING NERVOUS, ANXIOUS, OR ON EDGE: 1
7. FEELING AFRAID AS IF SOMETHING AWFUL MIGHT HAPPEN: 1
GAD7 TOTAL SCORE: 7
5. BEING SO RESTLESS THAT IT IS HARD TO SIT STILL: 1
6. BECOMING EASILY ANNOYED OR IRRITABLE: 1
IF YOU CHECKED OFF ANY PROBLEMS ON THIS QUESTIONNAIRE, HOW DIFFICULT HAVE THESE PROBLEMS MADE IT FOR YOU TO DO YOUR WORK, TAKE CARE OF THINGS AT HOME, OR GET ALONG WITH OTHER PEOPLE: SOMEWHAT DIFFICULT
2. NOT BEING ABLE TO STOP OR CONTROL WORRYING: 1
3. WORRYING TOO MUCH ABOUT DIFFERENT THINGS: 1
4. TROUBLE RELAXING: 1

## 2023-03-30 ASSESSMENT — PATIENT HEALTH QUESTIONNAIRE - PHQ9
3. TROUBLE FALLING OR STAYING ASLEEP: 0
9. THOUGHTS THAT YOU WOULD BE BETTER OFF DEAD, OR OF HURTING YOURSELF: 0
SUM OF ALL RESPONSES TO PHQ QUESTIONS 1-9: 1
4. FEELING TIRED OR HAVING LITTLE ENERGY: 0
6. FEELING BAD ABOUT YOURSELF - OR THAT YOU ARE A FAILURE OR HAVE LET YOURSELF OR YOUR FAMILY DOWN: 0
SUM OF ALL RESPONSES TO PHQ QUESTIONS 1-9: 1
5. POOR APPETITE OR OVEREATING: 1
7. TROUBLE CONCENTRATING ON THINGS, SUCH AS READING THE NEWSPAPER OR WATCHING TELEVISION: 0
2. FEELING DOWN, DEPRESSED OR HOPELESS: 0
SUM OF ALL RESPONSES TO PHQ QUESTIONS 1-9: 1
10. IF YOU CHECKED OFF ANY PROBLEMS, HOW DIFFICULT HAVE THESE PROBLEMS MADE IT FOR YOU TO DO YOUR WORK, TAKE CARE OF THINGS AT HOME, OR GET ALONG WITH OTHER PEOPLE: 0
8. MOVING OR SPEAKING SO SLOWLY THAT OTHER PEOPLE COULD HAVE NOTICED. OR THE OPPOSITE, BEING SO FIGETY OR RESTLESS THAT YOU HAVE BEEN MOVING AROUND A LOT MORE THAN USUAL: 0
SUM OF ALL RESPONSES TO PHQ QUESTIONS 1-9: 1
1. LITTLE INTEREST OR PLEASURE IN DOING THINGS: 0
SUM OF ALL RESPONSES TO PHQ9 QUESTIONS 1 & 2: 0

## 2023-03-30 NOTE — PROGRESS NOTES
how many days per week do you engage in moderate to strenuous exercise (like a brisk walk)?: 0 days  Have you lost any weight without trying in the past 3 months?: No  Body mass index: (!) 43.63      Inactivity Interventions:  Patient declined any further interventions or treatment  Obesity Interventions:  Patient declines any further evaluation or treatment          Dentist Screen:  Have you seen the dentist within the past year?: (!) No    Intervention:  Advised to schedule with their dentist     Vision Screen:  Do you have difficulty driving, watching TV, or doing any of your daily activities because of your eyesight?: No  Have you had an eye exam within the past year?: (!) No  No results found. Interventions:   Patient encouraged to make appointment with their eye specialist     ADL's:   Patient reports needing help with:  Select all that apply: (!) Transportation, Taking Medications  Interventions:  Pt calls the [facility] center for help with transportation    Advanced Directives:  Do you have a Living Will?: (!) No    Intervention:  has NO advanced directive - not interested in additional information                       Objective   Vitals:    03/30/23 0928   BP: 132/82   Site: Left Upper Arm   Position: Sitting   Cuff Size: Large Adult   Pulse: 64   SpO2: 96%   Weight: 287 lb (130.2 kg)   Height: 5' 8\" (1.727 m)      Body mass index is 43.64 kg/m². No Known Allergies  Prior to Visit Medications    Medication Sig Taking?  Authorizing Provider   valsartan-hydroCHLOROthiazide (DIOVAN-HCT) 160-25 MG per tablet TAKE 1 TABLET BY MOUTH DAILY  ROCÍO Michael CNP   atorvastatin (LIPITOR) 20 MG tablet TAKE 1 TABLET BY MOUTH DAILY  ROCÍO Michael CNP   oxybutynin (DITROPAN-XL) 10 MG extended release tablet TAKE 1 TABLET DAILY  ROCÍO Michael CNP   metFORMIN (GLUCOPHAGE-XR) 500 MG extended release tablet TAKE 1 TABLET BY MOUTH TWICE DAILY  Rosalva Gongora
(around 9/30/2023), or if symptoms worsen or fail to improve. Orders Placed This Encounter   Procedures    Hemoglobin A1C     Standing Status:   Future     Standing Expiration Date:   3/29/2024    Comprehensive Metabolic Panel     Standing Status:   Future     Standing Expiration Date:   3/30/2024    Lipid Panel     Standing Status:   Future     Standing Expiration Date:   3/29/2024     Order Specific Question:   Is Patient Fasting?/# of Hours     Answer:   12    TSH     Standing Status:   Future     Standing Expiration Date:   3/30/2024    T4, Free     Standing Status:   Future     Standing Expiration Date:   3/30/2024    PSA, Diagnostic     Standing Status:   Future     Standing Expiration Date:   7/39/1543    Kevin White MD, Neurology, [place]     Referral Priority:   Routine     Referral Type:   Eval and Treat     Referral Reason:   Specialty Services Required     Referred to Provider:   Belinda Garza MD     Requested Specialty:   Neurology     Number of Visits Requested:   1     Orders Placed This Encounter   Medications    famotidine (PEPCID) 20 MG tablet     Sig: Take 1 tablet twice a day     Dispense:  180 tablet     Refill:  1    tamsulosin (FLOMAX) 0.4 MG capsule     Sig: TAKE 1 CAPSULE DAILY     Dispense:  90 capsule     Refill:  1       Patient given educational materials - see patient instructions. All patient questionsanswered. Pt voiced understanding. Reviewed health maintenance.      Electronically signed by ROCÍO Herrera CNP, CNP on 4/3/2023 at 12:11 PM

## 2023-03-30 NOTE — PATIENT INSTRUCTIONS
monitor an eye problem. To check an eye injury. Visual acuity tests are done as part of routine exams. You may also have this test when you get your 's license or apply for some types of jobs. Visual field tests  These tests are used: To check for vision loss in any area of your range of vision. To screen for certain eye diseases. To look for nerve damage after a stroke, head injury, or other problem that could reduce blood flow to the brain. Refraction and color tests  A refraction test is done to find the right prescription for glasses and contact lenses. A color vision test is done to check for color blindness. Color vision is often tested as part of a routine exam. You may also have this test when you apply for a job where recognizing different colors is important, such as , electronics, or the [employer]. How are vision tests done? Visual acuity test   You cover one eye at a time. You read aloud from a wall chart across the room. You read aloud from a small card that you hold in your hand. Refraction   You look into a special device. The device puts lenses of different strengths in front of each eye to see how strong your glasses or contact lenses need to be. Visual field tests   Your doctor may have you look through special machines. Or your doctor may simply have you stare straight ahead while they move a finger into and out of your field of vision. Color vision test   You look at pieces of printed test patterns in various colors. You say what number or symbol you see. Your doctor may have you trace the number or symbol using a pointer. How do these tests feel? There is very little chance of having a problem from this test. If dilating drops are used for a vision test, they may make the eyes sting and cause a medicine taste in the mouth. Follow-up care is a key part of your treatment and safety.  Be sure to make and go to all appointments, and call your doctor if you are

## 2023-04-03 ASSESSMENT — ENCOUNTER SYMPTOMS
SORE THROAT: 0
DIARRHEA: 0
BLURRED VISION: 0
SHORTNESS OF BREATH: 0
ABDOMINAL PAIN: 0
HEARTBURN: 1
HOARSE VOICE: 0
NAUSEA: 0
VISUAL CHANGE: 0
CHOKING: 0
COUGH: 0
VOMITING: 0
WHEEZING: 0

## 2023-04-19 DIAGNOSIS — E78.2 MIXED HYPERLIPIDEMIA: ICD-10-CM

## 2023-04-19 DIAGNOSIS — I10 ESSENTIAL HYPERTENSION: ICD-10-CM

## 2023-04-19 RX ORDER — VALSARTAN AND HYDROCHLOROTHIAZIDE 160; 25 MG/1; MG/1
TABLET ORAL
Qty: 90 TABLET | Refills: 1 | Status: SHIPPED | OUTPATIENT
Start: 2023-04-19

## 2023-04-19 RX ORDER — ATORVASTATIN CALCIUM 20 MG/1
TABLET, FILM COATED ORAL
Qty: 90 TABLET | Refills: 1 | Status: SHIPPED | OUTPATIENT
Start: 2023-04-19

## 2023-04-19 NOTE — TELEPHONE ENCOUNTER
Vamshi Huerta called requesting a refill of the below medication which has been pended for you:     Requested Prescriptions     Pending Prescriptions Disp Refills    atorvastatin (LIPITOR) 20 MG tablet [Pharmacy Med Name: ATORVASTATIN 20MG TABLETS] 90 tablet 0     Sig: TAKE 1 TABLET BY MOUTH DAILY    valsartan-hydroCHLOROthiazide (DIOVAN-HCT) 160-25 MG per tablet [Pharmacy Med Name: VALSARTAN/HCTZ 160MG/25MG TABLETS] 90 tablet 0     Sig: TAKE 1 TABLET BY MOUTH DAILY       Last Appointment Date: 3/30/2023  Next Appointment Date: 10/3/2023    No Known Allergies

## 2023-06-23 DIAGNOSIS — I10 PRIMARY HYPERTENSION: Primary | ICD-10-CM

## 2023-06-23 RX ORDER — METOPROLOL TARTRATE 100 MG/1
100 TABLET ORAL 2 TIMES DAILY
Qty: 180 TABLET | Refills: 1 | Status: SHIPPED | OUTPATIENT
Start: 2023-06-23

## 2023-06-23 NOTE — TELEPHONE ENCOUNTER
Shira Finnegan called requesting a refill of the below medication which has been pended for you:     Requested Prescriptions     Pending Prescriptions Disp Refills    metoprolol (LOPRESSOR) 100 MG tablet 180 tablet 3     Sig: Take 1 tablet by mouth 2 times daily       Last Appointment Date: 3/30/2023  Next Appointment Date: Visit date not found    No Known Allergies

## 2023-06-23 NOTE — TELEPHONE ENCOUNTER
Refill for metoprolol sent to PCP [facility] [place]. Has not seen cardio since April 2022.

## 2023-08-17 ENCOUNTER — OFFICE VISIT (OUTPATIENT)
Dept: PODIATRY | Age: 78
End: 2023-08-17
Payer: MEDICARE

## 2023-08-17 VITALS
BODY MASS INDEX: 43.65 KG/M2 | SYSTOLIC BLOOD PRESSURE: 128 MMHG | HEART RATE: 67 BPM | DIASTOLIC BLOOD PRESSURE: 80 MMHG | WEIGHT: 288 LBS | HEIGHT: 68 IN

## 2023-08-17 DIAGNOSIS — M21.6X1 EQUINUS DEFORMITY OF BOTH FEET: ICD-10-CM

## 2023-08-17 DIAGNOSIS — M79.672 FOOT PAIN, BILATERAL: ICD-10-CM

## 2023-08-17 DIAGNOSIS — E11.42 DM TYPE 2 WITH DIABETIC PERIPHERAL NEUROPATHY (HCC): Primary | ICD-10-CM

## 2023-08-17 DIAGNOSIS — M79.671 FOOT PAIN, BILATERAL: ICD-10-CM

## 2023-08-17 DIAGNOSIS — M21.6X2 EQUINUS DEFORMITY OF BOTH FEET: ICD-10-CM

## 2023-08-17 DIAGNOSIS — B35.1 DERMATOPHYTOSIS OF NAIL: ICD-10-CM

## 2023-08-17 PROCEDURE — 11721 DEBRIDE NAIL 6 OR MORE: CPT | Performed by: PODIATRIST

## 2023-08-17 NOTE — PROGRESS NOTES
1971     Years since quittin.7    Smokeless tobacco: Never    Tobacco comments:     quit over 30 years ago   Substance Use Topics    Alcohol use: No     Alcohol/week: 0.0 standard drinks     Comment: no alcohol for many years     ROS: All 14 ROS systems reviewed and pertinent positives noted above, all others negative. Objective:  Vascular: DP and PT pulses palpable 2/4, bilateral.  CFT <3 seconds, bilateral.  Hair growth present to the level of the digits, bilateral.  Edema absent, bilateral.  Varicosities absent, bilateral. Erythema absent, bilateral. Distal Rubor absent bilateral.  Temperature within normal limits bilateral. Hyperpigmentation absent bilateral. No atrophic skin. Neurological: Sensation intact to light touch to level of digits, bilateral.  Protective sensation intact 10/10 sites via 5.07/10g [place]-Marly Monofilament, bilateral.  negative Tinel's, bilateral.  negative Valleix sign, bilateral.  Vibratory intact bilateral.  Reflexes Decreased bilateral.  Paresthesias negative. Dysthesias negative. Sharp/dull intact bilateral.    Musculoskeletal: Muscle strength 5/5, bilateral.  Pain present upon palpation bilateral generalized forefoot bilateral..  Decreased medial longitudinal arch, bilateral.  Ankle ROM decreased,bilateral.  1st MPJ ROM within normal limits, bilateral.  Dorsally contracted digits present. No other foot deformities. Integument:  Open lesion absent, Bilateral.  Interdigital maceration absent to web spaces,absent Bilateral.  Nails left 1, 2, 3, 4, 5 and right 1, 2, 3, 4, 5 thickened, dystrophic and crumbly, discolored with subungual debris. Fissures absent, Bilateral. Hyperkeratotic tissue is absent. Assessment:    Diagnosis Orders   1. DM type 2 with diabetic peripheral neuropathy ([address])        2. Equinus deformity of both feet        3. Dermatophytosis of nail        4.  Foot pain, bilateral               Plan:  Diabetic foot education and exam.  Nails as

## 2023-09-18 DIAGNOSIS — N40.0 ENLARGED PROSTATE: ICD-10-CM

## 2023-09-18 DIAGNOSIS — K21.9 GASTROESOPHAGEAL REFLUX DISEASE WITHOUT ESOPHAGITIS: ICD-10-CM

## 2023-09-18 DIAGNOSIS — E11.42 DM TYPE 2 WITH DIABETIC PERIPHERAL NEUROPATHY (HCC): ICD-10-CM

## 2023-09-18 RX ORDER — METFORMIN HYDROCHLORIDE 500 MG/1
500 TABLET, EXTENDED RELEASE ORAL 2 TIMES DAILY
Qty: 180 TABLET | OUTPATIENT
Start: 2023-09-18

## 2023-09-18 RX ORDER — FAMOTIDINE 20 MG/1
TABLET, FILM COATED ORAL
Qty: 60 TABLET | Refills: 0 | Status: SHIPPED | OUTPATIENT
Start: 2023-09-18 | End: 2023-09-20

## 2023-09-18 RX ORDER — TAMSULOSIN HYDROCHLORIDE 0.4 MG/1
CAPSULE ORAL
Qty: 60 CAPSULE | Refills: 0 | Status: SHIPPED | OUTPATIENT
Start: 2023-09-18 | End: 2023-09-20

## 2023-09-18 RX ORDER — FAMOTIDINE 20 MG/1
TABLET, FILM COATED ORAL
Qty: 180 TABLET | OUTPATIENT
Start: 2023-09-18

## 2023-09-18 RX ORDER — METFORMIN HYDROCHLORIDE 500 MG/1
500 TABLET, EXTENDED RELEASE ORAL 2 TIMES DAILY
Qty: 180 TABLET | Refills: 0 | OUTPATIENT
Start: 2023-09-18

## 2023-09-18 RX ORDER — METFORMIN HYDROCHLORIDE 500 MG/1
500 TABLET, EXTENDED RELEASE ORAL 2 TIMES DAILY
Qty: 60 TABLET | Refills: 0 | Status: SHIPPED | OUTPATIENT
Start: 2023-09-18 | End: 2023-09-20

## 2023-09-18 RX ORDER — TAMSULOSIN HYDROCHLORIDE 0.4 MG/1
CAPSULE ORAL
Qty: 90 CAPSULE | OUTPATIENT
Start: 2023-09-18

## 2023-09-20 DIAGNOSIS — E11.42 DM TYPE 2 WITH DIABETIC PERIPHERAL NEUROPATHY (HCC): ICD-10-CM

## 2023-09-20 DIAGNOSIS — K21.9 GASTROESOPHAGEAL REFLUX DISEASE WITHOUT ESOPHAGITIS: ICD-10-CM

## 2023-09-20 DIAGNOSIS — N40.0 ENLARGED PROSTATE: ICD-10-CM

## 2023-09-20 RX ORDER — TAMSULOSIN HYDROCHLORIDE 0.4 MG/1
CAPSULE ORAL
Qty: 90 CAPSULE | Refills: 0 | Status: SHIPPED | OUTPATIENT
Start: 2023-09-20

## 2023-09-20 RX ORDER — METFORMIN HYDROCHLORIDE 500 MG/1
500 TABLET, EXTENDED RELEASE ORAL 2 TIMES DAILY
Qty: 180 TABLET | Refills: 0 | Status: SHIPPED | OUTPATIENT
Start: 2023-09-20

## 2023-09-20 RX ORDER — FAMOTIDINE 20 MG/1
TABLET, FILM COATED ORAL
Qty: 180 TABLET | Refills: 0 | Status: SHIPPED | OUTPATIENT
Start: 2023-09-20

## 2023-09-20 NOTE — TELEPHONE ENCOUNTER
Paulina Wakefield called requesting a refill of the below medication which has been pended for you:     Requested Prescriptions     Pending Prescriptions Disp Refills    metFORMIN (GLUCOPHAGE-XR) 500 MG extended release tablet [Pharmacy Med Name: METFORMIN ER 500MG 24HR TABS] 180 tablet 0     Sig: TAKE 1 TABLET BY MOUTH IN THE MORNING AND AT BEDTIME    tamsulosin (FLOMAX) 0.4 MG capsule [Pharmacy Med Name: TAMSULOSIN 0.4MG CAPSULES] 90 capsule 0     Sig: TAKE 1 CAPSULE BY MOUTH DAILY    famotidine (PEPCID) 20 MG tablet [Pharmacy Med Name: FAMOTIDINE 20MG TABLETS] 180 tablet 0     Sig: TAKE 1 TABLET BY MOUTH TWICE DAILY       Last Appointment Date: 03/30/20203  Next Appointment Date: Visit date not found    No Known Allergies

## 2023-10-15 DIAGNOSIS — I10 ESSENTIAL HYPERTENSION: ICD-10-CM

## 2023-10-16 RX ORDER — VALSARTAN AND HYDROCHLOROTHIAZIDE 160; 25 MG/1; MG/1
TABLET ORAL
Qty: 90 TABLET | Refills: 1 | OUTPATIENT
Start: 2023-10-16

## 2023-10-16 RX ORDER — OXYBUTYNIN CHLORIDE 10 MG/1
TABLET, EXTENDED RELEASE ORAL
Qty: 90 TABLET | Refills: 3 | OUTPATIENT
Start: 2023-10-16

## 2023-10-19 DIAGNOSIS — E11.42 DM TYPE 2 WITH DIABETIC PERIPHERAL NEUROPATHY (HCC): ICD-10-CM

## 2023-10-19 RX ORDER — METFORMIN HYDROCHLORIDE 500 MG/1
500 TABLET, EXTENDED RELEASE ORAL 2 TIMES DAILY
Qty: 180 TABLET | Refills: 0 | Status: SHIPPED | OUTPATIENT
Start: 2023-10-19

## 2023-10-19 NOTE — TELEPHONE ENCOUNTER
Med was previously denied as pt needs an appt, pt is scheduled with [facility] on 12-18, can you fill or do you want to work in earlier?

## 2023-10-23 DIAGNOSIS — I10 PRIMARY HYPERTENSION: ICD-10-CM

## 2023-10-23 DIAGNOSIS — I10 ESSENTIAL HYPERTENSION: ICD-10-CM

## 2023-10-23 DIAGNOSIS — E78.2 MIXED HYPERLIPIDEMIA: ICD-10-CM

## 2023-10-23 DIAGNOSIS — E11.42 DM TYPE 2 WITH DIABETIC PERIPHERAL NEUROPATHY (HCC): ICD-10-CM

## 2023-10-23 NOTE — TELEPHONE ENCOUNTER
Robert Conte called requesting a refill of the below medication which has been pended for you:     Requested Prescriptions     Pending Prescriptions Disp Refills    atorvastatin (LIPITOR) 20 MG tablet 90 tablet 0     Sig: Take 1 tablet by mouth daily    metoprolol (LOPRESSOR) 100 MG tablet 180 tablet 0     Sig: Take 1 tablet by mouth 2 times daily    metFORMIN (GLUCOPHAGE-XR) 500 MG extended release tablet 180 tablet 0     Sig: Take 1 tablet by mouth in the morning and at bedtime       Last Appointment Date: 3/30/2023  Next Appointment Date: 12/18/2023    No Known Allergies

## 2023-10-23 NOTE — TELEPHONE ENCOUNTER
Jud Burk called requesting a refill of the below medication which has been pended for you:     Requested Prescriptions     Pending Prescriptions Disp Refills    atorvastatin (LIPITOR) 20 MG tablet 90 tablet 0     Sig: Take 1 tablet by mouth daily    metoprolol (LOPRESSOR) 100 MG tablet 180 tablet 0     Sig: Take 1 tablet by mouth 2 times daily    metFORMIN (GLUCOPHAGE-XR) 500 MG extended release tablet 180 tablet 0     Sig: Take 1 tablet by mouth in the morning and at bedtime    oxybutynin (DITROPAN-XL) 10 MG extended release tablet 90 tablet 0     Sig: TAKE 1 TABLET DAILY    valsartan-hydroCHLOROthiazide (DIOVAN-HCT) 160-25 MG per tablet 90 tablet 0     Sig: Take 1 tablet by mouth daily       Last Appointment Date: 3/30/2023  Next Appointment Date: 12/18/2023    No Known Allergies

## 2023-10-24 RX ORDER — METOPROLOL TARTRATE 100 MG/1
100 TABLET ORAL 2 TIMES DAILY
Qty: 180 TABLET | Refills: 0 | Status: SHIPPED | OUTPATIENT
Start: 2023-10-24

## 2023-10-24 RX ORDER — OXYBUTYNIN CHLORIDE 10 MG/1
TABLET, EXTENDED RELEASE ORAL
Qty: 90 TABLET | Refills: 0 | Status: SHIPPED | OUTPATIENT
Start: 2023-10-24

## 2023-10-24 RX ORDER — VALSARTAN AND HYDROCHLOROTHIAZIDE 160; 25 MG/1; MG/1
1 TABLET ORAL DAILY
Qty: 90 TABLET | Refills: 0 | Status: SHIPPED | OUTPATIENT
Start: 2023-10-24

## 2023-10-24 RX ORDER — ATORVASTATIN CALCIUM 20 MG/1
20 TABLET, FILM COATED ORAL DAILY
Qty: 90 TABLET | Refills: 0 | Status: SHIPPED | OUTPATIENT
Start: 2023-10-24

## 2023-10-24 RX ORDER — METFORMIN HYDROCHLORIDE 500 MG/1
500 TABLET, EXTENDED RELEASE ORAL 2 TIMES DAILY
Qty: 180 TABLET | Refills: 0 | Status: SHIPPED | OUTPATIENT
Start: 2023-10-24

## 2023-11-02 ENCOUNTER — OFFICE VISIT (OUTPATIENT)
Dept: PODIATRY | Age: 78
End: 2023-11-02
Payer: MEDICARE

## 2023-11-02 VITALS
DIASTOLIC BLOOD PRESSURE: 70 MMHG | WEIGHT: 289.4 LBS | HEART RATE: 68 BPM | RESPIRATION RATE: 20 BRPM | BODY MASS INDEX: 44 KG/M2 | SYSTOLIC BLOOD PRESSURE: 128 MMHG

## 2023-11-02 DIAGNOSIS — B35.1 DERMATOPHYTOSIS OF NAIL: ICD-10-CM

## 2023-11-02 DIAGNOSIS — E11.42 DM TYPE 2 WITH DIABETIC PERIPHERAL NEUROPATHY (HCC): Primary | ICD-10-CM

## 2023-11-02 PROCEDURE — 99999 PR OFFICE/OUTPT VISIT,PROCEDURE ONLY: CPT | Performed by: PODIATRIST

## 2023-11-02 PROCEDURE — 11721 DEBRIDE NAIL 6 OR MORE: CPT | Performed by: PODIATRIST

## 2023-11-02 NOTE — PROGRESS NOTES
(LOPRESSOR) 100 MG tablet Take 1 tablet by mouth 2 times daily 10/24/23  Yes Lizeth Iyer APRN - CNP   metFORMIN (GLUCOPHAGE-XR) 500 MG extended release tablet Take 1 tablet by mouth in the morning and at bedtime 10/24/23  Yes Lizeth Iyer APRN - CNP   oxybutynin (DITROPAN-XL) 10 MG extended release tablet TAKE 1 TABLET DAILY 10/24/23  Yes ROCÍO Hanesn CNP   valsartan-hydroCHLOROthiazide (DIOVAN-HCT) 160-25 MG per tablet Take 1 tablet by mouth daily 10/24/23  Yes ROCÍO Hansen CNP   tamsulosin (FLOMAX) 0.4 MG capsule TAKE 1 CAPSULE BY MOUTH DAILY 9/20/23  Yes ROCÍO Arreaga CNP   famotidine (PEPCID) 20 MG tablet TAKE 1 TABLET BY MOUTH TWICE DAILY 9/20/23  Yes ROCÍO Arreaga CNP   Diabetic Shoe MISC by Does not apply route Dispense Dm shoes and insoles  DM type 2 with diabetic peripheral neuropathy ([address])  (primary encounter diagnosis)  Equinus deformity of both feet  Dermatophytosis of nail 8/17/23  Yes Leo Veras DPM   aspirin 81 MG EC tablet Take 1 tablet by mouth daily 12/29/22  Yes Rosemary Cedillo MD   blood glucose monitor kit and supplies Check blood sugar daily.   Diagnosis E11.42 12/13/22  Yes ROCÍO Hansen CNP   blood glucose monitor strips Patient checks blood sugar daily 12/13/22  Yes Lizeth Iyer APRN - CNP   Lancets Micro Thin 33G MISC USE TO TEST BLOOD SUGAR EVERY DAY 12/13/22  Yes ROCÍO Hansen CNP   fluticasone (FLONASE) 50 MCG/ACT nasal spray SHAKE LIQUID AND USE 2 SPRAYS IN EACH NOSTRIL EVERY DAY 8/16/22  Yes Lizeth Iyer APRN - CNP   TRUE METRIX BLOOD GLUCOSE TEST strip USE TO TEST BLOOD SUGAR DAILY 6/15/22  Yes Marilee Barakat   omeprazole (PRILOSEC) 40 MG delayed release capsule TAKE 1 CAPSULE DAILY 10/28/19  Yes Lizeth Iyer, ROCÍO - CNP       Social History     Tobacco Use    Smoking status: Former

## 2023-11-17 DIAGNOSIS — N40.0 ENLARGED PROSTATE: ICD-10-CM

## 2023-11-17 RX ORDER — TAMSULOSIN HYDROCHLORIDE 0.4 MG/1
CAPSULE ORAL
Qty: 30 CAPSULE | Refills: 0 | Status: SHIPPED | OUTPATIENT
Start: 2023-11-17

## 2023-11-17 NOTE — TELEPHONE ENCOUNTER
Jud Burk called requesting a refill of the below medication which has been pended for you: LVM that pt is overdue for labs     Requested Prescriptions     Pending Prescriptions Disp Refills    tamsulosin (FLOMAX) 0.4 MG capsule [Pharmacy Med Name: TAMSULOSIN 0.4MG CAPSULES] 30 capsule 0     Sig: TAKE 1 CAPSULE BY MOUTH DAILY       Last Appointment Date: 03/30/2023  Next Appointment Date: 12/18/2023  No Known Allergies

## 2023-12-18 ENCOUNTER — OFFICE VISIT (OUTPATIENT)
Dept: FAMILY MEDICINE CLINIC | Age: 78
End: 2023-12-18
Payer: MEDICARE

## 2023-12-18 VITALS
BODY MASS INDEX: 43.8 KG/M2 | DIASTOLIC BLOOD PRESSURE: 62 MMHG | SYSTOLIC BLOOD PRESSURE: 130 MMHG | HEIGHT: 68 IN | OXYGEN SATURATION: 96 % | HEART RATE: 62 BPM | WEIGHT: 289 LBS

## 2023-12-18 DIAGNOSIS — Z23 FLU VACCINE NEED: ICD-10-CM

## 2023-12-18 DIAGNOSIS — E11.42 DM TYPE 2 WITH DIABETIC PERIPHERAL NEUROPATHY (HCC): Primary | ICD-10-CM

## 2023-12-18 DIAGNOSIS — N40.0 ENLARGED PROSTATE: ICD-10-CM

## 2023-12-18 DIAGNOSIS — K21.9 GASTROESOPHAGEAL REFLUX DISEASE WITHOUT ESOPHAGITIS: ICD-10-CM

## 2023-12-18 DIAGNOSIS — E05.90 HYPERTHYROIDISM: ICD-10-CM

## 2023-12-18 DIAGNOSIS — I10 ESSENTIAL HYPERTENSION: ICD-10-CM

## 2023-12-18 DIAGNOSIS — E78.2 MIXED HYPERLIPIDEMIA: ICD-10-CM

## 2023-12-18 PROCEDURE — G8417 CALC BMI ABV UP PARAM F/U: HCPCS | Performed by: NURSE PRACTITIONER

## 2023-12-18 PROCEDURE — G8427 DOCREV CUR MEDS BY ELIG CLIN: HCPCS | Performed by: NURSE PRACTITIONER

## 2023-12-18 PROCEDURE — 90694 VACC AIIV4 NO PRSRV 0.5ML IM: CPT | Performed by: NURSE PRACTITIONER

## 2023-12-18 PROCEDURE — 3075F SYST BP GE 130 - 139MM HG: CPT | Performed by: NURSE PRACTITIONER

## 2023-12-18 PROCEDURE — G8484 FLU IMMUNIZE NO ADMIN: HCPCS | Performed by: NURSE PRACTITIONER

## 2023-12-18 PROCEDURE — 1036F TOBACCO NON-USER: CPT | Performed by: NURSE PRACTITIONER

## 2023-12-18 PROCEDURE — 3051F HG A1C>EQUAL 7.0%<8.0%: CPT | Performed by: NURSE PRACTITIONER

## 2023-12-18 PROCEDURE — PBSHW INFLUENZA, FLUAD, (AGE 65 Y+), IM, PF, 0.5 ML: Performed by: NURSE PRACTITIONER

## 2023-12-18 PROCEDURE — 99214 OFFICE O/P EST MOD 30 MIN: CPT | Performed by: NURSE PRACTITIONER

## 2023-12-18 PROCEDURE — 1124F ACP DISCUSS-NO DSCNMKR DOCD: CPT | Performed by: NURSE PRACTITIONER

## 2023-12-18 PROCEDURE — 3078F DIAST BP <80 MM HG: CPT | Performed by: NURSE PRACTITIONER

## 2023-12-18 RX ORDER — VALSARTAN AND HYDROCHLOROTHIAZIDE 160; 25 MG/1; MG/1
1 TABLET ORAL DAILY
Qty: 90 TABLET | Refills: 1 | Status: SHIPPED | OUTPATIENT
Start: 2023-12-18

## 2023-12-18 RX ORDER — METOPROLOL TARTRATE 100 MG/1
100 TABLET ORAL 2 TIMES DAILY
Qty: 180 TABLET | Refills: 1 | Status: SHIPPED | OUTPATIENT
Start: 2023-12-18

## 2023-12-18 RX ORDER — FAMOTIDINE 20 MG/1
20 TABLET, FILM COATED ORAL 2 TIMES DAILY
Qty: 180 TABLET | Refills: 1 | Status: SHIPPED | OUTPATIENT
Start: 2023-12-18

## 2023-12-18 RX ORDER — TAMSULOSIN HYDROCHLORIDE 0.4 MG/1
0.4 CAPSULE ORAL DAILY
Qty: 90 CAPSULE | Refills: 1 | Status: SHIPPED | OUTPATIENT
Start: 2023-12-18

## 2023-12-18 RX ORDER — METFORMIN HYDROCHLORIDE 500 MG/1
500 TABLET, EXTENDED RELEASE ORAL 2 TIMES DAILY
Qty: 180 TABLET | Refills: 1 | Status: SHIPPED | OUTPATIENT
Start: 2023-12-18

## 2023-12-18 RX ORDER — OXYBUTYNIN CHLORIDE 10 MG/1
TABLET, EXTENDED RELEASE ORAL
Qty: 90 TABLET | Refills: 1 | Status: SHIPPED | OUTPATIENT
Start: 2023-12-18

## 2023-12-18 RX ORDER — ASPIRIN 81 MG/1
81 TABLET ORAL DAILY
Qty: 90 TABLET | Refills: 1 | Status: SHIPPED | OUTPATIENT
Start: 2023-12-18

## 2023-12-18 NOTE — PROGRESS NOTES
mouth 2 times daily 180 tablet 1    oxybutynin (DITROPAN-XL) 10 MG extended release tablet TAKE 1 TABLET DAILY 90 tablet 1    tamsulosin (FLOMAX) 0.4 MG capsule Take 1 capsule by mouth daily 90 capsule 1    valsartan-hydroCHLOROthiazide (DIOVAN-HCT) 160-25 MG per tablet Take 1 tablet by mouth daily 90 tablet 1    atorvastatin (LIPITOR) 20 MG tablet Take 1 tablet by mouth daily 90 tablet 0    Diabetic Shoe MISC by Does not apply route Dispense Dm shoes and insoles  DM type 2 with diabetic peripheral neuropathy (HCC)  (primary encounter diagnosis)  Equinus deformity of both feet  Dermatophytosis of nail 1 each 0    blood glucose monitor kit and supplies Check blood sugar daily. Diagnosis E11.42 1 kit 0    blood glucose monitor strips Patient checks blood sugar daily 50 strip 5    Lancets Micro Thin 33G MISC USE TO TEST BLOOD SUGAR EVERY  each 3    fluticasone (FLONASE) 50 MCG/ACT nasal spray SHAKE LIQUID AND USE 2 SPRAYS IN EACH NOSTRIL EVERY DAY 48 g 3    TRUE METRIX BLOOD GLUCOSE TEST strip USE TO TEST BLOOD SUGAR DAILY 200 strip 0    omeprazole (PRILOSEC) 40 MG delayed release capsule TAKE 1 CAPSULE DAILY 90 capsule 4     No current facility-administered medications for this visit. No Known Allergies    All patients pastmedical, surgical, social and family history has been reviewed. Subjective:      Review of Systems   Constitutional:  Negative for activity change, appetite change, fatigue, fever and weight loss. HENT:  Negative for hoarse voice and sore throat. Eyes:  Negative for blurred vision. Respiratory:  Negative for cough, choking, shortness of breath and wheezing. Cardiovascular:  Negative for chest pain and palpitations. Gastrointestinal:  Positive for heartburn. Negative for abdominal pain, diarrhea, dysphagia, nausea and vomiting. Endocrine: Negative for polydipsia and polyuria. Genitourinary:  Positive for difficulty urinating (at times. takes flomax which has helped. ).

## 2023-12-29 DIAGNOSIS — E11.42 DM TYPE 2 WITH DIABETIC PERIPHERAL NEUROPATHY (HCC): Primary | ICD-10-CM

## 2024-01-15 DIAGNOSIS — K21.9 GASTROESOPHAGEAL REFLUX DISEASE WITHOUT ESOPHAGITIS: ICD-10-CM

## 2024-01-15 RX ORDER — FAMOTIDINE 20 MG/1
20 TABLET, FILM COATED ORAL 2 TIMES DAILY
Qty: 180 TABLET | Refills: 1 | Status: SHIPPED | OUTPATIENT
Start: 2024-01-15

## 2024-01-15 NOTE — TELEPHONE ENCOUNTER
Ben called requesting a refill of the below medication which has been pended for you:     Requested Prescriptions     Pending Prescriptions Disp Refills    famotidine (PEPCID) 20 MG tablet 180 tablet 1     Sig: Take 1 tablet by mouth 2 times daily       Last Appointment Date: 12/18/2023  Next Appointment Date: 6/18/2024    No Known Allergies

## 2024-01-17 DIAGNOSIS — J30.2 SEASONAL ALLERGIC RHINITIS, UNSPECIFIED TRIGGER: ICD-10-CM

## 2024-01-19 RX ORDER — FLUTICASONE PROPIONATE 50 MCG
SPRAY, SUSPENSION (ML) NASAL
Qty: 48 G | Refills: 3 | Status: SHIPPED | OUTPATIENT
Start: 2024-01-19

## 2024-01-19 NOTE — TELEPHONE ENCOUNTER
Ben called requesting a refill of the below medication which has been pended for you:     Requested Prescriptions     Pending Prescriptions Disp Refills    fluticasone (FLONASE) 50 MCG/ACT nasal spray [Pharmacy Med Name: FLUTICASONE 50MCG NASAL SP (120) RX] 48 g 3     Sig: SHAKE LIQUID AND USE 2 SPRAYS IN EACH NOSTRIL EVERY DAY       Last Appointment Date: 12/18/2023  Next Appointment Date: 6/18/2024    No Known Allergies

## 2024-02-07 ENCOUNTER — OFFICE VISIT (OUTPATIENT)
Dept: UROLOGY | Age: 79
End: 2024-02-07
Payer: MEDICARE

## 2024-02-07 ENCOUNTER — OFFICE VISIT (OUTPATIENT)
Dept: PODIATRY | Age: 79
End: 2024-02-07
Payer: MEDICARE

## 2024-02-07 ENCOUNTER — HOSPITAL ENCOUNTER (OUTPATIENT)
Age: 79
Discharge: HOME | End: 2024-02-07
Payer: MEDICARE

## 2024-02-07 VITALS
SYSTOLIC BLOOD PRESSURE: 134 MMHG | HEART RATE: 64 BPM | HEIGHT: 68 IN | BODY MASS INDEX: 43.35 KG/M2 | DIASTOLIC BLOOD PRESSURE: 82 MMHG | WEIGHT: 286 LBS

## 2024-02-07 VITALS
HEIGHT: 68 IN | OXYGEN SATURATION: 96 % | DIASTOLIC BLOOD PRESSURE: 82 MMHG | HEART RATE: 64 BPM | TEMPERATURE: 98.8 F | WEIGHT: 286 LBS | BODY MASS INDEX: 43.35 KG/M2 | RESPIRATION RATE: 20 BRPM | SYSTOLIC BLOOD PRESSURE: 134 MMHG

## 2024-02-07 DIAGNOSIS — B35.1 DERMATOPHYTOSIS OF NAIL: ICD-10-CM

## 2024-02-07 DIAGNOSIS — R97.20 ELEVATED PSA: ICD-10-CM

## 2024-02-07 DIAGNOSIS — R39.15 URINARY URGENCY: ICD-10-CM

## 2024-02-07 DIAGNOSIS — N39.41 URGE INCONTINENCE: ICD-10-CM

## 2024-02-07 DIAGNOSIS — E11.42 DM TYPE 2 WITH DIABETIC PERIPHERAL NEUROPATHY (HCC): Primary | ICD-10-CM

## 2024-02-07 DIAGNOSIS — N40.1 BENIGN LOCALIZED PROSTATIC HYPERPLASIA WITH LOWER URINARY TRACT SYMPTOMS (LUTS): Primary | ICD-10-CM

## 2024-02-07 DIAGNOSIS — E78.2 MIXED HYPERLIPIDEMIA: ICD-10-CM

## 2024-02-07 PROCEDURE — 3079F DIAST BP 80-89 MM HG: CPT | Performed by: UROLOGY

## 2024-02-07 PROCEDURE — 1124F ACP DISCUSS-NO DSCNMKR DOCD: CPT | Performed by: UROLOGY

## 2024-02-07 PROCEDURE — G8417 CALC BMI ABV UP PARAM F/U: HCPCS | Performed by: UROLOGY

## 2024-02-07 PROCEDURE — 3075F SYST BP GE 130 - 139MM HG: CPT | Performed by: UROLOGY

## 2024-02-07 PROCEDURE — 99204 OFFICE O/P NEW MOD 45 MIN: CPT

## 2024-02-07 PROCEDURE — 99999 PR OFFICE/OUTPT VISIT,PROCEDURE ONLY: CPT | Performed by: PODIATRIST

## 2024-02-07 PROCEDURE — G8484 FLU IMMUNIZE NO ADMIN: HCPCS | Performed by: UROLOGY

## 2024-02-07 PROCEDURE — 11721 DEBRIDE NAIL 6 OR MORE: CPT | Performed by: PODIATRIST

## 2024-02-07 PROCEDURE — 84153 ASSAY OF PSA TOTAL: CPT

## 2024-02-07 PROCEDURE — 36415 COLL VENOUS BLD VENIPUNCTURE: CPT

## 2024-02-07 PROCEDURE — 1036F TOBACCO NON-USER: CPT | Performed by: UROLOGY

## 2024-02-07 PROCEDURE — 99214 OFFICE O/P EST MOD 30 MIN: CPT | Performed by: UROLOGY

## 2024-02-07 PROCEDURE — G8427 DOCREV CUR MEDS BY ELIG CLIN: HCPCS | Performed by: UROLOGY

## 2024-02-07 RX ORDER — TOLTERODINE 4 MG/1
4 CAPSULE, EXTENDED RELEASE ORAL DAILY
Qty: 60 CAPSULE | Refills: 2 | Status: SHIPPED | OUTPATIENT
Start: 2024-02-07 | End: 2024-04-07

## 2024-02-07 NOTE — PROGRESS NOTES
Use    Smoking status: Former     Current packs/day: 0.00     Average packs/day: 0.3 packs/day for 15.0 years (3.8 ttl pk-yrs)     Types: Cigars, Cigarettes     Start date: 1964     Quit date: 1970     Years since quittin.2    Smokeless tobacco: Never    Tobacco comments:     quit over 30 years ago   Substance Use Topics    Alcohol use: No     Alcohol/week: 0.0 standard drinks of alcohol     Comment: no alcohol for many years     ROS: All 14 ROS systems reviewed and pertinent positives noted above, all others negative.  Objective:  Vascular: DP and PT pulses palpable 2/4, bilateral.  CFT <3 seconds, bilateral.  Hair growth present to the level of the digits, bilateral.  Edema absent, bilateral.  Varicosities absent, bilateral. Erythema absent, bilateral. Distal Rubor absent bilateral.  Temperature within normal limits bilateral. Hyperpigmentation absent bilateral. No atrophic skin.    Neurological: Sensation intact to light touch to level of digits, bilateral.  Protective sensation intact 10/10 sites via 5.07/10g [place] Monofilament, bilateral.  negative Tinel's, bilateral.  negative Valleix sign, bilateral.  Vibratory intact bilateral.  Reflexes Decreased bilateral.  Paresthesias negative.  Dysthesias negative.  Sharp/dull intact bilateral.    Musculoskeletal: Muscle strength 5/5, bilateral.  Pain present upon palpation bilateral generalized forefoot bilateral..  Decreased medial longitudinal arch, bilateral.  Ankle ROM decreased,bilateral.  1st MPJ ROM within normal limits, bilateral.  Dorsally contracted digits present. No other foot deformities.    Integument:  Open lesion absent, Bilateral.  Interdigital maceration absent to web spaces,absent Bilateral.  Nails left 1, 2, 3, 4, 5 and right 1, 2, 3, 4, 5 thickened, dystrophic and crumbly, discolored with subungual debris.  Fissures absent, Bilateral. Hyperkeratotic tissue is absent.     Assessment:    Diagnosis Orders   1. DM type 2 with

## 2024-02-07 NOTE — TELEPHONE ENCOUNTER
Ben called requesting a refill of the below medication which has been pended for you:     Requested Prescriptions     Pending Prescriptions Disp Refills    atorvastatin (LIPITOR) 20 MG tablet 90 tablet 1     Sig: Take 1 tablet by mouth daily       Last Appointment Date: 12/18/2023  Next Appointment Date: 6/18/2024    No Known Allergies

## 2024-02-08 LAB — PSA SERPL-MCNC: 5.7 NG/ML (ref 0–4)

## 2024-02-08 RX ORDER — TOLTERODINE 4 MG/1
4 CAPSULE, EXTENDED RELEASE ORAL DAILY
Qty: 90 CAPSULE | OUTPATIENT
Start: 2024-02-08 | End: 2024-04-08

## 2024-02-08 RX ORDER — ATORVASTATIN CALCIUM 20 MG/1
20 TABLET, FILM COATED ORAL DAILY
Qty: 90 TABLET | Refills: 1 | Status: SHIPPED | OUTPATIENT
Start: 2024-02-08

## 2024-03-27 NOTE — PROGRESS NOTES
[facility] SPECIALY CARE, [facility], [place] A DEPARTMENT OF [facility]  [address] [facility] [address]  Dept: [phone]    Visit Date: 4/1/2024        HPI:     Ben Villaseñor is a 79 y.o. male who presents today for:  Chief Complaint   Patient presents with    Benign Prostatic Hypertrophy     1 mo f/u    Elevated PSA     5.70       HPI  Patient presents to urology clinic for follow-up.     Mr. Villaseñor reports doing well. Denies flank pain, suprapubic pressure, gross hematuria, dysuria, fever or chills.   LUTS controlled on Flomax and Detrol.     Elevated PSA  Rising   No family hx of prostate cancer  biopsy results--- negative 7/2020     Urinary frequency- hx of diabetes. On Flomax, ditropan switched to Detrol at last visit    Current Outpatient Medications   Medication Sig Dispense Refill    atorvastatin (LIPITOR) 20 MG tablet Take 1 tablet by mouth daily 90 tablet 1    tolterodine (DETROL LA) 4 MG extended release capsule Take 1 capsule by mouth daily 60 capsule 2    fluticasone (FLONASE) 50 MCG/ACT nasal spray SHAKE LIQUID AND USE 2 SPRAYS IN EACH NOSTRIL EVERY DAY 48 g 3    famotidine (PEPCID) 20 MG tablet Take 1 tablet by mouth 2 times daily 180 tablet 1    aspirin 81 MG EC tablet Take 1 tablet by mouth daily 90 tablet 1    metFORMIN (GLUCOPHAGE-XR) 500 MG extended release tablet Take 1 tablet by mouth in the morning and at bedtime 180 tablet 1    metoprolol (LOPRESSOR) 100 MG tablet Take 1 tablet by mouth 2 times daily 180 tablet 1    tamsulosin (FLOMAX) 0.4 MG capsule Take 1 capsule by mouth daily 90 capsule 1    valsartan-hydroCHLOROthiazide (DIOVAN-HCT) 160-25 MG per tablet Take 1 tablet by mouth daily 90 tablet 1    blood glucose monitor strips Patient checks blood sugar daily 50 strip 5    Lancets Micro Thin 33G MISC USE TO TEST BLOOD SUGAR EVERY  each 3    TRUE METRIX BLOOD GLUCOSE TEST strip USE TO TEST BLOOD SUGAR DAILY 200

## 2024-04-01 ENCOUNTER — OFFICE VISIT (OUTPATIENT)
Dept: UROLOGY | Age: 79
End: 2024-04-01
Payer: MEDICARE

## 2024-04-01 ENCOUNTER — HOSPITAL ENCOUNTER (OUTPATIENT)
Age: 79
Discharge: HOME | End: 2024-04-01
Payer: MEDICARE

## 2024-04-01 VITALS
HEART RATE: 78 BPM | BODY MASS INDEX: 43.35 KG/M2 | SYSTOLIC BLOOD PRESSURE: 128 MMHG | WEIGHT: 286 LBS | OXYGEN SATURATION: 93 % | HEIGHT: 68 IN | RESPIRATION RATE: 20 BRPM | DIASTOLIC BLOOD PRESSURE: 84 MMHG

## 2024-04-01 DIAGNOSIS — R97.20 ELEVATED PSA: ICD-10-CM

## 2024-04-01 DIAGNOSIS — N40.1 BENIGN LOCALIZED PROSTATIC HYPERPLASIA WITH LOWER URINARY TRACT SYMPTOMS (LUTS): Primary | ICD-10-CM

## 2024-04-01 DIAGNOSIS — R39.15 URINARY URGENCY: ICD-10-CM

## 2024-04-01 DIAGNOSIS — N39.41 URGE INCONTINENCE: ICD-10-CM

## 2024-04-01 LAB
CREAT SERPL-MCNC: 1.5 MG/DL (ref 0.7–1.2)
GFR SERPL CREATININE-BSD FRML MDRD: 47 ML/MIN/1.73M2

## 2024-04-01 PROCEDURE — 3074F SYST BP LT 130 MM HG: CPT | Performed by: NURSE PRACTITIONER

## 2024-04-01 PROCEDURE — 82565 ASSAY OF CREATININE: CPT

## 2024-04-01 PROCEDURE — 1036F TOBACCO NON-USER: CPT | Performed by: NURSE PRACTITIONER

## 2024-04-01 PROCEDURE — 3079F DIAST BP 80-89 MM HG: CPT | Performed by: NURSE PRACTITIONER

## 2024-04-01 PROCEDURE — 1124F ACP DISCUSS-NO DSCNMKR DOCD: CPT | Performed by: NURSE PRACTITIONER

## 2024-04-01 PROCEDURE — 99214 OFFICE O/P EST MOD 30 MIN: CPT | Performed by: NURSE PRACTITIONER

## 2024-04-01 PROCEDURE — G8427 DOCREV CUR MEDS BY ELIG CLIN: HCPCS | Performed by: NURSE PRACTITIONER

## 2024-04-01 PROCEDURE — 36415 COLL VENOUS BLD VENIPUNCTURE: CPT

## 2024-04-01 PROCEDURE — G8417 CALC BMI ABV UP PARAM F/U: HCPCS | Performed by: NURSE PRACTITIONER

## 2024-04-01 PROCEDURE — 99213 OFFICE O/P EST LOW 20 MIN: CPT | Performed by: NURSE PRACTITIONER

## 2024-04-01 NOTE — PATIENT INSTRUCTIONS
Call sooner with any questions or concerns.     To Schedule the PROSTATE MRI call [phone] option 2 at [facility]

## 2024-04-04 ENCOUNTER — TELEPHONE (OUTPATIENT)
Dept: UROLOGY | Age: 79
End: 2024-04-04

## 2024-04-04 NOTE — TELEPHONE ENCOUNTER
Patient called the office with some questions regarding his appointment Monday 4/1/24 with Charisma Correa.  Patient scheduled 4/11/24 at [facility] for a prostate MRI.  Patient states he does not have transportation, he does not have a car.   Patient is under the impression [facility] is setting up transportation for him.      Patient's cll back ph# [phone]

## 2024-04-09 ENCOUNTER — TELEPHONE (OUTPATIENT)
Dept: UROLOGY | Age: 79
End: 2024-04-09

## 2024-04-09 DIAGNOSIS — Z01.818 PRE-OP TESTING: Primary | ICD-10-CM

## 2024-04-09 NOTE — TELEPHONE ENCOUNTER
[facility]     Pre-Operative Evaluation/Consultation    Name:  Ben Villaseñor                                         Age:  79 y.o.  MRN:  [id number]       :  1945   Date:  2024         Sex: male    There were no encounter diagnoses.    Surgeon:  Dr. Estes  Procedure (Planned):  Ultra-sound guided prostate biopsy  Date Scheduled surgery: 24     Attending : No att. providers found    Primary Physician: Cleopatra bellamy  Cardiologist: None    Type of Anesthesia Requested: Monitored Anesthesia Care    Patient Medical history:  No Known Allergies  Social History     Tobacco Use    Smoking status: Former     Current packs/day: 0.00     Average packs/day: 0.3 packs/day for 15.0 years (3.8 ttl pk-yrs)     Types: Cigars, Cigarettes     Start date: 1964     Quit date: 1970     Years since quittin.3    Smokeless tobacco: Never    Tobacco comments:     quit over 30 years ago   Substance Use Topics    Alcohol use: No     Alcohol/week: 0.0 standard drinks of alcohol     Comment: no alcohol for many years    Drug use: No         Additional ordered pre-operative testing:  []CBC    []ABG      [] BMP   []URINALYSIS   []CMP    []HCG   []COAGS PT/INR  []T&C  []LFTs   []TYPE AND SCREEN    [] EKG  [] Chest X-Ray  [] Other Radiology    [] Sent to Hospitalist None  [] Sent to Anesthesia for your review: None   [] Additional Orders: None     Comments:None   Requests: No Special requests    Signed: Leidy Bah LPN 2024 1:09 PM

## 2024-04-10 NOTE — TELEPHONE ENCOUNTER
Spoke with patient, let him know will need to get EKG. Is scheduled to see dr ramirez on 4/24/24 will have patient get it done then.

## 2024-04-24 ENCOUNTER — HOSPITAL ENCOUNTER (OUTPATIENT)
Dept: NON INVASIVE DIAGNOSTICS | Age: 79
Discharge: HOME | End: 2024-04-24
Payer: MEDICARE

## 2024-04-24 ENCOUNTER — OFFICE VISIT (OUTPATIENT)
Dept: PODIATRY | Age: 79
End: 2024-04-24
Payer: MEDICARE

## 2024-04-24 VITALS
WEIGHT: 294.2 LBS | BODY MASS INDEX: 44.73 KG/M2 | RESPIRATION RATE: 20 BRPM | DIASTOLIC BLOOD PRESSURE: 70 MMHG | SYSTOLIC BLOOD PRESSURE: 128 MMHG | HEART RATE: 72 BPM

## 2024-04-24 DIAGNOSIS — Z01.818 PRE-OP TESTING: ICD-10-CM

## 2024-04-24 DIAGNOSIS — E11.42 DM TYPE 2 WITH DIABETIC PERIPHERAL NEUROPATHY (HCC): Primary | ICD-10-CM

## 2024-04-24 DIAGNOSIS — B35.1 DERMATOPHYTOSIS OF NAIL: ICD-10-CM

## 2024-04-24 PROCEDURE — 99999 PR OFFICE/OUTPT VISIT,PROCEDURE ONLY: CPT | Performed by: PODIATRIST

## 2024-04-24 PROCEDURE — 11721 DEBRIDE NAIL 6 OR MORE: CPT | Performed by: PODIATRIST

## 2024-04-24 PROCEDURE — 93005 ELECTROCARDIOGRAM TRACING: CPT

## 2024-04-24 NOTE — PROGRESS NOTES
Foot Care Worksheet  PCP: Cleopatra Iyer APRN - CNP  Last visit: 12/18/23    Nail description: Thick , Yellow , Crumbly , Marked limitation of ambulation     Pain resulting from thickened and dystrophy of nail plate No    Nails involved  Right   1, 2, 3, 4, 5  (T5-T9)  Left     1, 2, 3, 4, 5  (TA-T4)    Q7 1 Class A Finding - Non traumatic amputation of foot No    Q8 2 Class B Findings - Absent DP pulse No, Absent PT pulse No, Advanced tropic changes (3 required) Yes    Decrease hair growth Yes, Nail changes/thickening Yes, Pigmented changes/discoloration Yes, Skin texture (thin, shiny) Yes, Skin color (rubor/redness) No    Q9 1 Class B and 2 Class C Findings  Claudication No, Temperature change No, Paresthesia No, Burning Yes, Edema Yes    Subjective:  Patient presents to [facility] today for diabetic foot care.  Patient's diabetic control has been not changed.    No Known Allergies    Past Medical History:   Diagnosis Date    Acute cystitis with hematuria 09/17/2021    TITUS (acute kidney injury) (HCC) 09/17/2021    Anemia     chronic, negative work up with EGD and Colonoscopy.    Chest pain 06/09/2016    DJD (degenerative joint disease) of lumbar spine     Dry eye syndrome     Fall     GERD (gastroesophageal reflux disease)     Hepatic hemangioma     Hypertension     Keratitis     Secondary to dry eye syndrome.    Nuclear sclerotic cataract of both eyes     Obesity, morbid (HCC)     Obstructive sleep apnea of adult     non compliant with cpap    Onychomycosis     1, 2, 3, 4, and 5, bilateral.    Type II or unspecified type diabetes mellitus without mention of complication, not stated as uncontrolled     Urinary tract infection with hematuria 08/04/2020       Prior to Admission medications    Medication Sig Start Date End Date Taking? Authorizing Provider   atorvastatin (LIPITOR) 20 MG tablet Take 1 tablet by mouth daily 2/8/24  Yes Cleopatra Iyer APRN - CNP   fluticasone

## 2024-04-25 LAB
EKG ATRIAL RATE: 69 BPM
EKG P-R INTERVAL: 166 MS
EKG Q-T INTERVAL: 396 MS
EKG QRS DURATION: 78 MS
EKG QTC CALCULATION (BAZETT): 424 MS
EKG R AXIS: 1 DEGREES
EKG VENTRICULAR RATE: 69 BPM

## 2024-05-08 NOTE — TELEPHONE ENCOUNTER
Spoke with patient to give instructions for procedure. Patient is unsure if he will be able to do this. Patient has issues with transportation. Patient will call the office next week if able to get a hold of wife to bring to his procedure.

## 2024-05-21 ENCOUNTER — NURSE ONLY (OUTPATIENT)
Dept: UROLOGY | Age: 79
End: 2024-05-21

## 2024-05-21 VITALS
HEART RATE: 72 BPM | OXYGEN SATURATION: 93 % | RESPIRATION RATE: 16 BRPM | SYSTOLIC BLOOD PRESSURE: 128 MMHG | BODY MASS INDEX: 44.56 KG/M2 | WEIGHT: 294 LBS | HEIGHT: 68 IN | DIASTOLIC BLOOD PRESSURE: 70 MMHG

## 2024-05-21 RX ORDER — CIPROFLOXACIN 500 MG/1
500 TABLET, FILM COATED ORAL 2 TIMES DAILY
Qty: 6 TABLET | Refills: 0 | Status: SHIPPED | OUTPATIENT
Start: 2024-05-21 | End: 2024-05-24

## 2024-05-21 NOTE — PATIENT INSTRUCTIONS
PATIENT INSTRUCTIONS FOR PROSTATE BIOPSY    The Urologist has recommended that you undergo a prostate biopsy. He has discussed the reasons for this procedure and its risks.  The biopsy is obtained by the doctor placing an ultrasound probe in the rectum and guiding a thin needle into the prostate gland.    BEFORE THE BIOPSY    1. Stop taking aspirin, ibuprofen, naproxen and other aspirin-like products one week before the biopsy. Please notify the doctor if you are taking any of these.    2. If you are taking any type of blood thinner such as Coumadin, Effient, Pradaxa, or Plavix, tell your doctor before the biopsy is scheduled as these drugs will need to be discontinued before the biopsy.     3. If you have any heart problems, let your doctor know about it.    4.  You will need to purchase a fleets enema from the pharmacy and use it two hours before your biopsy.    5. You will be given six pills. These are your antibiotics.     A. You will take your first 2 pills the day before the biopsy.    B. You will take 2 more pills the morning of the biopsy.     C.  You will take the last 2 pills the day after the biopsy.       AFTER THE BIOPSY    1. Some bleeding in the urine, stool, or ejaculation is not uncommon and it usually clears within the first week after the biopsy. If it seems excessive, there are large clots, or you have difficulty urinating, call your doctor. If the office is closed, go to the   hospital emergency room.    2. If you experience chills and/or fever after your biopsy, notify your doctor.    3. Drink plenty of water after the biopsy. This will clear the urine of any blood. Do not start taking your aspirin or other blood thinners until the urine clears also.    PROCEDURE DATE:  May 29th, 2024             ARRIVAL TIME:  Surgery Center will call Tuesday afternoon to give you a time to arrive      LOCATION:  [facility] [place]           YOUR FOLLOW UP APPOINTMENT TO DISCUSS YOUR BIOPSY

## 2024-05-22 ENCOUNTER — TELEPHONE (OUTPATIENT)
Dept: UROLOGY | Age: 79
End: 2024-05-22

## 2024-05-22 NOTE — TELEPHONE ENCOUNTER
Patient called stating he picked up his antibiotic that was prescribed. Patient is not sure when he was suppose to take the medication, before or after surgery. Please advise. Can contact patient at Ph# [phone].

## 2024-05-28 ENCOUNTER — TELEPHONE (OUTPATIENT)
Dept: UROLOGY | Age: 79
End: 2024-05-28

## 2024-05-28 NOTE — TELEPHONE ENCOUNTER
Patient called stating he would like to cancel his surgery for tomorrow 5/29/2024. Stated his wife can not come with him and he can not come alone. Stated he already took the Ciprofloxacin and is asking if that will be a problem. He would like to reschedule his surgery. Patient seemed a bit confused. Please advise. Can contact patient at Ph# [phone].

## 2024-06-15 DIAGNOSIS — N40.0 ENLARGED PROSTATE: ICD-10-CM

## 2024-06-17 RX ORDER — TAMSULOSIN HYDROCHLORIDE 0.4 MG/1
0.4 CAPSULE ORAL DAILY
Qty: 90 CAPSULE | Refills: 1 | Status: SHIPPED | OUTPATIENT
Start: 2024-06-17

## 2024-06-17 NOTE — TELEPHONE ENCOUNTER
Ben called requesting a refill of the below medication which has been pended for you:     Requested Prescriptions     Pending Prescriptions Disp Refills    tamsulosin (FLOMAX) 0.4 MG capsule [Pharmacy Med Name: TAMSULOSIN 0.4MG CAPSULES] 90 capsule 1     Sig: TAKE 1 CAPSULE BY MOUTH DAILY       Last Appointment Date: 12/18/2023  Next Appointment Date: 6/18/2024    No Known Allergies

## 2024-06-18 ENCOUNTER — TELEPHONE (OUTPATIENT)
Dept: FAMILY MEDICINE CLINIC | Age: 79
End: 2024-06-18

## 2024-07-01 RX ORDER — TOLTERODINE 4 MG/1
4 CAPSULE, EXTENDED RELEASE ORAL DAILY
Qty: 90 CAPSULE | Refills: 0 | Status: SHIPPED | OUTPATIENT
Start: 2024-07-01 | End: 2024-08-30

## 2024-07-11 DIAGNOSIS — I10 ESSENTIAL HYPERTENSION: ICD-10-CM

## 2024-07-11 DIAGNOSIS — E11.42 DM TYPE 2 WITH DIABETIC PERIPHERAL NEUROPATHY (HCC): ICD-10-CM

## 2024-07-11 RX ORDER — METFORMIN HYDROCHLORIDE 500 MG/1
500 TABLET, EXTENDED RELEASE ORAL 2 TIMES DAILY
Qty: 60 TABLET | Refills: 0 | Status: SHIPPED | OUTPATIENT
Start: 2024-07-11

## 2024-07-11 RX ORDER — METFORMIN HYDROCHLORIDE 500 MG/1
500 TABLET, EXTENDED RELEASE ORAL 2 TIMES DAILY
Qty: 180 TABLET | OUTPATIENT
Start: 2024-07-11

## 2024-07-11 RX ORDER — VALSARTAN AND HYDROCHLOROTHIAZIDE 160; 25 MG/1; MG/1
1 TABLET ORAL DAILY
Qty: 30 TABLET | Refills: 0 | Status: SHIPPED | OUTPATIENT
Start: 2024-07-11

## 2024-07-11 RX ORDER — VALSARTAN AND HYDROCHLOROTHIAZIDE 160; 25 MG/1; MG/1
1 TABLET ORAL DAILY
Qty: 90 TABLET | OUTPATIENT
Start: 2024-07-11

## 2024-07-11 NOTE — TELEPHONE ENCOUNTER
Ben called requesting a refill of the below medication which has been pended for you:     Requested Prescriptions     Pending Prescriptions Disp Refills    valsartan-hydroCHLOROthiazide (DIOVAN-HCT) 160-25 MG per tablet [Pharmacy Med Name: VALSARTAN/HCTZ 160MG/25MG TABLETS] 90 tablet 1     Sig: TAKE 1 TABLET BY MOUTH DAILY    metFORMIN (GLUCOPHAGE-XR) 500 MG extended release tablet [Pharmacy Med Name: METFORMIN ER 500MG 24HR TABS] 180 tablet 1     Sig: TAKE 1 TABLET BY MOUTH IN THE MORNING AND AT BEDTIME       Last Appointment Date: 12/18/2023  Next Appointment Date: 8/8/2024    No Known Allergies

## 2024-08-02 DIAGNOSIS — E78.2 MIXED HYPERLIPIDEMIA: ICD-10-CM

## 2024-08-02 RX ORDER — ATORVASTATIN CALCIUM 20 MG/1
20 TABLET, FILM COATED ORAL DAILY
Qty: 30 TABLET | Refills: 0 | Status: SHIPPED | OUTPATIENT
Start: 2024-08-02

## 2024-08-02 NOTE — TELEPHONE ENCOUNTER
Ben called requesting a refill of the below medication which has been pended for you:     Requested Prescriptions     Pending Prescriptions Disp Refills    atorvastatin (LIPITOR) 20 MG tablet [Pharmacy Med Name: ATORVASTATIN 20MG TABLETS] 90 tablet 1     Sig: TAKE 1 TABLET BY MOUTH DAILY       Last Appointment Date: 12/18/2023  Next Appointment Date: 8/8/2024    No Known Allergies

## 2024-08-08 DIAGNOSIS — I10 ESSENTIAL HYPERTENSION: ICD-10-CM

## 2024-08-08 DIAGNOSIS — E11.42 DM TYPE 2 WITH DIABETIC PERIPHERAL NEUROPATHY (HCC): ICD-10-CM

## 2024-08-08 RX ORDER — METFORMIN HYDROCHLORIDE 500 MG/1
500 TABLET, EXTENDED RELEASE ORAL 2 TIMES DAILY
Qty: 60 TABLET | Refills: 0 | Status: SHIPPED | OUTPATIENT
Start: 2024-08-08

## 2024-08-08 RX ORDER — VALSARTAN AND HYDROCHLOROTHIAZIDE 160; 25 MG/1; MG/1
1 TABLET ORAL DAILY
Qty: 30 TABLET | Refills: 0 | Status: SHIPPED | OUTPATIENT
Start: 2024-08-08

## 2024-08-25 DIAGNOSIS — I10 PRIMARY HYPERTENSION: Primary | ICD-10-CM

## 2024-08-26 RX ORDER — METOPROLOL TARTRATE 100 MG
100 TABLET ORAL 2 TIMES DAILY
Qty: 60 TABLET | Refills: 0 | Status: SHIPPED | OUTPATIENT
Start: 2024-08-26

## 2024-08-26 NOTE — TELEPHONE ENCOUNTER
Ben called requesting a refill of the below medication which has been pended for you:     Requested Prescriptions     Pending Prescriptions Disp Refills    metoprolol (LOPRESSOR) 100 MG tablet [Pharmacy Med Name: METOPROLOL TARTRATE 100MG TABLETS] 180 tablet 1     Sig: TAKE 1 TABLET BY MOUTH TWICE DAILY       Last Appointment Date: 12/18/2023  Next Appointment Date: 9/10/2024    No Known Allergies

## 2024-09-03 DIAGNOSIS — E78.2 MIXED HYPERLIPIDEMIA: ICD-10-CM

## 2024-09-03 RX ORDER — ATORVASTATIN CALCIUM 20 MG/1
20 TABLET, FILM COATED ORAL DAILY
Qty: 90 TABLET | Refills: 1 | Status: SHIPPED | OUTPATIENT
Start: 2024-09-03

## 2024-09-03 NOTE — TELEPHONE ENCOUNTER
Ben called requesting a refill of the below medication which has been pended for you:     Requested Prescriptions     Pending Prescriptions Disp Refills    atorvastatin (LIPITOR) 20 MG tablet [Pharmacy Med Name: ATORVASTATIN 20MG TABLETS] 90 tablet      Sig: TAKE 1 TABLET BY MOUTH DAILY       Last Appointment Date: 12/18/2023  Next Appointment Date: 9/10/2024    No Known Allergies

## 2024-09-04 ENCOUNTER — OFFICE VISIT (OUTPATIENT)
Dept: PODIATRY | Age: 79
End: 2024-09-04
Payer: MEDICARE

## 2024-09-04 VITALS
RESPIRATION RATE: 20 BRPM | SYSTOLIC BLOOD PRESSURE: 128 MMHG | DIASTOLIC BLOOD PRESSURE: 76 MMHG | WEIGHT: 294.2 LBS | BODY MASS INDEX: 44.73 KG/M2 | HEART RATE: 76 BPM

## 2024-09-04 DIAGNOSIS — M21.6X2 EQUINUS DEFORMITY OF BOTH FEET: ICD-10-CM

## 2024-09-04 DIAGNOSIS — M21.6X1 EQUINUS DEFORMITY OF BOTH FEET: ICD-10-CM

## 2024-09-04 DIAGNOSIS — B35.1 DERMATOPHYTOSIS OF NAIL: ICD-10-CM

## 2024-09-04 DIAGNOSIS — E11.42 DM TYPE 2 WITH DIABETIC PERIPHERAL NEUROPATHY (HCC): Primary | ICD-10-CM

## 2024-09-04 PROCEDURE — 11721 DEBRIDE NAIL 6 OR MORE: CPT | Performed by: PODIATRIST

## 2024-09-04 PROCEDURE — 3078F DIAST BP <80 MM HG: CPT | Performed by: PODIATRIST

## 2024-09-04 PROCEDURE — 3074F SYST BP LT 130 MM HG: CPT | Performed by: PODIATRIST

## 2024-09-04 PROCEDURE — 1124F ACP DISCUSS-NO DSCNMKR DOCD: CPT | Performed by: PODIATRIST

## 2024-09-04 PROCEDURE — G8427 DOCREV CUR MEDS BY ELIG CLIN: HCPCS | Performed by: PODIATRIST

## 2024-09-04 PROCEDURE — 99213 OFFICE O/P EST LOW 20 MIN: CPT | Performed by: PODIATRIST

## 2024-09-04 PROCEDURE — 1036F TOBACCO NON-USER: CPT | Performed by: PODIATRIST

## 2024-09-04 PROCEDURE — G8417 CALC BMI ABV UP PARAM F/U: HCPCS | Performed by: PODIATRIST

## 2024-09-04 NOTE — PROGRESS NOTES
Foot Care Worksheet  PCP: Cleopatra Iyer, ROCÍO - CNP  Last visit: 12 / 18 / 2023    Nail description: Thick , Yellow , Crumbly , Marked limitation of ambulation     Pain resulting from thickened and dystrophy of nail plate No    Nails involved  Right   1, 2, 3, 4, 5  (T5-T9)  Left     1, 2, 3, 4, 5  (TA-T4)    Q7 1 Class A Finding - Non traumatic amputation of foot No    Q8 2 Class B Findings - Absent DP pulse No, Absent PT pulse No, Advanced tropic changes (3 required) Yes    Decrease hair growth Yes, Nail changes/thickening Yes, Pigmented changes/discoloration Yes, Skin texture (thin, shiny) Yes, Skin color (rubor/redness) No    Q9 1 Class B and 2 Class C Findings  Claudication No, Temperature change No, Paresthesia No, Burning Yes, Edema Yes    Subjective:  Patient presents to [facility] today for soreness both feet.  Happens off-and-on with increased activity.  No treatment tried.  No trauma.  Patient also request diabetic foot care.  Patient's diabetic control has been not changed.    No Known Allergies    Past Medical History:   Diagnosis Date    Acute cystitis with hematuria 09/17/2021    TITUS (acute kidney injury) (HCC) 09/17/2021    Anemia     chronic, negative work up with EGD and Colonoscopy.    Chest pain 06/09/2016    DJD (degenerative joint disease) of lumbar spine     Dry eye syndrome     Fall     GERD (gastroesophageal reflux disease)     Hepatic hemangioma     Hypertension     Keratitis     Secondary to dry eye syndrome.    Nuclear sclerotic cataract of both eyes     Obesity, morbid (HCC)     Obstructive sleep apnea of adult     non compliant with cpap    Onychomycosis     1, 2, 3, 4, and 5, bilateral.    Type II or unspecified type diabetes mellitus without mention of complication, not stated as uncontrolled     Urinary tract infection with hematuria 08/04/2020       Prior to Admission medications    Medication Sig Start Date End Date Taking? Authorizing Provider

## 2024-09-09 DIAGNOSIS — E11.42 DM TYPE 2 WITH DIABETIC PERIPHERAL NEUROPATHY (HCC): ICD-10-CM

## 2024-09-09 DIAGNOSIS — I10 ESSENTIAL HYPERTENSION: ICD-10-CM

## 2024-09-09 RX ORDER — METFORMIN HCL 500 MG
500 TABLET, EXTENDED RELEASE 24 HR ORAL 2 TIMES DAILY
Qty: 60 TABLET | Refills: 0 | Status: SHIPPED | OUTPATIENT
Start: 2024-09-09 | End: 2024-09-10 | Stop reason: SDUPTHER

## 2024-09-09 RX ORDER — VALSARTAN AND HYDROCHLOROTHIAZIDE 160; 25 MG/1; MG/1
1 TABLET ORAL DAILY
Qty: 30 TABLET | Refills: 0 | Status: SHIPPED | OUTPATIENT
Start: 2024-09-09 | End: 2024-09-10 | Stop reason: SDUPTHER

## 2024-09-10 ENCOUNTER — HOSPITAL ENCOUNTER (OUTPATIENT)
Age: 79
Discharge: HOME | End: 2024-09-10
Payer: MEDICARE

## 2024-09-10 ENCOUNTER — OFFICE VISIT (OUTPATIENT)
Dept: FAMILY MEDICINE CLINIC | Age: 79
End: 2024-09-10
Payer: MEDICARE

## 2024-09-10 VITALS
WEIGHT: 292 LBS | SYSTOLIC BLOOD PRESSURE: 138 MMHG | BODY MASS INDEX: 44.25 KG/M2 | OXYGEN SATURATION: 94 % | HEIGHT: 68 IN | DIASTOLIC BLOOD PRESSURE: 84 MMHG | HEART RATE: 60 BPM

## 2024-09-10 DIAGNOSIS — N18.2 CKD (CHRONIC KIDNEY DISEASE) STAGE 2, GFR 60-89 ML/MIN: ICD-10-CM

## 2024-09-10 DIAGNOSIS — E11.42 DM TYPE 2 WITH DIABETIC PERIPHERAL NEUROPATHY (HCC): ICD-10-CM

## 2024-09-10 DIAGNOSIS — R35.0 URINARY FREQUENCY: ICD-10-CM

## 2024-09-10 DIAGNOSIS — I10 ESSENTIAL HYPERTENSION: ICD-10-CM

## 2024-09-10 DIAGNOSIS — E05.90 HYPERTHYROIDISM: ICD-10-CM

## 2024-09-10 DIAGNOSIS — K21.9 GASTROESOPHAGEAL REFLUX DISEASE WITHOUT ESOPHAGITIS: ICD-10-CM

## 2024-09-10 DIAGNOSIS — Z00.00 MEDICARE ANNUAL WELLNESS VISIT, SUBSEQUENT: Primary | ICD-10-CM

## 2024-09-10 DIAGNOSIS — E78.2 MIXED HYPERLIPIDEMIA: ICD-10-CM

## 2024-09-10 DIAGNOSIS — E66.01 OBESITY, CLASS III, BMI 40-49.9 (MORBID OBESITY) (HCC): ICD-10-CM

## 2024-09-10 DIAGNOSIS — J30.2 SEASONAL ALLERGIC RHINITIS, UNSPECIFIED TRIGGER: ICD-10-CM

## 2024-09-10 LAB
ALBUMIN SERPL-MCNC: 3.7 G/DL (ref 3.5–5.2)
ALBUMIN/GLOB SERPL: 0.9 {RATIO} (ref 1–2.5)
ALP SERPL-CCNC: 149 U/L (ref 40–129)
ALT SERPL-CCNC: 19 U/L (ref 5–41)
ANION GAP SERPL CALCULATED.3IONS-SCNC: 11 MMOL/L (ref 9–17)
AST SERPL-CCNC: 12 U/L
BACTERIA URNS QL MICRO: ABNORMAL
BILIRUB SERPL-MCNC: 0.4 MG/DL (ref 0.3–1.2)
BILIRUB UR QL STRIP: NEGATIVE
BUN SERPL-MCNC: 18 MG/DL (ref 8–23)
BUN/CREAT SERPL: 11 (ref 9–20)
CALCIUM SERPL-MCNC: 9.2 MG/DL (ref 8.6–10.4)
CHARACTER UR: ABNORMAL
CHLORIDE SERPL-SCNC: 103 MMOL/L (ref 98–107)
CHOLEST SERPL-MCNC: 117 MG/DL (ref 0–199)
CHOLESTEROL/HDL RATIO: 3
CLARITY UR: CLEAR
CO2 SERPL-SCNC: 25 MMOL/L (ref 20–31)
COLOR UR: YELLOW
CREAT SERPL-MCNC: 1.6 MG/DL (ref 0.7–1.2)
CREAT UR-MCNC: 94.1 MG/DL (ref 39–259)
EPI CELLS #/AREA URNS HPF: ABNORMAL /HPF (ref 0–5)
EST. AVERAGE GLUCOSE BLD GHB EST-MCNC: 140 MG/DL
GFR, ESTIMATED: 43 ML/MIN/1.73M2
GLUCOSE SERPL-MCNC: 144 MG/DL (ref 70–99)
GLUCOSE UR STRIP-MCNC: NEGATIVE MG/DL
HBA1C MFR BLD: 6.5 % (ref 4–6)
HDLC SERPL-MCNC: 46 MG/DL
HGB UR QL STRIP.AUTO: ABNORMAL
KETONES UR STRIP-MCNC: NEGATIVE MG/DL
LDLC SERPL CALC-MCNC: 52 MG/DL (ref 0–100)
LEUKOCYTE ESTERASE UR QL STRIP: NEGATIVE
MICROALBUMIN UR-MCNC: 1503 MG/L (ref 0–20)
MICROALBUMIN/CREAT UR-RTO: 1597 MCG/MG CREAT (ref 0–17)
NITRITE UR QL STRIP: NEGATIVE
PH UR STRIP: 6.5 [PH] (ref 5–6)
POTASSIUM SERPL-SCNC: 4 MMOL/L (ref 3.7–5.3)
PROT SERPL-MCNC: 8 G/DL (ref 6.4–8.3)
PROT UR STRIP-MCNC: ABNORMAL MG/DL
RBC #/AREA URNS HPF: ABNORMAL /HPF (ref 0–4)
SODIUM SERPL-SCNC: 139 MMOL/L (ref 135–144)
SP GR UR STRIP: 1.02 (ref 1.01–1.02)
T4 FREE SERPL-MCNC: 1.4 NG/DL (ref 0.92–1.68)
TRIGL SERPL-MCNC: 98 MG/DL
TSH SERPL DL<=0.05 MIU/L-ACNC: 0.74 UIU/ML (ref 0.3–5)
UROBILINOGEN UR STRIP-ACNC: NORMAL EU/DL (ref 0–1)
VLDLC SERPL CALC-MCNC: 20 MG/DL
WBC #/AREA URNS HPF: ABNORMAL /HPF (ref 0–4)

## 2024-09-10 PROCEDURE — 3079F DIAST BP 80-89 MM HG: CPT | Performed by: NURSE PRACTITIONER

## 2024-09-10 PROCEDURE — 82570 ASSAY OF URINE CREATININE: CPT

## 2024-09-10 PROCEDURE — 80061 LIPID PANEL: CPT

## 2024-09-10 PROCEDURE — 84439 ASSAY OF FREE THYROXINE: CPT

## 2024-09-10 PROCEDURE — G8427 DOCREV CUR MEDS BY ELIG CLIN: HCPCS | Performed by: NURSE PRACTITIONER

## 2024-09-10 PROCEDURE — G8417 CALC BMI ABV UP PARAM F/U: HCPCS | Performed by: NURSE PRACTITIONER

## 2024-09-10 PROCEDURE — 81001 URINALYSIS AUTO W/SCOPE: CPT

## 2024-09-10 PROCEDURE — 1036F TOBACCO NON-USER: CPT | Performed by: NURSE PRACTITIONER

## 2024-09-10 PROCEDURE — 1124F ACP DISCUSS-NO DSCNMKR DOCD: CPT | Performed by: NURSE PRACTITIONER

## 2024-09-10 PROCEDURE — 80053 COMPREHEN METABOLIC PANEL: CPT

## 2024-09-10 PROCEDURE — 82043 UR ALBUMIN QUANTITATIVE: CPT

## 2024-09-10 PROCEDURE — 3044F HG A1C LEVEL LT 7.0%: CPT | Performed by: NURSE PRACTITIONER

## 2024-09-10 PROCEDURE — 99213 OFFICE O/P EST LOW 20 MIN: CPT | Performed by: NURSE PRACTITIONER

## 2024-09-10 PROCEDURE — 84443 ASSAY THYROID STIM HORMONE: CPT

## 2024-09-10 PROCEDURE — 83036 HEMOGLOBIN GLYCOSYLATED A1C: CPT

## 2024-09-10 PROCEDURE — 3075F SYST BP GE 130 - 139MM HG: CPT | Performed by: NURSE PRACTITIONER

## 2024-09-10 PROCEDURE — G0439 PPPS, SUBSEQ VISIT: HCPCS | Performed by: NURSE PRACTITIONER

## 2024-09-10 PROCEDURE — 36415 COLL VENOUS BLD VENIPUNCTURE: CPT

## 2024-09-10 RX ORDER — OMEPRAZOLE 40 MG/1
40 CAPSULE, DELAYED RELEASE ORAL DAILY
Qty: 90 CAPSULE | Refills: 1 | Status: SHIPPED | OUTPATIENT
Start: 2024-09-10

## 2024-09-10 RX ORDER — VALSARTAN AND HYDROCHLOROTHIAZIDE 160; 25 MG/1; MG/1
1 TABLET ORAL DAILY
Qty: 90 TABLET | Refills: 1 | Status: SHIPPED | OUTPATIENT
Start: 2024-09-10

## 2024-09-10 RX ORDER — FLUTICASONE PROPIONATE 50 MCG
SPRAY, SUSPENSION (ML) NASAL
Qty: 48 G | Refills: 3 | Status: SHIPPED | OUTPATIENT
Start: 2024-09-10

## 2024-09-10 RX ORDER — METFORMIN HCL 500 MG
500 TABLET, EXTENDED RELEASE 24 HR ORAL 2 TIMES DAILY
Qty: 180 TABLET | Refills: 1 | Status: SHIPPED | OUTPATIENT
Start: 2024-09-10

## 2024-09-10 RX ORDER — FAMOTIDINE 20 MG/1
20 TABLET, FILM COATED ORAL 2 TIMES DAILY
Qty: 180 TABLET | Refills: 1 | Status: SHIPPED | OUTPATIENT
Start: 2024-09-10

## 2024-09-10 RX ORDER — METOPROLOL TARTRATE 100 MG
100 TABLET ORAL 2 TIMES DAILY
Qty: 180 TABLET | Refills: 1 | Status: SHIPPED | OUTPATIENT
Start: 2024-09-10

## 2024-09-10 ASSESSMENT — PATIENT HEALTH QUESTIONNAIRE - PHQ9
SUM OF ALL RESPONSES TO PHQ9 QUESTIONS 1 & 2: 2
10. IF YOU CHECKED OFF ANY PROBLEMS, HOW DIFFICULT HAVE THESE PROBLEMS MADE IT FOR YOU TO DO YOUR WORK, TAKE CARE OF THINGS AT HOME, OR GET ALONG WITH OTHER PEOPLE: NOT DIFFICULT AT ALL
4. FEELING TIRED OR HAVING LITTLE ENERGY: SEVERAL DAYS
SUM OF ALL RESPONSES TO PHQ QUESTIONS 1-9: 4
SUM OF ALL RESPONSES TO PHQ QUESTIONS 1-9: 4
7. TROUBLE CONCENTRATING ON THINGS, SUCH AS READING THE NEWSPAPER OR WATCHING TELEVISION: NOT AT ALL
8. MOVING OR SPEAKING SO SLOWLY THAT OTHER PEOPLE COULD HAVE NOTICED. OR THE OPPOSITE, BEING SO FIGETY OR RESTLESS THAT YOU HAVE BEEN MOVING AROUND A LOT MORE THAN USUAL: SEVERAL DAYS
2. FEELING DOWN, DEPRESSED OR HOPELESS: SEVERAL DAYS
9. THOUGHTS THAT YOU WOULD BE BETTER OFF DEAD, OR OF HURTING YOURSELF: NOT AT ALL
1. LITTLE INTEREST OR PLEASURE IN DOING THINGS: SEVERAL DAYS
3. TROUBLE FALLING OR STAYING ASLEEP: NOT AT ALL
5. POOR APPETITE OR OVEREATING: NOT AT ALL
6. FEELING BAD ABOUT YOURSELF - OR THAT YOU ARE A FAILURE OR HAVE LET YOURSELF OR YOUR FAMILY DOWN: NOT AT ALL
SUM OF ALL RESPONSES TO PHQ QUESTIONS 1-9: 4
SUM OF ALL RESPONSES TO PHQ QUESTIONS 1-9: 4

## 2024-09-10 ASSESSMENT — LIFESTYLE VARIABLES
HOW MANY STANDARD DRINKS CONTAINING ALCOHOL DO YOU HAVE ON A TYPICAL DAY: PATIENT DOES NOT DRINK
HOW OFTEN DO YOU HAVE A DRINK CONTAINING ALCOHOL: NEVER

## 2024-09-16 ASSESSMENT — ENCOUNTER SYMPTOMS
EYE ITCHING: 1
RHINORRHEA: 1

## 2024-10-01 NOTE — PROGRESS NOTES
MARÍA ELENA BELCHER MD        [facility]  [facility] UROLOGY A DEPARTMENT OF [facility]  [address] [facility] [address]  Dept: [phone]  Dept Fax: [phone]  Loc: [phone]      [facility] Urology Office Note -     Patient:  Ben Villaseñor  YOB: 1945  Date: 2/7/2024    The patient is a 78 y.o. male who presents today for evaluation of the following problems:   Chief Complaint   Patient presents with    Elevated PSA     New Patient- Referred LSS    referred/consultation requested by Cleopatra Iyer APRN - CNP.    HISTORY OF PRESENT ILLNESS:     Elevated PSA  Stable. Here with PSA  No family hx of prostate cancer  Here with biopsy results--- negative      Urinary frequency- hx of diabetes. on flomax and ditropan- helpful. At treatment goal        Requested/reviewed records from Cleopatra Iyer APRN - CNP office and/or outside physician/EMR    (Patient's old records have been requested, reviewed and pertinent findings summarized in today's note.)    Procedures Today: N/A      Last several PSA's:  Lab Results   Component Value Date    PSA 5.56 (H) 12/18/2023    PSA 4.33 (H) 01/25/2021    PSA 4.46 (H) 02/03/2020       Last total testosterone:  No results found for: \"TESTOSTERONE\"    Urinalysis today:  No results found for this visit on 02/07/24.    Last BUN and creatinine:  Lab Results   Component Value Date    BUN 22 12/18/2023     Lab Results   Component Value Date    CREATININE 1.4 (H) 12/18/2023       Additional Lab/Culture results: none    Imaging Reviewed during this Office Visit:   MARÍA ELENA BELCHER MD independently reviewed the images and verified the radiology reports from:    No results found.    PAST MEDICAL, FAMILY AND SOCIAL HISTORY:  Past Medical History:   Diagnosis Date    Acute cystitis with hematuria 09/17/2021    TITUS (acute kidney injury) (HCC) 09/17/2021    Anemia     chronic, negative work up with  [email or web address]

## 2024-10-03 RX ORDER — TOLTERODINE 4 MG/1
4 CAPSULE, EXTENDED RELEASE ORAL DAILY
Qty: 90 CAPSULE | Refills: 0 | Status: SHIPPED | OUTPATIENT
Start: 2024-10-03 | End: 2024-12-02

## 2024-10-10 DIAGNOSIS — R82.90 ABNORMAL URINALYSIS: Primary | ICD-10-CM

## 2024-10-10 DIAGNOSIS — E11.42 DIABETIC POLYNEUROPATHY ASSOCIATED WITH TYPE 2 DIABETES MELLITUS (HCC): ICD-10-CM

## 2024-10-10 DIAGNOSIS — R80.9 MICROALBUMINURIA: ICD-10-CM

## 2024-12-13 DIAGNOSIS — N40.0 ENLARGED PROSTATE: ICD-10-CM

## 2024-12-13 RX ORDER — TAMSULOSIN HYDROCHLORIDE 0.4 MG/1
0.4 CAPSULE ORAL DAILY
Qty: 90 CAPSULE | Refills: 1 | Status: SHIPPED | OUTPATIENT
Start: 2024-12-13

## 2024-12-13 NOTE — TELEPHONE ENCOUNTER
Ben called requesting a refill of the below medication which has been pended for you:     Requested Prescriptions     Pending Prescriptions Disp Refills    tamsulosin (FLOMAX) 0.4 MG capsule [Pharmacy Med Name: TAMSULOSIN 0.4MG CAPSULES] 90 capsule 1     Sig: TAKE 1 CAPSULE BY MOUTH DAILY       Last Appointment Date: 9/10/2024  Next Appointment Date: 3/11/2025    No Known Allergies

## 2025-01-13 RX ORDER — TOLTERODINE 4 MG/1
4 CAPSULE, EXTENDED RELEASE ORAL DAILY
Qty: 90 CAPSULE | Refills: 0 | OUTPATIENT
Start: 2025-01-13 | End: 2025-03-14

## 2025-02-22 DIAGNOSIS — E78.2 MIXED HYPERLIPIDEMIA: ICD-10-CM

## 2025-02-22 DIAGNOSIS — K21.9 GASTROESOPHAGEAL REFLUX DISEASE WITHOUT ESOPHAGITIS: ICD-10-CM

## 2025-02-22 DIAGNOSIS — I10 ESSENTIAL HYPERTENSION: ICD-10-CM

## 2025-02-22 DIAGNOSIS — E11.42 DM TYPE 2 WITH DIABETIC PERIPHERAL NEUROPATHY (HCC): ICD-10-CM

## 2025-02-24 RX ORDER — METFORMIN HYDROCHLORIDE 500 MG/1
500 TABLET, EXTENDED RELEASE ORAL 2 TIMES DAILY
Qty: 180 TABLET | Refills: 0 | Status: SHIPPED | OUTPATIENT
Start: 2025-02-24

## 2025-02-24 RX ORDER — ATORVASTATIN CALCIUM 20 MG/1
20 TABLET, FILM COATED ORAL DAILY
Qty: 90 TABLET | Refills: 0 | Status: SHIPPED | OUTPATIENT
Start: 2025-02-24

## 2025-02-24 RX ORDER — OMEPRAZOLE 40 MG/1
40 CAPSULE, DELAYED RELEASE ORAL DAILY
Qty: 90 CAPSULE | Refills: 0 | Status: SHIPPED | OUTPATIENT
Start: 2025-02-24

## 2025-02-24 RX ORDER — VALSARTAN AND HYDROCHLOROTHIAZIDE 160; 25 MG/1; MG/1
1 TABLET ORAL DAILY
Qty: 90 TABLET | Refills: 0 | Status: SHIPPED | OUTPATIENT
Start: 2025-02-24

## 2025-02-24 RX ORDER — FAMOTIDINE 20 MG/1
20 TABLET, FILM COATED ORAL 2 TIMES DAILY
Qty: 180 TABLET | Refills: 0 | Status: SHIPPED | OUTPATIENT
Start: 2025-02-24

## 2025-02-24 RX ORDER — METOPROLOL TARTRATE 100 MG/1
100 TABLET ORAL 2 TIMES DAILY
Qty: 180 TABLET | Refills: 0 | Status: SHIPPED | OUTPATIENT
Start: 2025-02-24

## 2025-02-24 NOTE — TELEPHONE ENCOUNTER
Ben called requesting a refill of the below medication which has been pended for you:     Requested Prescriptions     Pending Prescriptions Disp Refills    atorvastatin (LIPITOR) 20 MG tablet [Pharmacy Med Name: ATORVASTATIN 20MG TABLETS] 90 tablet 1     Sig: TAKE 1 TABLET BY MOUTH DAILY    metFORMIN (GLUCOPHAGE-XR) 500 MG extended release tablet [Pharmacy Med Name: METFORMIN ER 500MG 24HR TABS] 180 tablet 1     Sig: TAKE 1 TABLET BY MOUTH IN THE MORNING AND AT BEDTIME    metoprolol (LOPRESSOR) 100 MG tablet [Pharmacy Med Name: METOPROLOL TARTRATE 100MG TABLETS] 180 tablet 1     Sig: TAKE 1 TABLET BY MOUTH TWICE DAILY    omeprazole (PRILOSEC) 40 MG delayed release capsule [Pharmacy Med Name: OMEPRAZOLE 40MG CAPSULES] 90 capsule 1     Sig: TAKE 1 CAPSULE BY MOUTH DAILY    valsartan-hydroCHLOROthiazide (DIOVAN-HCT) 160-25 MG per tablet [Pharmacy Med Name: VALSARTAN/HCTZ 160MG/25MG TABLETS] 90 tablet 1     Sig: TAKE 1 TABLET BY MOUTH DAILY    famotidine (PEPCID) 20 MG tablet [Pharmacy Med Name: FAMOTIDINE 20MG TABLETS] 180 tablet 1     Sig: TAKE 1 TABLET BY MOUTH TWICE DAILY       Last Appointment Date: 9/10/2024  Next Appointment Date: 3/11/2025    No Known Allergies

## 2025-03-11 ENCOUNTER — OFFICE VISIT (OUTPATIENT)
Dept: FAMILY MEDICINE CLINIC | Age: 80
End: 2025-03-11
Payer: MEDICARE

## 2025-03-11 ENCOUNTER — OFFICE VISIT (OUTPATIENT)
Dept: NEPHROLOGY | Age: 80
End: 2025-03-11
Payer: MEDICARE

## 2025-03-11 ENCOUNTER — OFFICE VISIT (OUTPATIENT)
Dept: PODIATRY | Age: 80
End: 2025-03-11
Payer: MEDICARE

## 2025-03-11 VITALS
SYSTOLIC BLOOD PRESSURE: 118 MMHG | HEART RATE: 64 BPM | RESPIRATION RATE: 20 BRPM | DIASTOLIC BLOOD PRESSURE: 82 MMHG | BODY MASS INDEX: 44.4 KG/M2 | WEIGHT: 292 LBS

## 2025-03-11 VITALS
BODY MASS INDEX: 45.01 KG/M2 | SYSTOLIC BLOOD PRESSURE: 132 MMHG | HEART RATE: 60 BPM | DIASTOLIC BLOOD PRESSURE: 74 MMHG | WEIGHT: 296 LBS | OXYGEN SATURATION: 95 %

## 2025-03-11 VITALS — OXYGEN SATURATION: 93 % | HEART RATE: 63 BPM | DIASTOLIC BLOOD PRESSURE: 82 MMHG | SYSTOLIC BLOOD PRESSURE: 118 MMHG

## 2025-03-11 DIAGNOSIS — N18.32 STAGE 3B CHRONIC KIDNEY DISEASE (HCC): Primary | ICD-10-CM

## 2025-03-11 DIAGNOSIS — B35.1 DERMATOPHYTOSIS OF NAIL: ICD-10-CM

## 2025-03-11 DIAGNOSIS — E78.2 MIXED HYPERLIPIDEMIA: ICD-10-CM

## 2025-03-11 DIAGNOSIS — N40.0 ENLARGED PROSTATE: ICD-10-CM

## 2025-03-11 DIAGNOSIS — I10 PRIMARY HYPERTENSION: ICD-10-CM

## 2025-03-11 DIAGNOSIS — R60.0 LOCALIZED EDEMA: ICD-10-CM

## 2025-03-11 DIAGNOSIS — E11.42 DM TYPE 2 WITH DIABETIC PERIPHERAL NEUROPATHY (HCC): Primary | ICD-10-CM

## 2025-03-11 DIAGNOSIS — K21.9 GASTROESOPHAGEAL REFLUX DISEASE WITHOUT ESOPHAGITIS: ICD-10-CM

## 2025-03-11 DIAGNOSIS — E11.42 DM TYPE 2 WITH DIABETIC PERIPHERAL NEUROPATHY (HCC): ICD-10-CM

## 2025-03-11 DIAGNOSIS — E05.90 HYPERTHYROIDISM: ICD-10-CM

## 2025-03-11 DIAGNOSIS — N40.0 BENIGN PROSTATIC HYPERPLASIA, UNSPECIFIED WHETHER LOWER URINARY TRACT SYMPTOMS PRESENT: ICD-10-CM

## 2025-03-11 DIAGNOSIS — I10 ESSENTIAL HYPERTENSION: ICD-10-CM

## 2025-03-11 PROCEDURE — 1124F ACP DISCUSS-NO DSCNMKR DOCD: CPT | Performed by: INTERNAL MEDICINE

## 2025-03-11 PROCEDURE — 11721 DEBRIDE NAIL 6 OR MORE: CPT | Performed by: PODIATRIST

## 2025-03-11 PROCEDURE — G8427 DOCREV CUR MEDS BY ELIG CLIN: HCPCS | Performed by: NURSE PRACTITIONER

## 2025-03-11 PROCEDURE — G8417 CALC BMI ABV UP PARAM F/U: HCPCS | Performed by: NURSE PRACTITIONER

## 2025-03-11 PROCEDURE — 99214 OFFICE O/P EST MOD 30 MIN: CPT | Performed by: NURSE PRACTITIONER

## 2025-03-11 PROCEDURE — 1160F RVW MEDS BY RX/DR IN RCRD: CPT | Performed by: NURSE PRACTITIONER

## 2025-03-11 PROCEDURE — 1159F MED LIST DOCD IN RCRD: CPT | Performed by: NURSE PRACTITIONER

## 2025-03-11 PROCEDURE — 1036F TOBACCO NON-USER: CPT | Performed by: INTERNAL MEDICINE

## 2025-03-11 PROCEDURE — 1159F MED LIST DOCD IN RCRD: CPT | Performed by: INTERNAL MEDICINE

## 2025-03-11 PROCEDURE — G8427 DOCREV CUR MEDS BY ELIG CLIN: HCPCS | Performed by: INTERNAL MEDICINE

## 2025-03-11 PROCEDURE — 1124F ACP DISCUSS-NO DSCNMKR DOCD: CPT | Performed by: NURSE PRACTITIONER

## 2025-03-11 PROCEDURE — 3074F SYST BP LT 130 MM HG: CPT | Performed by: INTERNAL MEDICINE

## 2025-03-11 PROCEDURE — 99213 OFFICE O/P EST LOW 20 MIN: CPT | Performed by: INTERNAL MEDICINE

## 2025-03-11 PROCEDURE — 3078F DIAST BP <80 MM HG: CPT | Performed by: NURSE PRACTITIONER

## 2025-03-11 PROCEDURE — 3075F SYST BP GE 130 - 139MM HG: CPT | Performed by: NURSE PRACTITIONER

## 2025-03-11 PROCEDURE — G8417 CALC BMI ABV UP PARAM F/U: HCPCS | Performed by: INTERNAL MEDICINE

## 2025-03-11 PROCEDURE — 1036F TOBACCO NON-USER: CPT | Performed by: NURSE PRACTITIONER

## 2025-03-11 PROCEDURE — 3079F DIAST BP 80-89 MM HG: CPT | Performed by: INTERNAL MEDICINE

## 2025-03-11 PROCEDURE — 99204 OFFICE O/P NEW MOD 45 MIN: CPT | Performed by: INTERNAL MEDICINE

## 2025-03-11 PROCEDURE — 99999 PR OFFICE/OUTPT VISIT,PROCEDURE ONLY: CPT | Performed by: PODIATRIST

## 2025-03-11 SDOH — ECONOMIC STABILITY: FOOD INSECURITY: WITHIN THE PAST 12 MONTHS, YOU WORRIED THAT YOUR FOOD WOULD RUN OUT BEFORE YOU GOT MONEY TO BUY MORE.: NEVER TRUE

## 2025-03-11 SDOH — ECONOMIC STABILITY: FOOD INSECURITY: WITHIN THE PAST 12 MONTHS, THE FOOD YOU BOUGHT JUST DIDN'T LAST AND YOU DIDN'T HAVE MONEY TO GET MORE.: NEVER TRUE

## 2025-03-11 ASSESSMENT — PATIENT HEALTH QUESTIONNAIRE - PHQ9
10. IF YOU CHECKED OFF ANY PROBLEMS, HOW DIFFICULT HAVE THESE PROBLEMS MADE IT FOR YOU TO DO YOUR WORK, TAKE CARE OF THINGS AT HOME, OR GET ALONG WITH OTHER PEOPLE: NOT DIFFICULT AT ALL
7. TROUBLE CONCENTRATING ON THINGS, SUCH AS READING THE NEWSPAPER OR WATCHING TELEVISION: NOT AT ALL
3. TROUBLE FALLING OR STAYING ASLEEP: SEVERAL DAYS
8. MOVING OR SPEAKING SO SLOWLY THAT OTHER PEOPLE COULD HAVE NOTICED. OR THE OPPOSITE, BEING SO FIGETY OR RESTLESS THAT YOU HAVE BEEN MOVING AROUND A LOT MORE THAN USUAL: SEVERAL DAYS
2. FEELING DOWN, DEPRESSED OR HOPELESS: SEVERAL DAYS
SUM OF ALL RESPONSES TO PHQ QUESTIONS 1-9: 5
9. THOUGHTS THAT YOU WOULD BE BETTER OFF DEAD, OR OF HURTING YOURSELF: NOT AT ALL
6. FEELING BAD ABOUT YOURSELF - OR THAT YOU ARE A FAILURE OR HAVE LET YOURSELF OR YOUR FAMILY DOWN: NOT AT ALL
5. POOR APPETITE OR OVEREATING: NOT AT ALL
1. LITTLE INTEREST OR PLEASURE IN DOING THINGS: SEVERAL DAYS
SUM OF ALL RESPONSES TO PHQ QUESTIONS 1-9: 5
4. FEELING TIRED OR HAVING LITTLE ENERGY: SEVERAL DAYS

## 2025-03-11 NOTE — PROGRESS NOTES
Foot Care Worksheet  PCP: Cleopatra Iyer APRN - CNP  Last visit: 10 / 04 / 2024    Nail description: Thick , Yellow , Crumbly , Marked limitation of ambulation     Pain resulting from thickened and dystrophy of nail plate No    Nails involved  Right   1, 2, 3, 4, 5  (T5-T9)  Left     1, 2, 3, 4, 5  (TA-T4)    Q7 1 Class A Finding - Non traumatic amputation of foot No    Q8 2 Class B Findings - Absent DP pulse No, Absent PT pulse No, Advanced tropic changes (3 required) Yes    Decrease hair growth Yes, Nail changes/thickening Yes, Pigmented changes/discoloration Yes, Skin texture (thin, shiny) Yes, Skin color (rubor/redness) No    Q9 1 Class B and 2 Class C Findings  Claudication No, Temperature change No, Paresthesia No, Burning Yes, Edema Yes    Subjective:  Patient presents to [facility] today for diabetic foot care.  Patient's diabetic control has been not changed.    No Known Allergies    Past Medical History:   Diagnosis Date    Acute cystitis with hematuria 09/17/2021    TITUS (acute kidney injury) 09/17/2021    Anemia     chronic, negative work up with EGD and Colonoscopy.    Chest pain 06/09/2016    DJD (degenerative joint disease) of lumbar spine     Dry eye syndrome     Fall     GERD (gastroesophageal reflux disease)     Hepatic hemangioma     Hypertension     Keratitis     Secondary to dry eye syndrome.    Nuclear sclerotic cataract of both eyes     Obesity, morbid     Obstructive sleep apnea of adult     non compliant with cpap    Onychomycosis     1, 2, 3, 4, and 5, bilateral.    Type II or unspecified type diabetes mellitus without mention of complication, not stated as uncontrolled     Urinary tract infection with hematuria 08/04/2020       Prior to Admission medications    Medication Sig Start Date End Date Taking? Authorizing Provider   atorvastatin (LIPITOR) 20 MG tablet TAKE 1 TABLET BY MOUTH DAILY 2/24/25  Yes Cleopatra Iyer APRN - CNP   metFORMIN

## 2025-03-11 NOTE — PROGRESS NOTES
spray SHAKE LIQUID AND USE 2 SPRAYS IN EACH NOSTRIL EVERY DAY 48 g 3    Diabetic Shoe MISC by Does not apply route Dispense Dm shoes and insoles  DM type 2 with diabetic peripheral neuropathy (HCC)  (primary encounter diagnosis)  Equinus deformity of both feet  Dermatophytosis of nail 1 each 0    aspirin 81 MG EC tablet Take 1 tablet by mouth daily 90 tablet 1    blood glucose monitor strips Patient checks blood sugar daily 50 strip 5    Lancets Micro Thin 33G MISC USE TO TEST BLOOD SUGAR EVERY  each 3    TRUE METRIX BLOOD GLUCOSE TEST strip USE TO TEST BLOOD SUGAR DAILY 200 strip 0     No current facility-administered medications for this visit.       Allergies:  Patient has no known allergies.    Social History:   Social History     Socioeconomic History    Marital status: Single     Spouse name: Not on file    Number of children: Not on file    Years of education: Not on file    Highest education level: Not on file   Occupational History    Not on file   Tobacco Use    Smoking status: Former     Current packs/day: 0.00     Average packs/day: 0.3 packs/day for 15.0 years (3.8 ttl pk-yrs)     Types: Cigars, Cigarettes     Start date: 1964     Quit date: 1970     Years since quittin.3    Smokeless tobacco: Never    Tobacco comments:     quit over 30 years ago   Substance and Sexual Activity    Alcohol use: No     Alcohol/week: 0.0 standard drinks of alcohol     Comment: no alcohol for many years    Drug use: No    Sexual activity: Not Currently     Partners: Female   Other Topics Concern    Not on file   Social History Narrative    Not on file     Social Drivers of Health     Financial Resource Strain: Low Risk  (2023)    Overall Financial Resource Strain (CARDIA)     Difficulty of Paying Living Expenses: Not hard at all   Food Insecurity: No Food Insecurity (2023)    Hunger Vital Sign     Worried About Running Out of Food in the Last Year: Never true     Ran Out of Food in the Last

## 2025-03-13 NOTE — PROGRESS NOTES
[facility]  [address] [facility] [address]  Dept: [phone]  Dept Fax: [phone]  Loc: [phone]    Ben Villaseñor is a 80 y.o. male who presents today for his medical conditions/complaintsas noted below.  Ben Villaseñor is c/o of   Chief Complaint   Patient presents with    Diabetes         HPI:     History of Present Illness  The patient is an 80-year-old male who presents for evaluation of type 2 diabetes, hyperlipidemia, hypertension, GERD, hypothyroidism, and enlarged prostate.    He has been inconsistent in monitoring his blood glucose levels but continues to adhere to his prescribed medication regimen.    He acknowledges a need for dietary modifications, particularly a reduction in his consumption of  cupcakes.    He has a history of hyperlipidemia. He is on lipitor 20mg daily. Due for labs.     He has a history of HTN. Denies chest pain or shortness of breath. Denies leg swelling.     He has a history of GERD. He takes omeprazole 40mg daily and pepcid 20mg daily. This controls him symptoms.     He has a history of hyperthyroidism. Does not appear that there was any radiation therapy for this. Due for labs. Not on current thyroid replacement.    He does have a history of enlarged prostate. He is following with nephrology. He is currently on flomax.     He has not been engaging in regular physical exercise but maintains a routine of walking from his residence to [facility] or [facility] and back.    Supplemental Information  He saw the foot doctor and the kidney doctor today. The kidney doctor wants to see him in 4 more months.    FAMILY HISTORY  His brother passed away about 2 years ago.    Hemoglobin A1C (%)   Date Value   09/10/2024 6.5 (H)   12/18/2023 7.6 (H)   03/09/2022 7.9 (H)             ( goal A1Cis < 7)   No components found for: \"LABMICR\"  No components found for: \"LDLCHOLESTEROL\",

## 2025-03-14 ASSESSMENT — ENCOUNTER SYMPTOMS
SHORTNESS OF BREATH: 0
COUGH: 0
WHEEZING: 0

## 2025-03-27 ENCOUNTER — TELEPHONE (OUTPATIENT)
Dept: VASCULAR SURGERY | Age: 80
End: 2025-03-27

## 2025-03-27 NOTE — TELEPHONE ENCOUNTER
I left a message on voicemail for Ben to return our call as he is in need of a ultrasound appointment ordered by Dr. Haque .  Radiology sent a note that they tried to contact him x 3.

## 2025-03-29 ENCOUNTER — HOSPITAL ENCOUNTER (OUTPATIENT)
Age: 80
Setting detail: OBSERVATION
Discharge: HOME | End: 2025-03-30
Attending: EMERGENCY MEDICINE | Admitting: FAMILY MEDICINE
Payer: MEDICARE

## 2025-03-29 ENCOUNTER — APPOINTMENT (OUTPATIENT)
Dept: GENERAL RADIOLOGY | Age: 80
End: 2025-03-29
Payer: MEDICARE

## 2025-03-29 DIAGNOSIS — R07.9 CHEST PAIN, UNSPECIFIED TYPE: Primary | ICD-10-CM

## 2025-03-29 PROBLEM — N17.9 AKI (ACUTE KIDNEY INJURY): Status: RESOLVED | Noted: 2021-09-17 | Resolved: 2025-03-29

## 2025-03-29 PROBLEM — Z87.891 FORMER SMOKER: Status: ACTIVE | Noted: 2025-03-29

## 2025-03-29 PROBLEM — Z91.199 NONCOMPLIANCE: Status: RESOLVED | Noted: 2022-12-25 | Resolved: 2025-03-29

## 2025-03-29 PROBLEM — R09.02 HYPOXIA: Status: RESOLVED | Noted: 2021-09-19 | Resolved: 2025-03-29

## 2025-03-29 PROBLEM — N18.32 CKD STAGE 3B, GFR 30-44 ML/MIN (HCC): Status: ACTIVE | Noted: 2021-09-17

## 2025-03-29 PROBLEM — E86.0 DEHYDRATION: Status: ACTIVE | Noted: 2025-03-29

## 2025-03-29 LAB
ALBUMIN SERPL-MCNC: 3.6 G/DL (ref 3.5–5.2)
ALBUMIN/GLOB SERPL: 0.9 {RATIO} (ref 1–2.5)
ALP SERPL-CCNC: 198 U/L (ref 40–129)
ALT SERPL-CCNC: 7 U/L (ref 5–41)
ANION GAP SERPL CALCULATED.3IONS-SCNC: 10 MMOL/L (ref 9–17)
AST SERPL-CCNC: 11 U/L
BASOPHILS # BLD: 0.03 K/UL (ref 0–0.2)
BASOPHILS NFR BLD: 1 % (ref 0–2)
BILIRUB DIRECT SERPL-MCNC: 0.1 MG/DL (ref 0–0.2)
BILIRUB INDIRECT SERPL-MCNC: 0.1 MG/DL (ref 0–1)
BILIRUB SERPL-MCNC: 0.2 MG/DL (ref 0.3–1.2)
BNP SERPL-MCNC: 81 PG/ML
BUN SERPL-MCNC: 27 MG/DL (ref 8–23)
CALCIUM SERPL-MCNC: 8.5 MG/DL (ref 8.6–10.4)
CHLORIDE SERPL-SCNC: 102 MMOL/L (ref 98–107)
CO2 SERPL-SCNC: 27 MMOL/L (ref 20–31)
CREAT SERPL-MCNC: 2 MG/DL (ref 0.7–1.2)
D DIMER PPP FEU-MCNC: <0.27 UG/ML FEU (ref 0–0.59)
EKG ATRIAL RATE: 64 BPM
EKG P AXIS: 83 DEGREES
EKG P-R INTERVAL: 198 MS
EKG Q-T INTERVAL: 410 MS
EKG QRS DURATION: 78 MS
EKG QTC CALCULATION (BAZETT): 422 MS
EKG R AXIS: 4 DEGREES
EKG T AXIS: 47 DEGREES
EKG VENTRICULAR RATE: 64 BPM
EOSINOPHIL # BLD: 0.14 K/UL (ref 0–0.44)
EOSINOPHILS RELATIVE PERCENT: 2 % (ref 1–4)
ERYTHROCYTE [DISTWIDTH] IN BLOOD BY AUTOMATED COUNT: 13.2 % (ref 11.8–14.4)
GFR, ESTIMATED: 33 ML/MIN/1.73M2
GLUCOSE BLD-MCNC: 97 MG/DL (ref 75–110)
GLUCOSE SERPL-MCNC: 189 MG/DL (ref 70–99)
HCT VFR BLD AUTO: 35.8 % (ref 40.7–50.3)
HGB BLD-MCNC: 11.1 G/DL (ref 13–17)
IMM GRANULOCYTES # BLD AUTO: <0.03 K/UL (ref 0–0.3)
IMM GRANULOCYTES NFR BLD: 0 %
INR PPP: 1
LIPASE SERPL-CCNC: 22 U/L (ref 13–60)
LYMPHOCYTES NFR BLD: 1.76 K/UL (ref 1.1–3.7)
LYMPHOCYTES RELATIVE PERCENT: 28 % (ref 24–43)
MCH RBC QN AUTO: 27.6 PG (ref 25.2–33.5)
MCHC RBC AUTO-ENTMCNC: 31 G/DL (ref 25.2–33.5)
MCV RBC AUTO: 89.1 FL (ref 82.6–102.9)
MONOCYTES NFR BLD: 0.87 K/UL (ref 0.1–1.2)
MONOCYTES NFR BLD: 14 % (ref 3–12)
NEUTROPHILS NFR BLD: 55 % (ref 36–65)
NEUTS SEG NFR BLD: 3.43 K/UL (ref 1.5–8.1)
NRBC BLD-RTO: 0 PER 100 WBC
PLATELET # BLD AUTO: 249 K/UL (ref 138–453)
PMV BLD AUTO: 10.7 FL (ref 8.1–13.5)
POTASSIUM SERPL-SCNC: 4.3 MMOL/L (ref 3.7–5.3)
PROT SERPL-MCNC: 7.6 G/DL (ref 6.4–8.3)
PROTHROMBIN TIME: 13.9 SEC (ref 11.5–14.2)
RBC # BLD AUTO: 4.02 M/UL (ref 4.21–5.77)
SODIUM SERPL-SCNC: 139 MMOL/L (ref 135–144)
TROPONIN I SERPL HS-MCNC: 13 NG/L (ref 0–22)
TROPONIN I SERPL HS-MCNC: 15 NG/L (ref 0–22)
WBC OTHER # BLD: 6.3 K/UL (ref 3.5–11.3)

## 2025-03-29 PROCEDURE — 99285 EMERGENCY DEPT VISIT HI MDM: CPT

## 2025-03-29 PROCEDURE — 82947 ASSAY GLUCOSE BLOOD QUANT: CPT

## 2025-03-29 PROCEDURE — 82248 BILIRUBIN DIRECT: CPT

## 2025-03-29 PROCEDURE — 84484 ASSAY OF TROPONIN QUANT: CPT

## 2025-03-29 PROCEDURE — 36415 COLL VENOUS BLD VENIPUNCTURE: CPT

## 2025-03-29 PROCEDURE — 80053 COMPREHEN METABOLIC PANEL: CPT

## 2025-03-29 PROCEDURE — 85610 PROTHROMBIN TIME: CPT

## 2025-03-29 PROCEDURE — 71045 X-RAY EXAM CHEST 1 VIEW: CPT

## 2025-03-29 PROCEDURE — G0378 HOSPITAL OBSERVATION PER HR: HCPCS

## 2025-03-29 PROCEDURE — 96360 HYDRATION IV INFUSION INIT: CPT

## 2025-03-29 PROCEDURE — 83690 ASSAY OF LIPASE: CPT

## 2025-03-29 PROCEDURE — 85025 COMPLETE CBC W/AUTO DIFF WBC: CPT

## 2025-03-29 PROCEDURE — 2580000003 HC RX 258: Performed by: NURSE PRACTITIONER

## 2025-03-29 PROCEDURE — 6360000002 HC RX W HCPCS: Performed by: NURSE PRACTITIONER

## 2025-03-29 PROCEDURE — 6370000000 HC RX 637 (ALT 250 FOR IP): Performed by: NURSE PRACTITIONER

## 2025-03-29 PROCEDURE — 96372 THER/PROPH/DIAG INJ SC/IM: CPT

## 2025-03-29 PROCEDURE — 85379 FIBRIN DEGRADATION QUANT: CPT

## 2025-03-29 PROCEDURE — 83880 ASSAY OF NATRIURETIC PEPTIDE: CPT

## 2025-03-29 PROCEDURE — 93005 ELECTROCARDIOGRAM TRACING: CPT | Performed by: EMERGENCY MEDICINE

## 2025-03-29 PROCEDURE — 99222 1ST HOSP IP/OBS MODERATE 55: CPT | Performed by: NURSE PRACTITIONER

## 2025-03-29 RX ORDER — ACETAMINOPHEN 325 MG/1
650 TABLET ORAL EVERY 6 HOURS PRN
Status: DISCONTINUED | OUTPATIENT
Start: 2025-03-29 | End: 2025-03-30 | Stop reason: HOSPADM

## 2025-03-29 RX ORDER — INSULIN LISPRO 100 [IU]/ML
0-4 INJECTION, SOLUTION INTRAVENOUS; SUBCUTANEOUS
Status: DISCONTINUED | OUTPATIENT
Start: 2025-03-29 | End: 2025-03-30 | Stop reason: HOSPADM

## 2025-03-29 RX ORDER — POLYETHYLENE GLYCOL 3350 17 G/17G
17 POWDER, FOR SOLUTION ORAL DAILY PRN
Status: DISCONTINUED | OUTPATIENT
Start: 2025-03-29 | End: 2025-03-30 | Stop reason: HOSPADM

## 2025-03-29 RX ORDER — INSULIN GLARGINE 100 [IU]/ML
5 INJECTION, SOLUTION SUBCUTANEOUS DAILY
Status: DISCONTINUED | OUTPATIENT
Start: 2025-03-30 | End: 2025-03-30 | Stop reason: HOSPADM

## 2025-03-29 RX ORDER — METOPROLOL TARTRATE 50 MG
100 TABLET ORAL 2 TIMES DAILY
Status: DISCONTINUED | OUTPATIENT
Start: 2025-03-29 | End: 2025-03-30 | Stop reason: HOSPADM

## 2025-03-29 RX ORDER — SODIUM CHLORIDE 9 MG/ML
INJECTION, SOLUTION INTRAVENOUS CONTINUOUS
Status: DISCONTINUED | OUTPATIENT
Start: 2025-03-29 | End: 2025-03-30 | Stop reason: HOSPADM

## 2025-03-29 RX ORDER — VALSARTAN AND HYDROCHLOROTHIAZIDE 160; 25 MG/1; MG/1
1 TABLET ORAL DAILY
Status: DISCONTINUED | OUTPATIENT
Start: 2025-03-30 | End: 2025-03-29

## 2025-03-29 RX ORDER — GLUCAGON 1 MG/ML
1 KIT INJECTION PRN
Status: DISCONTINUED | OUTPATIENT
Start: 2025-03-29 | End: 2025-03-30 | Stop reason: HOSPADM

## 2025-03-29 RX ORDER — ONDANSETRON 2 MG/ML
4 INJECTION INTRAMUSCULAR; INTRAVENOUS EVERY 6 HOURS PRN
Status: DISCONTINUED | OUTPATIENT
Start: 2025-03-29 | End: 2025-03-30 | Stop reason: HOSPADM

## 2025-03-29 RX ORDER — ACETAMINOPHEN 650 MG/1
650 SUPPOSITORY RECTAL EVERY 6 HOURS PRN
Status: DISCONTINUED | OUTPATIENT
Start: 2025-03-29 | End: 2025-03-30 | Stop reason: HOSPADM

## 2025-03-29 RX ORDER — SODIUM CHLORIDE 0.9 % (FLUSH) 0.9 %
5-40 SYRINGE (ML) INJECTION PRN
Status: DISCONTINUED | OUTPATIENT
Start: 2025-03-29 | End: 2025-03-30 | Stop reason: HOSPADM

## 2025-03-29 RX ORDER — SODIUM CHLORIDE 0.9 % (FLUSH) 0.9 %
5-40 SYRINGE (ML) INJECTION EVERY 12 HOURS SCHEDULED
Status: DISCONTINUED | OUTPATIENT
Start: 2025-03-29 | End: 2025-03-30 | Stop reason: HOSPADM

## 2025-03-29 RX ORDER — ATORVASTATIN CALCIUM 10 MG/1
20 TABLET, FILM COATED ORAL DAILY
Status: DISCONTINUED | OUTPATIENT
Start: 2025-03-30 | End: 2025-03-30 | Stop reason: HOSPADM

## 2025-03-29 RX ORDER — FAMOTIDINE 20 MG/1
20 TABLET, FILM COATED ORAL 2 TIMES DAILY
Status: DISCONTINUED | OUTPATIENT
Start: 2025-03-29 | End: 2025-03-30 | Stop reason: HOSPADM

## 2025-03-29 RX ORDER — VALSARTAN 80 MG/1
160 TABLET ORAL DAILY
Status: DISCONTINUED | OUTPATIENT
Start: 2025-03-30 | End: 2025-03-30 | Stop reason: HOSPADM

## 2025-03-29 RX ORDER — ONDANSETRON 4 MG/1
4 TABLET, ORALLY DISINTEGRATING ORAL EVERY 8 HOURS PRN
Status: DISCONTINUED | OUTPATIENT
Start: 2025-03-29 | End: 2025-03-30 | Stop reason: HOSPADM

## 2025-03-29 RX ORDER — PANTOPRAZOLE SODIUM 40 MG/1
40 TABLET, DELAYED RELEASE ORAL
Status: DISCONTINUED | OUTPATIENT
Start: 2025-03-30 | End: 2025-03-30 | Stop reason: HOSPADM

## 2025-03-29 RX ORDER — ASPIRIN 81 MG/1
81 TABLET ORAL DAILY
Status: DISCONTINUED | OUTPATIENT
Start: 2025-03-30 | End: 2025-03-30 | Stop reason: HOSPADM

## 2025-03-29 RX ORDER — ENOXAPARIN SODIUM 100 MG/ML
30 INJECTION SUBCUTANEOUS 2 TIMES DAILY
Status: DISCONTINUED | OUTPATIENT
Start: 2025-03-29 | End: 2025-03-30 | Stop reason: HOSPADM

## 2025-03-29 RX ORDER — TAMSULOSIN HYDROCHLORIDE 0.4 MG/1
0.4 CAPSULE ORAL DAILY
Status: DISCONTINUED | OUTPATIENT
Start: 2025-03-30 | End: 2025-03-30 | Stop reason: HOSPADM

## 2025-03-29 RX ORDER — DEXTROSE MONOHYDRATE 100 MG/ML
INJECTION, SOLUTION INTRAVENOUS CONTINUOUS PRN
Status: DISCONTINUED | OUTPATIENT
Start: 2025-03-29 | End: 2025-03-30 | Stop reason: HOSPADM

## 2025-03-29 RX ORDER — HYDROCHLOROTHIAZIDE 25 MG/1
25 TABLET ORAL DAILY
Status: DISCONTINUED | OUTPATIENT
Start: 2025-03-30 | End: 2025-03-30 | Stop reason: HOSPADM

## 2025-03-29 RX ORDER — SODIUM CHLORIDE 9 MG/ML
INJECTION, SOLUTION INTRAVENOUS PRN
Status: DISCONTINUED | OUTPATIENT
Start: 2025-03-29 | End: 2025-03-30 | Stop reason: HOSPADM

## 2025-03-29 RX ORDER — FLUTICASONE PROPIONATE 50 MCG
2 SPRAY, SUSPENSION (ML) NASAL DAILY
Status: DISCONTINUED | OUTPATIENT
Start: 2025-03-30 | End: 2025-03-30 | Stop reason: HOSPADM

## 2025-03-29 RX ADMIN — FAMOTIDINE 20 MG: 20 TABLET, FILM COATED ORAL at 23:42

## 2025-03-29 RX ADMIN — ENOXAPARIN SODIUM 30 MG: 100 INJECTION SUBCUTANEOUS at 23:42

## 2025-03-29 RX ADMIN — METOPROLOL TARTRATE 100 MG: 50 TABLET, FILM COATED ORAL at 23:42

## 2025-03-29 RX ADMIN — SODIUM CHLORIDE: 0.9 INJECTION, SOLUTION INTRAVENOUS at 23:19

## 2025-03-29 ASSESSMENT — PAIN - FUNCTIONAL ASSESSMENT
PAIN_FUNCTIONAL_ASSESSMENT: 0-10
PAIN_FUNCTIONAL_ASSESSMENT: NONE - DENIES PAIN

## 2025-03-29 ASSESSMENT — PAIN SCALES - GENERAL: PAINLEVEL_OUTOF10: 0

## 2025-03-29 NOTE — ED PROVIDER NOTES
[facility]  eMERGENCY dEPARTMENT eNCOUnter      Pt Name: Ben Vilalseñor  MRN: [id number]  Birthdate 1945  Date of evaluation: 3/29/2025      CHIEF COMPLAINT       Chief Complaint   Patient presents with    Chest Pain         HISTORY OF PRESENT ILLNESS    Ben Villaseñor is a 80 y.o. male who presents with chief complaint of chest pain is been left-sided, radiating to the back on and off for the last couple days he is not sure if he gets short of breath at all it is now gone.  He just and wanted take any chances he comes in by squad patient was given 324 of aspirin and nitro by squad he is pain-free currently  He denies any fevers or chills he says he does have chronic back pain as well  He has sleep apnea, type 2 diabetes, hypertension, hyperlipidemia  REVIEW OF SYSTEMS         Review of Systems   Constitutional:  Positive for fatigue. Negative for chills and fever.   HENT:  Negative for congestion, dental problem, sore throat and trouble swallowing.    Eyes:  Negative for visual disturbance.   Respiratory:  Positive for shortness of breath. Negative for wheezing.    Cardiovascular:  Positive for chest pain and leg swelling. Negative for palpitations.   Gastrointestinal:  Negative for abdominal pain, blood in stool, constipation, diarrhea, nausea and vomiting.   Genitourinary:  Negative for difficulty urinating, dysuria and testicular pain.   Musculoskeletal:  Positive for back pain. Negative for joint swelling and neck pain.   Skin:  Negative for rash.   Neurological:  Negative for dizziness, syncope, weakness and headaches.   Hematological:  Negative for adenopathy. Does not bruise/bleed easily.   Psychiatric/Behavioral:  Negative for confusion and suicidal ideas.          PAST MEDICAL HISTORY    has a past medical history of Acute cystitis with hematuria, TITUS (acute kidney injury), Anemia, Chest pain, DJD (degenerative joint disease) of lumbar spine, Dry eye syndrome, Fall, GERD (gastroesophageal

## 2025-03-30 VITALS
OXYGEN SATURATION: 95 % | RESPIRATION RATE: 18 BRPM | SYSTOLIC BLOOD PRESSURE: 173 MMHG | DIASTOLIC BLOOD PRESSURE: 83 MMHG | BODY MASS INDEX: 43.12 KG/M2 | TEMPERATURE: 97.5 F | HEART RATE: 59 BPM | HEIGHT: 68 IN | WEIGHT: 284.5 LBS

## 2025-03-30 LAB
ANION GAP SERPL CALCULATED.3IONS-SCNC: 8 MMOL/L (ref 9–17)
BASOPHILS # BLD: 0.03 K/UL (ref 0–0.2)
BASOPHILS NFR BLD: 1 % (ref 0–2)
BUN SERPL-MCNC: 21 MG/DL (ref 8–23)
BUN/CREAT SERPL: 12 (ref 9–20)
CALCIUM SERPL-MCNC: 8.5 MG/DL (ref 8.6–10.4)
CHLORIDE SERPL-SCNC: 105 MMOL/L (ref 98–107)
CHOLEST SERPL-MCNC: 103 MG/DL (ref 0–199)
CHOLESTEROL/HDL RATIO: 2.6
CO2 SERPL-SCNC: 27 MMOL/L (ref 20–31)
CREAT SERPL-MCNC: 1.8 MG/DL (ref 0.7–1.2)
EKG ATRIAL RATE: 52 BPM
EKG P AXIS: 54 DEGREES
EKG P-R INTERVAL: 200 MS
EKG Q-T INTERVAL: 418 MS
EKG Q-T INTERVAL: 450 MS
EKG QRS DURATION: 78 MS
EKG QRS DURATION: 80 MS
EKG QTC CALCULATION (BAZETT): 418 MS
EKG QTC CALCULATION (BAZETT): 427 MS
EKG R AXIS: -5 DEGREES
EKG R AXIS: 16 DEGREES
EKG T AXIS: 11 DEGREES
EKG T AXIS: 56 DEGREES
EKG VENTRICULAR RATE: 52 BPM
EKG VENTRICULAR RATE: 63 BPM
EOSINOPHIL # BLD: 0.15 K/UL (ref 0–0.44)
EOSINOPHILS RELATIVE PERCENT: 2 % (ref 1–4)
ERYTHROCYTE [DISTWIDTH] IN BLOOD BY AUTOMATED COUNT: 13.3 % (ref 11.8–14.4)
GFR, ESTIMATED: 37 ML/MIN/1.73M2
GLUCOSE BLD-MCNC: 161 MG/DL (ref 75–110)
GLUCOSE SERPL-MCNC: 104 MG/DL (ref 70–99)
HCT VFR BLD AUTO: 34.6 % (ref 40.7–50.3)
HDLC SERPL-MCNC: 39 MG/DL
HGB BLD-MCNC: 10.7 G/DL (ref 13–17)
IMM GRANULOCYTES # BLD AUTO: <0.03 K/UL (ref 0–0.3)
IMM GRANULOCYTES NFR BLD: 0 %
LDLC SERPL CALC-MCNC: 50 MG/DL (ref 0–100)
LYMPHOCYTES NFR BLD: 1.94 K/UL (ref 1.1–3.7)
LYMPHOCYTES RELATIVE PERCENT: 31 % (ref 24–43)
MCH RBC QN AUTO: 27.6 PG (ref 25.2–33.5)
MCHC RBC AUTO-ENTMCNC: 30.9 G/DL (ref 25.2–33.5)
MCV RBC AUTO: 89.2 FL (ref 82.6–102.9)
MONOCYTES NFR BLD: 0.79 K/UL (ref 0.1–1.2)
MONOCYTES NFR BLD: 12 % (ref 3–12)
NEUTROPHILS NFR BLD: 54 % (ref 36–65)
NEUTS SEG NFR BLD: 3.44 K/UL (ref 1.5–8.1)
NRBC BLD-RTO: 0 PER 100 WBC
PLATELET # BLD AUTO: 247 K/UL (ref 138–453)
PMV BLD AUTO: 10.8 FL (ref 8.1–13.5)
POTASSIUM SERPL-SCNC: 4.1 MMOL/L (ref 3.7–5.3)
RBC # BLD AUTO: 3.88 M/UL (ref 4.21–5.77)
SODIUM SERPL-SCNC: 140 MMOL/L (ref 135–144)
TRIGL SERPL-MCNC: 68 MG/DL
TROPONIN I SERPL HS-MCNC: 13 NG/L (ref 0–22)
TROPONIN I SERPL HS-MCNC: 16 NG/L (ref 0–22)
VLDLC SERPL CALC-MCNC: 14 MG/DL (ref 1–30)
WBC OTHER # BLD: 6.4 K/UL (ref 3.5–11.3)

## 2025-03-30 PROCEDURE — 93005 ELECTROCARDIOGRAM TRACING: CPT | Performed by: NURSE PRACTITIONER

## 2025-03-30 PROCEDURE — 96372 THER/PROPH/DIAG INJ SC/IM: CPT

## 2025-03-30 PROCEDURE — 80061 LIPID PANEL: CPT

## 2025-03-30 PROCEDURE — 99238 HOSP IP/OBS DSCHRG MGMT 30/<: CPT | Performed by: FAMILY MEDICINE

## 2025-03-30 PROCEDURE — 2580000003 HC RX 258: Performed by: NURSE PRACTITIONER

## 2025-03-30 PROCEDURE — G0378 HOSPITAL OBSERVATION PER HR: HCPCS

## 2025-03-30 PROCEDURE — 80048 BASIC METABOLIC PNL TOTAL CA: CPT

## 2025-03-30 PROCEDURE — 6370000000 HC RX 637 (ALT 250 FOR IP): Performed by: NURSE PRACTITIONER

## 2025-03-30 PROCEDURE — 85025 COMPLETE CBC W/AUTO DIFF WBC: CPT

## 2025-03-30 PROCEDURE — 96361 HYDRATE IV INFUSION ADD-ON: CPT

## 2025-03-30 PROCEDURE — 84484 ASSAY OF TROPONIN QUANT: CPT

## 2025-03-30 PROCEDURE — 82947 ASSAY GLUCOSE BLOOD QUANT: CPT

## 2025-03-30 PROCEDURE — 6360000002 HC RX W HCPCS: Performed by: NURSE PRACTITIONER

## 2025-03-30 PROCEDURE — 36415 COLL VENOUS BLD VENIPUNCTURE: CPT

## 2025-03-30 RX ORDER — HYDRALAZINE HYDROCHLORIDE 20 MG/ML
5 INJECTION INTRAMUSCULAR; INTRAVENOUS EVERY 6 HOURS PRN
Status: DISCONTINUED | OUTPATIENT
Start: 2025-03-30 | End: 2025-03-30 | Stop reason: HOSPADM

## 2025-03-30 RX ORDER — HYDRALAZINE HYDROCHLORIDE 10 MG/1
10 TABLET, FILM COATED ORAL 3 TIMES DAILY
Qty: 90 TABLET | Refills: 0 | Status: SHIPPED | OUTPATIENT
Start: 2025-03-30 | End: 2026-03-30

## 2025-03-30 RX ADMIN — HYDROCHLOROTHIAZIDE 25 MG: 25 TABLET ORAL at 08:12

## 2025-03-30 RX ADMIN — TAMSULOSIN HYDROCHLORIDE 0.4 MG: 0.4 CAPSULE ORAL at 08:12

## 2025-03-30 RX ADMIN — PANTOPRAZOLE SODIUM 40 MG: 40 TABLET, DELAYED RELEASE ORAL at 05:29

## 2025-03-30 RX ADMIN — FAMOTIDINE 20 MG: 20 TABLET, FILM COATED ORAL at 08:12

## 2025-03-30 RX ADMIN — VALSARTAN 160 MG: 80 TABLET, FILM COATED ORAL at 08:12

## 2025-03-30 RX ADMIN — ATORVASTATIN CALCIUM 20 MG: 10 TABLET, FILM COATED ORAL at 08:12

## 2025-03-30 RX ADMIN — FLUTICASONE PROPIONATE 2 SPRAY: 50 SPRAY, METERED NASAL at 08:11

## 2025-03-30 RX ADMIN — ASPIRIN 81 MG: 81 TABLET, COATED ORAL at 08:12

## 2025-03-30 RX ADMIN — SODIUM CHLORIDE: 0.9 INJECTION, SOLUTION INTRAVENOUS at 06:39

## 2025-03-30 RX ADMIN — INSULIN GLARGINE 5 UNITS: 100 INJECTION, SOLUTION SUBCUTANEOUS at 08:11

## 2025-03-30 RX ADMIN — ENOXAPARIN SODIUM 30 MG: 100 INJECTION SUBCUTANEOUS at 08:12

## 2025-03-30 NOTE — H&P
HOSPITALIST ADMISSION H&P    REASON FOR ADMISSION:  Chest pain  ESTIMATED LENGTH OF STAY:<2 midnights, 1-2 days    ATTENDING/ADMITTING PHYSICIAN: Velma Harris MD    HISTORY OF PRESENT ILLNESS:      The patient is a 80 y.o. male [facility] APR[facility] - CNP who presents from the ER with c/o intermittent chest pain over the past 2 days. He is a poor historian but denies fevers, cough, shortness of breath, nausea, emesis, or diaphoresis. EMS administered asprin and 1 nitro SL with relief of his chest pain.     In ER, EKG showed NSR without acute changes. Chest xray negative for acute process. Troponin 15 --> 13. D. Dimer and proBNP wnl. Last echo 08/2020 showed pEF, no diastolic dysfunction, no significant valvulopathy. Last stress test 04/2021 showed pEF, old inferior infarct, no ischemia. Cardiology accepted consult from ER and requested serial troponins.    CKD stage 3b with dehydration -- baseline serum creatinine 1.6  DMII -- HgbA1c 6.5  RABIA -- noncompliant with cpap  Chronic anemia -- stable     See below for additional PMH.    Patient rliv-vkvzrpyhey-ivswnasv-available records reviewed, including, but not limited to ER records, imaging results, lab results, office records, personal records, and [facility] -- no signs of abuse or diversion.     Past Medical History:   Diagnosis Date    Acute cystitis with hematuria 09/17/2021    TITUS (acute kidney injury) 09/17/2021    Anemia     chronic, negative work up with EGD and Colonoscopy.    Chest pain 06/09/2016    DJD (degenerative joint disease) of lumbar spine     Dry eye syndrome     Fall     GERD (gastroesophageal reflux disease)     Hepatic hemangioma     Hypertension     Hypoxia 09/19/2021    Keratitis     Secondary to dry eye syndrome.    Noncompliance 12/25/2022    Nuclear sclerotic cataract of both eyes     Obesity, morbid     Obstructive sleep apnea of adult     non compliant with cpap    Onychomycosis     1, 2, 3, 4, and 5, bilateral.

## 2025-03-30 NOTE — PLAN OF CARE
Problem: Chronic Conditions and Co-morbidities  Goal: Patient's chronic conditions and co-morbidity symptoms are monitored and maintained or improved  3/30/2025 0721 by Shock, Rylee, RN  Outcome: Progressing  3/30/2025 0105 by Mile Gutierrez RN  Outcome: Progressing     Problem: Discharge Planning  Goal: Discharge to home or other facility with appropriate resources  3/30/2025 0721 by Shock, Rylee, RN  Outcome: Progressing  3/30/2025 0105 by Mile Gutierrez RN  Outcome: Progressing  Flowsheets (Taken 3/29/2025 2313)  Discharge to home or other facility with appropriate resources:   Identify barriers to discharge with patient and caregiver   Identify discharge learning needs (meds, wound care, etc)   Refer to discharge planning if patient needs post-hospital services based on physician order or complex needs related to functional status, cognitive ability or social support system   Arrange for needed discharge resources and transportation as appropriate   Arrange for interpreters to assist at discharge as needed     Problem: Pain  Goal: Verbalizes/displays adequate comfort level or baseline comfort level  3/30/2025 0721 by Shock, Rylee, RN  Outcome: Progressing  3/30/2025 0105 by Mile Gutierrez RN  Outcome: Progressing     Problem: Safety - Adult  Goal: Free from fall injury  3/30/2025 0721 by Shock, Rylee, RN  Outcome: Progressing  3/30/2025 0105 by Mile Gutierrez RN  Outcome: Progressing     Problem: Cardiovascular - Adult  Goal: Maintains optimal cardiac output and hemodynamic stability  3/30/2025 0721 by Shock, Rylee, RN  Outcome: Progressing  3/30/2025 0105 by Mile Gutierrez RN  Outcome: Progressing  Goal: Absence of cardiac dysrhythmias or at baseline  3/30/2025 0721 by Shock, Rylee, RN  Outcome: Progressing  3/30/2025 0105 by Mile Gutierrez RN  Outcome: Progressing     Problem: Metabolic/Fluid and Electrolytes - Adult  Goal: Electrolytes maintained within normal limits  3/30/2025 0721 by Jonh

## 2025-03-30 NOTE — ED NOTES
ED Report    Presents to ED from: Home    Chief Complaint   Patient presents with    Chest Pain     No diagnosis found.  Present Condition: Stable  Pertinent Event(s): Chest pain- relieved with 324 ASA and 1 nitro per EMS. Patient has been having off and on chest pain for 2-3 days per patient. Patient is [place]!    LOC:  A+O x 4  Code Status: FULL    Vital Signs   Vitals:    03/29/25 1926 03/29/25 1959 03/29/25 2019 03/29/25 2100   BP: (!) 175/79 (!) 161/79  (!) 156/62   Pulse:    61   Resp:    16   Temp:       TempSrc:       SpO2: 96% 95% 96% 95%   Weight:       Height:         Oxygen Baseline: Room Air       Current Oxygen Needs: Room Air  Mobility: Independent       Mobility Aide: Independent    LDAs:   Peripheral IV 03/29/25 Left Antecubital (Active)   Site Assessment Clean, dry & intact 03/29/25 1815   Line Status Normal saline locked 03/29/25 1815   Phlebitis Assessment No symptoms 03/29/25 1815   Infiltration Assessment 0 03/29/25 1815   Alcohol Cap Used Yes 03/29/25 1815   Dressing Status Clean, dry & intact 03/29/25 1815   Dressing Type Transparent 03/29/25 1815     Abnormal ED Labs:    Labs Reviewed   COMPREHENSIVE METABOLIC W/ BILI PROFILE W/ REFLEX TO MG - Abnormal; Notable for the following components:       Result Value    Glucose 189 (*)     BUN 27 (*)     Creatinine 2.0 (*)     Est, Glom Filt Rate 33 (*)     Calcium 8.5 (*)     Albumin/Globulin Ratio 0.9 (*)     Total Bilirubin 0.2 (*)     Alkaline Phosphatase 198 (*)     All other components within normal limits   CBC WITH AUTO DIFFERENTIAL - Abnormal; Notable for the following components:    RBC 4.02 (*)     Hemoglobin 11.1 (*)     Hematocrit 35.8 (*)     Monocytes % 14 (*)     All other components within normal limits   PROTIME-INR   BRAIN NATRIURETIC PEPTIDE   TROPONIN   LIPASE   D-DIMER, QUANTITATIVE   TROPONIN     Imaging:    XR CHEST PORTABLE   Final Result   No acute process.             Medication Reconciliation Completed: Yes      Consults:

## 2025-03-30 NOTE — PLAN OF CARE
Problem: Chronic Conditions and Co-morbidities  Goal: Patient's chronic conditions and co-morbidity symptoms are monitored and maintained or improved  Outcome: Progressing     Problem: Discharge Planning  Goal: Discharge to home or other facility with appropriate resources  Outcome: Progressing  Flowsheets (Taken 3/29/2025 2742)  Discharge to home or other facility with appropriate resources:   Identify barriers to discharge with patient and caregiver   Identify discharge learning needs (meds, wound care, etc)   Refer to discharge planning if patient needs post-hospital services based on physician order or complex needs related to functional status, cognitive ability or social support system   Arrange for needed discharge resources and transportation as appropriate   Arrange for interpreters to assist at discharge as needed     Problem: Pain  Goal: Verbalizes/displays adequate comfort level or baseline comfort level  Outcome: Progressing     Problem: Safety - Adult  Goal: Free from fall injury  Outcome: Progressing     Problem: Cardiovascular - Adult  Goal: Maintains optimal cardiac output and hemodynamic stability  Outcome: Progressing  Goal: Absence of cardiac dysrhythmias or at baseline  Outcome: Progressing     Problem: Metabolic/Fluid and Electrolytes - Adult  Goal: Electrolytes maintained within normal limits  Outcome: Progressing  Goal: Hemodynamic stability and optimal renal function maintained  Outcome: Progressing  Goal: Glucose maintained within prescribed range  Outcome: Progressing

## 2025-03-30 NOTE — DISCHARGE INSTRUCTIONS
You have an Ultrasound for Dr. Haque scheduled 04/01/2025 at 11:30 am. Please arrive at 11:15 am with a full bladder.

## 2025-03-30 NOTE — DISCHARGE SUMMARY
Discharge Summary    Ben Villaseñor Patient Status:  Observation    1945 MRN [id number]   Location [facility]  PROGRESSIVE CARE Attending Velma Harris MD   Hosp Day # 0 PCP Cleopatra Iyer APRN - CNP     Date of Admission: 3/29/2025    Date of Discharge: 3/30/2025    Admitting Diagnosis: Chest pain [R07.9]  Chest pain, unspecified type [R07.9]    Discharge Diagnosis:   Chest pain.  HTN    Reason for Admission/HPI: chest pain for the past 2 days denies cough or shortness of breath.  Cardiology was consulted to ER recommend getting serial troponin to rule out acute coronary syndrome.    Hospital Course: Patient was admitted with telemetry had serial troponins which were all negative has been chest pain-free currently.  Has history of hypertension his blood pressures have been running on the high side was on IV hydralazine as needed here patient will discharge home today for outpatient follow-up with cardiology he will continue his home blood pressure medications along with p.o. hydralazine till further evaluated by cardiology and PCP.  Patient is outpatient and renal ultrasound ordered by nephrology advised to get it done and follow-up with nephrology as scheduled    Consultations: None    Procedures: None    Complications: None    Disposition: Home    Discharge Condition: fair    Discharge Medications:      Medication List        START taking these medications      hydrALAZINE 10 MG tablet  Commonly known as: APRESOLINE  Take 1 tablet by mouth 3 times daily            CONTINUE taking these medications      aspirin 81 MG EC tablet  Take 1 tablet by mouth daily     atorvastatin 20 MG tablet  Commonly known as: LIPITOR  TAKE 1 TABLET BY MOUTH DAILY     Diabetic Shoe Misc  by Does not apply route Dispense Dm shoes and insoles  DM type 2 with diabetic peripheral neuropathy (HCC)  (primary encounter diagnosis)  Equinus deformity of both feet  Dermatophytosis of nail     famotidine 20 MG tablet  Commonly

## 2025-03-30 NOTE — ED PROVIDER NOTES
ED Course as of 03/30/25 0403   Sat Mar 29, 2025   1932 Twelve-lead EKG sinus rhythm at 63 bpm.  Normal intervals and axis.  No acute ST elevations depressions or T wave inversions.  Underlying motion artifact. [NP]   2024 Case and has been with Dr. Lunsford I have reviewed the history and physical with the patient.  Several episodes of chest pain 2 to 3/day over the past 2 days.  CP a few minutes, at rest, no prior heart trouble.  Came on at rest.  Patient had difficulty describing the pain.  Abated with nitroglycerin he received aspirin per squad as well.  Has remained chest pain-free.  Risk factors include obesity hypertension hyperlipidemia and diabetes.  Previous history of SVT.  Please see Dr. Lunsford's interpretation for initial twelve-lead EKG.  I have reviewed this tracing as well. [NP]   2030 4/7/22 last cardiology. [NP]   2031 4/13/21 last stress test. [NP]   2032 8/5/20 was last ECHO. [NP]   2137 Case discussed with Dr. Ortiz, on-call for cardiology.  Agrees with admission at [facility] sedative plan for Lexiscan on Monday.  No plan for anticoagulation at this point unless patient has further chest pain then this could be revisited. [NP]      ED Course User Index  [NP] Renee Knapp MD       Results for orders placed or performed during the hospital encounter of 03/29/25   Comprehensive Metabolic w/ Bili Profile w/ Reflex to MG   Result Value Ref Range    Sodium 139 135 - 144 mmol/L    Potassium 4.3 3.7 - 5.3 mmol/L    Chloride 102 98 - 107 mmol/L    CO2 27 20 - 31 mmol/L    Anion Gap 10 9 - 17 mmol/L    Glucose 189 (H) 70 - 99 mg/dL    BUN 27 (H) 8 - 23 mg/dL    Creatinine 2.0 (H) 0.7 - 1.2 mg/dL    Est, Glom Filt Rate 33 (L) >60 mL/min/1.73m2    Calcium 8.5 (L) 8.6 - 10.4 mg/dL    Total Protein 7.6 6.4 - 8.3 g/dL    Albumin 3.6 3.5 - 5.2 g/dL    Albumin/Globulin Ratio 0.9 (L) 1.0 - 2.5    Total Bilirubin 0.2 (L) 0.3 - 1.2 mg/dL    Bilirubin, Direct 0.1 0.0 - 0.2 mg/dL    Bilirubin, Indirect

## 2025-03-31 ENCOUNTER — TELEPHONE (OUTPATIENT)
Dept: FAMILY MEDICINE CLINIC | Age: 80
End: 2025-03-31

## 2025-04-22 ENCOUNTER — TELEPHONE (OUTPATIENT)
Dept: NEPHROLOGY | Age: 80
End: 2025-04-22

## 2025-04-28 PROBLEM — E86.0 DEHYDRATION: Status: RESOLVED | Noted: 2025-03-29 | Resolved: 2025-04-28

## 2025-04-28 NOTE — TELEPHONE ENCOUNTER
Message left on voicemail asking that Ben return my call so we can reschedule his US as he no showed the last US appointment.

## 2025-05-23 DIAGNOSIS — E78.2 MIXED HYPERLIPIDEMIA: ICD-10-CM

## 2025-05-23 DIAGNOSIS — E11.42 DM TYPE 2 WITH DIABETIC PERIPHERAL NEUROPATHY (HCC): ICD-10-CM

## 2025-05-23 DIAGNOSIS — I10 ESSENTIAL HYPERTENSION: ICD-10-CM

## 2025-05-23 DIAGNOSIS — N40.0 ENLARGED PROSTATE: ICD-10-CM

## 2025-05-23 DIAGNOSIS — K21.9 GASTROESOPHAGEAL REFLUX DISEASE WITHOUT ESOPHAGITIS: ICD-10-CM

## 2025-05-23 RX ORDER — METFORMIN HYDROCHLORIDE 500 MG/1
500 TABLET, EXTENDED RELEASE ORAL 2 TIMES DAILY
Qty: 180 TABLET | Refills: 0 | Status: SHIPPED | OUTPATIENT
Start: 2025-05-23

## 2025-05-23 RX ORDER — ATORVASTATIN CALCIUM 20 MG/1
20 TABLET, FILM COATED ORAL DAILY
Qty: 90 TABLET | Refills: 0 | Status: SHIPPED | OUTPATIENT
Start: 2025-05-23

## 2025-05-23 RX ORDER — VALSARTAN AND HYDROCHLOROTHIAZIDE 160; 25 MG/1; MG/1
1 TABLET ORAL DAILY
Qty: 90 TABLET | Refills: 0 | Status: SHIPPED | OUTPATIENT
Start: 2025-05-23

## 2025-05-23 RX ORDER — OMEPRAZOLE 40 MG/1
40 CAPSULE, DELAYED RELEASE ORAL DAILY
Qty: 90 CAPSULE | Refills: 0 | Status: SHIPPED | OUTPATIENT
Start: 2025-05-23

## 2025-05-23 RX ORDER — METOPROLOL TARTRATE 100 MG/1
100 TABLET ORAL 2 TIMES DAILY
Qty: 180 TABLET | Refills: 0 | Status: SHIPPED | OUTPATIENT
Start: 2025-05-23

## 2025-05-23 RX ORDER — FAMOTIDINE 20 MG/1
20 TABLET, FILM COATED ORAL 2 TIMES DAILY
Qty: 180 TABLET | Refills: 0 | Status: SHIPPED | OUTPATIENT
Start: 2025-05-23

## 2025-05-23 RX ORDER — TAMSULOSIN HYDROCHLORIDE 0.4 MG/1
0.4 CAPSULE ORAL DAILY
Qty: 90 CAPSULE | Refills: 0 | Status: SHIPPED | OUTPATIENT
Start: 2025-05-23

## 2025-05-23 NOTE — TELEPHONE ENCOUNTER
Ben called requesting a refill of the below medication which has been pended for you:     Requested Prescriptions     Pending Prescriptions Disp Refills    tamsulosin (FLOMAX) 0.4 MG capsule [Pharmacy Med Name: TAMSULOSIN 0.4MG CAPSULES] 90 capsule 0     Sig: TAKE 1 CAPSULE BY MOUTH DAILY    omeprazole (PRILOSEC) 40 MG delayed release capsule [Pharmacy Med Name: OMEPRAZOLE 40MG CAPSULES] 90 capsule 0     Sig: TAKE 1 CAPSULE BY MOUTH DAILY    famotidine (PEPCID) 20 MG tablet [Pharmacy Med Name: FAMOTIDINE 20MG TABLETS] 180 tablet 0     Sig: TAKE 1 TABLET BY MOUTH TWICE DAILY    metFORMIN (GLUCOPHAGE-XR) 500 MG extended release tablet [Pharmacy Med Name: METFORMIN ER 500MG 24HR TABS] 180 tablet 0     Sig: TAKE 1 TABLET BY MOUTH IN THE MORNING AND AT BEDTIME    metoprolol (LOPRESSOR) 100 MG tablet [Pharmacy Med Name: METOPROLOL TARTRATE 100MG TABLETS] 180 tablet 0     Sig: TAKE 1 TABLET BY MOUTH TWICE DAILY    atorvastatin (LIPITOR) 20 MG tablet [Pharmacy Med Name: ATORVASTATIN 20MG TABLETS] 90 tablet 0     Sig: TAKE 1 TABLET BY MOUTH DAILY    valsartan-hydroCHLOROthiazide (DIOVAN-HCT) 160-25 MG per tablet [Pharmacy Med Name: VALSARTAN/HCTZ 160MG/25MG TABLETS] 90 tablet 0     Sig: TAKE 1 TABLET BY MOUTH DAILY       Last Appointment Date: 3/11/2025  Next Appointment Date: 9/12/2025    No Known Allergies

## 2025-07-24 ENCOUNTER — OFFICE VISIT (OUTPATIENT)
Dept: PODIATRY | Age: 80
End: 2025-07-24
Payer: MEDICARE

## 2025-07-24 VITALS
BODY MASS INDEX: 43.19 KG/M2 | WEIGHT: 285 LBS | HEIGHT: 68 IN | SYSTOLIC BLOOD PRESSURE: 148 MMHG | DIASTOLIC BLOOD PRESSURE: 88 MMHG | HEART RATE: 89 BPM

## 2025-07-24 DIAGNOSIS — B35.1 DERMATOPHYTOSIS OF NAIL: ICD-10-CM

## 2025-07-24 DIAGNOSIS — E11.42 DM TYPE 2 WITH DIABETIC PERIPHERAL NEUROPATHY (HCC): Primary | ICD-10-CM

## 2025-07-24 PROCEDURE — 11721 DEBRIDE NAIL 6 OR MORE: CPT | Performed by: PODIATRIST

## 2025-07-24 NOTE — PROGRESS NOTES
Foot Care Worksheet  PCP: Cleopatra Iyer, APRN - CNP  Last visit: 03/11/2025    Nail description: Thick , Yellow , Crumbly , Marked limitation of ambulation     Pain resulting from thickened and dystrophy of nail plate No    Nails involved  Right   1, 2, 3, 4, 5  (T5-T9)  Left     1, 2, 3, 4, 5  (TA-T4)    Q7 1 Class A Finding - Non traumatic amputation of foot No    Q8 2 Class B Findings - Absent DP pulse No, Absent PT pulse No, Advanced tropic changes (3 required) Yes    Decrease hair growth Yes, Nail changes/thickening Yes, Pigmented changes/discoloration Yes, Skin texture (thin, shiny) Yes, Skin color (rubor/redness) No    Q9 1 Class B and 2 Class C Findings  Claudication No, Temperature change No, Paresthesia No, Burning Yes, Edema Yes    Subjective:  Patient presents to [facility] today for diabetic foot care.  Patient's diabetic control has been not changed.    No Known Allergies    Past Medical History:   Diagnosis Date    Acute cystitis with hematuria 09/17/2021    TITUS (acute kidney injury) 09/17/2021    Anemia     chronic, negative work up with EGD and Colonoscopy.    Chest pain 06/09/2016    DJD (degenerative joint disease) of lumbar spine     Dry eye syndrome     Fall     GERD (gastroesophageal reflux disease)     Hepatic hemangioma     Hypertension     Hypoxia 09/19/2021    Keratitis     Secondary to dry eye syndrome.    Noncompliance 12/25/2022    Nuclear sclerotic cataract of both eyes     Obesity, morbid (HCC)     Obstructive sleep apnea of adult     non compliant with cpap    Onychomycosis     1, 2, 3, 4, and 5, bilateral.    Type II or unspecified type diabetes mellitus without mention of complication, not stated as uncontrolled     Urinary tract infection with hematuria 08/04/2020       Prior to Admission medications    Medication Sig Start Date End Date Taking? Authorizing Provider   tamsulosin (FLOMAX) 0.4 MG capsule TAKE 1 CAPSULE BY MOUTH DAILY 5/23/25   Joselyn Holbrook

## 2025-08-19 DIAGNOSIS — K21.9 GASTROESOPHAGEAL REFLUX DISEASE WITHOUT ESOPHAGITIS: ICD-10-CM

## 2025-08-19 DIAGNOSIS — E78.2 MIXED HYPERLIPIDEMIA: ICD-10-CM

## 2025-08-19 DIAGNOSIS — I10 ESSENTIAL HYPERTENSION: ICD-10-CM

## 2025-08-19 DIAGNOSIS — N40.0 ENLARGED PROSTATE: ICD-10-CM

## 2025-08-19 RX ORDER — METOPROLOL TARTRATE 100 MG/1
100 TABLET ORAL 2 TIMES DAILY
Qty: 180 TABLET | Refills: 0 | OUTPATIENT
Start: 2025-08-19

## 2025-08-19 RX ORDER — FAMOTIDINE 20 MG/1
20 TABLET, FILM COATED ORAL 2 TIMES DAILY
Qty: 180 TABLET | Refills: 1 | Status: SHIPPED | OUTPATIENT
Start: 2025-08-19

## 2025-08-19 RX ORDER — TAMSULOSIN HYDROCHLORIDE 0.4 MG/1
0.4 CAPSULE ORAL DAILY
Qty: 90 CAPSULE | Refills: 1 | Status: SHIPPED | OUTPATIENT
Start: 2025-08-19

## 2025-08-19 RX ORDER — OMEPRAZOLE 40 MG/1
40 CAPSULE, DELAYED RELEASE ORAL DAILY
Qty: 90 CAPSULE | Refills: 1 | Status: SHIPPED | OUTPATIENT
Start: 2025-08-19

## 2025-08-19 RX ORDER — VALSARTAN AND HYDROCHLOROTHIAZIDE 160; 25 MG/1; MG/1
1 TABLET ORAL DAILY
Qty: 90 TABLET | Refills: 1 | Status: SHIPPED | OUTPATIENT
Start: 2025-08-19

## 2025-08-19 RX ORDER — ATORVASTATIN CALCIUM 20 MG/1
20 TABLET, FILM COATED ORAL DAILY
Qty: 90 TABLET | Refills: 1 | Status: SHIPPED | OUTPATIENT
Start: 2025-08-19

## 2025-08-19 RX ORDER — METOPROLOL TARTRATE 100 MG/1
100 TABLET ORAL 2 TIMES DAILY
Qty: 180 TABLET | Refills: 1 | Status: SHIPPED | OUTPATIENT
Start: 2025-08-19

## 2025-08-28 DIAGNOSIS — I10 ESSENTIAL HYPERTENSION: ICD-10-CM

## 2025-08-28 RX ORDER — VALSARTAN AND HYDROCHLOROTHIAZIDE 160; 25 MG/1; MG/1
1 TABLET ORAL DAILY
Qty: 90 TABLET | Refills: 1 | Status: SHIPPED | OUTPATIENT
Start: 2025-08-28